# Patient Record
Sex: MALE | Race: WHITE | NOT HISPANIC OR LATINO | Employment: UNEMPLOYED | URBAN - METROPOLITAN AREA
[De-identification: names, ages, dates, MRNs, and addresses within clinical notes are randomized per-mention and may not be internally consistent; named-entity substitution may affect disease eponyms.]

---

## 2017-04-23 ENCOUNTER — HOSPITAL ENCOUNTER (EMERGENCY)
Facility: CLINIC | Age: 32
Discharge: HOME | End: 2017-04-23
Attending: PSYCHIATRY & NEUROLOGY | Admitting: PSYCHIATRY & NEUROLOGY
Payer: MEDICAID

## 2017-04-23 VITALS
TEMPERATURE: 98 F | OXYGEN SATURATION: 100 % | DIASTOLIC BLOOD PRESSURE: 79 MMHG | RESPIRATION RATE: 16 BRPM | HEART RATE: 124 BPM | BODY MASS INDEX: 25.68 KG/M2 | WEIGHT: 179 LBS | SYSTOLIC BLOOD PRESSURE: 129 MMHG

## 2017-04-23 DIAGNOSIS — F19.94 OTHER PSYCHOACTIVE SUBSTANCE-INDUCED MOOD DISORDER (H): ICD-10-CM

## 2017-04-23 DIAGNOSIS — F19.20 CHEMICAL DEPENDENCY (H): ICD-10-CM

## 2017-04-23 LAB
AMPHETAMINES UR QL SCN: ABNORMAL
BARBITURATES UR QL: ABNORMAL
BENZODIAZ UR QL: ABNORMAL
CANNABINOIDS UR QL SCN: ABNORMAL
COCAINE UR QL: ABNORMAL
ETHANOL UR QL SCN: ABNORMAL
OPIATES UR QL SCN: ABNORMAL

## 2017-04-23 PROCEDURE — 80307 DRUG TEST PRSMV CHEM ANLYZR: CPT | Performed by: FAMILY MEDICINE

## 2017-04-23 PROCEDURE — 25000132 ZZH RX MED GY IP 250 OP 250 PS 637: Performed by: PSYCHIATRY & NEUROLOGY

## 2017-04-23 PROCEDURE — 99285 EMERGENCY DEPT VISIT HI MDM: CPT | Mod: 25 | Performed by: PSYCHIATRY & NEUROLOGY

## 2017-04-23 PROCEDURE — 90791 PSYCH DIAGNOSTIC EVALUATION: CPT

## 2017-04-23 PROCEDURE — 99284 EMERGENCY DEPT VISIT MOD MDM: CPT | Mod: Z6 | Performed by: PSYCHIATRY & NEUROLOGY

## 2017-04-23 PROCEDURE — 80307 DRUG TEST PRSMV CHEM ANLYZR: CPT | Mod: 91 | Performed by: FAMILY MEDICINE

## 2017-04-23 PROCEDURE — 80320 DRUG SCREEN QUANTALCOHOLS: CPT | Performed by: FAMILY MEDICINE

## 2017-04-23 RX ORDER — GABAPENTIN 600 MG/1
600 TABLET ORAL 3 TIMES DAILY
Status: ON HOLD | COMMUNITY
End: 2017-05-12

## 2017-04-23 RX ADMIN — NICOTINE POLACRILEX 2 MG: 2 GUM, CHEWING ORAL at 16:12

## 2017-04-23 ASSESSMENT — ENCOUNTER SYMPTOMS
NERVOUS/ANXIOUS: 1
CONSTITUTIONAL NEGATIVE: 1
GASTROINTESTINAL NEGATIVE: 1
DECREASED CONCENTRATION: 1
RESPIRATORY NEGATIVE: 1
EYES NEGATIVE: 1
NEUROLOGICAL NEGATIVE: 1
HEMATOLOGIC/LYMPHATIC NEGATIVE: 1
ENDOCRINE NEGATIVE: 1
CARDIOVASCULAR NEGATIVE: 1
HALLUCINATIONS: 0
MUSCULOSKELETAL NEGATIVE: 1
HYPERACTIVE: 0

## 2017-04-23 NOTE — ED AVS SNAPSHOT
[facility], [place], Emergency Department    [address]    [facility] [address]    Phone:  [phone]    Fax:  [phone]                                       Morgan Bosch   MRN: [id number]    Department:  [facility], Emergency Department   Date of Visit:  4/23/2017           After Visit Summary Signature Page     I have received my discharge instructions, and my questions have been answered. I have discussed any challenges I see with this plan with the nurse or doctor.    ..........................................................................................................................................  Patient/Patient Representative Signature      ..........................................................................................................................................  Patient Representative Print Name and Relationship to Patient    ..................................................               ................................................  Date                                            Time    ..........................................................................................................................................  Reviewed by Signature/Title    ...................................................              ..............................................  Date                                                            Time

## 2017-04-23 NOTE — ED PROVIDER NOTES
History     Chief Complaint   Patient presents with     Suicidal     pt having anxiety, increased depression, and suicidal with plan     The history is provided by the patient and medical records.     Morgan Bosch is a 32 year old male who is here as he drove here seeking help. He has history of chemical dependency and MDD. He has been hospitalized previously. He was last here in November 2016 and was discharged to treatment at [facility], then was at [facility] for aftercare. Patient reports sobriety until this month. He felt his depression got worse. He relapsed on methamphetamine and alcohol, drinking twice weekly.he is living out of his truck and parents kicked him out for using. He was reporting feeling suicidal.    Patient was offered admission, or a crisis facility such as [facility]. He had time to process and is not interested in going back to treatment which is the likely recommendation. He felt that nothing will change. He declined admission into the hospital and a referral for Crisis stabilization.    PERSONAL MEDICAL HISTORY  Past Medical History:   Diagnosis Date     Anxiety      MDD (major depressive disorder)      Psychosis     substance induced     Substance abuse     methamphetamine     PAST SURGICAL HISTORY  Past Surgical History:   Procedure Laterality Date     NO HISTORY OF SURGERY       FAMILY HISTORY  Family History   Problem Relation Age of Onset     Anxiety Disorder Sister      Depression Father      Coronary Artery Disease Maternal Grandfather      DIABETES Paternal Grandfather      SOCIAL HISTORY  Social History   Substance Use Topics     Smoking status: Current Every Day Smoker     Packs/day: 0.50     Smokeless tobacco: Never Used     Alcohol use No     MEDICATIONS  No current facility-administered medications for this encounter.      Current Outpatient Prescriptions   Medication     gabapentin (NEURONTIN) 600 MG tablet     busPIRone (BUSPAR) 10 MG tablet     DULoxetine (CYMBALTA) 60 MG  capsule     multivitamin, therapeutic with minerals (THERA-VIT-M) TABS     Ibuprofen (ADVIL PO)     acetaminophen (TYLENOL) 325 MG tablet     hydrOXYzine (ATARAX) 50 MG tablet     prazosin (MINIPRESS) 1 MG capsule     nicotine polacrilex 4 MG lozenge     ALLERGIES  Allergies   Allergen Reactions     Adhesive Tape Itching     Blisters (band aids)     I have reviewed the Medications, Allergies, Past Medical and Surgical History, and Social History in the Epic system.    Review of Systems   Constitutional: Negative.    HENT: Negative.    Eyes: Negative.    Respiratory: Negative.    Cardiovascular: Negative.    Gastrointestinal: Negative.    Endocrine: Negative.    Genitourinary: Negative.    Musculoskeletal: Negative.    Skin: Negative.    Neurological: Negative.    Hematological: Negative.    Psychiatric/Behavioral: Positive for decreased concentration and suicidal ideas. Negative for hallucinations. The patient is nervous/anxious. The patient is not hyperactive.    All other systems reviewed and are negative.      Physical Exam   BP: 121/71  Pulse: 108  Temp: 98.6  F (37  C)  Resp: 16  Weight: 81.2 kg (179 lb)  SpO2: 97 %  Physical Exam   Constitutional: He appears well-developed.   HENT:   Head: Normocephalic.   Eyes: Pupils are equal, round, and reactive to light.   Neck: Normal range of motion.   Cardiovascular: Normal rate.    Pulmonary/Chest: Effort normal.   Abdominal: Soft.   Musculoskeletal: Normal range of motion.   Neurological: He is alert.   Skin: Skin is warm.   Psychiatric: His speech is normal. Judgment and thought content normal. His affect is blunt. He is withdrawn. He is not agitated, not aggressive, not hyperactive and not combative. Thought content is not paranoid and not delusional. Cognition and memory are normal. He exhibits a depressed mood. He expresses no homicidal and no suicidal ideation.   Nursing note and vitals reviewed.      ED Course     ED Course     Procedures      Labs Ordered and  "Resulted from Time of ED Arrival Up to the Time of Departure from the ED   DRUG ABUSE SCREEN 6 CHEM DEP URINE ([facility]) - Abnormal; Notable for the following:        Result Value    Amphetamine Qual Urine   (*)     Value: Positive   Cutoff for a positive amphetamine is greater than 500 ng/mL. This is an   unconfirmed screening result to be used for medical purposes only.      Benzodiazepine Qual Urine   (*)     Value: Positive   Cutoff for a positive benzodiazepine is greater than 200 ng/mL. This is an   unconfirmed screening result to be used for medical purposes only.      All other components within normal limits       Assessments & Plan (with Medical Decision Making)   Patient with active chemical dependency. He is declining an offer of admission or referral to crisis. He likely is starting to \"sober up,\" and is not ready to return to treatment. Patient is not holdable. He was discharged as he requested. He is encouraged to follow-up established care and services and to reach out to Crisis in the community.    I have reviewed the nursing notes.    I have reviewed the findings, diagnosis, plan and need for follow up with the patient.    Discharge Medication List as of 4/23/2017  6:13 PM          Final diagnoses:   Chemical dependency (H)   Other psychoactive substance-induced mood disorder (H)       4/23/2017   [facility], [place], EMERGENCY DEPARTMENT     Americo Bosch MD  04/23/17 2003    "

## 2017-04-23 NOTE — DISCHARGE INSTRUCTIONS
Methamphetamine is inducing a depressive state as you are coming down from it.  You need to stop using drugs in order to feel better  Follow-up with your established care and services for continued management  Consider connect to [facility] for further support in the community

## 2017-04-23 NOTE — ED AVS SNAPSHOT
[facility], Emergency Department    [address]    [place] [address]    Phone:  [phone]    Fax:  [phone]                                       Morgan Bosch   MRN: [id number]    Department:  [facility], Emergency Department   Date of Visit:  4/23/2017           Patient Information     Date Of Birth          1985        Your diagnoses for this visit were:     Chemical dependency (H)     Other psychoactive substance-induced mood disorder (H)        You were seen by Americo Bosch MD.      Follow-up Information     Follow up with No Ref-Primary, Physician.        Discharge Instructions       Methamphetamine is inducing a depressive state as you are coming down from it.  You need to stop using drugs in order to feel better  Follow-up with your established care and services for continued management  Consider connect to [facility] for further support in the community    24 Hour Appointment Hotline       To make an appointment at any [facility] clinic, call [phone] ([phone]). If you don't have a family doctor or clinic, we will help you find one. [facility] clinics are conveniently located to serve the needs of you and your family.             Review of your medicines      Our records show that you are taking the medicines listed below. If these are incorrect, please call your family doctor or clinic.        Dose / Directions Last dose taken    acetaminophen 325 MG tablet   Commonly known as:  TYLENOL   Dose:  650 mg        Take 2 tablets (650 mg) by mouth every 4 hours as needed for mild pain   Refills:  0        ADVIL PO   Dose:  400 mg        Take 400 mg by mouth every 8 hours as needed for moderate pain   Refills:  0        busPIRone 10 MG tablet   Commonly known as:  BUSPAR   Dose:  10 mg   Quantity:  90 tablet        Take 1 tablet (10 mg) by mouth 3 times daily   Refills:  1        DULoxetine 60 MG EC capsule   Commonly known as:  CYMBALTA   Dose:  60 mg   Quantity:  " 30 capsule        Take 1 capsule (60 mg) by mouth daily   Refills:  1        gabapentin 600 MG tablet   Commonly known as:  NEURONTIN   Dose:  600 mg        Take 600 mg by mouth 3 times daily   Refills:  0        hydrOXYzine 50 MG tablet   Commonly known as:  ATARAX   Dose:  50 mg   Quantity:  90 tablet        Take 1 tablet (50 mg) by mouth 3 times daily as needed for anxiety   Refills:  1        multivitamin, therapeutic with minerals Tabs tablet   Dose:  1 tablet   Quantity:  30 each        Take 1 tablet by mouth daily   Refills:  1        nicotine polacrilex 4 MG lozenge   Dose:  4-8 mg   Quantity:  135 tablet        Place 1-2 lozenges (4-8 mg) inside cheek every hour as needed for other (nicotine withdrawal symptoms)   Refills:  1        prazosin 1 MG capsule   Commonly known as:  MINIPRESS   Dose:  1 mg   Quantity:  30 capsule        Take 1 capsule (1 mg) by mouth At Bedtime   Refills:  1                Procedures and tests performed during your visit     Drug abuse screen 6 urine (chem dep)      Orders Needing Specimen Collection     None      Pending Results     No orders found from 4/21/2017 to 4/24/2017.            Pending Culture Results     No orders found from 4/21/2017 to 4/24/2017.            Thank you for choosing [facility]       Thank you for choosing [facility] for your care. Our goal is always to provide you with excellent care. Hearing back from our patients is one way we can continue to improve our services. Please take a few minutes to complete the written survey that you may receive in the mail after you visit with us. Thank you!        [employer] Information     [employer] lets you send messages to your doctor, view your test results, renew your prescriptions, schedule appointments and more. To sign up, go to [email or web address] . Click on \"Log in\" on the left side of the screen, which will take you to the Welcome page. Then click on \"Sign up Now\" on the right side of the page.     You will be " asked to enter the access code listed below, as well as some personal information. Please follow the directions to create your username and password.     Your access code is: [email or web address]  Expires: 2017  6:13 PM     Your access code will  in 90 days. If you need help or a new code, please call your [facility] clinic or [phone].        Care EveryWhere ID     This is your Care EveryWhere ID. This could be used by other organizations to access your [facility] medical records  [id number]        After Visit Summary       This is your record. Keep this with you and show to your community pharmacist(s) and doctor(s) at your next visit.

## 2017-04-25 ENCOUNTER — HOSPITAL ENCOUNTER (INPATIENT)
Facility: CLINIC | Age: 32
LOS: 20 days | Discharge: HOME | DRG: 885 | End: 2017-05-15
Attending: EMERGENCY MEDICINE | Admitting: PSYCHIATRY & NEUROLOGY
Payer: MEDICAID

## 2017-04-25 ENCOUNTER — TELEPHONE (OUTPATIENT)
Dept: BEHAVIORAL HEALTH | Facility: CLINIC | Age: 32
End: 2017-04-25

## 2017-04-25 DIAGNOSIS — F32.A DEPRESSION, UNSPECIFIED DEPRESSION TYPE: ICD-10-CM

## 2017-04-25 DIAGNOSIS — F33.1 MAJOR DEPRESSIVE DISORDER, RECURRENT EPISODE, MODERATE (H): ICD-10-CM

## 2017-04-25 DIAGNOSIS — F41.1 GAD (GENERALIZED ANXIETY DISORDER): Primary | ICD-10-CM

## 2017-04-25 DIAGNOSIS — F19.10 POLYSUBSTANCE ABUSE (H): ICD-10-CM

## 2017-04-25 DIAGNOSIS — G47.09 OTHER INSOMNIA: ICD-10-CM

## 2017-04-25 PROBLEM — R45.851 SUICIDAL IDEATION: Status: ACTIVE | Noted: 2017-04-25

## 2017-04-25 PROCEDURE — 90791 PSYCH DIAGNOSTIC EVALUATION: CPT

## 2017-04-25 PROCEDURE — 99285 EMERGENCY DEPT VISIT HI MDM: CPT | Mod: Z6 | Performed by: EMERGENCY MEDICINE

## 2017-04-25 PROCEDURE — 99285 EMERGENCY DEPT VISIT HI MDM: CPT | Mod: 25 | Performed by: EMERGENCY MEDICINE

## 2017-04-25 PROCEDURE — 25000132 ZZH RX MED GY IP 250 OP 250 PS 637: Performed by: PSYCHIATRY & NEUROLOGY

## 2017-04-25 PROCEDURE — 80307 DRUG TEST PRSMV CHEM ANLYZR: CPT | Performed by: EMERGENCY MEDICINE

## 2017-04-25 PROCEDURE — 80320 DRUG SCREEN QUANTALCOHOLS: CPT | Performed by: EMERGENCY MEDICINE

## 2017-04-25 PROCEDURE — 12400001 ZZH R&B MH UMMC

## 2017-04-25 RX ORDER — OLANZAPINE 5 MG/1
5 TABLET ORAL
Status: DISCONTINUED | OUTPATIENT
Start: 2017-04-25 | End: 2017-05-15 | Stop reason: HOSPADM

## 2017-04-25 RX ORDER — ALUMINA, MAGNESIA, AND SIMETHICONE 2400; 2400; 240 MG/30ML; MG/30ML; MG/30ML
30 SUSPENSION ORAL EVERY 4 HOURS PRN
Status: DISCONTINUED | OUTPATIENT
Start: 2017-04-25 | End: 2017-05-15 | Stop reason: HOSPADM

## 2017-04-25 RX ORDER — ACETAMINOPHEN 325 MG/1
650 TABLET ORAL EVERY 4 HOURS PRN
Status: DISCONTINUED | OUTPATIENT
Start: 2017-04-25 | End: 2017-05-15 | Stop reason: HOSPADM

## 2017-04-25 RX ORDER — HYDROXYZINE HYDROCHLORIDE 25 MG/1
25-50 TABLET, FILM COATED ORAL EVERY 4 HOURS PRN
Status: DISCONTINUED | OUTPATIENT
Start: 2017-04-25 | End: 2017-05-15 | Stop reason: HOSPADM

## 2017-04-25 RX ORDER — BISACODYL 10 MG
10 SUPPOSITORY, RECTAL RECTAL DAILY PRN
Status: DISCONTINUED | OUTPATIENT
Start: 2017-04-25 | End: 2017-05-15 | Stop reason: HOSPADM

## 2017-04-25 RX ORDER — MULTIPLE VITAMINS W/ MINERALS TAB 9MG-400MCG
1 TAB ORAL DAILY
Status: DISCONTINUED | OUTPATIENT
Start: 2017-04-25 | End: 2017-05-15 | Stop reason: HOSPADM

## 2017-04-25 RX ORDER — GABAPENTIN 600 MG/1
600 TABLET ORAL 3 TIMES DAILY
Status: DISCONTINUED | OUTPATIENT
Start: 2017-04-25 | End: 2017-05-15 | Stop reason: HOSPADM

## 2017-04-25 RX ORDER — PHENOBARBITAL 32.4 MG/1
32.4 TABLET ORAL 4 TIMES DAILY
Status: DISCONTINUED | OUTPATIENT
Start: 2017-04-25 | End: 2017-04-27

## 2017-04-25 RX ORDER — POLYETHYLENE GLYCOL 3350 17 G
2-4 POWDER IN PACKET (EA) ORAL
Status: DISCONTINUED | OUTPATIENT
Start: 2017-04-25 | End: 2017-05-15 | Stop reason: HOSPADM

## 2017-04-25 RX ORDER — BUSPIRONE HYDROCHLORIDE 10 MG/1
10 TABLET ORAL 3 TIMES DAILY
Status: DISCONTINUED | OUTPATIENT
Start: 2017-04-25 | End: 2017-05-02

## 2017-04-25 RX ORDER — TRAZODONE HYDROCHLORIDE 50 MG/1
50 TABLET, FILM COATED ORAL
Status: DISCONTINUED | OUTPATIENT
Start: 2017-04-25 | End: 2017-05-15 | Stop reason: HOSPADM

## 2017-04-25 RX ORDER — OLANZAPINE 10 MG/2ML
5 INJECTION, POWDER, FOR SOLUTION INTRAMUSCULAR
Status: DISCONTINUED | OUTPATIENT
Start: 2017-04-25 | End: 2017-05-15 | Stop reason: HOSPADM

## 2017-04-25 RX ADMIN — NICOTINE POLACRILEX 4 MG: 4 LOZENGE ORAL at 19:02

## 2017-04-25 RX ADMIN — ACETAMINOPHEN 650 MG: 325 TABLET, FILM COATED ORAL at 19:02

## 2017-04-25 RX ADMIN — NICOTINE POLACRILEX 4 MG: 4 LOZENGE ORAL at 17:58

## 2017-04-25 RX ADMIN — MULTIPLE VITAMINS W/ MINERALS TAB 1 TABLET: TAB at 19:02

## 2017-04-25 RX ADMIN — PHENOBARBITAL 32.4 MG: 32.4 TABLET ORAL at 19:02

## 2017-04-25 ASSESSMENT — ENCOUNTER SYMPTOMS
DIARRHEA: 0
ABDOMINAL PAIN: 0
SHORTNESS OF BREATH: 0
FEVER: 0
DYSPHORIC MOOD: 1

## 2017-04-25 NOTE — IP AVS SNAPSHOT
[address] [address]    Phone:  [phone]                                       After Visit Summary   4/25/2017    Morgan Bosch    MRN: [id number]           After Visit Summary Signature Page     I have received my discharge instructions, and my questions have been answered. I have discussed any challenges I see with this plan with the nurse or doctor.    ..........................................................................................................................................  Patient/Patient Representative Signature      ..........................................................................................................................................  Patient Representative Print Name and Relationship to Patient    ..................................................               ................................................  Date                                            Time    ..........................................................................................................................................  Reviewed by Signature/Title    ...................................................              ..............................................  Date                                                            Time

## 2017-04-25 NOTE — TELEPHONE ENCOUNTER
S: Rosa gave clinical saying pt attempted to carbon monoxide poison himself in his car today as a SA.  B: He was in er a few days and decided to leave er. He attempted suicide today saying everyone has lost nelly in him because he has relapsed on xanax, meth, etoh and cough syrup. He says he takes xanax occasionally. It is not rx'ed. He denies regularly using etoh. He says meth is his drug of choice but denies using it daily. He says he does not think he can be sober. He now wants cd tx. Utox pos for amphet's and benzps. No chronic med prob's.  A: coop, lethargic, vol, med cleared, SI  R: #20/[facility] accepted for brittanie james

## 2017-04-25 NOTE — IP AVS SNAPSHOT
MRN:[id number]                      After Visit Summary   4/25/2017    Morgan Bosch    MRN: [id number]           Thank you!     Thank you for choosing [facility] for your care. Our goal is always to provide you with excellent care.        Patient Information     Date Of Birth          1985        Designated Caregiver       Most Recent Value    Caregiver    Will someone help with your care after discharge? no      About your hospital stay     You were admitted on:  April 25, 2017 You last received care in the:  [facility]    You were discharged on:  May 15, 2017       Who to Call     For medical emergencies, please call 911.  For non-urgent questions about your medical care, please call your primary care provider or clinic, None          Attending Provider     Provider Specialty    Chanda Mcdowell MD Emergency Medicine    Konstantin Madrid MD Psychiatry       Primary Care Provider    Physician No Ref-Primary       No address on file        Further instructions from your care team       Behavioral Discharge Planning and Instructions    Summary:                                                  NEEDS PD    Main Diagnosis: Major Depressive Disorder, Alcohol use disorder    Major Treatments, Procedures and Findings: Psychological assessment, [facility] resources, medication management    Symptoms to Report: feeling more aggressive, increased confusion, losing more sleep, mood getting worse or thoughts of suicide    Lifestyle Adjustment: Please refrain from all mood altering substances including alcohol.    Psychiatry Follow-up:   Please contact [facility]  for a referral for medication management as you get closer to completing the program at [facility]..  [facility] has appointments available to them in [place].   Lacy Freeze: [phone]    Resources:   Crisis Intervention: 373.698.4796 or 623-097-3340 (TTY: 239.697.6248).  Call anytime for  "help.  National [employer] on Mental Illness (www.mn.mark.org): 333-572-6887 or 779-812-1363.  Suicide Awareness Voices of Education (SAVE) (www.save.org): 872-763-TEGS (1938)  National Suicide Prevention Line (www.mentalhealthmn.org): 909-033-YAMB (6905)    General Medication Instructions:   See your medication sheet(s) for instructions.   Take all medicines as directed.  Make no changes unless your doctor suggests them.   Go to all your doctor visits.  Be sure to have all your required lab tests. This way, your medicines can be refilled on time.  Do not use any drugs not prescribed by your doctor.  Avoid alcohol.      The treatment team has appreciated the opportunity to work with you.  We wish you the best in the future.  If you have any questions or concerns our unit number is [phone].    Pending Results     No orders found from 4/23/2017 to 4/26/2017.            Statement of Approval     Ordered          05/12/17 1708  I have reviewed and agree with all the recommendations and orders detailed in this document.  EFFECTIVE NOW     Approved and electronically signed by:  Konstantin Madrid MD             Admission Information     Date & Time Provider Department Dept. Phone    4/25/2017 Konstantin Madrid MD  [facility] [phone]      Your Vitals Were     Blood Pressure Pulse Temperature Respirations Height Weight    110/64 85 98  F (36.7  C) (Oral) 16 1.778 m (5' 10\") 87.5 kg (193 lb)    Pulse Oximetry BMI (Body Mass Index)                96% 27.69 kg/m2          [employer] Information     [employer] lets you send messages to your doctor, view your test results, renew your prescriptions, schedule appointments and more. To sign up, go to [email or web address] . Click on \"Log in\" on the left side of the screen, which will take you to the Welcome page. Then click on \"Sign up Now\" on the right side of the page.     You will be asked to enter the access code listed below, as well as some personal information. " Please follow the directions to create your username and password.     Your access code is: [email or web address]  Expires: 2017  6:13 PM     Your access code will  in 90 days. If you need help or a new code, please call your [facility] clinic or [phone].        Care EveryWhere ID     This is your Care EveryWhere ID. This could be used by other organizations to access your [facility] medical records  [id number]           Review of your medicines      START taking        Dose / Directions    FLUoxetine 20 MG tablet   Used for:  Major depressive disorder, recurrent episode, moderate (H), TIM (generalized anxiety disorder)        Dose:  40 mg   Take 2 tablets (40 mg) by mouth daily   Quantity:  60 tablet   Refills:  1         CONTINUE these medicines which may have CHANGED, or have new prescriptions. If we are uncertain of the size of tablets/capsules you have at home, strength may be listed as something that might have changed.        Dose / Directions    BusPIRone HCl 30 MG Tabs   This may have changed:    - medication strength  - how much to take  - when to take this   Used for:  TIM (generalized anxiety disorder)        Dose:  30 mg   Take 30 mg by mouth 2 times daily   Quantity:  60 tablet   Refills:  1       * hydrOXYzine 50 MG tablet   Commonly known as:  ATARAX   This may have changed:  Another medication with the same name was added. Make sure you understand how and when to take each.   Used for:  TIM (generalized anxiety disorder)        Dose:  50 mg   Take 1 tablet (50 mg) by mouth 3 times daily as needed for anxiety   Quantity:  90 tablet   Refills:  1       * hydrOXYzine 50 MG tablet   Commonly known as:  ATARAX   This may have changed:  You were already taking a medication with the same name, and this prescription was added. Make sure you understand how and when to take each.   Used for:  Other insomnia        Dose:   mg   Take 1-2 tablets ( mg) by mouth nightly as needed (sleep)    Quantity:  60 tablet   Refills:  1       * Notice:  This list has 2 medication(s) that are the same as other medications prescribed for you. Read the directions carefully, and ask your doctor or other care provider to review them with you.      CONTINUE these medicines which have NOT CHANGED        Dose / Directions    gabapentin 600 MG tablet   Commonly known as:  NEURONTIN   Used for:  TIM (generalized anxiety disorder)        Dose:  600 mg   Take 1 tablet (600 mg) by mouth 3 times daily   Quantity:  90 tablet   Refills:  1       multivitamin, therapeutic with minerals Tabs tablet   Used for:  Polysubstance abuse        Dose:  1 tablet   Take 1 tablet by mouth daily   Quantity:  30 each   Refills:  1       nicotine polacrilex 4 MG lozenge   Used for:  Polysubstance abuse        Dose:  4-8 mg   Place 1-2 lozenges (4-8 mg) inside cheek every hour as needed for other (nicotine withdrawal symptoms)   Quantity:  135 tablet   Refills:  1         STOP taking     acetaminophen 325 MG tablet   Commonly known as:  TYLENOL           ADVIL PO           DULoxetine 60 MG EC capsule   Commonly known as:  CYMBALTA                Where to get your medicines      These medications were sent to [facility] [place], [place] - [address]  [address] [address]     Phone:  [phone]     BusPIRone HCl 30 MG Tabs    FLUoxetine 20 MG tablet    gabapentin 600 MG tablet    hydrOXYzine 50 MG tablet    hydrOXYzine 50 MG tablet                Protect others around you: Learn how to safely use, store and throw away your medicines at www.disposemymeds.org.             Medication List: This is a list of all your medications and when to take them. Check marks below indicate your daily home schedule. Keep this list as a reference.      Medications           Morning Afternoon Evening Bedtime As Needed    BusPIRone HCl 30 MG Tabs   Take 30 mg by mouth 2 times daily   Last time this was given:  30 mg on  5/15/2017  6:09 AM                                FLUoxetine 20 MG tablet   Take 2 tablets (40 mg) by mouth daily   Last time this was given:  40 mg on 5/15/2017  6:09 AM                                gabapentin 600 MG tablet   Commonly known as:  NEURONTIN   Take 1 tablet (600 mg) by mouth 3 times daily   Last time this was given:  600 mg on 5/15/2017  6:09 AM                                * hydrOXYzine 50 MG tablet   Commonly known as:  ATARAX   Take 1 tablet (50 mg) by mouth 3 times daily as needed for anxiety   Last time this was given:  100 mg on 5/12/2017  8:20 PM                                * hydrOXYzine 50 MG tablet   Commonly known as:  ATARAX   Take 1-2 tablets ( mg) by mouth nightly as needed (sleep)   Last time this was given:  100 mg on 5/12/2017  8:20 PM                                multivitamin, therapeutic with minerals Tabs tablet   Take 1 tablet by mouth daily   Last time this was given:  1 tablet on 5/15/2017  6:10 AM                                nicotine polacrilex 4 MG lozenge   Place 1-2 lozenges (4-8 mg) inside cheek every hour as needed for other (nicotine withdrawal symptoms)   Last time this was given:  8 mg on 5/15/2017  6:10 AM                                * Notice:  This list has 2 medication(s) that are the same as other medications prescribed for you. Read the directions carefully, and ask your doctor or other care provider to review them with you.

## 2017-04-25 NOTE — PROGRESS NOTES
Patient admitted from emergency department. Patient is voluntary.  Frustrated and irritable with admission interview.  Stated he has been living in his van, and pulled on side of road and planned on carbon monoxide had hose to use but drove self to hospital.  Admits to using Meth and alcohol.  Friend had gave him one xanax.  Feeling hopeless and tired of dealing with his depression thinks he may have had 3 month of sobriety.  Questions cymbalta helping with his depression. Has not been attending AA or NA.  Has no support.

## 2017-04-25 NOTE — PHARMACY-ADMISSION MEDICATION HISTORY
Admission Medication History status for the 4/25/2017 admission is complete.  See EPIC admission navigator for Prior to Admission medications.    Medication history sources:  Patient, [facility] records (Care Everywhere)    Medication history source reliability: Good; patient knew his medications and doses well.    Medication adherence:  Moderate; patient reports he takes most of his medications regularly. He states he was on prazosin, but stopped a few months ago on his own. He also stopped taking his multivitamin and hydroxyzine PRN, but wants to restart both medications.    Changes made to PTA medication list (reason)  Added: None  Deleted:   - Prazosin; patient reports he hasn't taken for several months. He reports he stopped taking on his own.  Changed: None    Additional medication history information (including reliability of information, actions taken by pharmacist):   - [facility] records indicate the patient was started on quetiapine 100 mg po qHS for sleep in January 2017. The patient did not report as an active medication, so I did not add to his list. I was unable to follow-up with patient to confirm he is not taking.    Time spent in this activity: 10 minutes    Medication history completed by: Safia Ma, PharmD    Prior to Admission medications    Medication Sig Last Dose Taking? Auth Provider   gabapentin (NEURONTIN) 600 MG tablet Take 600 mg by mouth 3 times daily 4/24/2017 Yes Reported, Patient   busPIRone (BUSPAR) 10 MG tablet Take 1 tablet (10 mg) by mouth 3 times daily 4/24/2017 Yes Ni Harrison APRN CNP   hydrOXYzine (ATARAX) 50 MG tablet Take 1 tablet (50 mg) by mouth 3 times daily as needed for anxiety  Yes Ni Harrison APRN CNP   DULoxetine (CYMBALTA) 60 MG capsule Take 1 capsule (60 mg) by mouth daily 4/24/2017 Yes Ni Harrison APRN CNP   nicotine polacrilex 4 MG lozenge Place 1-2 lozenges (4-8 mg) inside cheek every hour as needed for other  (nicotine withdrawal symptoms) 4/25/2017 Yes Ni Harrison APRN CNP   Ibuprofen (ADVIL PO) Take 400 mg by mouth every 8 hours as needed for moderate pain   Yes Reported, Patient   acetaminophen (TYLENOL) 325 MG tablet Take 2 tablets (650 mg) by mouth every 4 hours as needed for mild pain  Yes Ni Harrison APRN CNP   multivitamin, therapeutic with minerals (THERA-VIT-M) TABS Take 1 tablet by mouth daily More than a month  Ni Harrison APRN CNP

## 2017-04-25 NOTE — PROGRESS NOTES
ADMISSION:[place], EBT( PT wants in the locker), card, Shoes, belt, bag park, hat, keys, phone, a lighter, all in locker #12  I am responsible for any personal items that are not sent to the safe or pharmacy. [facility] is not responsible for loss, theft or damage of any property in my possession.    Patient Signature _____________________ Date/Time _____________________    Staff Signature _______________________ Date/Time _____________________    2nd Staff person, if patient is unable/unwilling to sign  ___________________________________ Date/Time _____________________  DISCHARGE:  All personal items have been returned to me.    Patient Signature _____________________ Date/Time _____________________     Staff Signature _______________________ Date/Time _____________________

## 2017-04-25 NOTE — ED PROVIDER NOTES
History     Chief Complaint   Patient presents with     Suicidal     depression     HPI  Morgan Bosch is a 32 year old male with a history of polysubstance abuse and MDD who presents to the Emergency Department for evaluation of suicidal ideation. The patient reports feeling suicidal since being kicked out of his parents home in the last couple of weeks, when he relapsed on drugs. Notably, he was seen and evaluated here 2 days ago similarly for suicidal ideation and he was offered admission here or a crisis facility, such as [facility], but he declined at that time. The patient reports sobriety until this month, when he began having worsening depression, subsequently relapsing on methamphetamine and alcohol, as well as Xanax and cough syrup. He follows with [facility] and has had prior psychiatric admissions, and he has a history of inconsistency with his medications, gabapentin and Buspar. The patient has no physical complaints or other concerns at this time.    I have reviewed the Medications, Allergies, Past Medical and Surgical History, and Social History in the Epic system.    No current facility-administered medications for this encounter.      Current Outpatient Prescriptions   Medication     gabapentin (NEURONTIN) 600 MG tablet     busPIRone (BUSPAR) 10 MG tablet     DULoxetine (CYMBALTA) 60 MG capsule     nicotine polacrilex 4 MG lozenge     hydrOXYzine (ATARAX) 50 MG tablet     prazosin (MINIPRESS) 1 MG capsule     multivitamin, therapeutic with minerals (THERA-VIT-M) TABS     Ibuprofen (ADVIL PO)     acetaminophen (TYLENOL) 325 MG tablet     Past Medical History:   Diagnosis Date     Anxiety      MDD (major depressive disorder)      Psychosis     substance induced     Substance abuse     methamphetamine       Past Surgical History:   Procedure Laterality Date     NO HISTORY OF SURGERY         Family History   Problem Relation Age of Onset     Anxiety Disorder Sister      Depression Father       Coronary Artery Disease Maternal Grandfather      DIABETES Paternal Grandfather        Social History   Substance Use Topics     Smoking status: Current Every Day Smoker     Packs/day: 0.50     Smokeless tobacco: Never Used     Alcohol use 1.2 oz/week     2 Cans of beer per week        Allergies   Allergen Reactions     Adhesive Tape Itching     Blisters (band aids)       Review of Systems   Constitutional: Negative for fever.   Respiratory: Negative for shortness of breath.    Cardiovascular: Negative for chest pain.   Gastrointestinal: Negative for abdominal pain and diarrhea.   Psychiatric/Behavioral: Positive for dysphoric mood and suicidal ideas.   All other systems reviewed and are negative.      Physical Exam   BP: 105/72  Pulse: 103  Temp: 97.2  F (36.2  C)  Weight: 79.7 kg (175 lb 12.8 oz)  SpO2: 95 %  Physical Exam   Constitutional: He is oriented to person, place, and time. No distress.   HENT:   Mouth/Throat: No oropharyngeal exudate.   Superficial abrasion to the right side of face   Eyes: No scleral icterus.   Cardiovascular: Normal heart sounds and intact distal pulses.    Pulmonary/Chest: Breath sounds normal. No respiratory distress.   Abdominal: Soft. There is no tenderness.   Musculoskeletal: He exhibits no edema or tenderness.   Neurological: He is alert and oriented to person, place, and time. No cranial nerve deficit. He exhibits normal muscle tone.   Flat affect   Skin: Skin is warm. No rash noted. He is not diaphoretic.       ED Course     ED Course     Procedures             Critical Care time:  none               Labs Ordered and Resulted from Time of ED Arrival Up to the Time of Departure from the ED   DRUG ABUSE SCREEN 6 CHEM DEP URINE ([facility]) - Abnormal; Notable for the following:        Result Value    Amphetamine Qual Urine   (*)     Value: Positive   Cutoff for a positive amphetamine is greater than 500 ng/mL. This is an   unconfirmed screening result to be used for medical  purposes only.      Benzodiazepine Qual Urine   (*)     Value: Positive   Cutoff for a positive benzodiazepine is greater than 200 ng/mL. This is an   unconfirmed screening result to be used for medical purposes only.      All other components within normal limits            Assessments & Plan (with Medical Decision Making)   He does have a superficial abrasion to his face -- is unwilling to tell me exactly how or when it happened. He does seem neuro intact. He denies acute illness or complaints. He will be admitted voluntarily for inpt mental health stabilization and treatment.    I have reviewed the nursing notes.    I have reviewed the findings, diagnosis, plan and need for follow up with the patient.    New Prescriptions    No medications on file       Final diagnoses:   Depression, unspecified depression type   Polysubstance abuse     I, Tobias Obregon, am serving as a trained medical scribe to document services personally performed by Chanda Mcdowell MD, based on the provider's statements to me.      Chanda KERNS MD, was physically present and have reviewed and verified the accuracy of this note documented by Tobias Obregon.    4/25/2017   [facility], EMERGENCY DEPARTMENT     Chanda Mcdowell MD  04/25/17 8644

## 2017-04-26 LAB
ALBUMIN SERPL-MCNC: 3.4 G/DL (ref 3.4–5)
ALP SERPL-CCNC: 84 U/L (ref 40–150)
ALT SERPL W P-5'-P-CCNC: 22 U/L (ref 0–70)
ANION GAP SERPL CALCULATED.3IONS-SCNC: 5 MMOL/L (ref 3–14)
AST SERPL W P-5'-P-CCNC: 18 U/L (ref 0–45)
BASOPHILS # BLD AUTO: 0 10E9/L (ref 0–0.2)
BASOPHILS NFR BLD AUTO: 0.7 %
BILIRUB SERPL-MCNC: 1 MG/DL (ref 0.2–1.3)
BUN SERPL-MCNC: 10 MG/DL (ref 7–30)
CALCIUM SERPL-MCNC: 8.6 MG/DL (ref 8.5–10.1)
CHLORIDE SERPL-SCNC: 110 MMOL/L (ref 94–109)
CHOLEST SERPL-MCNC: 92 MG/DL
CO2 SERPL-SCNC: 29 MMOL/L (ref 20–32)
CREAT SERPL-MCNC: 0.88 MG/DL (ref 0.66–1.25)
DEPRECATED CALCIDIOL+CALCIFEROL SERPL-MC: 49 UG/L (ref 20–75)
DIFFERENTIAL METHOD BLD: ABNORMAL
EOSINOPHIL # BLD AUTO: 0.3 10E9/L (ref 0–0.7)
EOSINOPHIL NFR BLD AUTO: 5.6 %
ERYTHROCYTE [DISTWIDTH] IN BLOOD BY AUTOMATED COUNT: 12.8 % (ref 10–15)
GFR SERPL CREATININE-BSD FRML MDRD: ABNORMAL ML/MIN/1.7M2
GLUCOSE SERPL-MCNC: 98 MG/DL (ref 70–99)
HCT VFR BLD AUTO: 39 % (ref 40–53)
HCV AB SERPL QL IA: NORMAL
HDLC SERPL-MCNC: 57 MG/DL
HGB BLD-MCNC: 13.2 G/DL (ref 13.3–17.7)
HIV 1+2 AB+HIV1 P24 AG SERPL QL IA: NORMAL
IMM GRANULOCYTES # BLD: 0 10E9/L (ref 0–0.4)
IMM GRANULOCYTES NFR BLD: 0.3 %
LDLC SERPL CALC-MCNC: 14 MG/DL
LYMPHOCYTES # BLD AUTO: 1.6 10E9/L (ref 0.8–5.3)
LYMPHOCYTES NFR BLD AUTO: 26 %
MCH RBC QN AUTO: 31 PG (ref 26.5–33)
MCHC RBC AUTO-ENTMCNC: 33.8 G/DL (ref 31.5–36.5)
MCV RBC AUTO: 92 FL (ref 78–100)
MONOCYTES # BLD AUTO: 0.6 10E9/L (ref 0–1.3)
MONOCYTES NFR BLD AUTO: 10.4 %
NEUTROPHILS # BLD AUTO: 3.4 10E9/L (ref 1.6–8.3)
NEUTROPHILS NFR BLD AUTO: 57 %
NONHDLC SERPL-MCNC: 35 MG/DL
NRBC # BLD AUTO: 0 10*3/UL
NRBC BLD AUTO-RTO: 0 /100
PLATELET # BLD AUTO: 272 10E9/L (ref 150–450)
POTASSIUM SERPL-SCNC: 4.5 MMOL/L (ref 3.4–5.3)
PROT SERPL-MCNC: 6.5 G/DL (ref 6.8–8.8)
RBC # BLD AUTO: 4.26 10E12/L (ref 4.4–5.9)
SODIUM SERPL-SCNC: 144 MMOL/L (ref 133–144)
T4 FREE SERPL-MCNC: 1.21 NG/DL (ref 0.76–1.46)
TRIGL SERPL-MCNC: 105 MG/DL
TSH SERPL DL<=0.005 MIU/L-ACNC: 0.18 MU/L (ref 0.4–4)
WBC # BLD AUTO: 6 10E9/L (ref 4–11)

## 2017-04-26 PROCEDURE — 84443 ASSAY THYROID STIM HORMONE: CPT | Performed by: PSYCHIATRY & NEUROLOGY

## 2017-04-26 PROCEDURE — 25000132 ZZH RX MED GY IP 250 OP 250 PS 637: Performed by: PSYCHIATRY & NEUROLOGY

## 2017-04-26 PROCEDURE — 82306 VITAMIN D 25 HYDROXY: CPT | Performed by: PSYCHIATRY & NEUROLOGY

## 2017-04-26 PROCEDURE — 87389 HIV-1 AG W/HIV-1&-2 AB AG IA: CPT | Performed by: PSYCHIATRY & NEUROLOGY

## 2017-04-26 PROCEDURE — 80053 COMPREHEN METABOLIC PANEL: CPT | Performed by: PSYCHIATRY & NEUROLOGY

## 2017-04-26 PROCEDURE — 86803 HEPATITIS C AB TEST: CPT | Performed by: PSYCHIATRY & NEUROLOGY

## 2017-04-26 PROCEDURE — 85025 COMPLETE CBC W/AUTO DIFF WBC: CPT | Performed by: PSYCHIATRY & NEUROLOGY

## 2017-04-26 PROCEDURE — 84439 ASSAY OF FREE THYROXINE: CPT | Performed by: PSYCHIATRY & NEUROLOGY

## 2017-04-26 PROCEDURE — 80061 LIPID PANEL: CPT | Performed by: PSYCHIATRY & NEUROLOGY

## 2017-04-26 PROCEDURE — 12400001 ZZH R&B MH UMMC

## 2017-04-26 PROCEDURE — 36415 COLL VENOUS BLD VENIPUNCTURE: CPT | Performed by: PSYCHIATRY & NEUROLOGY

## 2017-04-26 PROCEDURE — 99223 1ST HOSP IP/OBS HIGH 75: CPT | Mod: AI | Performed by: PSYCHIATRY & NEUROLOGY

## 2017-04-26 RX ORDER — FLUOXETINE 10 MG/1
20 TABLET, FILM COATED ORAL DAILY
Status: DISCONTINUED | OUTPATIENT
Start: 2017-04-26 | End: 2017-05-02

## 2017-04-26 RX ADMIN — MULTIPLE VITAMINS W/ MINERALS TAB 1 TABLET: TAB at 08:50

## 2017-04-26 RX ADMIN — BUSPIRONE HYDROCHLORIDE 10 MG: 10 TABLET ORAL at 08:50

## 2017-04-26 RX ADMIN — PHENOBARBITAL 32.4 MG: 32.4 TABLET ORAL at 08:50

## 2017-04-26 RX ADMIN — NICOTINE POLACRILEX 8 MG: 4 LOZENGE ORAL at 13:34

## 2017-04-26 RX ADMIN — BUSPIRONE HYDROCHLORIDE 10 MG: 10 TABLET ORAL at 13:31

## 2017-04-26 RX ADMIN — NICOTINE POLACRILEX 8 MG: 4 LOZENGE ORAL at 19:58

## 2017-04-26 RX ADMIN — NICOTINE POLACRILEX 4 MG: 4 LOZENGE ORAL at 08:50

## 2017-04-26 RX ADMIN — PHENOBARBITAL 32.4 MG: 32.4 TABLET ORAL at 13:32

## 2017-04-26 RX ADMIN — NICOTINE POLACRILEX 4 MG: 4 LOZENGE ORAL at 17:01

## 2017-04-26 RX ADMIN — NICOTINE POLACRILEX 4 MG: 4 LOZENGE ORAL at 11:39

## 2017-04-26 RX ADMIN — GABAPENTIN 600 MG: 600 TABLET, FILM COATED ORAL at 14:07

## 2017-04-26 RX ADMIN — HYDROXYZINE HYDROCHLORIDE 50 MG: 25 TABLET ORAL at 13:31

## 2017-04-26 RX ADMIN — FLUOXETINE HYDROCHLORIDE 20 MG: 10 TABLET, COATED ORAL at 19:58

## 2017-04-26 RX ADMIN — PHENOBARBITAL 32.4 MG: 32.4 TABLET ORAL at 17:01

## 2017-04-26 RX ADMIN — GABAPENTIN 600 MG: 600 TABLET, FILM COATED ORAL at 08:50

## 2017-04-26 RX ADMIN — GABAPENTIN 600 MG: 600 TABLET, FILM COATED ORAL at 19:58

## 2017-04-26 RX ADMIN — PHENOBARBITAL 32.4 MG: 32.4 TABLET ORAL at 19:58

## 2017-04-26 RX ADMIN — BUSPIRONE HYDROCHLORIDE 10 MG: 10 TABLET ORAL at 19:58

## 2017-04-26 ASSESSMENT — ACTIVITIES OF DAILY LIVING (ADL)
LAUNDRY: WITH SUPERVISION
ORAL_HYGIENE: INDEPENDENT
HYGIENE/GROOMING: INDEPENDENT
DRESS: INDEPENDENT
ORAL_HYGIENE: INDEPENDENT
GROOMING: INDEPENDENT
DRESS: STREET CLOTHES

## 2017-04-26 NOTE — PLAN OF CARE
Problem: General Plan of Care (Inpatient Behavioral)  Goal: Team Discussion  Team Plan:   BEHAVIORAL TEAM DISCUSSION     Continued Stay Criteria/Rationale: suicide attempt by CO2 in the context of a relapse on meth and alcohol  Plan: CTC to explore options for support, manage medications  Participants: Dr. Madrid, RN Jessica Clemons, CTC Chanda Josephan     Medical/Physical: withdrawing from meth and alcohol     Illness Management Recovery model: Personal Plan of Care     Patient completed Personal Plan of Care, identifying reasons for hospitalization and goals for discharge. Form reviewed in team meeting and signed by patient, physician, writer and RN. Form given to [facility] to be scanned into [facility].

## 2017-04-26 NOTE — PROGRESS NOTES
"  Initial Psychosocial Assessment    I have reviewed the chart, met with the patient, and developed Care Plan. Information for assessment was obtained from: Pt, medical record                                                             Voluntary    Presenting Problem: Patient brought self to hospital after attempting suicide by carbon dioxide. He relapsed on Meth and alcohol. Pt reported to this writer that asphyxiation takes too long and he needs a quicker form of suicide like hanging. He does not want to live any longer. Patient stated, \"Im not going to make it.\"      History of Mental Health and Chemical Dependency:  History of several commitments, spent time at [facility] possibly 2010    Many prior CD treatments: [facility], [facility] x2, project [facility] x2, [facility]      Significant Life Events   (Illness, Abuse, Trauma, Death): denies    Family: born and raised in [place] with intact family. One sister in [place], never  and no children.    Living Situation: homeless, living in his [employer]      Educational Background: HS grad, attended the art [employer] and film school in [place].       Financial Status: unemployed, [place] and food stamps    Legal Issues:  Felony robbery conviction    Ethnic/Cultural Issues: none    Spiritual Orientation:  Yarsani     Service History: none    Social Functioning (organization, interests): used to enjoy photography, art and music    Current Treatment Providers are:    none    Social Service Assessment/Plan:     Provide safe environment  Manage medications  CTC to explore options for follow up  Offer therapeutic groups for coping skills                   "

## 2017-04-26 NOTE — PROGRESS NOTES
The pt was visible only briefly, took medications and went to sleep.  He was unavailable/unwilling to check-in this shift.     04/25/17 4435   Behavioral Health   Hallucinations denies / not responding to hallucinations   Thinking distractable   Orientation person: oriented;date: oriented;place: oriented;time: oriented   Memory baseline memory   Insight poor   Judgement impaired   Eye Contact at examiner   Affect blunted, flat   Mood depressed   Physical Appearance/Attire attire appropriate to age and situation   Hygiene neglected grooming - unclean body, hair, teeth   Suicidality other (see comments)  (unable to assess, pt slept all shift)   Self Injury other (see comment)  (unable to assess, pt slept all shift)   Activity withdrawn;isolative;other (see comment)  (sleeping)   Speech coherent   Medication Sensitivity no stated side effects;no observed side effects   Psychomotor / Gait balanced;steady   Substance Withdrawal Interventions   Social and Therapeutic Interventions (Substance Withdrawal) encourage participation in therapeutic groups and milieu activities;encourage socialization with peers   Safety   Suicidality status 15   Psycho Education   Type of Intervention 1:1 intervention   Response unavailable   Treatment Detail sleeping   Behavioral Health Interventions   Depression maintain safety precautions;monitor need to revise level of observation;maintain safe secure environment;encourage nutrition and hydration;encourage participation / independence with adls;provide emotional support;establish therapeutic relationship   Social and Therapeutic Interventions (Depression) encourage socialization with peers;encourage effective boundaries with peers;encourage participation in therapeutic groups and milieu activities

## 2017-04-26 NOTE — PROGRESS NOTES
Pt given Phenobarbital 32.4 mg at 1902. Next dose was scheduled for 2000, but was not given, due to timing with first dose. Next dose would be at 0100, but pt was asleep before writer could confirm if he was okay with being awakened to take this medication.    Pt requested medication for pain (rated at a 5 out of 10), and was given 650 mg of Tylenol at 1902.    Will continue to monitor for safety and comfort.

## 2017-04-27 PROCEDURE — 99233 SBSQ HOSP IP/OBS HIGH 50: CPT | Performed by: PSYCHIATRY & NEUROLOGY

## 2017-04-27 PROCEDURE — 25000132 ZZH RX MED GY IP 250 OP 250 PS 637: Performed by: PSYCHIATRY & NEUROLOGY

## 2017-04-27 PROCEDURE — 12400001 ZZH R&B MH UMMC

## 2017-04-27 RX ORDER — PHENOBARBITAL 32.4 MG/1
32.4 TABLET ORAL 3 TIMES DAILY
Status: DISCONTINUED | OUTPATIENT
Start: 2017-04-27 | End: 2017-04-28

## 2017-04-27 RX ADMIN — PHENOBARBITAL 32.4 MG: 32.4 TABLET ORAL at 20:38

## 2017-04-27 RX ADMIN — BUSPIRONE HYDROCHLORIDE 10 MG: 10 TABLET ORAL at 20:37

## 2017-04-27 RX ADMIN — BUSPIRONE HYDROCHLORIDE 10 MG: 10 TABLET ORAL at 08:51

## 2017-04-27 RX ADMIN — NICOTINE POLACRILEX 4 MG: 4 LOZENGE ORAL at 18:02

## 2017-04-27 RX ADMIN — GABAPENTIN 600 MG: 600 TABLET, FILM COATED ORAL at 20:37

## 2017-04-27 RX ADMIN — MULTIPLE VITAMINS W/ MINERALS TAB 1 TABLET: TAB at 08:51

## 2017-04-27 RX ADMIN — NICOTINE POLACRILEX 8 MG: 4 LOZENGE ORAL at 11:51

## 2017-04-27 RX ADMIN — NICOTINE POLACRILEX 8 MG: 4 LOZENGE ORAL at 15:53

## 2017-04-27 RX ADMIN — HYDROXYZINE HYDROCHLORIDE 50 MG: 25 TABLET ORAL at 14:14

## 2017-04-27 RX ADMIN — HYDROXYZINE HYDROCHLORIDE 50 MG: 25 TABLET ORAL at 20:37

## 2017-04-27 RX ADMIN — NICOTINE POLACRILEX 8 MG: 4 LOZENGE ORAL at 20:37

## 2017-04-27 RX ADMIN — GABAPENTIN 600 MG: 600 TABLET, FILM COATED ORAL at 08:51

## 2017-04-27 RX ADMIN — NICOTINE POLACRILEX 8 MG: 4 LOZENGE ORAL at 08:51

## 2017-04-27 RX ADMIN — GABAPENTIN 600 MG: 600 TABLET, FILM COATED ORAL at 14:15

## 2017-04-27 RX ADMIN — NICOTINE POLACRILEX 4 MG: 4 LOZENGE ORAL at 19:40

## 2017-04-27 RX ADMIN — BUSPIRONE HYDROCHLORIDE 10 MG: 10 TABLET ORAL at 14:14

## 2017-04-27 RX ADMIN — PHENOBARBITAL 32.4 MG: 32.4 TABLET ORAL at 15:54

## 2017-04-27 RX ADMIN — PHENOBARBITAL 32.4 MG: 32.4 TABLET ORAL at 08:51

## 2017-04-27 RX ADMIN — NICOTINE POLACRILEX 8 MG: 4 LOZENGE ORAL at 14:14

## 2017-04-27 RX ADMIN — PHENOBARBITAL 32.4 MG: 32.4 TABLET ORAL at 12:52

## 2017-04-27 RX ADMIN — FLUOXETINE HYDROCHLORIDE 20 MG: 10 TABLET, COATED ORAL at 08:51

## 2017-04-27 RX ADMIN — NICOTINE POLACRILEX 8 MG: 4 LOZENGE ORAL at 12:52

## 2017-04-27 ASSESSMENT — ACTIVITIES OF DAILY LIVING (ADL)
GROOMING: INDEPENDENT
DRESS: INDEPENDENT
ORAL_HYGIENE: INDEPENDENT
ORAL_HYGIENE: INDEPENDENT
LAUNDRY: UNABLE TO COMPLETE
GROOMING: INDEPENDENT
DRESS: STREET CLOTHES;INDEPENDENT

## 2017-04-27 NOTE — PROGRESS NOTES
"   04/26/17 2000   Behavioral Health   Hallucinations denies / not responding to hallucinations   Thinking confused;distractable;poor concentration   Orientation person: oriented;place: oriented   Memory baseline memory   Insight poor   Judgement impaired   Eye Contact at examiner   Affect blunted, flat;sad   Mood depressed;anxious;mood is calm   Physical Appearance/Attire disheveled   Hygiene neglected grooming - unclean body, hair, teeth   Suicidality chronic thoughts with no stated plan   Self Injury other (see comment)  (denies )   Activity isolative;withdrawn   Speech clear   Activities of Daily Living   Hygiene/Grooming independent   Oral Hygiene independent   Dress independent   Laundry with supervision   Room Organization independent     Patient reported SI thoughts including \" I think about all the ways I can end my life I fantasize about killing myself.\"  Patient denies SIB.  Patient reported feeling \" depressed(8), sad (3), agitated (4), irritated (3) and anxious (4).  Patient isolative, blunted, flat affect, ate meals, spend most of shift in bed and did not socialized with others.  Patient daily goal \" to feel better.\"  Patient goal after discharge \"not sure.\"  Patient is independent with ADL's.  Patient reported no nutritional concerns.  Patient reported no visitors.      "

## 2017-04-27 NOTE — H&P
Morgan Bosch was seen for 70 minutes on 04/26/2017, over 50 minutes of this time spent in counseling and coordinating care, clarifying diagnostic prognostic issues and presence of support in community.      CHIEF COMPLAINT AND REASON FOR ADMISSION:  Morgan Bosch is a 32-year-old  male with history of polysubstance abuse, major depression who presented to the Emergency Department for evaluation of suicidal ideation.  He said that he had been kicked out of his parent's home in the last couple of weeks, relapsed on drugs.  He reported that lately he has been using Xanax, cough syrup, methamphetamines and alcohol.      HISTORY OF PRESENT ILLNESS:  The patient is well known to this facility.  His last hospitalization was in 10/2016.  He was also admitted and treated in 09/2016, 08/2016, 02/2016, 03/2015, 10/2011, 03/2011, 04/2011, and he says he has multiple psychiatric hospitalizations as well.  The patient reports also that he has not been able to stay clean and sober, was kicked out of his mother's home in [place], [place], secondary to his drug relapse.  He said that he had attempted to carbon monoxide himself by placing a hose from the exhaust into his van;s window.  Then started having second thoughts and terminated the suicide attempt.  He reports poor sleep, low energy, feeling worthless, hopeless.  He admits that he has been using Xanax around 2 mg per day, snorting and using IV approximately $30 worth per episode of methamphetamines, using cough syrup but not every day and drinking alcohol daily, averaging a 6 pack per day.      PAST PSYCHIATRIC HISTORY:  The patient's most recent CD treatment efforts include [facility]  chemical dependency program.  See prior diagnoses such as schizoaffective disorder, major depressive disorder, TIM.  Has a history of prior MICD commitments in half-way in 06/2010 through [facility].  Typically does not follow through with services upon termination  of court involvement.  History of serious suicide attempt by acetaminophen overdose in 2010.  He states that he does not believe that his medications are working for him.  He has a history of poor compliance with medication.  He told me today that he feels depressed and highly anxious even when he is sober, however, his longest period of sobriety was only 6 months or so and in a closed environment.  In addition to the above-mentioned, the patient says that he was treated with multiple antidepressants, could recall being on Effexor, Prozac, Zoloft.  He is unsure which one was the most helpful; however, later told me that Prozac had been.  He also tried Lexapro, lithium, Seroquel, trazodone, Zyprexa.  Said that Vistaril was not helpful.  Reports a history of possible serotonin syndrome.  He estimates that he had been in 7-10 chemical dependency treatment programs.      PAST MEDICAL HISTORY:  No history of major medical concerns, surgeries, seizures, but there is a history of mild alcohol withdrawal.      ALLERGIES:  Allergic to adhesive tape.       HOME MEDICATIONS:  The patient admits he was not 100% compliant with:    1.  BuSpar 10 mg 3 times a day.   2.  Gabapentin 600 mg 3 times a day.   3.  Multivitamin 1 tablet daily.      PHYSICAL EXAMINATION:  Please refer to Dr. Chanda Mcdowell's note from 04/25/2017.  The patient had following lab work done at the Emergency Department:  Comprehensive metabolic battery showed elevated chloride of 110, total protein 6.5, otherwise was normal.  The patient had a low TSH 0.18, but free T4 was normal.  CBC with differential showed elevated white blood cells 13.2, hematocrit 39.0, MCV of 4.26.  Hepatitis C antibody and HIV antibody were nonreactive both.  Urine drug screen was positive for benzodiazepines and amphetamines.      VITAL SIGNS:  Temperature 98, respirations 16, heart rate 83, blood pressure 115/61.        FAMILY AND SOCIAL HISTORY:  The patient reports several family  members on both mother's and father's side have depression and anxiety. Uncle has Down's syndrome.  Parents have depression.  Two members of extended family have bipolar disorder.  He grew up in the [facility] and in [place].  Parents are  and had a normal childhood.  Has 1 sister, strained relationship with his family.  Completed about 1 year of college.  He has been homeless since being kicked out from his mother's place a couple of weeks ago.  Was convicted of robbery 8 years ago, spent some time in correction.  Denied current legal issues.      MENTAL STATUS EXAMINATION:  The patient is a  male who was interviewed while lying on the bed.  He appeared to be in significant distress and speech was slow and monotone.  He had difficulties keeping his eyes open.  Seemed to be pretty straightforward about his substance use, reported still having suicidal thoughts but said that he felt safe being in the hospital.  There was no loosening of associations.  He denied any psychotic symptoms or homicidal thoughts.  He was oriented x3.  Fund of knowledge appeared to be average with proper usage of vocabulary.  He had no peculiar use of language.  Fund of knowledge was appropriate.  Gait and station were not tested.        1.  Depression:  Major depressive disorder, severe, recurrent without psychotic features.   2.  Generalized anxiety disorder.   3.  Stimulant use disorder, severe.   4.  Polysubstance abuse.      RECOMMENDATION:  The patient was started overnight on phenobarbital to prevent withdrawal from using short-acting benzodiazepines (Xanax).  We will start him on Neurontin and buspirone, however, we will not restart on Cymbalta as he reported it was not helpful.  We will instead start him on Prozac as he reported he had the most significant improvement with it. I asked if he desired to go into chemical dependency treatment.  He said that he was unsure that he was on too many treatment programs, did not  see any benefit from that.  He, however, agreed with me to discuss this issue later on when he was more alert and had more chances to think about that.         IRMA DOHERTY MD             D: 2017 17:52   T: 2017 23:12   MT: EDWIN      Name:     NAVJOT HAINES   MRN:      [id number]        Account:      [id number]   :      1985           Admitted:     [id number]      Document: [id number]

## 2017-04-27 NOTE — PLAN OF CARE
"Problem: Depressive Symptoms  Goal: Depressive Symptoms  Signs and symptoms of listed problems will be absent or manageable.      Met with pt for 1:1.  Pt had refused his morning check-in with staff, but was receptive to check in on evening shift.  Pt presented as flat with a blunted affect.  Pt appears depressed.  Pt has poor eye contact and responded to many questions with \"I don't know\".  Pt appears guarded.  Pt has been isolative and withdrawn during his stay on the inpatient unit.  Pt has only left his room for meals and for medications.  Pt states he will attend group, but then does not follow through with that statement.  Pt states he has depression 8/10, but is unable to cite a specific stressor.  Pt rates anxiety 6/10, but is unable to cite a specific stressor.  Pt reports that he is sad, specifically because he is on the unit.  Pt reports that he feels overwhelmed on the unit.  Pt's goal is to be \"calm and relaxed\" on the unit.       Pt states he has chronic SI thoughts, with frequent urges, and thoughts of planning a suicide attempt.  Pt contracted for safety on the unit.  Pt has reported to other staff that he fantasizes about suicide frequently.  Pt reports SIB thoughts, but no urges or plans for SIB on the unit.  Pt denies all AH, VH, paranoia, and HI.     Pt reports no medical concerns. Pt reports he will shower, but it is unclear if he will follow through.  Pt reports that he has no nutrition concerns.  Pt is calm and pleasant when he does interact with staff and peers.  Will continue to monitor and assess.      "

## 2017-04-27 NOTE — PROGRESS NOTES
"[facility], [place]   Psychiatric Progress Note        Interim History:   The patient's care was discussed with the treatment team during the daily team meeting and/or staff's chart notes were reviewed.  Staff report patient spent a lot of time in his bed getting out only to eat and take meds. Refused vitals. He did get out of bed during visit with me, agreed to go to the patients' lounge. Maintained minimal eye contact. Reported still feeling severely depressed and constantly thinking about suicide: \"I want to kill myself. I don't have any will to keep living. I don't care to stop using because I don't care to live\". Said that he specifically thought about hanging himself and the whole process of doing it, confirmed that he had tried to poison himself with carbon monoxide prior to coming to the hospital. When asked if he was OK with staying at the hospital, he responded he was OK for one more day or two. Admitted that if he were to be discharged from the hospital now, he would have hanged himself. No more signs of withdrawal.         Medications:       FLUoxetine  20 mg Oral Daily     busPIRone  10 mg Oral TID     gabapentin  600 mg Oral TID     multivitamin, therapeutic with minerals  1 tablet Oral Daily     PHENobarbital  32.4 mg Oral 4x Daily          Allergies:     Allergies   Allergen Reactions     Adhesive Tape Itching     Blisters (band aids)          Labs:   No results found for this or any previous visit (from the past 24 hour(s)).       Psychiatric Examination:   BP (P) 107/60 (BP Location: Right arm)  Pulse (P) 76  Temp (P) 98  F (36.7  C) (Oral)  Resp 16  Ht 1.778 m (5' 10\")  Wt 78.9 kg (174 lb)  SpO2 96%  BMI 24.97 kg/m2  Weight is 174 lbs 0 oz  Body mass index is 24.97 kg/(m^2).    Appearance: awake, alert  Attitude:  guarded and somewhat cooperative  Eye Contact:  poor   Mood:  anxious and depressed  Affect:  constricted mobility  Speech:  increased speech latency " and decreased prosody  Psychomotor Behavior:  no evidence of tardive dyskinesia, dystonia, or tics and physical retardation  Throught Process:  goal oriented  Associations:  no loose associations  Thought Content:  active suicidal ideation present  Insight:  limited  Judgement:  limited  Oriented to:  time, person, and place  Attention Span and Concentration:  limited  Recent and Remote Memory:  limited         Precautions:     Behavioral Orders   Procedures     Code 1 - Restrict to Unit     Routine Programming     As clinically indicated     Status 15     Every 15 minutes.     Suicide precautions          DIagnoses:     1. Depression: Major depressive disorder, severe, recurrent without psychotic features.   2. Generalized anxiety disorder.   3. Stimulant use disorder, severe.   4. Polysubstance abuse.          Plan:     Was started on Prozac yesterday. Will start decreasing dose of Phenobarbital. No other med changes. He presents as serious danger to self, will, likely, soon demand to be discharged from the hospital, refuses CD treatment. Will file a petition for MICD commitment.

## 2017-04-28 PROCEDURE — 99207 ZZC CDG-MDM COMPONENT: MEETS MODERATE - UP CODED: CPT | Performed by: PSYCHIATRY & NEUROLOGY

## 2017-04-28 PROCEDURE — 12400001 ZZH R&B MH UMMC

## 2017-04-28 PROCEDURE — 25000132 ZZH RX MED GY IP 250 OP 250 PS 637: Performed by: PSYCHIATRY & NEUROLOGY

## 2017-04-28 PROCEDURE — 99233 SBSQ HOSP IP/OBS HIGH 50: CPT | Performed by: PSYCHIATRY & NEUROLOGY

## 2017-04-28 RX ORDER — PHENOBARBITAL 32.4 MG/1
32.4 TABLET ORAL 2 TIMES DAILY
Status: COMPLETED | OUTPATIENT
Start: 2017-04-28 | End: 2017-04-30

## 2017-04-28 RX ADMIN — NICOTINE POLACRILEX 4 MG: 4 LOZENGE ORAL at 12:26

## 2017-04-28 RX ADMIN — MULTIPLE VITAMINS W/ MINERALS TAB 1 TABLET: TAB at 08:48

## 2017-04-28 RX ADMIN — FLUOXETINE HYDROCHLORIDE 20 MG: 10 TABLET, COATED ORAL at 08:48

## 2017-04-28 RX ADMIN — NICOTINE POLACRILEX 4 MG: 4 LOZENGE ORAL at 21:09

## 2017-04-28 RX ADMIN — NICOTINE POLACRILEX 4 MG: 4 LOZENGE ORAL at 17:59

## 2017-04-28 RX ADMIN — HYDROXYZINE HYDROCHLORIDE 50 MG: 25 TABLET ORAL at 20:21

## 2017-04-28 RX ADMIN — NICOTINE POLACRILEX 2 MG: 4 LOZENGE ORAL at 20:21

## 2017-04-28 RX ADMIN — GABAPENTIN 600 MG: 600 TABLET, FILM COATED ORAL at 08:48

## 2017-04-28 RX ADMIN — NICOTINE POLACRILEX 4 MG: 4 LOZENGE ORAL at 16:39

## 2017-04-28 RX ADMIN — PHENOBARBITAL 32.4 MG: 32.4 TABLET ORAL at 21:09

## 2017-04-28 RX ADMIN — NICOTINE POLACRILEX 4 MG: 4 LOZENGE ORAL at 13:21

## 2017-04-28 RX ADMIN — NICOTINE POLACRILEX 4 MG: 4 LOZENGE ORAL at 07:23

## 2017-04-28 RX ADMIN — BUSPIRONE HYDROCHLORIDE 10 MG: 10 TABLET ORAL at 20:21

## 2017-04-28 RX ADMIN — BUSPIRONE HYDROCHLORIDE 10 MG: 10 TABLET ORAL at 08:48

## 2017-04-28 RX ADMIN — NICOTINE POLACRILEX 4 MG: 4 LOZENGE ORAL at 08:48

## 2017-04-28 RX ADMIN — GABAPENTIN 600 MG: 600 TABLET, FILM COATED ORAL at 20:21

## 2017-04-28 RX ADMIN — PHENOBARBITAL 32.4 MG: 32.4 TABLET ORAL at 08:48

## 2017-04-28 ASSESSMENT — ACTIVITIES OF DAILY LIVING (ADL)
LAUNDRY: UNABLE TO COMPLETE
HYGIENE/GROOMING: INDEPENDENT
ORAL_HYGIENE: INDEPENDENT
DRESS: STREET CLOTHES

## 2017-04-28 NOTE — PROGRESS NOTES
Pt. Was calm. PT. Did not attend groups. PT. Spent most of the time in his room. Pt. Seemed isolative and withdrawn. PT. Told staff that he feels depressed and has thoughts of suicide.

## 2017-04-28 NOTE — PROGRESS NOTES
"[facility], [place]   Psychiatric Progress Note        Interim History:   The patient's care was discussed with the treatment team during the daily team meeting and/or staff's chart notes were reviewed.  Staff report patient spent a lot of time in his bed getting out only to eat and take meds. Poor eye contact, when asked how he felt, said he was better physically, but still very depressed. Admitted to still having thoughts of hanging himself, when asked if he would stay at the hospital voluntarily, said, for 1-2 more days, not for longer. I informed him that we filed a petition for commitment and he was to be screened today. He showed very little emotions on his face, but said he understood.          Medications:       PHENobarbital  32.4 mg Oral TID     FLUoxetine  20 mg Oral Daily     busPIRone  10 mg Oral TID     gabapentin  600 mg Oral TID     multivitamin, therapeutic with minerals  1 tablet Oral Daily          Allergies:     Allergies   Allergen Reactions     Adhesive Tape Itching     Blisters (band aids)          Labs:   No results found for this or any previous visit (from the past 24 hour(s)).       Psychiatric Examination:   /60 (BP Location: Right arm)  Pulse 76  Temp 97.2  F (36.2  C) (Oral)  Resp 16  Ht 1.778 m (5' 10\")  Wt 78.9 kg (174 lb)  SpO2 96%  BMI 24.97 kg/m2  Weight is 174 lbs 0 oz  Body mass index is 24.97 kg/(m^2).    Appearance: awake, alert  Attitude:  guarded and minimally cooperative  Eye Contact:  poor   Mood:  anxious and depressed  Affect:  constricted mobility  Speech:  increased speech latency and decreased prosody  Psychomotor Behavior:  no evidence of tardive dyskinesia, dystonia, or tics and physical retardation  Throught Process:  goal oriented  Associations:  no loose associations  Thought Content:  active suicidal ideation present  Insight:  limited  Judgement:  limited  Oriented to:  time, person, and place  Attention Span and " Concentration:  limited  Recent and Remote Memory:  limited         Precautions:     Behavioral Orders   Procedures     Code 1 - Restrict to Unit     Routine Programming     As clinically indicated     Status 15     Every 15 minutes.     Suicide precautions          DIagnoses:     1. Depression: Major depressive disorder, severe, recurrent without psychotic features.   2. Generalized anxiety disorder.   3. Stimulant use disorder, severe.   4. Polysubstance abuse.          Plan:     Was started on Prozac 2 days ago. Will continue to decrease Phenobarbital dose. No other med changes. He presents as serious danger to self, will, likely, soon demand to be discharged from the hospital, refuses CD treatment. Will be screened today for MICD commitment. Presents a serious danger to self, if no screened and demands to leave, should be evaluated for need for 72 hour hold.

## 2017-04-28 NOTE — PLAN OF CARE
Problem: General Plan of Care (Inpatient Behavioral)  Goal: Team Discussion  Team Plan:   BEHAVIORAL TEAM DISCUSSION     Continued Stay Criteria/Rationale: suicidal/suicide attempt by carbon dioxide  Plan: petition for commitment with Meliton Chavez  Participants: Dr. 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 Form reviewed in team meeting and signed by patient, physician, writer and RN. Form given to [facility] to be scanned into Memoir.

## 2017-04-28 NOTE — PROGRESS NOTES
Pt discharged at 1345 to [facility] via cab with 30 day supply of meds. See D/C note and instructions.

## 2017-04-29 PROCEDURE — 25000132 ZZH RX MED GY IP 250 OP 250 PS 637: Performed by: PSYCHIATRY & NEUROLOGY

## 2017-04-29 PROCEDURE — 12400001 ZZH R&B MH UMMC

## 2017-04-29 RX ADMIN — BUSPIRONE HYDROCHLORIDE 10 MG: 10 TABLET ORAL at 08:53

## 2017-04-29 RX ADMIN — NICOTINE POLACRILEX 4 MG: 4 LOZENGE ORAL at 11:50

## 2017-04-29 RX ADMIN — BUSPIRONE HYDROCHLORIDE 10 MG: 10 TABLET ORAL at 15:52

## 2017-04-29 RX ADMIN — BUSPIRONE HYDROCHLORIDE 10 MG: 10 TABLET ORAL at 19:55

## 2017-04-29 RX ADMIN — NICOTINE POLACRILEX 4 MG: 4 LOZENGE ORAL at 19:55

## 2017-04-29 RX ADMIN — PHENOBARBITAL 32.4 MG: 32.4 TABLET ORAL at 08:52

## 2017-04-29 RX ADMIN — GABAPENTIN 600 MG: 600 TABLET, FILM COATED ORAL at 08:53

## 2017-04-29 RX ADMIN — HYDROXYZINE HYDROCHLORIDE 50 MG: 25 TABLET ORAL at 08:52

## 2017-04-29 RX ADMIN — NICOTINE POLACRILEX 8 MG: 4 LOZENGE ORAL at 07:23

## 2017-04-29 RX ADMIN — FLUOXETINE HYDROCHLORIDE 20 MG: 10 TABLET, COATED ORAL at 08:52

## 2017-04-29 RX ADMIN — PHENOBARBITAL 32.4 MG: 32.4 TABLET ORAL at 19:55

## 2017-04-29 RX ADMIN — GABAPENTIN 600 MG: 600 TABLET, FILM COATED ORAL at 19:55

## 2017-04-29 RX ADMIN — GABAPENTIN 600 MG: 600 TABLET, FILM COATED ORAL at 15:52

## 2017-04-29 RX ADMIN — MULTIPLE VITAMINS W/ MINERALS TAB 1 TABLET: TAB at 08:53

## 2017-04-29 RX ADMIN — HYDROXYZINE HYDROCHLORIDE 50 MG: 25 TABLET ORAL at 19:55

## 2017-04-29 RX ADMIN — NICOTINE POLACRILEX 4 MG: 4 LOZENGE ORAL at 08:52

## 2017-04-29 RX ADMIN — NICOTINE POLACRILEX 4 MG: 4 LOZENGE ORAL at 15:53

## 2017-04-29 RX ADMIN — NICOTINE POLACRILEX 4 MG: 4 LOZENGE ORAL at 18:44

## 2017-04-29 ASSESSMENT — ACTIVITIES OF DAILY LIVING (ADL)
LAUNDRY: WITH SUPERVISION
DRESS: STREET CLOTHES
ORAL_HYGIENE: INDEPENDENT
ORAL_HYGIENE: INDEPENDENT
HYGIENE/GROOMING: PROMPTS
GROOMING: INDEPENDENT
DRESS: STREET CLOTHES
HYGIENE/GROOMING: PROMPTS
DRESS: INDEPENDENT
ORAL_HYGIENE: INDEPENDENT

## 2017-04-29 NOTE — PROGRESS NOTES
"   04/29/17 0000 Behavioral Health   Hallucinations denies / not responding to hallucinations   Thinking distractable;poor concentration   Orientation person: oriented;place: oriented   Memory baseline memory   Insight poor   Judgement impaired   Eye Contact at examiner   Affect blunted, flat   Mood depressed;anxious;mood is calm   Physical Appearance/Attire disheveled   Hygiene neglected grooming - unclean body, hair, teeth   Suicidality chronic thoughts with no stated plan   Self Injury other (see comment)  (denies )   Activity isolative;withdrawn;other (see comment)  (did not attend group and did not socialized with others )   Speech clear   Activities of Daily Living   Hygiene/Grooming independent   Oral Hygiene independent   Dress independent   Laundry with supervision   Room Organization independent       Patient reported SI.  Patient denies SIB.  Patient reported feeling depressed (8), agitated (4), sad (2) and anxious (4) \" I don't want to talk about it.\"  Patient seems calm, spent most of the shift in his room, did not attend group and did not socialized with others.  Patient reported no nutritional concerns.  Patient is independent with ADL's.  Patient reported no visitors.      "

## 2017-04-29 NOTE — PROGRESS NOTES
Pt spent time in his room in his room, he left his room for meals and to complete his meals. Pt stated he has chronic thoughts of suicide but no plan, he also reported feeling depressed and anxious.

## 2017-04-30 PROCEDURE — 12400001 ZZH R&B MH UMMC

## 2017-04-30 PROCEDURE — 25000132 ZZH RX MED GY IP 250 OP 250 PS 637: Performed by: PSYCHIATRY & NEUROLOGY

## 2017-04-30 RX ADMIN — FLUOXETINE HYDROCHLORIDE 20 MG: 10 TABLET, COATED ORAL at 08:08

## 2017-04-30 RX ADMIN — MULTIPLE VITAMINS W/ MINERALS TAB 1 TABLET: TAB at 08:09

## 2017-04-30 RX ADMIN — HYDROXYZINE HYDROCHLORIDE 50 MG: 25 TABLET ORAL at 14:31

## 2017-04-30 RX ADMIN — BUSPIRONE HYDROCHLORIDE 10 MG: 10 TABLET ORAL at 14:30

## 2017-04-30 RX ADMIN — NICOTINE POLACRILEX 8 MG: 4 LOZENGE ORAL at 20:14

## 2017-04-30 RX ADMIN — NICOTINE POLACRILEX 4 MG: 4 LOZENGE ORAL at 21:25

## 2017-04-30 RX ADMIN — NICOTINE POLACRILEX 4 MG: 4 LOZENGE ORAL at 18:21

## 2017-04-30 RX ADMIN — NICOTINE POLACRILEX 8 MG: 4 LOZENGE ORAL at 09:59

## 2017-04-30 RX ADMIN — NICOTINE POLACRILEX 4 MG: 4 LOZENGE ORAL at 16:38

## 2017-04-30 RX ADMIN — GABAPENTIN 600 MG: 600 TABLET, FILM COATED ORAL at 08:08

## 2017-04-30 RX ADMIN — GABAPENTIN 600 MG: 600 TABLET, FILM COATED ORAL at 14:30

## 2017-04-30 RX ADMIN — NICOTINE POLACRILEX 8 MG: 4 LOZENGE ORAL at 14:32

## 2017-04-30 RX ADMIN — HYDROXYZINE HYDROCHLORIDE 50 MG: 25 TABLET ORAL at 20:14

## 2017-04-30 RX ADMIN — NICOTINE POLACRILEX 4 MG: 4 LOZENGE ORAL at 08:09

## 2017-04-30 RX ADMIN — PHENOBARBITAL 32.4 MG: 32.4 TABLET ORAL at 08:08

## 2017-04-30 RX ADMIN — NICOTINE POLACRILEX 4 MG: 4 LOZENGE ORAL at 11:54

## 2017-04-30 RX ADMIN — BUSPIRONE HYDROCHLORIDE 10 MG: 10 TABLET ORAL at 08:08

## 2017-04-30 RX ADMIN — BUSPIRONE HYDROCHLORIDE 10 MG: 10 TABLET ORAL at 20:14

## 2017-04-30 RX ADMIN — GABAPENTIN 600 MG: 600 TABLET, FILM COATED ORAL at 20:14

## 2017-04-30 ASSESSMENT — ACTIVITIES OF DAILY LIVING (ADL)
DRESS: STREET CLOTHES
ORAL_HYGIENE: INDEPENDENT
LAUNDRY: WITH SUPERVISION
ORAL_HYGIENE: INDEPENDENT
GROOMING: INDEPENDENT
LAUNDRY: WITH SUPERVISION
DRESS: STREET CLOTHES
HYGIENE/GROOMING: INDEPENDENT

## 2017-04-30 NOTE — PROGRESS NOTES
Pt attended groups this morning and he went back to his to sleep. He denies suicidal ideation and being depressed. He appears isolative, quiet and lonely. He come out for meals and making request.

## 2017-04-30 NOTE — PROGRESS NOTES
Patient reports feeling depressed with no significant change from previous days, denies current SI/SIB. Patient isolated to his room and napped almost all shift, only coming out to eat.     04/29/17 2100   Behavioral Health   Hallucinations denies / not responding to hallucinations   Thinking poor concentration   Orientation person: oriented;place: oriented;date: oriented;time: oriented   Memory baseline memory   Insight poor   Judgement impaired   Eye Contact at examiner   Affect blunted, flat   Mood depressed   Physical Appearance/Attire disheveled   Hygiene neglected grooming - unclean body, hair, teeth   Suicidality other (see comments)  (Denies)   Self Injury other (see comment)  (Denies)   Activity isolative;withdrawn   Speech clear;coherent   Medication Sensitivity no stated side effects;no observed side effects   Psychomotor / Gait slow   Activities of Daily Living   Hygiene/Grooming prompts   Oral Hygiene independent   Dress street clothes   Room Organization independent

## 2017-05-01 PROCEDURE — 99233 SBSQ HOSP IP/OBS HIGH 50: CPT | Performed by: PSYCHIATRY & NEUROLOGY

## 2017-05-01 PROCEDURE — 12400001 ZZH R&B MH UMMC

## 2017-05-01 PROCEDURE — 25000132 ZZH RX MED GY IP 250 OP 250 PS 637: Performed by: PSYCHIATRY & NEUROLOGY

## 2017-05-01 PROCEDURE — 99207 ZZC CDG-MDM COMPONENT: MEETS MODERATE - UP CODED: CPT | Performed by: PSYCHIATRY & NEUROLOGY

## 2017-05-01 RX ADMIN — MULTIPLE VITAMINS W/ MINERALS TAB 1 TABLET: TAB at 08:31

## 2017-05-01 RX ADMIN — NICOTINE POLACRILEX 4 MG: 4 LOZENGE ORAL at 13:49

## 2017-05-01 RX ADMIN — NICOTINE POLACRILEX 8 MG: 4 LOZENGE ORAL at 15:57

## 2017-05-01 RX ADMIN — NICOTINE POLACRILEX 8 MG: 4 LOZENGE ORAL at 17:22

## 2017-05-01 RX ADMIN — OLANZAPINE 5 MG: 5 TABLET, FILM COATED ORAL at 19:27

## 2017-05-01 RX ADMIN — BUSPIRONE HYDROCHLORIDE 10 MG: 10 TABLET ORAL at 08:31

## 2017-05-01 RX ADMIN — BUSPIRONE HYDROCHLORIDE 10 MG: 10 TABLET ORAL at 13:49

## 2017-05-01 RX ADMIN — BUSPIRONE HYDROCHLORIDE 10 MG: 10 TABLET ORAL at 19:27

## 2017-05-01 RX ADMIN — NICOTINE POLACRILEX 8 MG: 4 LOZENGE ORAL at 19:27

## 2017-05-01 RX ADMIN — NICOTINE POLACRILEX 4 MG: 4 LOZENGE ORAL at 11:15

## 2017-05-01 RX ADMIN — NICOTINE POLACRILEX 4 MG: 4 LOZENGE ORAL at 08:31

## 2017-05-01 RX ADMIN — GABAPENTIN 600 MG: 600 TABLET, FILM COATED ORAL at 19:27

## 2017-05-01 RX ADMIN — NICOTINE POLACRILEX 8 MG: 4 LOZENGE ORAL at 12:29

## 2017-05-01 RX ADMIN — GABAPENTIN 600 MG: 600 TABLET, FILM COATED ORAL at 08:31

## 2017-05-01 RX ADMIN — FLUOXETINE HYDROCHLORIDE 20 MG: 10 TABLET, COATED ORAL at 08:31

## 2017-05-01 RX ADMIN — GABAPENTIN 600 MG: 600 TABLET, FILM COATED ORAL at 13:50

## 2017-05-01 RX ADMIN — HYDROXYZINE HYDROCHLORIDE 50 MG: 25 TABLET ORAL at 13:49

## 2017-05-01 ASSESSMENT — ACTIVITIES OF DAILY LIVING (ADL)
DRESS: INDEPENDENT
ORAL_HYGIENE: INDEPENDENT
HYGIENE/GROOMING: INDEPENDENT
LAUNDRY: WITH SUPERVISION
HYGIENE/GROOMING: INDEPENDENT
DRESS: STREET CLOTHES
LAUNDRY: WITH SUPERVISION
ORAL_HYGIENE: INDEPENDENT

## 2017-05-01 NOTE — PROGRESS NOTES
Pt spent majority of his in bed and he stated that he is depressed. He stated that the medication is not working he he comes out for meals or making requests. He appears isolative and minimal interaction with peers.

## 2017-05-01 NOTE — PLAN OF CARE
"Problem: Depressive Symptoms  Goal: Depressive Symptoms  Signs and symptoms of listed problems will be absent or manageable.   Outcome: No Change  48 Hour Assessment    Pt declined vitals today    Pt was guarded during check-in, not making eye contact with writer. He stated that he was reluctant to answer questions because \"They want to commit me, so I'm hesitant to answer questions.\" Writer assured pt that he was not in legal trouble with the commitment process, and he stated that he knows this. However, when a representative from the [facility] came to the hospital to interview pt, he says that he declined to meet with them. In addition, writer reassured pt that anything he shares is written in an objective manner, and no opinions are given. It was also explained that staff needs to gather information in order to know that he is safe, and will keep himself safe while in the hospital and after discharge. He states that \"I'm safe.\" Pt did not confirm or deny SI or SIB. Writer encouraged pt to meet with  to gather more information regarding the commitment process.    Pt did begin to open up with how he is feeling and shared that his depression has been \"really bad\". \"I don't feel like I have a future. I don't feel like there is anything that will make it better.\"    Pt was med compliant, and visible in the milieu for meals, snacks and to get himself coffee     SI: did not elaborate and would not answer this question     SIB: did not elaborate and would not answer this question     Auditory/Visual Hallucinations: declined to answer this question    No aggressive behaviors displayed. Pt is withdrawn, not socializing with others, and not attending groups. Pt occasionally watches television, but for about 20 to 30 minutes, and returns to his room soon after.     Quality of sleep: has been in bed for the majority of the shift and does not state sleeping problems.     Pt denies pain    Will continue to monitor for " safety, and encourage pt to come out of his room.     Illness Management Recovery model:  Self-Reflection & Planning.     Encouraged patient to continue to consider triggers, wellness strategies, early warning signs, feedback from others, actions to take to prevent relapse, and coping strategies as part of a plan to remain well after leaving the hospital.

## 2017-05-02 PROCEDURE — 25000132 ZZH RX MED GY IP 250 OP 250 PS 637: Performed by: PSYCHIATRY & NEUROLOGY

## 2017-05-02 PROCEDURE — 12400001 ZZH R&B MH UMMC

## 2017-05-02 RX ORDER — FLUOXETINE 10 MG/1
40 TABLET, FILM COATED ORAL DAILY
Status: DISCONTINUED | OUTPATIENT
Start: 2017-05-02 | End: 2017-05-15 | Stop reason: HOSPADM

## 2017-05-02 RX ORDER — BUSPIRONE HYDROCHLORIDE 15 MG/1
15 TABLET ORAL 3 TIMES DAILY
Status: DISCONTINUED | OUTPATIENT
Start: 2017-05-02 | End: 2017-05-03

## 2017-05-02 RX ADMIN — GABAPENTIN 600 MG: 600 TABLET, FILM COATED ORAL at 08:30

## 2017-05-02 RX ADMIN — NICOTINE POLACRILEX 8 MG: 4 LOZENGE ORAL at 14:23

## 2017-05-02 RX ADMIN — NICOTINE POLACRILEX 4 MG: 4 LOZENGE ORAL at 16:15

## 2017-05-02 RX ADMIN — NICOTINE POLACRILEX 8 MG: 4 LOZENGE ORAL at 20:08

## 2017-05-02 RX ADMIN — GABAPENTIN 600 MG: 600 TABLET, FILM COATED ORAL at 20:08

## 2017-05-02 RX ADMIN — NICOTINE POLACRILEX 8 MG: 4 LOZENGE ORAL at 18:04

## 2017-05-02 RX ADMIN — FLUOXETINE HYDROCHLORIDE 40 MG: 10 TABLET, COATED ORAL at 08:30

## 2017-05-02 RX ADMIN — OLANZAPINE 5 MG: 5 TABLET, FILM COATED ORAL at 18:33

## 2017-05-02 RX ADMIN — GABAPENTIN 600 MG: 600 TABLET, FILM COATED ORAL at 14:23

## 2017-05-02 RX ADMIN — NICOTINE POLACRILEX 8 MG: 4 LOZENGE ORAL at 08:30

## 2017-05-02 RX ADMIN — NICOTINE POLACRILEX 8 MG: 4 LOZENGE ORAL at 11:09

## 2017-05-02 RX ADMIN — BUSPIRONE HYDROCHLORIDE 15 MG: 15 TABLET ORAL at 20:08

## 2017-05-02 RX ADMIN — HYDROXYZINE HYDROCHLORIDE 50 MG: 25 TABLET ORAL at 18:33

## 2017-05-02 RX ADMIN — MULTIPLE VITAMINS W/ MINERALS TAB 1 TABLET: TAB at 08:30

## 2017-05-02 RX ADMIN — BUSPIRONE HYDROCHLORIDE 15 MG: 15 TABLET ORAL at 14:24

## 2017-05-02 RX ADMIN — BUSPIRONE HYDROCHLORIDE 15 MG: 15 TABLET ORAL at 08:30

## 2017-05-02 ASSESSMENT — ACTIVITIES OF DAILY LIVING (ADL)
DRESS: STREET CLOTHES
HYGIENE/GROOMING: INDEPENDENT
ORAL_HYGIENE: INDEPENDENT
LAUNDRY: WITH SUPERVISION

## 2017-05-02 NOTE — PROGRESS NOTES
Writer spoke with Lacy from [facility]. Lacy stated they did support he petition and sent it to the 's office. She feels the court hold will come today.

## 2017-05-02 NOTE — PROGRESS NOTES
"Met briefly with patient for Rule 25 assessment.  Began [facility] application for eligibility and patient stated that he is not willing or able to complete a \"two hour\" assessment at this time because he says that it \"makes him too angry\".  Reports that \"if they want me to go to [facility] I'll go but I'm am not going to complete another assessment.\"  Patient left the office and ended the interview.      Pt. Reports he obtained MA through [facility].  This writer printed [facility] Application for Eligibility and placed this with the patient's Rule 25 documentation that he had begun working on on [facility]'s desk.  Perhaps the patient will be more open to completing the paperwork without an  present at this time.    "

## 2017-05-02 NOTE — PROGRESS NOTES
Juice refued to meet with the CD  for a rule 25.  He then turned in a 12 hour intent to leave form at 1225.  He actually wrote 1200 although the time was actually 1225 so the time was changed to reflect this.  Dr. Dominguez was notified.

## 2017-05-02 NOTE — PROGRESS NOTES
Patient reports continued depression without significant change from previous days but denies SI/SIB. Patient isolated to his room almost all shift, resting in bed.     05/01/17 2100   Behavioral Health   Hallucinations denies / not responding to hallucinations   Thinking distractable   Orientation person: oriented;place: oriented;date: oriented;time: oriented   Memory baseline memory   Insight insight appropriate to situation   Judgement impaired   Eye Contact at examiner   Affect blunted, flat   Mood depressed   Physical Appearance/Attire appears stated age;attire appropriate to age and situation   Hygiene well groomed   Suicidality other (see comments)  (Denies)   Self Injury other (see comment)  (Denies)   Activity isolative;withdrawn   Speech clear;coherent   Medication Sensitivity no stated side effects;no observed side effects   Psychomotor / Gait balanced;steady   Activities of Daily Living   Hygiene/Grooming independent   Oral Hygiene independent   Dress independent   Laundry with supervision   Room Organization independent

## 2017-05-02 NOTE — PROGRESS NOTES
"  /64  Pulse 85  Temp 97.6  F (36.4  C) (Oral)  Resp 16  Ht 1.778 m (5' 10\")  Wt 78.9 kg (174 lb)  SpO2 96%  BMI 24.97 kg/m2       Pt was visible in the milieu for the majority of the shift. He attended and participated in Community meeting, as well as watched movies with fellow patients. Pt requested to watch the news, as well. He was calm and pleasant with staff and others, but withdrawn.     Requested prn nicotine lozenges, as well as one dose of Zyprexa 5 mg. Was compliant with other scheduled medications.    Will continue to monitor for safety.  "

## 2017-05-02 NOTE — PROGRESS NOTES
"[facility], [place]   Psychiatric Progress Note        Interim History:   The patient's care was discussed with the treatment team during the daily team meeting and/or staff's chart notes were reviewed.  Staff report patient spent a lot of time in his bed getting out only to eat and take meds. Poor eye contact, when asked how he felt, said he still felt very depressed and didn't see any future for himself. Reported having Suicidal ideation, but would not give details. He refused to meet with county screener, refused vitals. Told me that he would consider leaving hospital in one day or two, then, admitted he had no place to go to.         Medications:       FLUoxetine  20 mg Oral Daily     busPIRone  10 mg Oral TID     gabapentin  600 mg Oral TID     multivitamin, therapeutic with minerals  1 tablet Oral Daily          Allergies:     Allergies   Allergen Reactions     Adhesive Tape Itching     Blisters (band aids)          Labs:   No results found for this or any previous visit (from the past 24 hour(s)).       Psychiatric Examination:   /64  Pulse 85  Temp 97.6  F (36.4  C) (Oral)  Resp 16  Ht 1.778 m (5' 10\")  Wt 78.9 kg (174 lb)  SpO2 96%  BMI 24.97 kg/m2  Weight is 174 lbs 0 oz  Body mass index is 24.97 kg/(m^2).    Appearance: awake, alert  Attitude:  guarded and minimally cooperative  Eye Contact:  poor   Mood:  anxious and depressed  Affect:  constricted mobility  Speech:  increased speech latency and decreased prosody  Psychomotor Behavior:  no evidence of tardive dyskinesia, dystonia, or tics and physical retardation  Throught Process:  goal oriented  Associations:  no loose associations  Thought Content:  active suicidal ideation present  Insight:  limited  Judgement:  limited  Oriented to:  time, person, and place  Attention Span and Concentration:  limited  Recent and Remote Memory:  limited         Precautions:     Behavioral Orders   Procedures     Code 1 - " Restrict to Unit     Routine Programming     As clinically indicated     Status 15     Every 15 minutes.     Suicide precautions          DIagnoses:     1. Depression: Major depressive disorder, severe, recurrent without psychotic features.   2. Generalized anxiety disorder.   3. Stimulant use disorder, severe.   4. Polysubstance abuse.          Plan:     Will increase Prozac. Will continue to decrease Phenobarbital dose. No other med changes. He presents as serious danger to self, will, likely, soon demand to be discharged from the hospital, refuses CD treatment. Was screened for MICD commitment. Presents a serious danger to self, if no screened and demands to leave, should be evaluated for need for 72 hour hold.

## 2017-05-02 NOTE — PROGRESS NOTES
The [facility] called and said Juice is on a court hold and they will fax the hold now.

## 2017-05-02 NOTE — PROGRESS NOTES
Pt comes out from room for meals but he has been sleeping in room. He denies vitals and not wanting to have one on one but he states everything is ok. He appears to be quiet and isolative to room. He does not socialize with peers.

## 2017-05-03 PROCEDURE — 99232 SBSQ HOSP IP/OBS MODERATE 35: CPT | Performed by: PSYCHIATRY & NEUROLOGY

## 2017-05-03 PROCEDURE — 12400001 ZZH R&B MH UMMC

## 2017-05-03 PROCEDURE — 25000132 ZZH RX MED GY IP 250 OP 250 PS 637: Performed by: PSYCHIATRY & NEUROLOGY

## 2017-05-03 RX ORDER — BUSPIRONE HYDROCHLORIDE 15 MG/1
30 TABLET ORAL 2 TIMES DAILY
Status: DISCONTINUED | OUTPATIENT
Start: 2017-05-03 | End: 2017-05-15 | Stop reason: HOSPADM

## 2017-05-03 RX ADMIN — HYDROXYZINE HYDROCHLORIDE 50 MG: 25 TABLET ORAL at 20:34

## 2017-05-03 RX ADMIN — GABAPENTIN 600 MG: 600 TABLET, FILM COATED ORAL at 08:46

## 2017-05-03 RX ADMIN — OLANZAPINE 5 MG: 5 TABLET, FILM COATED ORAL at 13:29

## 2017-05-03 RX ADMIN — HYDROXYZINE HYDROCHLORIDE 50 MG: 25 TABLET ORAL at 13:29

## 2017-05-03 RX ADMIN — BUSPIRONE HYDROCHLORIDE 30 MG: 15 TABLET ORAL at 20:34

## 2017-05-03 RX ADMIN — NICOTINE POLACRILEX 8 MG: 4 LOZENGE ORAL at 14:46

## 2017-05-03 RX ADMIN — NICOTINE POLACRILEX 8 MG: 4 LOZENGE ORAL at 13:29

## 2017-05-03 RX ADMIN — NICOTINE POLACRILEX 8 MG: 4 LOZENGE ORAL at 17:24

## 2017-05-03 RX ADMIN — NICOTINE POLACRILEX 8 MG: 4 LOZENGE ORAL at 10:38

## 2017-05-03 RX ADMIN — GABAPENTIN 600 MG: 600 TABLET, FILM COATED ORAL at 13:29

## 2017-05-03 RX ADMIN — NICOTINE POLACRILEX 8 MG: 4 LOZENGE ORAL at 18:35

## 2017-05-03 RX ADMIN — BUSPIRONE HYDROCHLORIDE 15 MG: 15 TABLET ORAL at 13:29

## 2017-05-03 RX ADMIN — FLUOXETINE HYDROCHLORIDE 40 MG: 10 TABLET, COATED ORAL at 08:46

## 2017-05-03 RX ADMIN — BUSPIRONE HYDROCHLORIDE 15 MG: 15 TABLET ORAL at 08:46

## 2017-05-03 RX ADMIN — NICOTINE POLACRILEX 8 MG: 4 LOZENGE ORAL at 11:59

## 2017-05-03 RX ADMIN — GABAPENTIN 600 MG: 600 TABLET, FILM COATED ORAL at 20:34

## 2017-05-03 RX ADMIN — NICOTINE POLACRILEX 8 MG: 4 LOZENGE ORAL at 20:35

## 2017-05-03 RX ADMIN — MULTIPLE VITAMINS W/ MINERALS TAB 1 TABLET: TAB at 08:47

## 2017-05-03 RX ADMIN — NICOTINE POLACRILEX 8 MG: 4 LOZENGE ORAL at 08:46

## 2017-05-03 ASSESSMENT — ACTIVITIES OF DAILY LIVING (ADL)
ORAL_HYGIENE: INDEPENDENT
DRESS: INDEPENDENT
LAUNDRY: WITH SUPERVISION
GROOMING: INDEPENDENT

## 2017-05-03 NOTE — PROGRESS NOTES
05/03/17 1318   Behavioral Health   Hallucinations denies / not responding to hallucinations   Thinking poor concentration   Orientation person: oriented;place: oriented;date: oriented;time: oriented   Memory baseline memory   Insight poor   Judgement impaired   Eye Contact at examiner   Affect blunted, flat   Mood hopeless   Physical Appearance/Attire attire appropriate to age and situation   Hygiene well groomed   Suicidality (none stated)   Self Injury (none stated)   Activity refusal   Speech clear   Medication Sensitivity no stated side effects   Psychomotor / Gait slow   Psycho Education   Type of Intervention 1:1 intervention   Response participates with cues/redirection   Hours 0.5   Activities of Daily Living   Hygiene/Grooming independent   Oral Hygiene independent   Dress independent   Laundry with supervision   Room Organization independent   pt isolative and withdrawn. Pt has limited hope for activities. Pt stays in room most of shift, out for short periods and mostly for meals.

## 2017-05-03 NOTE — PROGRESS NOTES
"[facility], [place]   Psychiatric Progress Note        Interim History:   The patient's care was discussed with the treatment team during the daily team meeting and/or staff's chart notes were reviewed.  Staff report patient spent a lot of time in his bed getting out only to eat and take meds. Poor eye contact, when asked how he felt, said he still felt very depressed and didn't see any future for himself. He admitted he was upset that [facility] supported our [facility] petition: \"I should have kept my mouth shut about Suicidal ideation\". He initially refused to meet with CD , then, agreed and completed CD assessment, but made it clear that it was important for him to be in compliance with treatment team recommendations. He denied med side effects.         Medications:       FLUoxetine  40 mg Oral Daily     busPIRone  15 mg Oral TID     gabapentin  600 mg Oral TID     multivitamin, therapeutic with minerals  1 tablet Oral Daily          Allergies:     Allergies   Allergen Reactions     Adhesive Tape Itching     Blisters (band aids)          Labs:   No results found for this or any previous visit (from the past 24 hour(s)).       Psychiatric Examination:   /64  Pulse 85  Temp 97.6  F (36.4  C) (Oral)  Resp 16  Ht 1.778 m (5' 10\")  Wt 78.9 kg (174 lb)  SpO2 96%  BMI 24.97 kg/m2  Weight is 174 lbs 0 oz  Body mass index is 24.97 kg/(m^2).    Appearance: awake, alert  Attitude:  guarded and minimally cooperative  Eye Contact:  poor   Mood:  anxious and depressed  Affect:  constricted mobility  Speech:  increased speech latency and decreased prosody  Psychomotor Behavior:  no evidence of tardive dyskinesia, dystonia, or tics and physical retardation  Throught Process:  goal oriented  Associations:  no loose associations  Thought Content:  active suicidal ideation present  Insight:  limited  Judgement:  limited  Oriented to:  time, person, and place  Attention Span and " Concentration:  limited  Recent and Remote Memory:  limited         Precautions:     Behavioral Orders   Procedures     Code 1 - Restrict to Unit     Routine Programming     As clinically indicated     Status 15     Every 15 minutes.     Suicide precautions          DIagnoses:     1. Depression: Major depressive disorder, severe, recurrent without psychotic features.   2. Generalized anxiety disorder.   3. Stimulant use disorder, severe.   4. Polysubstance abuse.          Plan:     Prozac and Buspar were increased 2 days ago. Will, per patient's request, change Buspar to bid dose. No other med changes.

## 2017-05-03 NOTE — PROGRESS NOTES
"Rule 25 Assessment  Background Information   1. Date of Assessment Request  2. Date of Assessment  5/3/2017 3. Date Service Authorized     4.   Carol Ann Salter MA, LPC, [facility] 5.  Phone Number   [facility] [phone] 6. Referent  N/A 7. Assessment Site  [facility] 20NB     8. Client Name   Morgan Bosch 9. Date of Birth  1985 Age  32 year old 10. Gender  male  11. PMI/ Insurance No.  Payor: MEDICAID [place] / Plan: LIMITED MEDICAID / Product Type: Medicaid /   [id number]   12. Client's Primary Language:  English 13. Do you require special accommodations, such as an  or assistance with written material? No   14. Current Address: [address] [place] [address] [place]  [facility] [address]   15. Client Phone Numbers: [phone] (home)      16. Tell me what has happened to bring you here today.    Per EPIC ER Note dated 4/26/17;  \"Morgan Bosch is a 32-year-old  male with history of polysubstance abuse, major depression who presented to the Emergency Department for evaluation of suicidal ideation. He said that he had been kicked out of his parent's home in the last couple of weeks, relapsed on drugs. He reported that lately he has been using Xanax, cough syrup, methamphetamines and alcohol.\"    17. Have you had other rule 25 assessments? Yes.   When  The patient's most recent CD treatment efforts include [facility] chemical dependency program. Has a history of prior MICD commitments in longterm in 06/2010 through [facility]. Typically does not follow through with services upon termination of court involvement.    DIMENSION I - Acute Intoxication /Withdrawal Potential   1. Chemical use most recent 12 months outside a facility and other significant use history (client self-report)              X = Primary Drug Used   Age of First Use Most Recent Pattern of Use and Duration   Need enough information to show pattern (both frequency and amounts) and to show tolerance for each chemical that has a diagnosis "   Date of last use and time, if needed   Withdrawal Potential? Requiring special care Method of use  (oral, smoked, snort, IV, etc)     Alcohol 20 2-5 beers , 1-2x week 18   no     oral     Marijuana/  Hashish N/A             Cocaine/Crack N/A           X Meth/  Amphetamines 25  .01-.05 grams, 1-2x/week 18  no snort     Heroin N/A             Other Opiates/  Synthetics N/A             Inhalants N/A             Benzodiazepines N/A             Hallucinogens N/A             Barbiturates/  Sedatives/  Hypnotics N/A             Over-the-Counter Drugs N/A             Other N/A             Nicotine 19 1/-1/2 pack/day (cigarettes) 18 no smoke     2. Do you use greater amounts of alcohol/other drugs to feel intoxicated or achieve the desired effect? No (patient report)  Or use the same amount and get less of an effect? no (DSM) Example: Increase in amounts and frequency of use.    3A. Have you ever been to detox? no    3B. When was the first time? NA    3C. How many times since then? NA    3D. Date of most recent detox: NA    4.  Withdrawal symptoms: Have you had any of the following withdrawal symptoms?  Past 12 months Recent (past 30 days)   Agitation  Fatigue / Extremely Tired  Sad / Depressed Feeling  Anxiety / Worried Agitation  Fatigue / Extremely Tired  Sad / Depressed Feeling  Anxiety / Worried     's Visual Observations and Symptoms: Alert and orientated x4 with no major withdrawal symptomology.     Based on the above information, is withdrawal likely to require attention as part of treatment participation?  No.    Dimension I Ratings   Acute intoxication/Withdrawal potential - The placing authority must use the criteria in Dimension I to determine a client s acute intoxication and withdrawal potential.    RISK DESCRIPTIONS - Severity ratin Client can tolerate and cope with withdrawal discomfort. The client displays no intoxication or withdrawal signs and symptoms.    REASONS SEVERITY WAS  ASSIGNED Patient displays no symptoms of withdrawal or intoxication symptomology at this time. Pt reports that his last use of alcohol was on 4/26/17 and his last use of meth was on 4/25/17. Pt was given a UA at time of ER admit and the UA was POS for  benzodiazepines and amphetamines.        DIMENSION II - Biomedical Complications and Conditions   1. Do you have any current health/medical conditions?(Include any infectious diseases, allergies, or chronic or acute pain, history of chronic conditions) Per Pt. Report, he does not have any medical conditions  Past Medical History:   Diagnosis Date     Anxiety      MDD (major depressive disorder)      Psychosis     substance induced     Substance abuse     methamphetamine       2. Do you have a health care provider? When was your most recent appointment? What concerns were identified?   Per pt report he does not have a primary care physician    3. If indicated by answers to items 1 or 2: How do you deal with these concerns? Per medical chart:  Pt. Has multiple mental health Dx and a history of polysubstance abuse, multiple past psychiatric hospitalizations and prior commitment for MI/CD.  Pt typically does not follow through with treatment recommendations.  Is that working for you?   If you are not receiving care for this problem, why not?        4A. List current medication(s) including over-the-counter or herbal supplements--including pain management:     Prior to Admission medications    Medication Sig Start Date End Date Taking? Authorizing Provider   gabapentin (NEURONTIN) 600 MG tablet Take 600 mg by mouth 3 times daily   Yes Reported, Patient   busPIRone (BUSPAR) 10 MG tablet Take 1 tablet (10 mg) by mouth 3 times daily 11/7/16  Yes Ni Harrison APRN CNP   DULoxetine (CYMBALTA) 60 MG capsule Take 1 capsule (60 mg) by mouth daily 11/7/16  Yes Ni Harrison APRN CNP   nicotine polacrilex 4 MG lozenge Place 1-2 lozenges (4-8 mg) inside cheek  every hour as needed for other (nicotine withdrawal symptoms) 11/7/16  Yes Ni Harrison APRN CNP   multivitamin, therapeutic with minerals (THERA-VIT-M) TABS Take 1 tablet by mouth daily 11/7/16  Yes Ni Harrison APRN CNP   hydrOXYzine (ATARAX) 50 MG tablet Take 1 tablet (50 mg) by mouth 3 times daily as needed for anxiety 11/7/16   Ni Harrison APRN CNP   Ibuprofen (ADVIL PO) Take 400 mg by mouth every 8 hours as needed for moderate pain     Reported, Patient   acetaminophen (TYLENOL) 325 MG tablet Take 2 tablets (650 mg) by mouth every 4 hours as needed for mild pain 3/23/15   Ni Harrison APRN CNP     Current Facility-Administered Medications   Medication     FLUoxetine (PROzac) tablet 40 mg     busPIRone (BUSPAR) tablet 15 mg     gabapentin (NEURONTIN) tablet 600 mg     multivitamin, therapeutic with minerals (THERA-VIT-M) tablet 1 tablet     nicotine polacrilex lozenge 4-8 mg     hydrOXYzine (ATARAX) tablet 25-50 mg     OLANZapine (zyPREXA) tablet 5 mg    Or     OLANZapine (zyPREXA) injection 5 mg     acetaminophen (TYLENOL) tablet 650 mg     alum & mag hydroxide-simethicone (MYLANTA ES/MAALOX  ES) suspension 30 mL     magnesium hydroxide (MILK OF MAGNESIA) suspension 30 mL     bisacodyl (DULCOLAX) Suppository 10 mg     traZODone (DESYREL) tablet 50 mg     nicotine polacrilex (COMMIT) lozenge 2-4 mg       4B. Do you follow current medical recommendations/take medications as prescribed? Yes    4C. When did you last take your medication? 5/3/2017    5. Has a health care provider/healer ever recommended that you reduce or quit alcohol/drug use? yes    6. Are you pregnant? No    7. Have you had any injuries, assaults/violence towards you, accidents, health related issues, overdose(s) or hospitalizations related to your use of alcohol or other drugs: No    8. Do you have any specific physical needs/accommodations? No    Dimension II Ratings   Biomedical Conditions and  "Complications - The placing authority must use the criteria in Dimension II to determine a client s biomedical conditions and complications.   RISK DESCRIPTIONS - Severity ratin Client displays full functioning with good ability to cope with physical discomfort.    REASONS SEVERITY WAS ASSIGNED Patient denies having any chronic biomedical conditions that would interfere with treatment or any recovery skills training/workshop. Pt reports taking the following medications at this time;    Current Facility-Administered Medications   Medication     FLUoxetine (PROzac) tablet 40 mg     busPIRone (BUSPAR) tablet 15 mg     gabapentin (NEURONTIN) tablet 600 mg     multivitamin, therapeutic with minerals (THERA-VIT-M) tablet 1 tablet     nicotine polacrilex lozenge 4-8 mg     hydrOXYzine (ATARAX) tablet 25-50 mg     OLANZapine (zyPREXA) tablet 5 mg    Or     OLANZapine (zyPREXA) injection 5 mg     acetaminophen (TYLENOL) tablet 650 mg     alum & mag hydroxide-simethicone (MYLANTA ES/MAALOX  ES) suspension 30 mL     magnesium hydroxide (MILK OF MAGNESIA) suspension 30 mL     bisacodyl (DULCOLAX) Suppository 10 mg     traZODone (DESYREL) tablet 50 mg     nicotine polacrilex (COMMIT) lozenge 2-4 mg     At the time of detox admission the patients BP was 115/61 and Pulse was 83 BPM. Pt denies having pain at this time.  Pt reports that he consumes nicotine daily (cigarette smoker) but isn't inclined to quit smoking at this time. Pt was provided with smoking cessation educational literature.        DIMENSION III - Emotional, Behavioral, Cognitive Conditions and Complications   1. (Optional) Tell me what it was like growing up in your family. (substance use, mental health, discipline, abuse, support)   Narrative provided by the patient:  \"Normal family - parents still , one sister. No abuse.  Family doesn't use, I didn't use drugs or alcohol until college  Raised by: His parents - still   Siblings: Patient reports " that he has 1 sister  Family CD History: Does not endorse CD history  Family MH History: Per chart: The patient reports several family members on both mother's and father's side have depression and anxiety. Uncle has Down's syndrome. Parents have depression. Two members of extended family have bipolar disorder.   Abuse: Pt denies a history of abuse while growing up.   Supported?: Pt reports that they felt supported while growing up.    2. When was the last time that you had significant problems...  A. with feeling very trapped, lonely, sad, blue, depressed or hopeless  about the future? Past Month    B. with sleep trouble, such as bad dreams, sleeping restlessly, or falling  asleep during the day? 2 - 12 months ago    C. with feeling very anxious, nervous, tense, scared, panicked, or like  something bad was going to happen? Past Month    D. with becoming very distressed and upset when something reminded  you of the past? Never    E. with thinking about ending your life or committing suicide? Past Month    3. When was the last time that you did the following things two or more times?  A. Lied or conned to get things you wanted or to avoid having to do  something? 1+ years ago    B. Had a hard time paying attention at school, work, or home? Never    C. Had a hard time listening to instructions at school, work, or home? 1+ years ago    D. Were a bully or threatened other people? Never    E. Started physical fights with other people? Past Month      Note: These questions are from the Global Appraisal of Individual Needs--Short Screener. Any item marked  past month  or  2 to 12 months ago  will be scored with a severity rating of at least 2.     Per medical Chart:  The pt had been living with his mother and was asked to leave.  Following this he relapsed on alcohol and stimulants.  He became depressed and hospitalized himself.      2A-2C: Pt reports and attributes these to his use of chemicals and possibly related to MH  concerns.   2E: Pt denies having any SIB's/SI's/SA's at this time and feels hopeful about the future.   3A-3C: Pt reports and attributes these to his use of chemicals and possibly related to MH concerns.     4A. If the person has answered item 2E with  in the past year  or  the past month , ask about frequency and history of suicide in the family or someone close and whether they were under the influence.   Patient does NOT endorse a hx of suicide in his family or of anyone close to him    Any history of suicide in your family? Or someone close to you? No    4B. If the person answered item 2E  in the past month  ask about intent, plan, means and access and any other follow-up information to determine imminent risk. Document any actions taken to intervene on any identified imminent risk.  N/A    5A. Have you ever been diagnosed with a mental health problem?  yes    5B. Are you receiving care for any mental health issues? Yes - currently hospitalized  If yes, what is the focus of that care or treatment?  Medication management and case management to connect patient with resources to help stabilize symptoms beyond crisis period  Are you satisfied with the service? yes  Most recent appointment?  current  How has it been helpful? Able to sleep    6. Have you been prescribed medications for emotional/psychological problems? Yes.    6B. Current mental health medications are listed for Dimension II, reference item II-5.     6C. Are you taking your medications as instructed?  yes.    7. Does your MH provider know about your use? Yes.    7B. What does he or she have to say about it?(DSM) Supportive of abstaining from substance use and recovery    8A. Have you ever been verbally, emotionally, physically or sexually abused?  No     Follow up questions to learn current risk, continuing emotional impact.  NA    8B. Have you received counseling for abuse?  No    9. Have you ever experienced or been part of a group that experienced  community violence, historical trauma, rape or assault? No    10A. : No    11. Do you have problems with any of the following things in your daily life?  No    Note: If the person has any of the above problems, follow up with items 12, 13, and 14. If none of the issues in item 11 are a problem for the person, skip to item 15.    I really don't cope.     12. Have you been diagnosed with traumatic brain injury or Alzheimer s?  no    13. If the answer to #12 is no, ask the following questions:    Have you ever hit your head or been hit on the head? no     Were you ever seen in the Emergency Room, hospital or by a doctor because of an injury to your head? no    Have you had any significant illness that affected your brain (brain tumor, meningitis, West Nile Virus, stroke or seizure, heart attack, near drowning or near suffocation)?  no    14. If the answer to #12 is yes, ask if any of the problems identified in #11 occurred since the head injury or loss of oxygen. NA    15A. Highest grade of school completed: Some college, but no degree    15B. Do you have a learning disability? No.    15C. Did you ever have tutoring in Math or English? No.    15D. Have you ever been diagnosed with Fetal Alcohol Effects or Fetal Alcohol Syndrome? no.    16. If yes to item 15 B, C, or D: How has this affected your use or been affected by your use? N/A    Dimension III Ratings   Emotional/Behavioral/Cognitive - The placing authority must use the criteria in Dimension III to determine a client s emotional, behavioral, and cognitive conditions and complications.   RISK DESCRIPTIONS - Severity ratin Client has difficulty with impulse control and lacks coping skills. Client has difficulty functioning in significant life areas. Client has moderate symptoms of emotional, behavioral, or cognitive problems. Client is able to participate in most treatment activities.    REASONS SEVERITY WAS ASSIGNED - The patient reports having mental  "health diagnosis of depression and anxiety and polysubstance abuse. Pt reports taking the following medications for MH; see Dimension II section 5. Pt reports that his childhood was normal and felt supported while growing up. Pt reports having one siblings. Pt denies a history of abuse. Pt lacks sober coping skills and impulse control. Pt lacks emotional and stress management skills. Pt denies SIB/SA/HI/HA at this time.        DIMENSION IV - Readiness for Change   1. You ve told me what brought you here today. (first section) What do you think the problem really is? \"That I am depressed and I recently relapsed.\"      2. Tell me how things are going.     Relationships: Mom kicked him out of his living situation  Legal: Does not endorse current legal  Financial: Not good  Emotional: Have been depressed  Education: N/A  Leisure: N/A  Employment: unemployed  Living Arrangements: Homeless      3. What activities have you engaged in when using alcohol/other drugs that could be hazardous to you or others (i.e. driving a car/motorcycle/boat, operating machinery, unsafe sex, sharing needles for drugs or tattoos, etc   Pt. Does not endorse engaging in hazardous activities while under the influence    4. How much time do you spend getting, using or getting over using alcohol or drugs? (DSM) 10-20 hours/week    5. Reasons for drinking/drug use (Use the space below to record answers. It may not be necessary to ask each item.)  Like the feeling Yes   Trying to forget problems N/A   To cope with stress N/A   To relieve physical pain N/A   To cope with anxiety Yes   To cope with depression Yes   To relax or unwind N/A   Makes it easier to talk with people Yes   Partner encourages use N/A   Most friends drink or use N/A   To cope with family problems N/A   Afraid of withdrawal symptoms/to feel better N/A   Other (specify)  N/A     A. What concerns other people about your alcohol or drug use/Has anyone told you that you use too much? " "What did they say? (DSM) My family and friends are very concerned. They want me to get help and quit using.  They are concerned about my health and mental health    B. What did you think about that/ do you think you have a problem with alcohol or drug use?   I agree and realize that I have a problem.     6. What changes are you willing to make? What substance are you willing to stop using? How are you going to do that? Have you tried that before? What interfered with your success with that goal?  Go to treatment and quit using.  Willing to stop using everything and engage in recovery activities.     7. What would be helpful to you in making this change? Support groups, treatment, and structure.    Dimension IV Ratings   Readiness for Change - The placing authority must use the criteria in Dimension IV to determine a client s readiness for change.   RISK DESCRIPTIONS - Severity rating: 3 Client displays inconsistent compliance, minimal awareness of either the client s addiction or mental disorder, and is minimally cooperative.    REASONS SEVERITY WAS ASSIGNED  Patient displays verbal compliance and motivation but lacks consistent behaviors and follow-through. Pt has continued to use despite significant impairment in multiple life domains, fractured relationships and mental health symptoms. Pt appears to be in the \"Preparation\" Stage within the Stages of Change Model as evidenced by pt. Admitting to the hospital which based on past experience is the beginning stage for pt to return to recovery and/or not using activities.\"        DIMENSION V - Relapse, Continued Use, and Continued Problem Potential   1. In what ways have you tried to control, cut-down or quit your use?Pt has multiple attempts at recovery and has attend CD treatment on multiple occassions in the past - reports he does not remember how many  If you have had periods of sobriety, how did you accomplish that? Treatment and support groups and hospitals "   hat was helpful? What happened to prevent you from continuing your sobriety? (DSM)   I have tried cutting down and controlling but lead to me relapsing.  My longest peroid of sobriety has been - Pt reports that he does not know    2. Have you experienced cravings? If yes, ask follow up questions to determine if the person recognizes triggers and if the person has had any success in dealing with them.  Yes, stress and seeing certain people, places or things all can cause me cravings to want to use.    3. Have you been treated for alcohol/other drug abuse/dependence? Yes.    3B. Number of times(lifetime) (over what period) - patient has attended CD tx on multiple past occassions - reports he does not remember how many.  At least 6..  Most recent was November 2016 at [facility]  3C. Number of times completed treatment (lifetime) - Patient has a hx of completing treatment but not following through with recommendations after.    3D. During the past three years have you participated in outpatient and/or residential?  Yes.    3E. When and where? [facility].     3F. What was helpful? yes    4. Support group participation: Have you/do you attend support group meetings to reduce/stop your alcohol/drug use? How recently? Yes - when I was at treatment  What was your experience? fine  Are you willing to restart? yes    5. What would assist you in staying sober/straight?     Structure, sober support, and treatment    Dimension V Ratings   Relapse/Continued Use/Continued problem potential - The placing authority must use the criteria in Dimension V to determine a client s relapse, continued use, and continued problem potential.   RISK DESCRIPTIONS - Severity rating: 3 Some awareness of the negative impact of mental health problems or substance abuse. Some coping skills to arrest mental health or addiction illnesses, or prevent relapse, inconsistently applied    REASONS SEVERITY WAS ASSIGNED Patient reports  having been involved in numerous past inpatient and outpatient treatments (completed most), 0 past detox admissions, and minimal past 12-Step support group participation. Pt reports having minimal some sober time but reports he is not sure how much and has tried to quit using and drinking on multiple occassions in the past but relapsed.  Pt lacks impulse control, sober coping skills and long-term sober maintenance skills. Pt is at a high risk for relapse/continued use.       DIMENSION VI - Recovery Environment   1. Are you employed/attending school? Tell me about that.     I am currently unemployed and I am not attending school.     2A. Describe a typical day; evening for you. Work, school, social, leisure, volunteer, spiritual practices. Include time spent obtaining, using, recovering from drugs or alcohol. (DSM) I haven't been doing much of anything as of late and spend most of the day using or with things related to my use. I don't do much of anything except for using and I lack daily structure.      2B. How often do you spend more time than you planned using or use more than you planned? (DSM) A lot of the time when I am actively using.     3. How important is using to your social connections? Do many of your family or friends use? Not my family.  i don't have a lot of friends    4A. Are you currently in a significant relationship? No    4C. Sexual Orientation: Heterosexual    5A. Who do you live with?  NA    5B. Tell me about their alcohol/drug use and mental health issues. NA    5C. Are you concerned for your safety there? Yes - homeless    5D. Are you concerned about the safety of anyone else who lives with you? no.    6A. Do you have children who live with you? No    6B. Do you have children who do not live with you? No    7A. Who supports you in making changes in your alcohol or drug use? What are they willing to do to support you? Who is upset or angry about you making changes in your alcohol or drug use?  "How big a problem is this for you?      My family is supportive. I am sure some would help if I needed something and if they knew I was working hard on my sobriety.     7B. This table is provided to record information about the person s relationships and available support It is not necessary to ask each item; only to get a comprehensive picture of their support system.  How often can you count on the following people when you need someone?   Partner / Spouse N/A   Parent(s)/Aunt(s)/Uncle(s)/Grandparents Always supportive   Sibling(s)/Cousin(s) Always supportive   Child(waldemar) N/A   Other relative(s) N/A   Friend(s)/neighbor(s) N/A   Child(waldemar) s father(s)/mother(s) N/A   Support group member(s) Always supportive   Community of nelly members N/A   /counselor/therapist/healer N/A   Other (specify) N/A     8A. What is your current living situation? I am currently homeless.    8B. What is your long term plan for where you will be living? I dont have one    8C. Tell me about your living environment/neighborhood? Homeless, non-supportive environment    9. Criminal justice history: Gather current/recent history and any significant history related to substance use--Arrests?  Yes - one  Convictions? Yes - \"simple robbery\"  Circumstances? Served time in prison  Still on probation or parole? No    10. What obstacles exist to participating in treatment? (Time off work, childcare, funding, transportation, pending prison time, living situation) None    Dimension VI Ratings   Recovery environment - The placing authority must use the criteria in Dimension VI to determine a client s recovery environment.   RISK DESCRIPTIONS - Severity ratin Client has (A) Chronically antagonistic significant other, living environment, family, peer group or long-term criminal justice involvement that is harmful to recovery or treatment progress, or (B) Client has an actively antagonistic significant other, family, work, or living " "environment with immediate threat to the client s safety and well-being.    REASONS SEVERITY WAS ASSIGNED - (What support does the person have for making changes? What structure/stability does the person have in his or her daily life that will increase the likelihood that changes can be sustained? What problems exist in the person s environment that will jeopardize getting/staying clean and sober?)     Patient reports that his current living situation is unsupportive towards his recovery. Pt reports that he is currently homeless. Pt reports that he lacks a daily structure and meaningful activities. Pt reports that he is currently unemployed and is not attending school at this time. Pt reports fracturing relationships with family and friends due to his use. Pt lacks a sober support network. Pt reports that he has some past legal involvement for \"simple robbery\" and isn't on probation for it.        Client Choice/Exceptions   Would you like services specific to language, age, gender, culture, Temple preference, race, ethnicity, sexual orientation or disability?  Somewhere in [place] or [place]    What particular treatment choices and options would you like to have?MI/CD    Do you have a preference for a particular treatment program? [facility] (per patient request)    Criteria for Diagnosis     Criteria for Diagnosis  DSM-5 Criteria for Substance Use Disorder  Instructions: Determine whether the client currently meets the criteria for Substance Use Disorder using the diagnostic criteria in the DSM-V pp.481-589. Current means during the most recent 12 months outside a facility that controls access to substances    Category of Substance Severity (ICD-10 Code / DSM 5 Code)     Alcohol Use Disorder Moderate  (F10.20) (303.90)   Cannabis Use Disorder NA   Hallucinogen Use Disorder NA   Inhalant Use Disorder NA   Opioid Use Disorder NA   Sedative, Hypnotic, or Anxiolytic Use Disorder NA   Stimulant Related Disorder " Severe   (F15.20) (304.40) Amphetamine type substance   Tobacco Use Disorder Moderate   (F17.200) (305.1)   Other (or unknown) Substance Use Disorder NA     Collateral Contact Summary   Number of contacts made: 2    Contact with referring person:  N/A.    If court related records were reviewed, summarize here: N/A    Information from collateral contacts was significantly different from information from the client and lead to different risk ratings. Summarize here: It is this writer's opinion that the patient did not give a thorough and accurate clinical picture of his substance use concerns and history in his report, however significant documentation exists in the [facility] medical record for this pt. That indicates a severe pattern of polysubstance use concerns, past attempts at treatment and recovery and multiple mental health/psychiatric hospitalizations, which indicate a severe pattern of use/abuse    Rule 25 Assessment Summary and Plan   's Recommendation    1)  Complete a residential based or similar treatment program similar to [facility]    2)  Abstain from all mood-altering chemicals unless prescribed by a licensed provider.   3)  Attend weekly peer recovery support group meetings (ie. 12 step,SMART Recovery, health Realization, Wellbriety, Rational Recovery, etc.)     4)  Actively work with a male recovery mentor, sponsor or obtain a  through [place] [facility] ([phone]).   5)  Follow all the recommendations of your treatment/medical providers.  6)  Remain law abiding.      Collateral Contacts     Name    Konstantin Madrid MD Relationship    Attending Physician Phone Number    [phone] Releases           Patient's H&P:  Morgan Bosch was seen for 70 minutes on 04/26/2017, over 50 minutes of this time spent in counseling and coordinating care, clarifying diagnostic prognostic issues and presence of support in community.       CHIEF COMPLAINT AND REASON FOR ADMISSION: Morgan  Hiro is a 32-year-old  male with history of polysubstance abuse, major depression who presented to the Emergency Department for evaluation of suicidal ideation. He said that he had been kicked out of his parent's home in the last couple of weeks, relapsed on drugs. He reported that lately he has been using Xanax, cough syrup, methamphetamines and alcohol.       HISTORY OF PRESENT ILLNESS: The patient is well known to this facility. His last hospitalization was in 10/2016. He was also admitted and treated in 09/2016, 08/2016, 02/2016, 03/2015, 10/2011, 03/2011, 04/2011, and he says he has multiple psychiatric hospitalizations as well. The patient reports also that he has not been able to stay clean and sober, was kicked out of his mother's home in [place], [place], secondary to his drug relapse. He said that he had attempted to carbon monoxide himself by placing a hose from the exhaust into his van;s window. Then started having second thoughts and terminated the suicide attempt. He reports poor sleep, low energy, feeling worthless, hopeless. He admits that he has been using Xanax around 2 mg per day, snorting and using IV approximately $30 worth per episode of methamphetamines, using cough syrup but not every day and drinking alcohol daily, averaging a 6 pack per day.       PAST PSYCHIATRIC HISTORY: The patient's most recent CD treatment efforts include [facility] chemical dependency program. See prior diagnoses such as schizoaffective disorder, major depressive disorder, TIM. Has a history of prior MICD commitments in [facility] in 06/2010 through [facility]. Typically does not follow through with services upon termination of court involvement. History of serious suicide attempt by acetaminophen overdose in 2010. He states that he does not believe that his medications are working for him. He has a history of poor compliance with medication. He told me today that he feels depressed and highly  anxious even when he is sober, however, his longest period of sobriety was only 6 months or so and in a closed environment. In addition to the above-mentioned, the patient says that he was treated with multiple antidepressants, could recall being on Effexor, Prozac, Zoloft. He is unsure which one was the most helpful; however, later told me that Prozac had been. He also tried Lexapro, lithium, Seroquel, trazodone, Zyprexa. Said that Vistaril was not helpful. Reports a history of possible serotonin syndrome. He estimates that he had been in 7-10 chemical dependency treatment programs.       PAST MEDICAL HISTORY: No history of major medical concerns, surgeries, seizures, but there is a history of mild alcohol withdrawal.       ALLERGIES: Allergic to adhesive tape.       HOME MEDICATIONS: The patient admits he was not 100% compliant with:   1. BuSpar 10 mg 3 times a day.   2. Gabapentin 600 mg 3 times a day.   3. Multivitamin 1 tablet daily.       PHYSICAL EXAMINATION: Please refer to Dr. Chanda Mcdowell's note from 04/25/2017. The patient had following lab work done at the Emergency Department: Comprehensive metabolic battery showed elevated chloride of 110, total protein 6.5, otherwise was normal. The patient had a low TSH 0.18, but free T4 was normal. CBC with differential showed elevated white blood cells 13.2, hematocrit 39.0, MCV of 4.26. Hepatitis C antibody and HIV antibody were nonreactive both. Urine drug screen was positive for benzodiazepines and amphetamines.       VITAL SIGNS: Temperature 98, respirations 16, heart rate 83, blood pressure 115/61.       FAMILY AND SOCIAL HISTORY: The patient reports several family members on both mother's and father's side have depression and anxiety. Uncle has Down's syndrome. Parents have depression. Two members of extended family have bipolar disorder. He grew up in the [facility] and in [place]. Parents are  and had a normal childhood. Has 1 sister,  strained relationship with his family. Completed about 1 year of college. He has been homeless since being kicked out from his mother's place a couple of weeks ago. Was convicted of robbery 8 years ago, spent some time in longterm. Denied current legal issues.       MENTAL STATUS EXAMINATION: The patient is a  male who was interviewed while lying on the bed. He appeared to be in significant distress and speech was slow and monotone. He had difficulties keeping his eyes open. Seemed to be pretty straightforward about his substance use, reported still having suicidal thoughts but said that he felt safe being in the hospital. There was no loosening of associations. He denied any psychotic symptoms or homicidal thoughts. He was oriented x3. Fund of knowledge appeared to be average with proper usage of vocabulary. He had no peculiar use of language. Fund of knowledge was appropriate. Gait and station were not tested.       1. Depression: Major depressive disorder, severe, recurrent without psychotic features.   2. Generalized anxiety disorder.   3. Stimulant use disorder, severe.   4. Polysubstance abuse.       RECOMMENDATION: The patient was started overnight on phenobarbital to prevent withdrawal from using short-acting benzodiazepines (Xanax). We will start him on Neurontin and buspirone, however, we will not restart on Cymbalta as he reported it was not helpful. We will instead start him on Prozac as he reported he had the most significant improvement with it. I asked if he desired to go into chemical dependency treatment. He said that he was unsure that he was on too many treatment programs, did not see any benefit from that. He, however, agreed with me to discuss this issue later on when he was more alert and had more chances to think about that.           IRMA DOHERTY MD               D: 04/26/2017 17:52 T: 04/26/2017 23:12 MT: LQ       Name: NAVJOT HAINES   MRN: [id number] Account: [id number]    : 1985 Admitted: [id number]       Document: [id number]              Last signed by: Konstantin Madrid MD at 2017  1:17 PM   Revision History          Collateral Contacts     Name    [facility]'s EMR   Relationship     Phone Number     Releases           Information Provided:  This writer reviewed this patients Electronic Medical Record and the information reviewed supports the information the patient reported during his CD evaluation.    llateral Contacts      A problematic pattern of alcohol/drug use leading to clinically significant impairment or distress, as manifested by at least two of the following, occurring within a 12-month period:    Alcohol/drug is often taken in larger amounts or over a longer period than was intended.  There is a persistent desire or unsuccessful efforts to cut down or control alcohol/drug use  A great deal of time is spent in activities necessary to obtain alcohol, use alcohol, or recover from its effects.  Recurrent alcohol/drug use resulting in a failure to fulfill major role obligations at work, school or home.  Continued alcohol use despite having persistent or recurrent social or interpersonal problems caused or exacerbated by the effects of alcohol/drug.  Important social, occupational, or recreational activities are given up or reduced because of alcohol/drug use.  Alcohol/drug use is continued despite knowledge of having a persistent or recurrent physical or psychological problem that is likely to have been caused or exacerbated by alcohol.    Specifier  Severe: Presence of 6 or more symptoms

## 2017-05-03 NOTE — PROGRESS NOTES
Completed patient's Rule 25 assessment after patient completed all documentation independently.  Patient assessed at Residential level and documentation was given to [facility] to send to [facility].  CPA form was filled out and needs a signature

## 2017-05-04 PROCEDURE — 25000132 ZZH RX MED GY IP 250 OP 250 PS 637: Performed by: PSYCHIATRY & NEUROLOGY

## 2017-05-04 PROCEDURE — 12400001 ZZH R&B MH UMMC

## 2017-05-04 RX ADMIN — NICOTINE POLACRILEX 8 MG: 4 LOZENGE ORAL at 16:47

## 2017-05-04 RX ADMIN — NICOTINE POLACRILEX 8 MG: 4 LOZENGE ORAL at 15:45

## 2017-05-04 RX ADMIN — NICOTINE POLACRILEX 8 MG: 4 LOZENGE ORAL at 19:05

## 2017-05-04 RX ADMIN — GABAPENTIN 600 MG: 600 TABLET, FILM COATED ORAL at 19:56

## 2017-05-04 RX ADMIN — MULTIPLE VITAMINS W/ MINERALS TAB 1 TABLET: TAB at 06:48

## 2017-05-04 RX ADMIN — NICOTINE POLACRILEX 8 MG: 4 LOZENGE ORAL at 17:44

## 2017-05-04 RX ADMIN — BUSPIRONE HYDROCHLORIDE 30 MG: 15 TABLET ORAL at 19:55

## 2017-05-04 RX ADMIN — GABAPENTIN 600 MG: 600 TABLET, FILM COATED ORAL at 14:26

## 2017-05-04 RX ADMIN — OLANZAPINE 5 MG: 5 TABLET, FILM COATED ORAL at 19:55

## 2017-05-04 RX ADMIN — NICOTINE POLACRILEX 8 MG: 4 LOZENGE ORAL at 14:27

## 2017-05-04 RX ADMIN — HYDROXYZINE HYDROCHLORIDE 50 MG: 25 TABLET ORAL at 19:56

## 2017-05-04 RX ADMIN — NICOTINE POLACRILEX 8 MG: 4 LOZENGE ORAL at 06:50

## 2017-05-04 RX ADMIN — FLUOXETINE HYDROCHLORIDE 40 MG: 10 TABLET, COATED ORAL at 06:47

## 2017-05-04 RX ADMIN — NICOTINE POLACRILEX 8 MG: 4 LOZENGE ORAL at 12:58

## 2017-05-04 RX ADMIN — NICOTINE POLACRILEX 8 MG: 4 LOZENGE ORAL at 19:56

## 2017-05-04 RX ADMIN — GABAPENTIN 600 MG: 600 TABLET, FILM COATED ORAL at 06:48

## 2017-05-04 RX ADMIN — BUSPIRONE HYDROCHLORIDE 30 MG: 15 TABLET ORAL at 06:47

## 2017-05-04 ASSESSMENT — ACTIVITIES OF DAILY LIVING (ADL)
ORAL_HYGIENE: INDEPENDENT
DRESS: STREET CLOTHES
LAUNDRY: WITH SUPERVISION
ORAL_HYGIENE: INDEPENDENT
DRESS: INDEPENDENT
GROOMING: INDEPENDENT
LAUNDRY: WITH SUPERVISION
GROOMING: INDEPENDENT

## 2017-05-04 NOTE — PROGRESS NOTES
"  Pt went to court today and the results are that he is under commitment now. Pt appears blunted, and reports feeling \"flat.\"       05/04/17 1336   Behavioral Health   Hallucinations denies / not responding to hallucinations   Thinking poor concentration   Orientation person: oriented;place: oriented;date: oriented;time: oriented   Memory baseline memory   Insight poor   Judgement impaired   Eye Contact at examiner   Affect blunted, flat   Mood depressed   Physical Appearance/Attire neat   Hygiene well groomed   Suicidality other (see comments)  (denies, alluded to possible passive SI)   Self Injury other (see comment)  (denies)   Activity other (see comment)  (Court, on commitment now, eating, partially in milieu)   Speech clear;coherent   Medication Sensitivity no stated side effects;no observed side effects   Psychomotor / Gait balanced;steady   Activities of Daily Living   Hygiene/Grooming independent   Oral Hygiene independent   Dress independent   Laundry with supervision   Room Organization independent     "

## 2017-05-04 NOTE — PROGRESS NOTES
"[facility], [place]   Psychiatric Progress Note        Interim History:   The patient's care was discussed with the treatment team during the daily team meeting and/or staff's chart notes were reviewed.  Patient was not seen today as he was attending his final commitment hearing. He denied med side effects. Per staff report:  Pt went to court today and the results are that he is under commitment now. Pt appears blunted, and reports feeling \"flat.\"         Medications:       busPIRone  30 mg Oral BID     FLUoxetine  40 mg Oral Daily     gabapentin  600 mg Oral TID     multivitamin, therapeutic with minerals  1 tablet Oral Daily          Allergies:     Allergies   Allergen Reactions     Adhesive Tape Itching     Blisters (band aids)          Labs:   No results found for this or any previous visit (from the past 24 hour(s)).       Psychiatric Examination:   /64  Pulse 85  Temp 98.8  F (37.1  C)  Resp 16  Ht 1.778 m (5' 10\")  Wt 78.9 kg (174 lb)  SpO2 96%  BMI 24.97 kg/m2  Weight is 174 lbs 0 oz  Body mass index is 24.97 kg/(m^2).    Appearance: awake, alert  Attitude:  guarded and minimally cooperative  Eye Contact:  poor   Mood:  anxious and depressed  Affect:  constricted mobility  Speech:  increased speech latency and decreased prosody  Psychomotor Behavior:  no evidence of tardive dyskinesia, dystonia, or tics and physical retardation  Throught Process:  goal oriented  Associations:  no loose associations  Thought Content:  active suicidal ideation present  Insight:  limited  Judgement:  limited  Oriented to:  time, person, and place  Attention Span and Concentration:  limited  Recent and Remote Memory:  limited         Precautions:     Behavioral Orders   Procedures     Code 1 - Restrict to Unit     Routine Programming     As clinically indicated     Status 15     Every 15 minutes.     Suicide precautions          DIagnoses:     1. Depression: Major depressive disorder, " severe, recurrent without psychotic features.   2. Generalized anxiety disorder.   3. Stimulant use disorder, severe.   4. Polysubstance abuse.          Plan:     Prozac and Buspar were increased 3 days ago. No medication changes today. Patient is now fully MICD committed. Will work on getting him into residential MICD program.

## 2017-05-05 PROCEDURE — 99232 SBSQ HOSP IP/OBS MODERATE 35: CPT | Performed by: PSYCHIATRY & NEUROLOGY

## 2017-05-05 PROCEDURE — 25000132 ZZH RX MED GY IP 250 OP 250 PS 637: Performed by: PSYCHIATRY & NEUROLOGY

## 2017-05-05 PROCEDURE — 12400001 ZZH R&B MH UMMC

## 2017-05-05 RX ADMIN — HYDROXYZINE HYDROCHLORIDE 50 MG: 25 TABLET ORAL at 20:31

## 2017-05-05 RX ADMIN — NICOTINE POLACRILEX 8 MG: 4 LOZENGE ORAL at 11:39

## 2017-05-05 RX ADMIN — BUSPIRONE HYDROCHLORIDE 30 MG: 15 TABLET ORAL at 08:51

## 2017-05-05 RX ADMIN — NICOTINE POLACRILEX 8 MG: 4 LOZENGE ORAL at 08:51

## 2017-05-05 RX ADMIN — NICOTINE POLACRILEX 8 MG: 4 LOZENGE ORAL at 12:34

## 2017-05-05 RX ADMIN — GABAPENTIN 600 MG: 600 TABLET, FILM COATED ORAL at 20:31

## 2017-05-05 RX ADMIN — GABAPENTIN 600 MG: 600 TABLET, FILM COATED ORAL at 15:38

## 2017-05-05 RX ADMIN — NICOTINE POLACRILEX 8 MG: 4 LOZENGE ORAL at 15:38

## 2017-05-05 RX ADMIN — NICOTINE POLACRILEX 8 MG: 4 LOZENGE ORAL at 19:23

## 2017-05-05 RX ADMIN — NICOTINE POLACRILEX 8 MG: 4 LOZENGE ORAL at 18:22

## 2017-05-05 RX ADMIN — GABAPENTIN 600 MG: 600 TABLET, FILM COATED ORAL at 08:51

## 2017-05-05 RX ADMIN — NICOTINE POLACRILEX 8 MG: 4 LOZENGE ORAL at 16:58

## 2017-05-05 RX ADMIN — FLUOXETINE HYDROCHLORIDE 40 MG: 10 TABLET, COATED ORAL at 08:51

## 2017-05-05 RX ADMIN — MULTIPLE VITAMINS W/ MINERALS TAB 1 TABLET: TAB at 08:51

## 2017-05-05 RX ADMIN — NICOTINE POLACRILEX 4 MG: 4 LOZENGE ORAL at 10:10

## 2017-05-05 RX ADMIN — NICOTINE POLACRILEX 8 MG: 4 LOZENGE ORAL at 20:31

## 2017-05-05 RX ADMIN — BUSPIRONE HYDROCHLORIDE 30 MG: 15 TABLET ORAL at 20:31

## 2017-05-05 ASSESSMENT — ACTIVITIES OF DAILY LIVING (ADL)
DRESS: STREET CLOTHES
DRESS: STREET CLOTHES;INDEPENDENT
HYGIENE/GROOMING: INDEPENDENT
HYGIENE/GROOMING: INDEPENDENT
ORAL_HYGIENE: INDEPENDENT
ORAL_HYGIENE: INDEPENDENT

## 2017-05-05 NOTE — PLAN OF CARE
"Problem: Depressive Symptoms  Goal: Depressive Symptoms  Signs and symptoms of listed problems will be absent or manageable.   Outcome: Improving  48 Hour Assessment    Pt declined 1600 and 2000 vitals.    Pt appeared to be less isolative and withdrawn today and was visible in the milieu for the entirety of the shift. He was watching movies, socializing with fellow patients, as well as attending and participating in Community group. He displayed a full range of affect, smiling and laughing with staff and patients. He states that he feels better after his court visit today, and is hoping his aftercare placement is arranged soon.     He was pleasant and calm with writer, other staff and patients, and states that his \"Depression is better, and my anxiety is a little high today.\" Pt requested and received prn Zyprexa 5 mg  and Vistaril 50 mg. Pt was med compliant with other prescribed medications.        SI: Pt denies     SIB: Pt denies     Auditory/Visual Hallucinations: Pt states that he has never had auditory or visual hallucinations.     Quality of sleep: States that he is usually able to fall asleep with no issue.    Will continue to monitor for safety          "

## 2017-05-05 NOTE — PLAN OF CARE
Problem: General Plan of Care (Inpatient Behavioral)  Goal: Team Discussion  Team Plan:   BEHAVIORAL TEAM DISCUSSION     Continued Stay Criteria/Rationale: pt is committed and will transfer to MICD treatment  Plan: CTC to coordinate transfer to MICD facility, manage meds, offer groups for coping skills  Participants: Dr. Madrid, [facility] ALBINO Peña     Progress: moderate

## 2017-05-05 NOTE — PROGRESS NOTES
"[facility], [place]   Psychiatric Progress Note        Interim History:   The patient's care was discussed with the treatment team during the daily team meeting and/or staff's chart notes were reviewed.  He was seen today sitting quietly at the patients' lobby and coloring. Reported he had been feeling less depressed. When I asked him specifics, said that depression was less severe and denied presence of Suicidal ideation. He also said he was more hopeful about his future, accepted he would have to go to MICD program. Subjectively, appeared to be slightly more animated.         Medications:       busPIRone  30 mg Oral BID     FLUoxetine  40 mg Oral Daily     gabapentin  600 mg Oral TID     multivitamin, therapeutic with minerals  1 tablet Oral Daily          Allergies:     Allergies   Allergen Reactions     Adhesive Tape Itching     Blisters (band aids)          Labs:   No results found for this or any previous visit (from the past 24 hour(s)).       Psychiatric Examination:   /64  Pulse 85  Temp 98.8  F (37.1  C)  Resp 16  Ht 1.778 m (5' 10\")  Wt 78.9 kg (174 lb)  SpO2 96%  BMI 24.97 kg/m2  Weight is 174 lbs 0 oz  Body mass index is 24.97 kg/(m^2).    Appearance: awake, alert  Attitude:  More cooperative  Eye Contact: better  Mood: less anxious and depressed  Affect:  constricted mobility, more animated than before  Speech:  increased speech latency and decreased prosody  Psychomotor Behavior:  no evidence of tardive dyskinesia, dystonia, or tics and physical retardation  Throught Process:  goal oriented  Associations:  no loose associations  Thought Content:  Denies Suicidal ideation   Insight:  limited, but improving  Judgement:  limited, but improving  Oriented to:  time, person, and place  Attention Span and Concentration:  limited  Recent and Remote Memory:  limited         Precautions:     Behavioral Orders   Procedures     Code 1 - Restrict to Unit     Routine " Programming     As clinically indicated     Status 15     Every 15 minutes.     Suicide precautions          DIagnoses:     1. Depression: Major depressive disorder, severe, recurrent without psychotic features.   2. Generalized anxiety disorder.   3. Stimulant use disorder, severe.   4. Polysubstance abuse.          Plan:     Shows improvement. He is fully committed. Had CD assessment. Will, likely, go to [facility] treatment.

## 2017-05-06 PROCEDURE — 25000132 ZZH RX MED GY IP 250 OP 250 PS 637: Performed by: PSYCHIATRY & NEUROLOGY

## 2017-05-06 PROCEDURE — 12400001 ZZH R&B MH UMMC

## 2017-05-06 RX ADMIN — NICOTINE POLACRILEX 8 MG: 4 LOZENGE ORAL at 14:03

## 2017-05-06 RX ADMIN — NICOTINE POLACRILEX 8 MG: 4 LOZENGE ORAL at 18:23

## 2017-05-06 RX ADMIN — NICOTINE POLACRILEX 8 MG: 4 LOZENGE ORAL at 19:29

## 2017-05-06 RX ADMIN — HYDROXYZINE HYDROCHLORIDE 50 MG: 25 TABLET ORAL at 20:31

## 2017-05-06 RX ADMIN — FLUOXETINE HYDROCHLORIDE 40 MG: 10 TABLET, COATED ORAL at 08:43

## 2017-05-06 RX ADMIN — NICOTINE POLACRILEX 8 MG: 4 LOZENGE ORAL at 12:28

## 2017-05-06 RX ADMIN — NICOTINE POLACRILEX 8 MG: 4 LOZENGE ORAL at 09:55

## 2017-05-06 RX ADMIN — BUSPIRONE HYDROCHLORIDE 30 MG: 15 TABLET ORAL at 20:31

## 2017-05-06 RX ADMIN — BUSPIRONE HYDROCHLORIDE 30 MG: 15 TABLET ORAL at 08:43

## 2017-05-06 RX ADMIN — NICOTINE POLACRILEX 8 MG: 4 LOZENGE ORAL at 15:57

## 2017-05-06 RX ADMIN — NICOTINE POLACRILEX 8 MG: 4 LOZENGE ORAL at 20:31

## 2017-05-06 RX ADMIN — GABAPENTIN 600 MG: 600 TABLET, FILM COATED ORAL at 08:43

## 2017-05-06 RX ADMIN — GABAPENTIN 600 MG: 600 TABLET, FILM COATED ORAL at 20:31

## 2017-05-06 RX ADMIN — MULTIPLE VITAMINS W/ MINERALS TAB 1 TABLET: TAB at 08:43

## 2017-05-06 RX ADMIN — NICOTINE POLACRILEX 8 MG: 4 LOZENGE ORAL at 17:13

## 2017-05-06 RX ADMIN — GABAPENTIN 600 MG: 600 TABLET, FILM COATED ORAL at 14:03

## 2017-05-06 RX ADMIN — NICOTINE POLACRILEX 8 MG: 4 LOZENGE ORAL at 11:22

## 2017-05-06 RX ADMIN — NICOTINE POLACRILEX 8 MG: 4 LOZENGE ORAL at 08:43

## 2017-05-06 ASSESSMENT — ACTIVITIES OF DAILY LIVING (ADL)
DRESS: INDEPENDENT
LAUNDRY: UNABLE TO COMPLETE
HYGIENE/GROOMING: INDEPENDENT
DRESS: SCRUBS (BEHAVIORAL HEALTH)
GROOMING: INDEPENDENT
ORAL_HYGIENE: INDEPENDENT
ORAL_HYGIENE: INDEPENDENT

## 2017-05-06 NOTE — PLAN OF CARE
"Problem: Depressive Symptoms  Goal: Depressive Symptoms  Signs and symptoms of listed problems will be absent or manageable.   Met with pt for 1:1.  Pt presents as calm and has good eye contact.  Pt reports he feels \"pretty good\".   Pt reports he has depression 2/10 and anxiety 3/10.  Pt states this is an improvement from his admission.  He states the improvements stems from the stability on the unit and restarting medications.  Pt denies SI, SIB, AH, VH, paranoia, and HI.  Pt was visible in the milieu (watching movies); however, he had minimal socializing with peers.  Pt is calm and pleasant with staff.  Pt had questions about commitment that he planned to follow up with his physician.       Pt states that he naps during the day because he is \"bored\".  Pt reported that he took a shower today and has enough to eat.  Pt has been routinely refusing daily vitals, but is in NAD.  Pt had no further questions or concerns for staff.  Will continue to monitor and assess.      "

## 2017-05-06 NOTE — PROGRESS NOTES
"Pt staying out in milieu for first half of shift. Refusing vitals because \"there's no point in getting it.\" Some socializing with another male peer but otherwise mostly to self. Didn't check-in with writer. Flat and blunted affect but calm and pleasant otherwise.        05/05/17 2140   Behavioral Health   Hallucinations denies / not responding to hallucinations   Thinking distractable   Memory baseline memory   Insight poor   Judgement impaired   Eye Contact at examiner   Affect blunted, flat   Mood mood is calm   Physical Appearance/Attire appears stated age;attire appropriate to age and situation;neat   Hygiene other (see comment)  (adequate)   Suicidality other (see comments)  (none stated/observed)   Self Injury other (see comment)  (none stated/observed)   Activity isolative   Speech clear;coherent   Medication Sensitivity no observed side effects;no stated side effects   Psychomotor / Gait balanced;steady   Activities of Daily Living   Hygiene/Grooming independent   Oral Hygiene independent   Dress street clothes;independent   Room Organization independent     "

## 2017-05-06 NOTE — PROGRESS NOTES
05/06/17 1323   Behavioral Health   Hallucinations denies / not responding to hallucinations   Thinking distractable   Orientation person: oriented;place: oriented;date: oriented;time: oriented   Memory baseline memory   Insight poor   Judgement impaired   Eye Contact at examiner   Affect blunted, flat   Mood mood is calm   Physical Appearance/Attire appears stated age   Hygiene well groomed   Suicidality other (see comments)  (denied )   Self Injury other (see comment)  (denied)   Activity isolative   Speech clear;coherent   Medication Sensitivity no stated side effects   Psychomotor / Gait balanced   Psycho Education   Type of Intervention 1:1 intervention   Response participates, initiates socially appropriate   Hours 0.5   Treatment Detail (ways to cope )   Activities of Daily Living   Hygiene/Grooming independent   Oral Hygiene independent   Dress independent   Room Organization independent   Groups   Details (isolative )   Behavioral Health Interventions   Depression maintain safety precautions;encourage participation / independence with adls;provide emotional support;establish therapeutic relationship;assist with developing and utilizing healthy coping strategies;build upon strengths   Social and Therapeutic Interventions (Depression) encourage socialization with peers;encourage participation in therapeutic groups and milieu activities     Pt was isolative the majority of this shift, came out for meals, refused BP, attended community meeting only but refused to participate. Pt appeared blunted/flat, in and out of room and observed pacing the whelan at times. Pt had a visit with mom and stated visit went well.

## 2017-05-07 PROCEDURE — 25000132 ZZH RX MED GY IP 250 OP 250 PS 637: Performed by: PSYCHIATRY & NEUROLOGY

## 2017-05-07 PROCEDURE — 12400001 ZZH R&B MH UMMC

## 2017-05-07 RX ADMIN — NICOTINE POLACRILEX 8 MG: 4 LOZENGE ORAL at 07:38

## 2017-05-07 RX ADMIN — NICOTINE POLACRILEX 8 MG: 4 LOZENGE ORAL at 15:11

## 2017-05-07 RX ADMIN — NICOTINE POLACRILEX 8 MG: 4 LOZENGE ORAL at 10:10

## 2017-05-07 RX ADMIN — NICOTINE POLACRILEX 8 MG: 4 LOZENGE ORAL at 20:18

## 2017-05-07 RX ADMIN — NICOTINE POLACRILEX 8 MG: 4 LOZENGE ORAL at 13:56

## 2017-05-07 RX ADMIN — GABAPENTIN 600 MG: 600 TABLET, FILM COATED ORAL at 13:55

## 2017-05-07 RX ADMIN — GABAPENTIN 600 MG: 600 TABLET, FILM COATED ORAL at 20:18

## 2017-05-07 RX ADMIN — NICOTINE POLACRILEX 8 MG: 4 LOZENGE ORAL at 08:47

## 2017-05-07 RX ADMIN — HYDROXYZINE HYDROCHLORIDE 50 MG: 25 TABLET ORAL at 20:18

## 2017-05-07 RX ADMIN — BUSPIRONE HYDROCHLORIDE 30 MG: 15 TABLET ORAL at 20:18

## 2017-05-07 RX ADMIN — MULTIPLE VITAMINS W/ MINERALS TAB 1 TABLET: TAB at 07:38

## 2017-05-07 RX ADMIN — BUSPIRONE HYDROCHLORIDE 30 MG: 15 TABLET ORAL at 07:38

## 2017-05-07 RX ADMIN — OLANZAPINE 5 MG: 5 TABLET, FILM COATED ORAL at 15:16

## 2017-05-07 RX ADMIN — NICOTINE POLACRILEX 4 MG: 4 LOZENGE ORAL at 12:29

## 2017-05-07 RX ADMIN — GABAPENTIN 600 MG: 600 TABLET, FILM COATED ORAL at 07:38

## 2017-05-07 RX ADMIN — NICOTINE POLACRILEX 8 MG: 4 LOZENGE ORAL at 16:42

## 2017-05-07 RX ADMIN — FLUOXETINE HYDROCHLORIDE 40 MG: 10 TABLET, COATED ORAL at 07:38

## 2017-05-07 ASSESSMENT — ACTIVITIES OF DAILY LIVING (ADL)
HYGIENE/GROOMING: INDEPENDENT
ORAL_HYGIENE: INDEPENDENT
DRESS: STREET CLOTHES
LAUNDRY: WITH SUPERVISION
DRESS: STREET CLOTHES
LAUNDRY: WITH SUPERVISION
ORAL_HYGIENE: INDEPENDENT
HYGIENE/GROOMING: INDEPENDENT

## 2017-05-07 NOTE — PROGRESS NOTES
Pt spent time in the [place] watching movies and he was asking about his coomitment what happen if he relapses. He denies suicidal ideation and he wants to get to treatment.

## 2017-05-08 PROCEDURE — 12400001 ZZH R&B MH UMMC

## 2017-05-08 PROCEDURE — 99232 SBSQ HOSP IP/OBS MODERATE 35: CPT | Performed by: PSYCHIATRY & NEUROLOGY

## 2017-05-08 PROCEDURE — 25000132 ZZH RX MED GY IP 250 OP 250 PS 637: Performed by: PSYCHIATRY & NEUROLOGY

## 2017-05-08 RX ADMIN — NICOTINE POLACRILEX 8 MG: 4 LOZENGE ORAL at 13:23

## 2017-05-08 RX ADMIN — OLANZAPINE 5 MG: 5 TABLET, FILM COATED ORAL at 16:27

## 2017-05-08 RX ADMIN — BUSPIRONE HYDROCHLORIDE 30 MG: 15 TABLET ORAL at 20:01

## 2017-05-08 RX ADMIN — NICOTINE POLACRILEX 8 MG: 4 LOZENGE ORAL at 15:00

## 2017-05-08 RX ADMIN — NICOTINE POLACRILEX 4 MG: 4 LOZENGE ORAL at 17:47

## 2017-05-08 RX ADMIN — GABAPENTIN 600 MG: 600 TABLET, FILM COATED ORAL at 13:23

## 2017-05-08 RX ADMIN — NICOTINE POLACRILEX 4 MG: 4 LOZENGE ORAL at 10:01

## 2017-05-08 RX ADMIN — NICOTINE POLACRILEX 8 MG: 4 LOZENGE ORAL at 08:21

## 2017-05-08 RX ADMIN — GABAPENTIN 600 MG: 600 TABLET, FILM COATED ORAL at 08:21

## 2017-05-08 RX ADMIN — MULTIPLE VITAMINS W/ MINERALS TAB 1 TABLET: TAB at 08:21

## 2017-05-08 RX ADMIN — NICOTINE POLACRILEX 8 MG: 4 LOZENGE ORAL at 11:28

## 2017-05-08 RX ADMIN — GABAPENTIN 600 MG: 600 TABLET, FILM COATED ORAL at 20:02

## 2017-05-08 RX ADMIN — BUSPIRONE HYDROCHLORIDE 30 MG: 15 TABLET ORAL at 08:21

## 2017-05-08 RX ADMIN — FLUOXETINE HYDROCHLORIDE 40 MG: 10 TABLET, COATED ORAL at 08:21

## 2017-05-08 RX ADMIN — NICOTINE POLACRILEX 8 MG: 4 LOZENGE ORAL at 16:27

## 2017-05-08 ASSESSMENT — ACTIVITIES OF DAILY LIVING (ADL)
GROOMING: INDEPENDENT
ORAL_HYGIENE: INDEPENDENT
LAUNDRY: WITH SUPERVISION
HYGIENE/GROOMING: INDEPENDENT
DRESS: INDEPENDENT
ORAL_HYGIENE: INDEPENDENT
LAUNDRY: WITH SUPERVISION
DRESS: INDEPENDENT

## 2017-05-08 NOTE — PROGRESS NOTES
05/08/17 1424   Behavioral Health   Hallucinations denies / not responding to hallucinations   Thinking intact   Orientation person: oriented;place: oriented;date: oriented;time: oriented   Memory baseline memory   Insight insight appropriate to situation   Judgement impaired   Eye Contact at examiner   Affect blunted, flat   Mood mood is calm;irritable   Physical Appearance/Attire appears stated age   Hygiene well groomed   Suicidality other (see comments)  (denied)   Self Injury other (see comment)  (denied)   Activity isolative;withdrawn   Speech clear;coherent   Medication Sensitivity no stated side effects   Psychomotor / Gait balanced   Psycho Education   Type of Intervention 1:1 intervention   Response participates, initiates socially appropriate   Hours 0.5   Treatment Detail (check-in )   Activities of Daily Living   Hygiene/Grooming independent   Oral Hygiene independent   Dress independent   Laundry with supervision   Room Organization independent   Groups   Details (isolative )   Behavioral Health Interventions   Depression maintain safety precautions;encourage participation / independence with adls;provide emotional support;establish therapeutic relationship;assist with developing and utilizing healthy coping strategies;build upon strengths   Social and Therapeutic Interventions (Depression) encourage socialization with peers;encourage participation in therapeutic groups and milieu activities     Pt was withdrawn and isolative. Pt spent the majority of the shift napping in his room. When visible, pt was barely social. Refused vitals and groups. Pt appeared irritated upon approached. Blunted/flat affect. Denied all psychotic symptoms. Kept on asking when he's going to get out of here.       .

## 2017-05-08 NOTE — PROGRESS NOTES
Isolative and withdrawn from the milieu tonight. Spent most the evening napping or reading in his room. Has denied vitals for the entire evening. Is irritable when asked to get a blood pressure. His insight is appropriate to certain situation and events but his judgment is impaired when it comes to actively participating in social activities and groups.

## 2017-05-08 NOTE — PROGRESS NOTES
"[facility], [place]   Psychiatric Progress Note        Interim History:   The patient's care was discussed with the treatment team during the daily team meeting and/or staff's chart notes were reviewed.  He was seen today sitting quietly at the patients' lobby. He reported he had been doing better, moodwise, but quickly got irritated when I asked why he had been refusing vitals. He eventually, agreed to allow vitals. Otherwise, he is cooperative with care and his meds.          Medications:       busPIRone  30 mg Oral BID     FLUoxetine  40 mg Oral Daily     gabapentin  600 mg Oral TID     multivitamin, therapeutic with minerals  1 tablet Oral Daily          Allergies:     Allergies   Allergen Reactions     Adhesive Tape Itching     Blisters (band aids)          Labs:   No results found for this or any previous visit (from the past 24 hour(s)).       Psychiatric Examination:   /64  Pulse 85  Temp 98.8  F (37.1  C)  Resp 16  Ht 1.778 m (5' 10\")  Wt 78.9 kg (174 lb)  SpO2 96%  BMI 24.97 kg/m2  Weight is 174 lbs 0 oz  Body mass index is 24.97 kg/(m^2).    Appearance: awake, alert  Attitude:  partially cooperative  Eye Contact: better  Mood: less anxious and depressed  Affect:  constricted mobility, more animated than before  Speech:  increased speech latency and decreased prosody  Psychomotor Behavior:  no evidence of tardive dyskinesia, dystonia, or tics and physical retardation  Throught Process:  goal oriented  Associations:  no loose associations  Thought Content:  Denies Suicidal ideation   Insight:  limited, but improving  Judgement:  limited, but improving  Oriented to:  time, person, and place  Attention Span and Concentration:  limited  Recent and Remote Memory:  limited         Precautions:     Behavioral Orders   Procedures     Code 1 - Restrict to Unit     Routine Programming     As clinically indicated     Status 15     Every 15 minutes.     Suicide precautions "          DIagnoses:     1. Depression: Major depressive disorder, severe, recurrent without psychotic features.   2. Generalized anxiety disorder.   3. Stimulant use disorder, severe.   4. Polysubstance abuse.          Plan:     Shows improvement. He is fully committed. Had CD assessment. Will, likely, go to [facility] treatment.

## 2017-05-09 PROCEDURE — 12400001 ZZH R&B MH UMMC

## 2017-05-09 PROCEDURE — 25000132 ZZH RX MED GY IP 250 OP 250 PS 637: Performed by: PSYCHIATRY & NEUROLOGY

## 2017-05-09 RX ADMIN — NICOTINE POLACRILEX 4 MG: 4 LOZENGE ORAL at 12:29

## 2017-05-09 RX ADMIN — NICOTINE POLACRILEX 8 MG: 4 LOZENGE ORAL at 19:27

## 2017-05-09 RX ADMIN — NICOTINE POLACRILEX 8 MG: 4 LOZENGE ORAL at 15:29

## 2017-05-09 RX ADMIN — BUSPIRONE HYDROCHLORIDE 30 MG: 15 TABLET ORAL at 08:36

## 2017-05-09 RX ADMIN — ACETAMINOPHEN 650 MG: 325 TABLET, FILM COATED ORAL at 18:22

## 2017-05-09 RX ADMIN — GABAPENTIN 600 MG: 600 TABLET, FILM COATED ORAL at 19:27

## 2017-05-09 RX ADMIN — GABAPENTIN 600 MG: 600 TABLET, FILM COATED ORAL at 13:40

## 2017-05-09 RX ADMIN — FLUOXETINE HYDROCHLORIDE 40 MG: 10 TABLET, COATED ORAL at 08:37

## 2017-05-09 RX ADMIN — NICOTINE POLACRILEX 4 MG: 4 LOZENGE ORAL at 08:40

## 2017-05-09 RX ADMIN — NICOTINE POLACRILEX 4 MG: 4 LOZENGE ORAL at 13:41

## 2017-05-09 RX ADMIN — HYDROXYZINE HYDROCHLORIDE 50 MG: 25 TABLET ORAL at 19:27

## 2017-05-09 RX ADMIN — BUSPIRONE HYDROCHLORIDE 30 MG: 15 TABLET ORAL at 19:27

## 2017-05-09 RX ADMIN — GABAPENTIN 600 MG: 600 TABLET, FILM COATED ORAL at 08:37

## 2017-05-09 RX ADMIN — NICOTINE POLACRILEX 8 MG: 4 LOZENGE ORAL at 17:35

## 2017-05-09 RX ADMIN — NICOTINE POLACRILEX 8 MG: 4 LOZENGE ORAL at 18:23

## 2017-05-09 RX ADMIN — MULTIPLE VITAMINS W/ MINERALS TAB 1 TABLET: TAB at 08:37

## 2017-05-09 RX ADMIN — NICOTINE POLACRILEX 8 MG: 4 LOZENGE ORAL at 16:43

## 2017-05-09 ASSESSMENT — ACTIVITIES OF DAILY LIVING (ADL)
HYGIENE/GROOMING: INDEPENDENT
ORAL_HYGIENE: INDEPENDENT
DRESS: INDEPENDENT
ORAL_HYGIENE: INDEPENDENT
DRESS: INDEPENDENT
LAUNDRY: WITH SUPERVISION
GROOMING: INDEPENDENT

## 2017-05-09 NOTE — PROGRESS NOTES
05/09/17 1219   Behavioral Health   Hallucinations denies / not responding to hallucinations   Thinking intact   Orientation person: oriented;place: oriented;date: oriented;time: oriented   Memory baseline memory   Insight poor   Judgement impaired   Eye Contact at examiner   Affect blunted, flat   Mood mood is calm   Physical Appearance/Attire attire appropriate to age and situation   Hygiene well groomed   Suicidality (denies)   Self Injury (denies)   Activity withdrawn   Speech clear;coherent   Medication Sensitivity no stated side effects   Psychomotor / Gait balanced   Psycho Education   Type of Intervention 1:1 intervention   Response participates, initiates socially appropriate   Hours 0.5   Activities of Daily Living   Hygiene/Grooming independent   Oral Hygiene independent   Dress independent   Laundry with supervision   Room Organization independent   pt more social and visible in milieu. Pt patiently waiting for a bed at a treatment facility. Pt calm and pleasant. Pt attending some groups.

## 2017-05-09 NOTE — PLAN OF CARE
Problem: Depressive Symptoms  Goal: Depressive Symptoms  Signs and symptoms of listed problems will be absent or manageable.   Outcome: No Change  Morgan is fully alert and oriented. He was visible in milieu watching TV with peers. No agitating or irritable behaviors this evening. He remains medication compliant. He denies any discomfort. He refused to talk about his depression this evening. He define his moon as better. No SI/SIB behaviors

## 2017-05-10 PROCEDURE — 99232 SBSQ HOSP IP/OBS MODERATE 35: CPT | Performed by: PSYCHIATRY & NEUROLOGY

## 2017-05-10 PROCEDURE — 90853 GROUP PSYCHOTHERAPY: CPT

## 2017-05-10 PROCEDURE — 25000132 ZZH RX MED GY IP 250 OP 250 PS 637: Performed by: PSYCHIATRY & NEUROLOGY

## 2017-05-10 PROCEDURE — 12400001 ZZH R&B MH UMMC

## 2017-05-10 RX ADMIN — NICOTINE POLACRILEX 8 MG: 4 LOZENGE ORAL at 08:56

## 2017-05-10 RX ADMIN — GABAPENTIN 600 MG: 600 TABLET, FILM COATED ORAL at 08:57

## 2017-05-10 RX ADMIN — HYDROXYZINE HYDROCHLORIDE 50 MG: 25 TABLET ORAL at 13:26

## 2017-05-10 RX ADMIN — NICOTINE POLACRILEX 8 MG: 4 LOZENGE ORAL at 19:15

## 2017-05-10 RX ADMIN — NICOTINE POLACRILEX 8 MG: 4 LOZENGE ORAL at 07:45

## 2017-05-10 RX ADMIN — FLUOXETINE HYDROCHLORIDE 40 MG: 10 TABLET, COATED ORAL at 08:56

## 2017-05-10 RX ADMIN — GABAPENTIN 600 MG: 600 TABLET, FILM COATED ORAL at 13:26

## 2017-05-10 RX ADMIN — BUSPIRONE HYDROCHLORIDE 30 MG: 15 TABLET ORAL at 08:56

## 2017-05-10 RX ADMIN — OLANZAPINE 5 MG: 5 TABLET, FILM COATED ORAL at 18:19

## 2017-05-10 RX ADMIN — NICOTINE POLACRILEX 8 MG: 4 LOZENGE ORAL at 16:29

## 2017-05-10 RX ADMIN — NICOTINE POLACRILEX 4 MG: 4 LOZENGE ORAL at 17:57

## 2017-05-10 RX ADMIN — NICOTINE POLACRILEX 8 MG: 4 LOZENGE ORAL at 10:11

## 2017-05-10 RX ADMIN — HYDROXYZINE HYDROCHLORIDE 50 MG: 25 TABLET ORAL at 18:19

## 2017-05-10 RX ADMIN — MULTIPLE VITAMINS W/ MINERALS TAB 1 TABLET: TAB at 08:56

## 2017-05-10 RX ADMIN — NICOTINE POLACRILEX 8 MG: 4 LOZENGE ORAL at 14:55

## 2017-05-10 RX ADMIN — NICOTINE POLACRILEX 8 MG: 4 LOZENGE ORAL at 12:23

## 2017-05-10 RX ADMIN — NICOTINE POLACRILEX 8 MG: 4 LOZENGE ORAL at 13:26

## 2017-05-10 ASSESSMENT — ACTIVITIES OF DAILY LIVING (ADL)
LAUNDRY: WITH SUPERVISION
ORAL_HYGIENE: INDEPENDENT
LAUNDRY: UNABLE TO COMPLETE
DRESS: STREET CLOTHES
HYGIENE/GROOMING: INDEPENDENT
DRESS: STREET CLOTHES
GROOMING: INDEPENDENT
ORAL_HYGIENE: INDEPENDENT

## 2017-05-10 NOTE — PROGRESS NOTES
Initially seen by OT on this date. More observation needed to complete initial evaluation at this time.  Partcipiated in group focused on relaxation and self expression.  Worked quietly, com[pleted task and asked to leave.    Plan: Encourage ongoing attendance as able.  Patient will be given a self assessment/goal setting form.  OT staff will review this with patient, explain the value of inclusion in treatment plan and offer options to meet identified goals.

## 2017-05-10 NOTE — PROGRESS NOTES
Pt attended groups this shift and he states that he doing well and waiting for placement. He was in the lounge watching movies with other peers.

## 2017-05-10 NOTE — PROGRESS NOTES
"[facility], [place]   Psychiatric Progress Note        Interim History:   The patient's care was discussed with the treatment team during the daily team meeting and/or staff's chart notes were reviewed.  He was seen today sitting quietly at the patients' lobby. He reported feeling better. Denied having Suicidal ideation for the last 3-4 days. Admitted he felt bored here, was looking forward to being discharged to [facility] program [facility]. He has been allowing checking vitals.          Medications:       busPIRone  30 mg Oral BID     FLUoxetine  40 mg Oral Daily     gabapentin  600 mg Oral TID     multivitamin, therapeutic with minerals  1 tablet Oral Daily          Allergies:     Allergies   Allergen Reactions     Adhesive Tape Itching     Blisters (band aids)          Labs:   No results found for this or any previous visit (from the past 24 hour(s)).       Psychiatric Examination:   /64  Pulse 85  Temp 97.5  F (36.4  C) (Oral)  Resp 16  Ht 1.778 m (5' 10\")  Wt 88 kg (194 lb)  SpO2 96%  BMI 27.84 kg/m2  Weight is 194 lbs 0 oz  Body mass index is 27.84 kg/(m^2).    Appearance: awake, alert  Attitude: more cooperative  Eye Contact: better  Mood: less anxious and depressed  Affect:  constricted mobility, more animated than before  Speech:  increased speech latency and decreased prosody  Psychomotor Behavior:  no evidence of tardive dyskinesia, dystonia, or tics and physical retardation  Throught Process:  goal oriented  Associations:  no loose associations  Thought Content:  Denies Suicidal ideation   Insight:  limited, but improving  Judgement:  limited, but improving  Oriented to:  time, person, and place  Attention Span and Concentration:  limited  Recent and Remote Memory:  limited         Precautions:     Behavioral Orders   Procedures     Code 1 - Restrict to Unit     Routine Programming     As clinically indicated     Status 15     Every 15 minutes.     Suicide precautions      "     DIagnoses:     1. Depression: Major depressive disorder, severe, recurrent without psychotic features.   2. Generalized anxiety disorder.   3. Stimulant use disorder, severe.   4. Polysubstance abuse.          Plan:     Shows improvement. He is fully committed. Had CD assessment. Will, likely, go to [facility] treatment. Per [facility], he was also referred to [facility] and [facility] programs.

## 2017-05-10 NOTE — PROGRESS NOTES
Was social out in the milieu tonight. Said during group that he was having a really good day. No anxiety or depression noted.

## 2017-05-11 PROCEDURE — 99232 SBSQ HOSP IP/OBS MODERATE 35: CPT | Performed by: PSYCHIATRY & NEUROLOGY

## 2017-05-11 PROCEDURE — 12400001 ZZH R&B MH UMMC

## 2017-05-11 PROCEDURE — 25000132 ZZH RX MED GY IP 250 OP 250 PS 637: Performed by: PSYCHIATRY & NEUROLOGY

## 2017-05-11 RX ADMIN — BUSPIRONE HYDROCHLORIDE 30 MG: 15 TABLET ORAL at 08:04

## 2017-05-11 RX ADMIN — NICOTINE POLACRILEX 8 MG: 4 LOZENGE ORAL at 18:17

## 2017-05-11 RX ADMIN — NICOTINE POLACRILEX 8 MG: 4 LOZENGE ORAL at 15:59

## 2017-05-11 RX ADMIN — MULTIPLE VITAMINS W/ MINERALS TAB 1 TABLET: TAB at 08:05

## 2017-05-11 RX ADMIN — BUSPIRONE HYDROCHLORIDE 30 MG: 15 TABLET ORAL at 20:24

## 2017-05-11 RX ADMIN — NICOTINE POLACRILEX 8 MG: 4 LOZENGE ORAL at 09:26

## 2017-05-11 RX ADMIN — NICOTINE POLACRILEX 8 MG: 4 LOZENGE ORAL at 12:14

## 2017-05-11 RX ADMIN — FLUOXETINE HYDROCHLORIDE 40 MG: 10 TABLET, COATED ORAL at 08:04

## 2017-05-11 RX ADMIN — NICOTINE POLACRILEX 8 MG: 4 LOZENGE ORAL at 10:51

## 2017-05-11 RX ADMIN — NICOTINE POLACRILEX 4 MG: 4 LOZENGE ORAL at 08:05

## 2017-05-11 RX ADMIN — NICOTINE POLACRILEX 8 MG: 4 LOZENGE ORAL at 19:16

## 2017-05-11 RX ADMIN — NICOTINE POLACRILEX 8 MG: 4 LOZENGE ORAL at 14:16

## 2017-05-11 RX ADMIN — NICOTINE POLACRILEX 8 MG: 4 LOZENGE ORAL at 17:02

## 2017-05-11 RX ADMIN — HYDROXYZINE HYDROCHLORIDE 50 MG: 25 TABLET ORAL at 20:24

## 2017-05-11 RX ADMIN — GABAPENTIN 600 MG: 600 TABLET, FILM COATED ORAL at 08:04

## 2017-05-11 RX ADMIN — NICOTINE POLACRILEX 8 MG: 4 LOZENGE ORAL at 20:24

## 2017-05-11 RX ADMIN — GABAPENTIN 600 MG: 600 TABLET, FILM COATED ORAL at 20:24

## 2017-05-11 RX ADMIN — GABAPENTIN 600 MG: 600 TABLET, FILM COATED ORAL at 14:17

## 2017-05-11 ASSESSMENT — ACTIVITIES OF DAILY LIVING (ADL)
DRESS: STREET CLOTHES
HYGIENE/GROOMING: INDEPENDENT
ORAL_HYGIENE: INDEPENDENT
ORAL_HYGIENE: INDEPENDENT
DRESS: STREET CLOTHES
GROOMING: INDEPENDENT

## 2017-05-11 NOTE — PROGRESS NOTES
05/11/17 1423   Activities of Daily Living   Hygiene/Grooming independent   Oral Hygiene independent   Dress street clothes   Room Organization independent   Pt has been visible his affect flat, he has made several jokes, and played guitar with staff. He went to all groups, and stated that he is ready to leave, and is doing ok.

## 2017-05-11 NOTE — PROGRESS NOTES
"[facility], [place]   Psychiatric Progress Note        Interim History:   The patient's care was discussed with the treatment team during the daily team meeting and/or staff's chart notes were reviewed.  He was seen today sitting quietly at the patients' lobby and coloring. Reported that his mood was slightly better, denied presence of Suicidal ideation. Said he was looking forward to getting out of this hospital, sounded less enthusiastic while was talking about going to CD program. Socialized with some patients on the floor.          Medications:       busPIRone  30 mg Oral BID     FLUoxetine  40 mg Oral Daily     gabapentin  600 mg Oral TID     multivitamin, therapeutic with minerals  1 tablet Oral Daily          Allergies:     Allergies   Allergen Reactions     Adhesive Tape Itching     Blisters (band aids)          Labs:   No results found for this or any previous visit (from the past 24 hour(s)).       Psychiatric Examination:   /64  Pulse 85  Temp 98  F (36.7  C) (Oral)  Resp 16  Ht 1.778 m (5' 10\")  Wt 88 kg (194 lb)  SpO2 96%  BMI 27.84 kg/m2  Weight is 194 lbs 0 oz  Body mass index is 27.84 kg/(m^2).    Appearance: awake, alert  Attitude: more cooperative  Eye Contact: better  Mood: less anxious and depressed  Affect:  constricted mobility, more animated than before  Speech:  increased speech latency and decreased prosody  Psychomotor Behavior:  no evidence of tardive dyskinesia, dystonia, or tics and physical retardation  Throught Process:  goal oriented  Associations:  no loose associations  Thought Content:  Denies Suicidal ideation   Insight:  limited, but improving  Judgement:  limited, but improving  Oriented to:  time, person, and place  Attention Span and Concentration: better  Recent and Remote Memory: good         Precautions:     Behavioral Orders   Procedures     Code 1 - Restrict to Unit     Routine Programming     As clinically indicated     Status 15 "     Every 15 minutes.     Suicide precautions          DIagnoses:     1. Depression: Major depressive disorder, severe, recurrent without psychotic features.   2. Generalized anxiety disorder.   3. Stimulant use disorder, severe.   4. Polysubstance abuse.          Plan:     Shows improvement. He is fully committed. Had CD assessment. Per [facility], he can go to CD treatment on May 16.

## 2017-05-12 PROCEDURE — 25000132 ZZH RX MED GY IP 250 OP 250 PS 637: Performed by: PSYCHIATRY & NEUROLOGY

## 2017-05-12 PROCEDURE — 99207 ZZC CDG-MDM COMPONENT: MEETS LOW - DOWN CODED: CPT | Performed by: PSYCHIATRY & NEUROLOGY

## 2017-05-12 PROCEDURE — 99232 SBSQ HOSP IP/OBS MODERATE 35: CPT | Performed by: PSYCHIATRY & NEUROLOGY

## 2017-05-12 PROCEDURE — 12400001 ZZH R&B MH UMMC

## 2017-05-12 RX ORDER — HYDROXYZINE HYDROCHLORIDE 50 MG/1
50 TABLET, FILM COATED ORAL 3 TIMES DAILY PRN
Qty: 90 TABLET | Refills: 1 | Status: ON HOLD | OUTPATIENT
Start: 2017-05-12 | End: 2017-05-30

## 2017-05-12 RX ORDER — BUSPIRONE HYDROCHLORIDE 30 MG/1
30 TABLET ORAL 2 TIMES DAILY
Qty: 60 TABLET | Refills: 1 | Status: ON HOLD | OUTPATIENT
Start: 2017-05-12 | End: 2017-05-30

## 2017-05-12 RX ORDER — FLUOXETINE 20 MG/1
40 TABLET, FILM COATED ORAL DAILY
Qty: 60 TABLET | Refills: 1 | Status: ON HOLD | OUTPATIENT
Start: 2017-05-12 | End: 2017-05-30

## 2017-05-12 RX ORDER — GABAPENTIN 600 MG/1
600 TABLET ORAL 3 TIMES DAILY
Qty: 90 TABLET | Refills: 1 | Status: ON HOLD | OUTPATIENT
Start: 2017-05-12 | End: 2017-05-30

## 2017-05-12 RX ORDER — HYDROXYZINE HYDROCHLORIDE 50 MG/1
50-100 TABLET, FILM COATED ORAL
Qty: 60 TABLET | Refills: 1 | Status: ON HOLD | OUTPATIENT
Start: 2017-05-12 | End: 2017-05-30

## 2017-05-12 RX ORDER — HYDROXYZINE HYDROCHLORIDE 50 MG/1
50-100 TABLET, FILM COATED ORAL
Status: DISCONTINUED | OUTPATIENT
Start: 2017-05-12 | End: 2017-05-15 | Stop reason: HOSPADM

## 2017-05-12 RX ADMIN — BUSPIRONE HYDROCHLORIDE 30 MG: 15 TABLET ORAL at 20:03

## 2017-05-12 RX ADMIN — NICOTINE POLACRILEX 8 MG: 4 LOZENGE ORAL at 20:04

## 2017-05-12 RX ADMIN — GABAPENTIN 600 MG: 600 TABLET, FILM COATED ORAL at 20:03

## 2017-05-12 RX ADMIN — GABAPENTIN 600 MG: 600 TABLET, FILM COATED ORAL at 14:09

## 2017-05-12 RX ADMIN — FLUOXETINE HYDROCHLORIDE 40 MG: 10 TABLET, COATED ORAL at 08:14

## 2017-05-12 RX ADMIN — HYDROXYZINE HYDROCHLORIDE 100 MG: 50 TABLET, FILM COATED ORAL at 20:20

## 2017-05-12 RX ADMIN — NICOTINE POLACRILEX 8 MG: 4 LOZENGE ORAL at 18:36

## 2017-05-12 RX ADMIN — NICOTINE POLACRILEX 8 MG: 4 LOZENGE ORAL at 12:40

## 2017-05-12 RX ADMIN — NICOTINE POLACRILEX 8 MG: 4 LOZENGE ORAL at 06:51

## 2017-05-12 RX ADMIN — NICOTINE POLACRILEX 8 MG: 4 LOZENGE ORAL at 17:34

## 2017-05-12 RX ADMIN — NICOTINE POLACRILEX 8 MG: 4 LOZENGE ORAL at 14:09

## 2017-05-12 RX ADMIN — MULTIPLE VITAMINS W/ MINERALS TAB 1 TABLET: TAB at 08:14

## 2017-05-12 RX ADMIN — BUSPIRONE HYDROCHLORIDE 30 MG: 15 TABLET ORAL at 08:14

## 2017-05-12 RX ADMIN — NICOTINE POLACRILEX 8 MG: 4 LOZENGE ORAL at 08:13

## 2017-05-12 RX ADMIN — NICOTINE POLACRILEX 8 MG: 4 LOZENGE ORAL at 16:05

## 2017-05-12 RX ADMIN — GABAPENTIN 600 MG: 600 TABLET, FILM COATED ORAL at 08:14

## 2017-05-12 RX ADMIN — NICOTINE POLACRILEX 8 MG: 4 LOZENGE ORAL at 10:02

## 2017-05-12 ASSESSMENT — ACTIVITIES OF DAILY LIVING (ADL)
GROOMING: INDEPENDENT
LAUNDRY: WITH SUPERVISION
ORAL_HYGIENE: INDEPENDENT
ORAL_HYGIENE: INDEPENDENT
LAUNDRY: WITH SUPERVISION
DRESS: INDEPENDENT
GROOMING: INDEPENDENT
DRESS: STREET CLOTHES

## 2017-05-12 NOTE — PROGRESS NOTES
"The pt seemed bored, and reported \"being done with being here.\"  He is waiting on a treatment placement, and has endorsed many times his lack of belief that he will stay sober.     05/11/17 1444   Behavioral Health   Hallucinations denies / not responding to hallucinations   Thinking poor concentration   Orientation person: oriented;place: oriented;date: oriented;time: oriented   Memory baseline memory   Insight admits / accepts   Judgement intact   Eye Contact at examiner   Affect full range affect   Mood mood is calm   Physical Appearance/Attire neat   Hygiene well groomed   Suicidality other (see comments)  (pt denies)   Self Injury other (see comment)  (pt denies)   Activity other (see comment)  (visible, attending groups, watching tv)   Speech clear   Medication Sensitivity no stated side effects;no observed side effects   Psychomotor / Gait balanced   Substance Withdrawal Interventions   Social and Therapeutic Interventions (Substance Withdrawal) encourage socialization with peers;encourage effective boundaries with peers;encourage participation in therapeutic groups and milieu activities   Safety   Suicidality status 15   Psycho Education   Type of Intervention 1:1 intervention   Response participates, initiates socially appropriate   Hours 0.5   Treatment Detail check-in   Group Therapy Session   Group Attendance attended group session   Time Session Began 1700   Time Session Ended 1720   Total Time (minutes) 20   Group Type community   Activities of Daily Living   Hygiene/Grooming independent   Oral Hygiene independent   Dress street clothes   Room Organization independent   Activity   Activity Level of Assistance independent   Behavioral Health Interventions   Depression maintain safety precautions;monitor need to revise level of observation;maintain safe secure environment;encourage participation / independence with adls;establish therapeutic relationship;provide emotional support   Social and Therapeutic " Interventions (Depression) encourage socialization with peers;encourage effective boundaries with peers;encourage participation in therapeutic groups and milieu activities

## 2017-05-12 NOTE — PROGRESS NOTES
"   05/12/17 1417   Behavioral Health   Hallucinations denies / not responding to hallucinations   Thinking poor concentration   Orientation person: oriented;place: oriented;date: oriented;time: oriented   Memory baseline memory   Insight admits / accepts   Judgement intact   Eye Contact at examiner   Affect full range affect   Mood anxious   Physical Appearance/Attire neat   Hygiene well groomed   Suicidality (denies)   Self Injury (denies)   Activity (visible in milieu)   Speech clear;coherent   Medication Sensitivity no stated side effects   Psychomotor / Gait balanced   Psycho Education   Type of Intervention 1:1 intervention   Response participates, initiates socially appropriate   Hours 0.5   Activities of Daily Living   Hygiene/Grooming independent   Oral Hygiene independent   Dress independent   Laundry with supervision   Room Organization independent   pt visible in milieu most of shift, social and calm with peers. Pt resting at other times ( seems out of boredem). Pt got frustrated after calling [facility] to move up his discharge and found that he could get in Monday morning. Pt thought his team, \"werent doing their jobs\". Pt denies SI.    "

## 2017-05-12 NOTE — DISCHARGE INSTRUCTIONS
Behavioral Discharge Planning and Instructions    Summary:                                                  NEEDS PD    Main Diagnosis: Major Depressive Disorder, Alcohol use disorder    Major Treatments, Procedures and Findings: Psychological assessment, [facility] resources, medication management    Symptoms to Report: feeling more aggressive, increased confusion, losing more sleep, mood getting worse or thoughts of suicide    Lifestyle Adjustment: Please refrain from all mood altering substances including alcohol.    Psychiatry Follow-up:   Please contact [facility]  for a referral for medication management as you get closer to completing the program at [facility]..  [facility] has appointments available to them in [place].   Lacy Freeze: [phone]    Resources:   Crisis Intervention: 760.599.3700 or 886-682-1866 (TTY: 326.315.1404).  Call anytime for help.  National [place] on Mental Illness (www.mn.mark.org): 915.855.8772 or 640-030-3352.  Suicide Awareness Voices of Education (SAVE) (www.save.org): 459-316-XPWG (4185)  National Suicide Prevention Line (www.mentalhealthmn.org): 980-004-PRQI (9803)    General Medication Instructions:   See your medication sheet(s) for instructions.   Take all medicines as directed.  Make no changes unless your doctor suggests them.   Go to all your doctor visits.  Be sure to have all your required lab tests. This way, your medicines can be refilled on time.  Do not use any drugs not prescribed by your doctor.  Avoid alcohol.      The treatment team has appreciated the opportunity to work with you.  We wish you the best in the future.  If you have any questions or concerns our unit number is [phone].

## 2017-05-12 NOTE — PROGRESS NOTES
"[facility], [place]   Psychiatric Progress Note        Interim History:   The patient's care was discussed with the treatment team during the daily team meeting and/or staff's chart notes were reviewed.  He was seen today sitting quietly at the patients' lobby and coloring. Reported that his mood was slightly better, denied presence of Suicidal ideation. He, in fact looked irritated, sad and had minimal eye contact. Said he was looking forward to getting out of this hospital, sounded less enthusiastic while was talking about going to CD program, complained of feeling bored, having insomnia. I reviewed with him past hypnotic trials: he agreed to try Vistaril  mg hs prn for insomnia. Socialized with some patients on the floor.   P.S.: after meeting with me patient called to [facility] and was told that they could take him not on coming Tuesday as they had agreed earlier, but on Monday. He became agitated and blamed this treatment team for not making [facility] staff to admit him sooner.          Medications:       busPIRone  30 mg Oral BID     FLUoxetine  40 mg Oral Daily     gabapentin  600 mg Oral TID     multivitamin, therapeutic with minerals  1 tablet Oral Daily          Allergies:     Allergies   Allergen Reactions     Adhesive Tape Itching     Blisters (band aids)          Labs:   No results found for this or any previous visit (from the past 24 hour(s)).       Psychiatric Examination:   /64  Pulse 85  Temp 97.8  F (36.6  C) (Oral)  Resp 17  Ht 1.778 m (5' 10\")  Wt 88 kg (194 lb)  SpO2 96%  BMI 27.84 kg/m2  Weight is 194 lbs 0 oz  Body mass index is 27.84 kg/(m^2).    Appearance: awake, alert  Attitude: irritable, less cooperative  Eye Contact: limited  Mood: less anxious and depressed?  Affect:  constricted mobility,   Speech:  increased speech latency and decreased prosody  Psychomotor Behavior:  no evidence of tardive dyskinesia, dystonia, or tics and " physical retardation  Throught Process:  goal oriented  Associations:  no loose associations  Thought Content:  Denies Suicidal ideation   Insight:  limited, but improving  Judgement:  limited, but improving  Oriented to:  time, person, and place  Attention Span and Concentration: better  Recent and Remote Memory: good         Precautions:     Behavioral Orders   Procedures     Code 1 - Restrict to Unit     Routine Programming     As clinically indicated     Status 15     Every 15 minutes.     Suicide precautions          DIagnoses:     1. Depression: Major depressive disorder, severe, recurrent without psychotic features.   2. Generalized anxiety disorder.   3. Stimulant use disorder, severe.   4. Polysubstance abuse.          Plan:     Reports feeling less depressed and ready to go to CD program. He is fully committed. Had CD assessment. Per [facility], he can go to [facility] CD treatment on May 15. I completed his discharge orders 5/12/2017, he agreed to try Vistaril  mg hs prn for sleep.

## 2017-05-12 NOTE — PROGRESS NOTES
Patient became very angry with writer after he called [facility] to move up his admission. He thought writer should have called today to check on a availability. Writer stated that yesterday was told that they could admit patient on Tuesday and was never told to call again. In addition, writer faxed pt's provisional discharge to the  yesterday for her signature as patient was due to leave on Tuesday. PD was not signed and faxed back as of this writing. Patient will need a thirty day supply of meds which need to be ordered. Patient was very demanding and angry.

## 2017-05-13 PROCEDURE — 90853 GROUP PSYCHOTHERAPY: CPT

## 2017-05-13 PROCEDURE — 25000132 ZZH RX MED GY IP 250 OP 250 PS 637: Performed by: PSYCHIATRY & NEUROLOGY

## 2017-05-13 PROCEDURE — 12400001 ZZH R&B MH UMMC

## 2017-05-13 RX ADMIN — BUSPIRONE HYDROCHLORIDE 30 MG: 15 TABLET ORAL at 08:21

## 2017-05-13 RX ADMIN — NICOTINE POLACRILEX 8 MG: 4 LOZENGE ORAL at 06:57

## 2017-05-13 RX ADMIN — FLUOXETINE HYDROCHLORIDE 40 MG: 10 TABLET, COATED ORAL at 08:46

## 2017-05-13 RX ADMIN — NICOTINE POLACRILEX 8 MG: 4 LOZENGE ORAL at 13:28

## 2017-05-13 RX ADMIN — GABAPENTIN 600 MG: 600 TABLET, FILM COATED ORAL at 08:22

## 2017-05-13 RX ADMIN — NICOTINE POLACRILEX 8 MG: 4 LOZENGE ORAL at 12:00

## 2017-05-13 RX ADMIN — GABAPENTIN 600 MG: 600 TABLET, FILM COATED ORAL at 19:45

## 2017-05-13 RX ADMIN — NICOTINE POLACRILEX 8 MG: 4 LOZENGE ORAL at 19:45

## 2017-05-13 RX ADMIN — NICOTINE POLACRILEX 8 MG: 4 LOZENGE ORAL at 18:25

## 2017-05-13 RX ADMIN — NICOTINE POLACRILEX 4 MG: 4 LOZENGE ORAL at 08:22

## 2017-05-13 RX ADMIN — NICOTINE POLACRILEX 8 MG: 4 LOZENGE ORAL at 17:12

## 2017-05-13 RX ADMIN — BUSPIRONE HYDROCHLORIDE 30 MG: 15 TABLET ORAL at 19:45

## 2017-05-13 RX ADMIN — NICOTINE POLACRILEX 8 MG: 4 LOZENGE ORAL at 09:37

## 2017-05-13 RX ADMIN — MULTIPLE VITAMINS W/ MINERALS TAB 1 TABLET: TAB at 08:22

## 2017-05-13 RX ADMIN — NICOTINE POLACRILEX 8 MG: 4 LOZENGE ORAL at 15:58

## 2017-05-13 ASSESSMENT — ACTIVITIES OF DAILY LIVING (ADL)
ORAL_HYGIENE: INDEPENDENT
DRESS: STREET CLOTHES
DRESS: STREET CLOTHES
HYGIENE/GROOMING: INDEPENDENT
HYGIENE/GROOMING: INDEPENDENT
ORAL_HYGIENE: INDEPENDENT
LAUNDRY: WITH SUPERVISION

## 2017-05-13 NOTE — PROGRESS NOTES
"/64  Pulse 85  Temp 97.8  F (36.6  C) (Oral)  Resp 17  Ht 1.778 m (5' 10\")  Wt 88 kg (194 lb)  SpO2 96%  BMI 27.84 kg/m2       Pt was visible in the milieu for the majority of the evening shift. He attended and participated in Community meeting, and socialized with fellow patients. He states that he \"Feels good, but am sort of upset that I wasn't able to be discharged today.\" However, he says he feels ok about leaving on Monday. Pt stated that he is concerned that his medications won't be ready for him when he is discharged. Will follow up with this.    Pt does make passing comments, such as \"I wish I was debilitated.\" However, he displays a full range of affect, if not somewhat blunted at times. He is pleasant with staff and other patients, and does not display signs of aggression.    No medication side effects displayed or observed. He took 100 mg of prn Zyprexa, as well as his other scheduled medications. He states that his sleep has been \"Good.\"    He denies SI, SIB, auditory and visual hallucinations.    Will continue to assess and monitor for safety.  "

## 2017-05-13 NOTE — PROGRESS NOTES
05/13/17 1452   Activities of Daily Living   Hygiene/Grooming independent   Oral Hygiene independent   Dress street clothes   Room Organization independent   pt has been visible all shift, his affect is bright, he played guitar, cribbage, and Jenga with peers. He stated that he is doing fine, and is ready to leave.

## 2017-05-14 VITALS
DIASTOLIC BLOOD PRESSURE: 64 MMHG | WEIGHT: 193 LBS | OXYGEN SATURATION: 96 % | TEMPERATURE: 98 F | BODY MASS INDEX: 27.63 KG/M2 | HEART RATE: 85 BPM | RESPIRATION RATE: 16 BRPM | SYSTOLIC BLOOD PRESSURE: 110 MMHG | HEIGHT: 70 IN

## 2017-05-14 PROCEDURE — 12400001 ZZH R&B MH UMMC

## 2017-05-14 PROCEDURE — 25000132 ZZH RX MED GY IP 250 OP 250 PS 637: Performed by: PSYCHIATRY & NEUROLOGY

## 2017-05-14 RX ADMIN — NICOTINE POLACRILEX 8 MG: 4 LOZENGE ORAL at 21:56

## 2017-05-14 RX ADMIN — GABAPENTIN 600 MG: 600 TABLET, FILM COATED ORAL at 20:33

## 2017-05-14 RX ADMIN — NICOTINE POLACRILEX 8 MG: 4 LOZENGE ORAL at 14:12

## 2017-05-14 RX ADMIN — NICOTINE POLACRILEX 8 MG: 4 LOZENGE ORAL at 11:21

## 2017-05-14 RX ADMIN — NICOTINE POLACRILEX 8 MG: 4 LOZENGE ORAL at 16:04

## 2017-05-14 RX ADMIN — BUSPIRONE HYDROCHLORIDE 30 MG: 15 TABLET ORAL at 08:24

## 2017-05-14 RX ADMIN — ACETAMINOPHEN 650 MG: 325 TABLET, FILM COATED ORAL at 23:00

## 2017-05-14 RX ADMIN — MULTIPLE VITAMINS W/ MINERALS TAB 1 TABLET: TAB at 08:24

## 2017-05-14 RX ADMIN — GABAPENTIN 600 MG: 600 TABLET, FILM COATED ORAL at 08:24

## 2017-05-14 RX ADMIN — BUSPIRONE HYDROCHLORIDE 30 MG: 15 TABLET ORAL at 20:33

## 2017-05-14 RX ADMIN — NICOTINE POLACRILEX 8 MG: 4 LOZENGE ORAL at 17:45

## 2017-05-14 RX ADMIN — GABAPENTIN 600 MG: 600 TABLET, FILM COATED ORAL at 14:12

## 2017-05-14 RX ADMIN — FLUOXETINE HYDROCHLORIDE 40 MG: 10 TABLET, COATED ORAL at 08:24

## 2017-05-14 RX ADMIN — NICOTINE POLACRILEX 8 MG: 4 LOZENGE ORAL at 19:04

## 2017-05-14 RX ADMIN — NICOTINE POLACRILEX 8 MG: 4 LOZENGE ORAL at 20:33

## 2017-05-14 RX ADMIN — NICOTINE POLACRILEX 8 MG: 4 LOZENGE ORAL at 09:35

## 2017-05-14 RX ADMIN — NICOTINE POLACRILEX 8 MG: 4 LOZENGE ORAL at 08:24

## 2017-05-14 RX ADMIN — NICOTINE POLACRILEX 8 MG: 4 LOZENGE ORAL at 07:04

## 2017-05-14 ASSESSMENT — ACTIVITIES OF DAILY LIVING (ADL)
LAUNDRY: WITH SUPERVISION
DRESS: INDEPENDENT
ORAL_HYGIENE: INDEPENDENT
GROOMING: INDEPENDENT

## 2017-05-14 NOTE — PROGRESS NOTES
Patient reports good mood, denies SI/SIB or psychotic symptoms, states he is ready to leave and just waiting for his bed to open in treatment. Patient was visible and social with full range affect.     05/13/17 2000   Behavioral Health   Hallucinations denies / not responding to hallucinations   Thinking intact   Orientation person: oriented;place: oriented;date: oriented;time: oriented   Memory baseline memory   Insight poor   Judgement impaired   Eye Contact at examiner   Affect full range affect   Mood mood is calm   Physical Appearance/Attire appears stated age   Hygiene neglected grooming - unclean body, hair, teeth   Suicidality other (see comments)  (Denies)   Self Injury other (see comment)  (Denies)   Activity other (see comment)  (Visible and social.)   Speech clear;coherent   Medication Sensitivity no stated side effects;no observed side effects   Psychomotor / Gait balanced;steady   Activities of Daily Living   Hygiene/Grooming independent   Oral Hygiene independent   Dress street clothes   Laundry with supervision   Room Organization independent

## 2017-05-14 NOTE — PLAN OF CARE
"Problem: Depressive Symptoms  Goal: Depressive Symptoms  Signs and symptoms of listed problems will be absent or manageable.   Outcome: Adequate for Discharge Date Met:  05/14/17  PT in milieu most of shift, had visitor and reports he is looking forward to discharge tomorrow, denies feeling suicidal. Pt requested copy of his PD.states \"I never got one\" pt did have a signed copy in his chart was given copy.      "

## 2017-05-15 ENCOUNTER — HOSPITAL ENCOUNTER (INPATIENT)
Facility: CLINIC | Age: 32
LOS: 17 days | Discharge: OTHER INSTITUTION | DRG: 885 | End: 2017-06-02
Attending: EMERGENCY MEDICINE | Admitting: PSYCHIATRY & NEUROLOGY
Payer: MEDICAID

## 2017-05-15 DIAGNOSIS — F19.20 CHEMICAL DEPENDENCY (H): ICD-10-CM

## 2017-05-15 LAB — ALCOHOL BREATH TEST: 0.05 (ref 0–0.01)

## 2017-05-15 PROCEDURE — 25000132 ZZH RX MED GY IP 250 OP 250 PS 637: Performed by: PSYCHIATRY & NEUROLOGY

## 2017-05-15 PROCEDURE — 82075 ASSAY OF BREATH ETHANOL: CPT

## 2017-05-15 PROCEDURE — 99285 EMERGENCY DEPT VISIT HI MDM: CPT | Mod: 25

## 2017-05-15 PROCEDURE — 99283 EMERGENCY DEPT VISIT LOW MDM: CPT | Mod: Z6 | Performed by: EMERGENCY MEDICINE

## 2017-05-15 RX ADMIN — FLUOXETINE HYDROCHLORIDE 40 MG: 10 TABLET, COATED ORAL at 06:09

## 2017-05-15 RX ADMIN — NICOTINE POLACRILEX 8 MG: 4 LOZENGE ORAL at 06:10

## 2017-05-15 RX ADMIN — GABAPENTIN 600 MG: 600 TABLET, FILM COATED ORAL at 06:09

## 2017-05-15 RX ADMIN — BUSPIRONE HYDROCHLORIDE 30 MG: 15 TABLET ORAL at 06:09

## 2017-05-15 RX ADMIN — MULTIPLE VITAMINS W/ MINERALS TAB 1 TABLET: TAB at 06:10

## 2017-05-15 ASSESSMENT — ENCOUNTER SYMPTOMS
HALLUCINATIONS: 0
FEVER: 0
ABDOMINAL PAIN: 0
SHORTNESS OF BREATH: 0
SORE THROAT: 0

## 2017-05-15 NOTE — IP AVS SNAPSHOT
[address]    [address] [facility]    [facility] [address]    Phone:  [phone]                                       After Visit Summary   5/15/2017    Morgan Bosch    MRN: [id number]           After Visit Summary Signature Page     I have received my discharge instructions, and my questions have been answered. I have discussed any challenges I see with this plan with the nurse or doctor.    ..........................................................................................................................................  Patient/Patient Representative Signature      ..........................................................................................................................................  Patient Representative Print Name and Relationship to Patient    ..................................................               ................................................  Date                                            Time    ..........................................................................................................................................  Reviewed by Signature/Title    ...................................................              ..............................................  Date                                                            Time

## 2017-05-15 NOTE — PROGRESS NOTES
Juice denies any SI/SIB and states feeling stable and ready for discharge this morning.  Will be taking taxi to [facility] for treatment which Juice is looking forward to.  AM meds given.  All discharge meds and belongings from home given to Juice upon discharge.  All d/c recommendations reviewed with patient who states understanding and has no questions or concerns at this time.

## 2017-05-15 NOTE — IP AVS SNAPSHOT
MRN:[id number]                      After Visit Summary   5/15/2017    Morgan Bosch    MRN: [id number]           Thank you!     Thank you for choosing [facility] for your care. Our goal is always to provide you with excellent care.        Patient Information     Date Of Birth          1985        About your hospital stay     You were admitted on:  May 16, 2017 You last received care in the:  [facility] 32NR    You were discharged on:  June 2, 2017       Who to Call     For medical emergencies, please call 911.  For non-urgent questions about your medical care, please call your primary care provider or clinic, None          Attending Provider     Provider Specialty    Maame Madden MD Emergency Medicine    Sam, Ish Stock MD Psychiatry    [facility], Quoc King MD Psychiatry       Primary Care Provider    Physician No Ref-Primary      Further instructions from your care team       Behavioral Discharge Planning and Instructions    Summary: You were admitted to station 32 the day after you were discharged from station 20. You were discharged from station 20 on a provisional discharge with a plan to go to [facility]. You did not go to [facility] [address] but chose to use drugs and wander around. You then presented to the ED. Your provisional discharge has been revoked and you are being sent to [facility].     [facility]. The commitment expires on 11/5/2017.       Main Diagnosis:   1. Major depressive disorder, moderate, recurrent.   2. Generalized anxiety disorder.   3. Stimulant use disorder, severe.  4. Polysubstance abuse.      Major Treatments, Procedures and Findings: You met with Marta Harrison NP for psychiatric assessment and medication management. Direct care was provided by unit nurses and staff. You met with case management. You had opportunities to participate in therapeutic  groups on the unit.     Symptoms to Report: feeling more aggressive, increased confusion, losing more sleep, mood getting worse, thoughts of suicide or use of drugs or alcohol    Lifestyle Adjustment: Take medications only as prescribed. Do not use alcohol or illicit drugs. Attend all scheduled appointments with your outpatient providers. Call at least 24 hours in advance if you need to reschedule an appointment to ensure continued access to your outpatient providers. Contact the appropriate professional or hotline listed below as needed for support if your symptoms continue, or call 9-1-1 in an emergency.       Psychiatry Follow-up:   You are being discharged to [facility]    Resources:   Crisis Intervention: 290.201.6227 or 603-381-8129 (TTY: 370.949.9941).  Call anytime for help.  National [place] on Mental Illness (www.mn.mark.org): 709.740.7617 or 037-708-6601.  Alcoholics Anonymous (www.alcoholics-anonymous.org): Check your phone book for your local chapter.  Suicide Awareness Voices of Education (SAVE) (www.save.org): 477-592-IQXA (8683)  National Suicide Prevention Line (www.mentalhealthmn.org): 432-429-QSHP (9160)  Mental Health Consumer/Survivor Network of [place] (www.mhcsn.net): 268.618.6796 or 769-162-2687  Mental Health Association of [place] (www.mentalhealth.org): 796.595.6262 or 100-610-4202    General Medication Instructions:   See your medication sheet(s) for instructions.   Take all medicines as directed.  Make no changes unless your doctor suggests them.   Go to all your doctor visits.  Be sure to have all your required lab tests. This way, your medicines can be refilled on time.  Do not use any drugs not prescribed by your doctor.  Avoid alcohol.    The treatment team has appreciated the opportunity to work with you.  We wish you the best in the future.  If you have any questions or concerns our unit number is [phone].         Pending Results     No orders found from 5/13/2017 to 5/16/2017.     "        Statement of Approval     Ordered          17 0516  I have reviewed and agree with all the recommendations and orders detailed in this document.  EFFECTIVE NOW     Approved and electronically signed by:  Quoc Malloy MD             Admission Information     Date & Time Provider Department Dept. Phone    5/15/2017 Quoc Malloy MD  32NR [phone]      Your Vitals Were     Blood Pressure Pulse Temperature Respirations Height Weight    104/70 73 97.2  F (36.2  C) (Oral) 16 1.778 m (5' 10\") 86.5 kg (190 lb 11.2 oz)    Pulse Oximetry BMI (Body Mass Index)                93% 27.36 kg/m2          MyChart Information     [employer] lets you send messages to your doctor, view your test results, renew your prescriptions, schedule appointments and more. To sign up, go to [email or web address] . Click on \"Log in\" on the left side of the screen, which will take you to the Welcome page. Then click on \"Sign up Now\" on the right side of the page.     You will be asked to enter the access code listed below, as well as some personal information. Please follow the directions to create your username and password.     Your access code is: [email or web address]  Expires: 2017  6:13 PM     Your access code will  in 90 days. If you need help or a new code, please call your [facility] clinic or [phone].        Care EveryWhere ID     This is your Care EveryWhere ID. This could be used by other organizations to access your [facility] medical records  [id number]           Review of your medicines      START taking        Dose / Directions    benzocaine 20 % Liqd liquid   Commonly known as:  ANBESOL        Dose:  1 spray   Take 1 mL by mouth every 3 hours as needed for moderate pain   Quantity:  30 mL   Refills:  1       FLUoxetine 20 MG capsule   Commonly known as:  PROzac   Replaces:  FLUoxetine 20 MG tablet        Dose:  60 mg   Take 3 capsules (60 mg) by mouth daily   Quantity:  90 capsule   Refills:  " 1       OLANZapine 10 MG tablet   Commonly known as:  zyPREXA        Dose:  10 mg   Take 1 tablet (10 mg) by mouth daily as needed for agitation or severe anxiety   Quantity:  10 tablet   Refills:  1         CONTINUE these medicines which may have CHANGED, or have new prescriptions. If we are uncertain of the size of tablets/capsules you have at home, strength may be listed as something that might have changed.        Dose / Directions    hydrOXYzine 50 MG tablet   Commonly known as:  ATARAX   This may have changed:    - how much to take  - Another medication with the same name was removed. Continue taking this medication, and follow the directions you see here.        Dose:  100 mg   Take 2 tablets (100 mg) by mouth 3 times daily as needed for anxiety   Quantity:  180 tablet   Refills:  1       nicotine polacrilex 4 MG lozenge   This may have changed:  how much to take        Dose:  8 mg   Place 2 lozenges (8 mg) inside cheek every hour as needed for other (nicotine withdrawal symptoms)   Quantity:  168 tablet   Refills:  1         CONTINUE these medicines which have NOT CHANGED        Dose / Directions    BusPIRone HCl 30 MG Tabs        Dose:  30 mg   Take 30 mg by mouth 2 times daily   Quantity:  60 tablet   Refills:  1       gabapentin 600 MG tablet   Commonly known as:  NEURONTIN        Dose:  600 mg   Take 1 tablet (600 mg) by mouth 3 times daily   Quantity:  90 tablet   Refills:  1       multivitamin, therapeutic with minerals Tabs tablet        Dose:  1 tablet   Take 1 tablet by mouth daily   Quantity:  30 each   Refills:  1         STOP taking     FLUoxetine 20 MG tablet   Replaced by:  FLUoxetine 20 MG capsule                Where to get your medicines      These medications were sent to [facility] [place], [place] - [address]  [address] [address]     Phone:  [phone]     hydrOXYzine 50 MG tablet    nicotine polacrilex 4 MG lozenge         These  medications were sent to [id number] - [place] - [address] [place] [address]     Phone:  [phone]     benzocaine 20 % Liqd liquid    BusPIRone HCl 30 MG Tabs    FLUoxetine 20 MG capsule    gabapentin 600 MG tablet    multivitamin, therapeutic with minerals Tabs tablet    OLANZapine 10 MG tablet                Protect others around you: Learn how to safely use, store and throw away your medicines at www.disposemymeds.org.             Medication List: This is a list of all your medications and when to take them. Check marks below indicate your daily home schedule. Keep this list as a reference.      Medications           Morning Afternoon Evening Bedtime As Needed    benzocaine 20 % Liqd liquid   Commonly known as:  ANBESOL   Take 1 mL by mouth every 3 hours as needed for moderate pain                                   BusPIRone HCl 30 MG Tabs   Take 30 mg by mouth 2 times daily   Last time this was given:  30 mg on 6/2/2017  8:00 AM                                      FLUoxetine 20 MG capsule   Commonly known as:  PROzac   Take 3 capsules (60 mg) by mouth daily   Last time this was given:  60 mg on 6/2/2017  8:00 AM                                   gabapentin 600 MG tablet   Commonly known as:  NEURONTIN   Take 1 tablet (600 mg) by mouth 3 times daily   Last time this was given:  600 mg on 6/2/2017  8:00 AM                                         hydrOXYzine 50 MG tablet   Commonly known as:  ATARAX   Take 2 tablets (100 mg) by mouth 3 times daily as needed for anxiety   Last time this was given:  50 mg on 6/1/2017  8:15 PM                                   multivitamin, therapeutic with minerals Tabs tablet   Take 1 tablet by mouth daily   Last time this was given:  1 tablet on 6/2/2017  8:00 AM                                   nicotine polacrilex 4 MG lozenge   Place 2 lozenges (8 mg) inside cheek every hour as needed for other (nicotine withdrawal symptoms)   Last  time this was given:  8 mg on 6/2/2017  7:49 AM                                   OLANZapine 10 MG tablet   Commonly known as:  zyPREXA   Take 1 tablet (10 mg) by mouth daily as needed for agitation or severe anxiety

## 2017-05-15 NOTE — PROGRESS NOTES
"/64  Pulse 85  Temp 98  F (36.7  C) (Oral)  Resp 16  Ht 1.778 m (5' 10\")  Wt 87.5 kg (193 lb)  SpO2 96%  BMI 27.69 kg/m2      Pt was visible in the milieu for the majority of the shift, socializing with others, watching television and playing the guitar. He states that his depression and anxiety is \"Better\" and that he is looking forward to discharge. He denies SI/SIB and auditory/visual hallucinations.    Writer asked pt if he has support, aside from his treatment center, and he states \"I have my mom, and I saw her today.\" Encouraged pt to use all of the resources available to him, and reach out when he needs help.    Pt expressed concern regarding his ride for discharge tomorrow, and writer reassured him that his ride would be arranged for him. Discharge for pt is at 0745 5/15/2017. Explained the process of discharging from the hospital, and pt understands information.    Pt attended and participated on Community group. Provided pt with supplies to shower if he would like to do so before his discharge.     No aggressive behaviors present, and pt displayed a full range of affect. No additional concerns at this time.    Will continue to assess and monitor for safety.      "

## 2017-05-16 PROBLEM — F19.20 CHEMICAL DEPENDENCY (H): Status: ACTIVE | Noted: 2017-05-16

## 2017-05-16 LAB
AMPHETAMINES UR QL SCN: ABNORMAL
BARBITURATES UR QL: ABNORMAL
BENZODIAZ UR QL: ABNORMAL
CANNABINOIDS UR QL SCN: ABNORMAL
COCAINE UR QL: ABNORMAL
ETHANOL UR QL SCN: ABNORMAL
OPIATES UR QL SCN: ABNORMAL
PCP UR QL SCN: ABNORMAL

## 2017-05-16 PROCEDURE — 80307 DRUG TEST PRSMV CHEM ANLYZR: CPT | Performed by: NURSE PRACTITIONER

## 2017-05-16 PROCEDURE — 80307 DRUG TEST PRSMV CHEM ANLYZR: CPT | Performed by: EMERGENCY MEDICINE

## 2017-05-16 PROCEDURE — 25000132 ZZH RX MED GY IP 250 OP 250 PS 637: Performed by: EMERGENCY MEDICINE

## 2017-05-16 PROCEDURE — 90853 GROUP PSYCHOTHERAPY: CPT

## 2017-05-16 PROCEDURE — 25000132 ZZH RX MED GY IP 250 OP 250 PS 637: Performed by: NURSE PRACTITIONER

## 2017-05-16 PROCEDURE — 12400001 ZZH R&B MH UMMC

## 2017-05-16 PROCEDURE — 80320 DRUG SCREEN QUANTALCOHOLS: CPT | Performed by: NURSE PRACTITIONER

## 2017-05-16 PROCEDURE — 99223 1ST HOSP IP/OBS HIGH 75: CPT | Mod: AI | Performed by: NURSE PRACTITIONER

## 2017-05-16 RX ORDER — POLYETHYLENE GLYCOL 3350 17 G
4-8 POWDER IN PACKET (EA) ORAL
Status: DISCONTINUED | OUTPATIENT
Start: 2017-05-16 | End: 2017-05-24

## 2017-05-16 RX ORDER — BUSPIRONE HYDROCHLORIDE 15 MG/1
30 TABLET ORAL 2 TIMES DAILY
Status: DISCONTINUED | OUTPATIENT
Start: 2017-05-16 | End: 2017-06-02 | Stop reason: HOSPADM

## 2017-05-16 RX ORDER — HYDROXYZINE HYDROCHLORIDE 50 MG/1
50-100 TABLET, FILM COATED ORAL
Status: DISCONTINUED | OUTPATIENT
Start: 2017-05-16 | End: 2017-05-23

## 2017-05-16 RX ORDER — GABAPENTIN 600 MG/1
600 TABLET ORAL 3 TIMES DAILY
Status: DISCONTINUED | OUTPATIENT
Start: 2017-05-16 | End: 2017-06-02 | Stop reason: HOSPADM

## 2017-05-16 RX ORDER — FLUOXETINE 10 MG/1
60 TABLET, FILM COATED ORAL DAILY
Status: DISCONTINUED | OUTPATIENT
Start: 2017-05-16 | End: 2017-05-18 | Stop reason: CLARIF

## 2017-05-16 RX ORDER — ACETAMINOPHEN 325 MG/1
650 TABLET ORAL EVERY 4 HOURS PRN
Status: DISCONTINUED | OUTPATIENT
Start: 2017-05-16 | End: 2017-06-02 | Stop reason: HOSPADM

## 2017-05-16 RX ORDER — IBUPROFEN 600 MG/1
600 TABLET, FILM COATED ORAL EVERY 6 HOURS PRN
Status: DISCONTINUED | OUTPATIENT
Start: 2017-05-16 | End: 2017-06-02 | Stop reason: HOSPADM

## 2017-05-16 RX ORDER — BUPROPION HYDROCHLORIDE 150 MG/1
150 TABLET ORAL DAILY
Status: DISCONTINUED | OUTPATIENT
Start: 2017-05-16 | End: 2017-05-22

## 2017-05-16 RX ORDER — MULTIPLE VITAMINS W/ MINERALS TAB 9MG-400MCG
1 TAB ORAL DAILY
Status: DISCONTINUED | OUTPATIENT
Start: 2017-05-16 | End: 2017-06-02 | Stop reason: HOSPADM

## 2017-05-16 RX ORDER — HYDROXYZINE HYDROCHLORIDE 25 MG/1
25-50 TABLET, FILM COATED ORAL EVERY 4 HOURS PRN
Status: DISCONTINUED | OUTPATIENT
Start: 2017-05-16 | End: 2017-05-16

## 2017-05-16 RX ORDER — HYDROXYZINE HYDROCHLORIDE 50 MG/1
50 TABLET, FILM COATED ORAL 3 TIMES DAILY PRN
Status: DISCONTINUED | OUTPATIENT
Start: 2017-05-16 | End: 2017-05-23

## 2017-05-16 RX ORDER — FLUOXETINE 10 MG/1
40 TABLET, FILM COATED ORAL DAILY
Status: DISCONTINUED | OUTPATIENT
Start: 2017-05-16 | End: 2017-05-16

## 2017-05-16 RX ADMIN — BUPROPION HYDROCHLORIDE 150 MG: 150 TABLET, FILM COATED, EXTENDED RELEASE ORAL at 10:45

## 2017-05-16 RX ADMIN — HYDROXYZINE HYDROCHLORIDE 100 MG: 50 TABLET, FILM COATED ORAL at 19:29

## 2017-05-16 RX ADMIN — NICOTINE POLACRILEX 8 MG: 4 LOZENGE ORAL at 14:04

## 2017-05-16 RX ADMIN — NICOTINE POLACRILEX 8 MG: 4 LOZENGE ORAL at 08:11

## 2017-05-16 RX ADMIN — GABAPENTIN 600 MG: 600 TABLET, FILM COATED ORAL at 14:04

## 2017-05-16 RX ADMIN — BUSPIRONE HYDROCHLORIDE 30 MG: 15 TABLET ORAL at 08:11

## 2017-05-16 RX ADMIN — NICOTINE POLACRILEX 8 MG: 4 LOZENGE ORAL at 10:45

## 2017-05-16 RX ADMIN — GABAPENTIN 600 MG: 600 TABLET, FILM COATED ORAL at 19:29

## 2017-05-16 RX ADMIN — BUSPIRONE HYDROCHLORIDE 30 MG: 15 TABLET ORAL at 19:29

## 2017-05-16 RX ADMIN — GABAPENTIN 600 MG: 600 TABLET, FILM COATED ORAL at 08:11

## 2017-05-16 RX ADMIN — NICOTINE POLACRILEX 8 MG: 4 LOZENGE ORAL at 17:47

## 2017-05-16 RX ADMIN — NICOTINE POLACRILEX 8 MG: 4 LOZENGE ORAL at 19:28

## 2017-05-16 RX ADMIN — NICOTINE POLACRILEX 8 MG: 4 LOZENGE ORAL at 20:23

## 2017-05-16 RX ADMIN — NICOTINE POLACRILEX 8 MG: 4 LOZENGE ORAL at 16:34

## 2017-05-16 RX ADMIN — NICOTINE POLACRILEX 8 MG: 4 LOZENGE ORAL at 15:17

## 2017-05-16 RX ADMIN — MULTIPLE VITAMINS W/ MINERALS TAB 1 TABLET: TAB at 08:11

## 2017-05-16 RX ADMIN — NICOTINE POLACRILEX 8 MG: 4 LOZENGE ORAL at 12:34

## 2017-05-16 RX ADMIN — NICOTINE POLACRILEX 8 MG: 4 LOZENGE ORAL at 06:14

## 2017-05-16 RX ADMIN — FLUOXETINE HYDROCHLORIDE 60 MG: 10 TABLET, COATED ORAL at 08:11

## 2017-05-16 ASSESSMENT — ACTIVITIES OF DAILY LIVING (ADL)
GROOMING: HANDWASHING;SHOWER;INDEPENDENT
TRANSFERRING: 0-->INDEPENDENT
EATING: 0-->INDEPENDENT
COMMUNICATION: 0-->UNDERSTANDS/COMMUNICATES WITHOUT DIFFICULTY
BATHING: 0-->INDEPENDENT
GROOMING: HANDWASHING;INDEPENDENT
SWALLOWING: 0-->SWALLOWS FOODS/LIQUIDS WITHOUT DIFFICULTY
AMBULATION: 0-->INDEPENDENT
DRESS: STREET CLOTHES;SCRUBS (BEHAVIORAL HEALTH);INDEPENDENT
ORAL_HYGIENE: INDEPENDENT
CHANGE_IN_FUNCTIONAL_STATUS_SINCE_ONSET_OF_CURRENT_ILLNESS/INJURY: NO
DRESS: 0-->INDEPENDENT
TOILETING: 0-->INDEPENDENT
DRESS: STREET CLOTHES;INDEPENDENT
ORAL_HYGIENE: INDEPENDENT

## 2017-05-16 NOTE — PROGRESS NOTES
"Patient was admitted to unit from  ED.  Patient is committed to this hospital.  Patient reports that he discharged from unit 20 yesterday (5/15/17) in the morning and took a cab to [facility] where he was intended to go for CD treatment.  Patient stated that he decided not to go to treatment and instead \"started to use\".  Per patient, he drank a bottle of DXM, snorted meth, smoked marijuana and cigarette butts as well as drinking a beer.  His utox is pending at this time.  Blood alcohol level is 0.053.  Patient reports that he isn't much interested in going through treatment but acknowledges that his options are limited as he is under commitment.  He states that the treatment he goes to should be locked.  Denies suicidality, SIB, hallucinations and HI. Pupils are dilated.    Patient is calm and cooperative.  Need to collect a urine sample for Utox.    "

## 2017-05-16 NOTE — PLAN OF CARE
Problem: General Plan of Care (Inpatient Behavioral)  Goal: Team Discussion  Team Plan:   BEHAVIORAL TEAM DISCUSSION     Continued Stay Criteria/Rationale: new patient  Plan: The patient will continue to meet w/ the treatment team for assessment and treatment planning, CTC will continue to work w/ for discharge planning.     Participants: DION Mancini MS,CORKY Sandhu, RN     Summary/Recommendation: Pt is on MI/CD commitment, eloped while on the way to treatment  Medical/Physical: see chart  Progress: new patient     Pt did not fill out the personal plan of care

## 2017-05-16 NOTE — PROGRESS NOTES
"  INITIAL PSYCHOSOCIAL ASSESSMENT AND NOTE  I have reviewed the chart met with the patient, and developed Care Plan.  Information for assessment was obtained from: Pt and chart    PRESENTING PROBLEM: Pt was admitted to station  on a voluntary basis. Pt is on a MI/CD commitment. He had been discharged from this facility on 5/14 to [facility] for chemical dependency treatment but eloped rather than entering treatment. Pt then used multiple substances and presented in the ED the following day on 5/15.   Per admit note by Marta Harrison NP, \"Mr. Bosch has previous diagnoses of major depressive disorder, generalized anxiety disorder, stimulant use disorder and polysubstance abuse. He was most recently hospitalized on station [address] [place] [address] from 04/25/17 through 05/15/2017. He had completed treatment at [facility] a few months prior and was residing at his mother's home. She kicked him out due to his drug relapse. He attempted to commit suicide by carbon monoxide poisoning. He placed a hose from the exhaust into his van's window. He changed his mind and aborted the suicide attempt. Prior to his hospitalization on station [address] [place] [address], he had been using Xanax 2 mg per day. He had also been snorting and using IV methamphetamine. He had been consuming a 6 pack of alcohol daily and had been sporadically abusing dextromethorphan. During his hospitalization, his mood stabilized. He was committed MICD. He was transported to [facility] via taxi following his provisional discharge. He states that he had been thinking about eloping prior to this and once the taxi arrived at [facility] he eloped without entering the facility. He used dextromethorphan, methamphetamine, alcohol and marijuana. He said that he had a \"peaceful good day.\" He was singing in the streets. He estimates that he walked 12 miles. He then walked to the Emergency Department because \"I'm on commitment and I didn't know what else to do.\" The patient reports that he " "feels hopeless about his ability to maintain sobriety. He does not want to participate in chemical dependency treatment and states that if he is forced to do so through commitment he will not go to groups and he will spend his time writing.\"   The following areas have been assessed:  History of Mental Health and Chemical Dependency: Pt has past diagnoses of major depressive disorder, generalized anxiety disorder, stimulant use disorder and polysubstance abuse. Pt has had multiple past inpatient  admits (12+) including 4 in 2016; this is his second in 2017. Pt is currently on a MI/CD commitment; he has hx of 2 past MI/CD commitments in 2010. Hx of suicide attempt by overdosing on Tylenol many years ago. Had an aborted suicide attempt in 2017 using carbon monoxide. Hx of SIB years ago by putting out cigarettes on his skin.   Methamphetamine is pt's drug of choice; began using at age 23 or 24. Hx of IV use. Cocaine started age 23. Hx regular pot use. Hx of alcohol abuse, trying heroin and opiates and abusing benzodiazepines. Also often abuses dextromethorphan--states is self medicating his social anxiety with this. Longest sober is 6 months.  Pt has been to 10+ past CD treatments including his last treatment at [facility] in 4/2016--did complete that program. Has also been to [facility], [facility] and [facility]  Living Situation: pt is homeless; family won't let him live with them due to drug use  Significant Life Events (Illness, Abuse, Trauma, Death): denies any hx of abuse  Family Description (Constellation, Family Psychiatric History): Never , no children. Grew up in [place] with both parents and a sister. Sister lives in [place]; parents live in [place], [place]. Family relationship is strained.   uncle, sister, aunts and grandparents have depression. Two members of his extended family have bipolar disorder. Multiple people on his mother's side of the family have chemical dependency issues. "     Financial Status: No income. Has limited [place], [place] and food stamps  Occupational History: Last employed 2015 working as a  for a [employer] .   Educational Background: graduated high school, some college--about 1 year     Service History: none  Legal Issues: No current legal other than the commitment. Pt was in halfway 8-9 years ago for felony robbery  Ethnic/Cultural Issues: none  Spiritual Orientation: none  Social Functioning (organizations, interests): Has social anxiety, feels isolated. Hx of engaging in photography, art and music    Current Treatment providers:    through [facility] is Lacy Langley, [phone]  Fax: [phone]    No other providers at this time    Social Service Assessment/Plan:   Pt is a voluntary admit though is on a MI/CD commitment. Pt is clear that he is not interested in chemical dependency treatment and states that he will not participate if he is forced.  CTC contacted pt's CM, Lacy Langley. Discussed options including [facility] facility, [facility] or [facility] in [place]. She will talk to supervisor.     Discharge Plan:     Summary: You were admitted to station 32 the day after you were discharged from station 20. You were discharged from station 20 on a provisional discharge with a plan to go to [address]. You did not go to [address] but chose to use drugs and wander around. You then presented to the ED. Your provisional discharge has been revoked and you are being sent to [facility].     You were dually committed to the [facility] and the [facility] on 5/5/2017. The commitment expires on 11/5/2017.

## 2017-05-16 NOTE — H&P
"DATE OF ADMISSION:  05/15/2017      IDENTIFYING INFORMATION:  Mr. Morgan Bosch is a 32-year-old male admitted to the [facility], [place], station [address] [place].  He was admitted as a voluntary patient under ongoing MICD commitment through the Emergency Department on 05/15/2017 following a relapse on dextromethorphan, methamphetamine, alcohol and marijuana.  He had been discharged from station [address] [place] earlier in the day and took a cab to [facility], but then eloped rather than entering treatment.     CHIEF COMPLAINT:  \"A foreshadow of a thought in my mind and then when the moment got there it was too intense.\"      HISTORY OF PRESENT ILLNESS:  Mr. Bosch provides information for this assessment.  He is a reliable historian.  Intake data, records from the ER and records from previous hospitalizations were reviewed.      Mr. Bosch has previous diagnoses of major depressive disorder, generalized anxiety disorder, stimulant use disorder and polysubstance abuse.  He was most recently hospitalized on station [address] [place] from 04/25/17 through 05/15/2017.  He had completed treatment at [facility] a few months prior and was residing at his mother's home.  She kicked him out due to his drug relapse.  He attempted to commit suicide by carbon monoxide poisoning.  He placed a hose from the exhaust into his van's window.  He changed his mind and aborted the suicide attempt.  Prior to his hospitalization on station [address] [place], he had been using Xanax 2 mg per day.  He had also been snorting and using IV methamphetamine.  He had been consuming a 6 pack of alcohol daily and had been sporadically abusing dextromethorphan.  During his hospitalization, his mood stabilized.  He was committed MICD.   He was transported to [facility] via taxi following his provisional discharge.  He states that he had been thinking about eloping prior to this and once the taxi arrived at [facility] he eloped without entering the " "facility.  He used dextromethorphan, methamphetamine, alcohol and marijuana.  He said that he had a \"peaceful good day.\"  He was singing in the streets.  He estimates that he walked 12 miles.  He then walked to the Emergency Department because \"I'm on commitment and I didn't know what else to do.\"  The patient reports that he feels hopeless about his ability to maintain sobriety.  He does not want to participate in chemical dependency treatment and states that if he is forced to do so through commitment he will not go to groups and he will spend his time writing.      He reports that his mood is mildly depressed.  He denies suicidal thoughts.  Sleep and appetite have been normal.  Energy has been fair.  He has some social anxiety.  He denies feeling irritable.  He denies symptoms consistent with psychosis or marilyn.  He denies homicidal ideation.      PAST PSYCHIATRIC HISTORY:  He has had multiple psychiatric hospitalizations including 4 in 2016.  This is his second hospitalization in 2017.  He has a history of 2 MICD commitments around 2010.  He has a history of suicide attempt by overdosing on 500 tablets of Tylenol \"a long time ago.\"  He also aborted a suicide attempt by carbon monoxide poisoning in 04/2017.  He has a history of self-injury by putting out cigarettes on his skin \"a long time ago.\"  He denies any history of aggressive behavior toward others.  His  through [facility] is Lacy Langley.  He does not have a prescriber or a therapist.  Past medications include Cymbalta, Effexor, Lexapro, lithium, Seroquel, trazodone and Zyprexa.  He reports a history of possible serotonin syndrome.      SUBSTANCE USE HISTORY:  Methamphetamine is his drug of choice.  He began using methamphetamine when he was 23 or 24 years old.  He has used IV in the past.  He began using cocaine at the age of 23.  He has a history of using marijuana regularly.  In more recent months he was using it sporadically.  He has a " "history of alcohol abuse many years ago.  In the past, he has tried heroin and opiate pills.  He has also abused benzodiazepines.  He says that he often abuses dextromethorphan, which helps alleviate social anxiety.  He estimates that he has been to over 10 chemical dependency treatments, most recently at [facility].  His longest period of sobriety was 6 months.  He smokes 1/2-1 pack of cigarettes per day.      PAST MEDICAL HISTORY:  No history of major medical concerns, surgeries, seizures or head injuries.      REVIEW OF SYSTEMS:  A 10-point review of systems is completed and negative with the exception of HPI.      PHYSICAL EXAMINATION:  Please refer to the exam completed by Dr. Madden in the Emergency Department on 05/15/2017.      ALLERGIES:  Adhesive tape.      PRIOR TO ADMISSION MEDICATIONS:   1.  BuSpar 30 mg p.o. b.i.d.   2.  Prozac 40 mg p.o. q. day.   3.  Neurontin 600 mg p.o. t.i.d.   4.  Vistaril 50 mg p.o. t.i.d. p.r.n. anxiety.   5.  Vistaril  mg p.o. each day at bedtime p.r.n. insomnia.   6.  Multivitamin 1 tab p.o. q. day.   7.  Nicotine lozenges 4-8 mg p.o. q. hour p.r.n.      LABORATORY DATA:  Urine toxicology is pending collection.  Breathalyzer was 0.053.      VITAL SIGNS:  Temperature 98.1, pulse 105, respirations 18, blood pressure 112/68.      FAMILY HISTORY:  He reports that several members on both his mother's side and his father's side have depression and anxiety.  He stated his uncle, sister, aunts and grandparents have depression.  Two members of his extended family have bipolar disorder.  Multiple people on his mother's side of the family have chemical dependency issues.      SOCIAL HISTORY:  He grew up in the [facility] and in [place].  His parents are .  He describes his childhood as \"normal.\"  He has 1 sister.  He has a strained relationship with his family.  He is single.  He does not have children.  He completed about 1 year of college.  He was last " "employed in 09/2015 working as a  for a donut .  He is currently homeless.  His family will not allow him to reside with them.  He was convicted of robbery 9 years ago and spent some time in senior living.  No current legal issues.      MENTAL STATUS EXAMINATION:  He was awake, alert with fair grooming and hygiene.  Attitude was cooperative.  Eye contact was fair.  Mood was mildly depressed and anxious but overall \"fairly stable.\"  Affect was blunted.  Speech was clear and coherent.  There is no evidence of tardive dyskinesia, dystonia or tics.  Thought process was linear and goal oriented.  He had no loosening of associations.  He denied suicidal and homicidal ideation.  There is no evidence of psychosis.  Insight was fair, judgment was fair.  He was oriented to person, place, time, date and reason for hospitalization.  Attention span and concentration were intact.  Recent and remote memory were intact.  He had no peculiar use of language.  Fund of knowledge was appropriate.  Muscle strength and tone were normal.  Gait and station were normal.      DIAGNOSES:   1.  Major depressive disorder, moderate, recurrent.   2.  Generalized anxiety disorder.   3.  Stimulant use disorder, severe.  4.  Polysubstance abuse.      RECOMMENDATIONS:  Mr. Bosch will continue as a voluntary patient on station [address] [place] [address].  We will contact his outpatient  to discuss revocation of his provisional discharge and possible dispositions following his release from the hospital.  We discussed options for medication management.  He would like to increase Prozac from 40 mg to 60 mg.  All other medications will be continued as they were prescribed prior to admission.  I provided him with information regarding the risks and benefits of this treatment plan including medications, and he provided consent.         BHARAT CALI NP             D: 05/16/2017 07:51   T: 05/16/2017 08:18   MT: LILLY      Name:     YANCY, " NAVJOT   MRN:      [id number]        Account:      [id number]   :      1985           Admitted:     [id number]      Document: [id number]

## 2017-05-16 NOTE — ED PROVIDER NOTES
"  History     Chief Complaint   Patient presents with     Addiction Problem     Pt was discharged this am from station 20-was given a cab ride to his treatment center but instead went/drank, did multiple drugs.     KENZIE  Morgan Bosch is a 32 year old male with a history of depression, substance induced psychosis, and chronic recalcitrant polysubstance abuse who presents to the Emergency Department for substance abuse. The patient was recently hospitalized for 3 weeks and was discharged today around 8:00 AM (~15.5 hours ago) due to suicidal ideation and substance abuse. He is on committment and was supposed to go to [facility] for CD but did not. Transportation was arranged for him to go to [facility] for CD treatment and once he got out of the car, he \"bolted\" and instead, he decided to go and use multiple different substances. He reports drinking a bottle of DXM, snorted 20 dollars of meth, smoked marijuana and drank 1 beer. This was nearly 16 hours ago. He denies suicidal ideation, hallucinations, cough, cold symptoms, nausea, vomiting or any other concerns or complaints at this time. He was placed on commitment during his most recent hospitalization and \"I'm turning myself in\" because he realizes that he needs help.    Past Medical History:   Diagnosis Date     Anxiety      MDD (major depressive disorder)      Psychosis     substance induced     Substance abuse     methamphetamine       Past Surgical History:   Procedure Laterality Date     NO HISTORY OF SURGERY         Family History   Problem Relation Age of Onset     Anxiety Disorder Sister      Depression Father      Coronary Artery Disease Maternal Grandfather      DIABETES Paternal Grandfather        Social History   Substance Use Topics     Smoking status: Current Every Day Smoker     Packs/day: 0.50     Smokeless tobacco: Never Used     Alcohol use 1.2 oz/week     2 Cans of beer per week       Current Facility-Administered Medications   Medication     " hydrOXYzine (ATARAX) tablet 25-50 mg     busPIRone (BUSPAR) tablet 30 mg     FLUoxetine (PROzac) tablet 40 mg     gabapentin (NEURONTIN) tablet 600 mg     multivitamin, therapeutic with minerals (THERA-VIT-M) tablet 1 tablet     nicotine polacrilex lozenge 4-8 mg        Allergies   Allergen Reactions     Adhesive Tape Itching     Blisters (band aids)     I have reviewed the Medications, Allergies, Past Medical and Surgical History, and Social History in the Epic system.    Review of Systems   Constitutional: Negative for fever.   HENT: Negative for congestion and sore throat.    Respiratory: Negative for shortness of breath.    Cardiovascular: Negative for chest pain.   Gastrointestinal: Negative for abdominal pain.   Psychiatric/Behavioral: Negative for hallucinations and suicidal ideas.        Positive for polysubstance abuse.     All other systems reviewed and are negative.      Physical Exam   BP: 136/84  Pulse: 125  Temp: 100.1  F (37.8  C)  Resp: 18  SpO2: 95 %  Physical Exam   Constitutional: No distress.   Alert, cooperative.    HENT:   Head: Atraumatic.   Mouth/Throat: Oropharynx is clear and moist. No oropharyngeal exudate.   Eyes: Pupils are equal, round, and reactive to light. No scleral icterus.   Pupils approximately 5 mm and reactive B   Cardiovascular: Normal rate, regular rhythm, normal heart sounds and intact distal pulses.    No murmur heard.  Pulmonary/Chest: Effort normal and breath sounds normal. No respiratory distress. He has no wheezes. He has no rales.   Abdominal: Soft. Bowel sounds are normal. He exhibits no distension. There is no tenderness. There is no rebound.   Musculoskeletal: He exhibits no edema or tenderness.   Skin: Skin is warm. No rash noted. He is not diaphoretic.   Psychiatric:   Alert, cooperative. Slightly disheveled. Normal speech. No SI, does not appear to be attending to internal stimuli.    Nursing note and vitals reviewed.      ED Course     11:27 PM  The patient was  seen and examined by Dr. Madden in [facility].     ED Course     Procedures             Critical Care time:  none               Results for orders placed or performed during the hospital encounter of 05/15/17 (from the past 24 hour(s))   Alcohol breath test POCT   Result Value Ref Range    Alcohol Breath Test 0.053 (A) 0.00 - 0.01              Assessments & Plan (with Medical Decision Making)   This is a 32-year-old male who was just discharged from station 20 earlier this morning and is committed to the [facility] due to MICD purposes who comes back after using.  Evidently the patient was discharged early this morning, plan was to send him to [facility] for chemical dependency treatment.  Transportation brought him to [facility], but he never actually made it in the door and instead  ran away  and used dextromethorphan, marijuana, alcohol, and meth.  He came back here himself today because he knows he needs help.  He denies any suicidal ideation and is alert and cooperative.    I do see the document dated May 11th under the notes tab in Epic, scanned in as  findings of fact  which is the commitment form to [facility].  As patient has failed the plan to discharge him directly to treatment, he will need to come back into the hospital and further plans made, possibly send him to a locked treatment facility, this will be up to the admitting team.  Patient will be admitted at this point.    I have reviewed the nursing notes.  I have reviewed the findings, diagnosis, plan and need for follow up with the patient.  Current Discharge Medication List          Final diagnoses:   Chemical dependency (H)     Kaia KERNS, am serving as a trained medical scribe to document services personally performed by Maame Madden MD, based on the provider's statements to me.      Maame KERNS MD, was physically present and have reviewed and verified the accuracy of this note documented by Kaia Rapp.     5/15/2017    [facility], [place], EMERGENCY DEPARTMENT     Maame Madden MD  05/16/17 0124

## 2017-05-16 NOTE — DISCHARGE SUMMARY
Psychiatric Discharge Summary    Morgan Bosch MRN# [id number]   Age: 32 year old YOB: 1985     Date of Admission:  4/25/2017  Date of Discharge:  5/15/2017  7:25 AM  Admitting Physician:  Konstantin Madrid MD  Discharge Physician:  Konstantin Madrid MD (Contact: Pager: [phone])         Event Leading to Hospitalization:   Morgan Bosch is a 32-year-old  male with history of polysubstance abuse, major depression who presented to the Emergency Department for evaluation of suicidal ideation. He said that he had been kicked out of his parent's home in the last couple of weeks, relapsed on drugs. He reported that lately he has been using Xanax, cough syrup, methamphetamines and alcohol.       HISTORY OF PRESENT ILLNESS: The patient is well known to this facility. His last hospitalization was in 10/2016. He was also admitted and treated in 09/2016, 08/2016, 02/2016, 03/2015, 10/2011, 03/2011, 04/2011, and he says he has multiple psychiatric hospitalizations as well. The patient reports also that he has not been able to stay clean and sober, was kicked out of his mother's home in [place], [place], secondary to his drug relapse. He said that he had attempted to carbon monoxide himself by placing a hose from the exhaust into his van;s window. Then started having second thoughts and terminated the suicide attempt. He reports poor sleep, low energy, feeling worthless, hopeless. He admits that he has been using Xanax around 2 mg per day, snorting and using IV approximately $30 worth per episode of methamphetamines, using cough syrup but not every day and drinking alcohol daily, averaging a 6 pack per day.       PAST PSYCHIATRIC HISTORY: The patient's most recent CD treatment efforts include [facility] chemical dependency program. See prior diagnoses such as schizoaffective disorder, major depressive disorder, TIM. Has a history of prior MICD commitments in long-term in 06/2010  through [facility]. Typically does not follow through with services upon termination of court involvement. History of serious suicide attempt by acetaminophen overdose in 2010. He states that he does not believe that his medications are working for him. He has a history of poor compliance with medication. He told me today that he feels depressed and highly anxious even when he is sober, however, his longest period of sobriety was only 6 months or so and in a closed environment. In addition to the above-mentioned, the patient says that he was treated with multiple antidepressants, could recall being on Effexor, Prozac, Zoloft. He is unsure which one was the most helpful; however, later told me that Prozac had been. He also tried Lexapro, lithium, Seroquel, trazodone, Zyprexa. Said that Vistaril was not helpful. Reports a history of possible serotonin syndrome. He estimates that he had been in 7-10 chemical dependency treatment programs.       See Admission note by Konstantin Madrid MD for additional details.          DIagnoses:     1. Depression: Major depressive disorder, severe, recurrent without psychotic features.   2. Generalized anxiety disorder.   3. Stimulant use disorder, severe.   4. Polysubstance abuse.          Labs:     Results for NAVJOT HAINES (MRN [id number]) as of 5/16/2017 12:49   Ref. Range 4/23/2017 15:34 4/25/2017 10:45 4/26/2017 11:38   Sodium Latest Ref Range: 133 - 144 mmol/L   144   Potassium Latest Ref Range: 3.4 - 5.3 mmol/L   4.5   Chloride Latest Ref Range: 94 - 109 mmol/L   110 (H)   Carbon Dioxide Latest Ref Range: 20 - 32 mmol/L   29   Urea Nitrogen Latest Ref Range: 7 - 30 mg/dL   10   Creatinine Latest Ref Range: 0.66 - 1.25 mg/dL   0.88   GFR Estimate Latest Ref Range: >60 mL/min/1.7m2   >90...   GFR Estimate If Black Latest Ref Range: >60 mL/min/1.7m2   >90...   Calcium Latest Ref Range: 8.5 - 10.1 mg/dL   8.6   Anion Gap Latest Ref Range: 3 - 14 mmol/L   5   Albumin  Latest Ref Range: 3.4 - 5.0 g/dL   3.4   Protein Total Latest Ref Range: 6.8 - 8.8 g/dL   6.5 (L)   Bilirubin Total Latest Ref Range: 0.2 - 1.3 mg/dL   1.0   Alkaline Phosphatase Latest Ref Range: 40 - 150 U/L   84   ALT Latest Ref Range: 0 - 70 U/L   22   AST Latest Ref Range: 0 - 45 U/L   18   Cholesterol Latest Ref Range: <200 mg/dL   92   HDL Cholesterol Latest Ref Range: >39 mg/dL   57   LDL Cholesterol Calculated Latest Ref Range: <100 mg/dL   14   Non HDL Cholesterol Latest Ref Range: <130 mg/dL   35   T4 Free Latest Ref Range: 0.76 - 1.46 ng/dL   1.21   Triglycerides Latest Ref Range: <150 mg/dL   105   TSH Latest Ref Range: 0.40 - 4.00 mU/L   0.18 (L)   Vitamin D Deficiency screening Latest Ref Range: 20 - 75 ug/L   49   Glucose Latest Ref Range: 70 - 99 mg/dL   98   WBC Latest Ref Range: 4.0 - 11.0 10e9/L   6.0   Hemoglobin Latest Ref Range: 13.3 - 17.7 g/dL   13.2 (L)   Hematocrit Latest Ref Range: 40.0 - 53.0 %   39.0 (L)   Platelet Count Latest Ref Range: 150 - 450 10e9/L   272   RBC Count Latest Ref Range: 4.4 - 5.9 10e12/L   4.26 (L)   MCV Latest Ref Range: 78 - 100 fl   92   MCH Latest Ref Range: 26.5 - 33.0 pg   31.0   MCHC Latest Ref Range: 31.5 - 36.5 g/dL   33.8   RDW Latest Ref Range: 10.0 - 15.0 %   12.8   Diff Method Unknown   Automated Method   % Neutrophils Latest Units: %   57.0   % Lymphocytes Latest Units: %   26.0   % Monocytes Latest Units: %   10.4   % Eosinophils Latest Units: %   5.6   % Basophils Latest Units: %   0.7   % Immature Granulocytes Latest Units: %   0.3   Nucleated RBCs Latest Ref Range: 0 /100   0   Absolute Neutrophil Latest Ref Range: 1.6 - 8.3 10e9/L   3.4   Absolute Lymphocytes Latest Ref Range: 0.8 - 5.3 10e9/L   1.6   Absolute Monocytes Latest Ref Range: 0.0 - 1.3 10e9/L   0.6   Absolute Eosinophils Latest Ref Range: 0.0 - 0.7 10e9/L   0.3   Absolute Basophils Latest Ref Range: 0.0 - 0.2 10e9/L   0.0   Abs Immature Granulocytes Latest Ref Range: 0 - 0.4 10e9/L    0.0   Absolute Nucleated RBC Unknown   0.0   Hepatitis C Antibody Latest Ref Range: NR    Nonreactive...   HIV Antigen Antibody Combo Latest Ref Range: NR    Nonreactive...   Amphetamine Qual Urine Latest Ref Range: NEG  Positive... (A) Positive... (A)    Cocaine Qual Urine Latest Ref Range: NEG  Negative... Negative...    Opiates Qualitative Urine Latest Ref Range: NEG  Negative... Negative...    Cannabinoids Qual Urine Latest Ref Range: NEG  Negative... Negative...    Barbiturates Qual Urine Latest Ref Range: NEG  Negative... Negative...    Benzodiazepine Qual Urine Latest Ref Range: NEG  Positive... (A) Positive... (A)    Ethanol Qual Urine Latest Ref Range: NEG  Negative... Negative...             Consults:   No consultations were requested during this admission         Hospital Course:   Morgan Bosch was admitted to Station 20 with attending Konstantin Madrid MD. The patient was placed under status 15 (15 minute checks) to ensure patient safety. The patient presented as severely depressed, openly voicing Suicidal ideation and plans how to kill himself. He repeatedly stated that he wanted to kill himself and would not go to CD treatment because he had no future and saw no point in trying to get better. Because of severity of depression, Suicidal ideation, refusal to go to CD treatment this treatment filed for MICD commitment. Petition was supported by the Pending sale to [facility] and patient was fully MICD committed. Patient's Cymbalta was stopped, he was treated with Prozac, dose was increased to the max recommended. He used Vistaril for anxiety and, occasionally, Zyprexa for anxiety. He reported some improvement in his depressive symptoms and, by the end of hospitalization, denied presence of Suicidal ideation, stated he was ready to go to CD program. He, however, also looked irritable and craving to use. The patient stated at this facility for few more days after he was completed. He didn't go to groups, spent time  outside of his room coloring. He grew more and more irritable, demanding from myself and CTC to get him out of unit ASAP with no concern/interest about going to an appropriate MICD program approved by his [facility]. He eventually, was approved for [facility] CD program and was discharged there. As of the moment of writing this note I became aware that on his way to [facility] CD program patient escaped, relapsed on cough syrup and other substances and was readmitted to this facility to station 32.     Morgan Bosch did not participate in groups and was visible in the milieu.     The patient's symptoms of depression have somewhat improved.     Morgan Bosch was released to CD program. At the time of discharge Morgan Bosch was determined to not be a danger to himself or others.          Discharge Medications:     Discharge Medication List as of 5/15/2017  7:20 AM      START taking these medications    Details   FLUoxetine 20 MG tablet Take 2 tablets (40 mg) by mouth daily, Disp-60 tablet, R-1, E-Prescribe         CONTINUE these medications which have CHANGED    Details   busPIRone 30 MG TABS Take 30 mg by mouth 2 times daily, Disp-60 tablet, R-1, E-Prescribe      !! hydrOXYzine (ATARAX) 50 MG tablet Take 1 tablet (50 mg) by mouth 3 times daily as needed for anxiety, Disp-90 tablet, R-1, E-Prescribe      !! hydrOXYzine (ATARAX) 50 MG tablet Take 1-2 tablets ( mg) by mouth nightly as needed (sleep), Disp-60 tablet, R-1, E-Prescribe      gabapentin (NEURONTIN) 600 MG tablet Take 1 tablet (600 mg) by mouth 3 times daily, Disp-90 tablet, R-1, E-Prescribe       !! - Potential duplicate medications found. Please discuss with provider.      CONTINUE these medications which have NOT CHANGED    Details   nicotine polacrilex 4 MG lozenge Place 1-2 lozenges (4-8 mg) inside cheek every hour as needed for other (nicotine withdrawal symptoms), Disp-135 tablet, R-1, E-Prescribe      multivitamin, therapeutic with  minerals (THERA-VIT-M) TABS Take 1 tablet by mouth daily, Disp-30 each, R-1, E-Prescribe         STOP taking these medications       DULoxetine (CYMBALTA) 60 MG capsule Comments:   Reason for Stopping:         Ibuprofen (ADVIL PO) Comments:   Reason for Stopping:         acetaminophen (TYLENOL) 325 MG tablet Comments:   Reason for Stopping:                    Psychiatric Examination:   Appearance:  awake, alert and dressed in hospital scrubs  Attitude:  somewhat cooperative  Eye Contact:  fair  Mood:  anxious and sad   Affect:  constricted mobility  Speech:  clear, coherent  Psychomotor Behavior:  no evidence of tardive dyskinesia, dystonia, or tics and intact station, gait and muscle tone  Thought Process:  linear and goal oriented  Associations:  no loose associations  Thought Content:  no evidence of suicidal ideation or homicidal ideation and no evidence of psychotic thought  Insight:  partial  Judgment:  fair  Oriented to:  time, person, and place  Attention Span and Concentration:  fair  Recent and Remote Memory:  fair  Language: Able to name objects, Able to repeat phrases and Able to read and write  Fund of Knowledge: appropriate  Muscle Strength and Tone: normal  Gait and Station: Normal         Discharge Plan:   psychiatry Follow-up: discharged to [facility] program.   Please contact [facility]  for a referral for medication management as you get closer to completing the program at [facility]  [facility] has appointments available to them in [place].   Lacy Freeze: [phone]      Attestation:  The patient has been seen and evaluated by me,  Konstantin Madrid MD on 5/15/2017 between 9:00 and 9:40.

## 2017-05-16 NOTE — PROGRESS NOTES
05/16/17 0258   Patient Belongings   Did you bring any home meds/supplements to the hospital?  Yes   Disposition of meds  Sent to security/pharmacy per site process   Patient Belongings cell phone/electronics;clothing;shoes;wallet;other (see comments)   Disposition of Belongings Safe, unit locker #10, patient   Belongings Search Yes   Clothing Search Yes   Second Staff Shawn   General Info Comment [facility], [facility] SAFE:  (1) bottle each of Gabapentin 300 mg caps, Hydroxyzine HCL 50 mg tabs, Buspirone HCL 30 mg tabs, and Fluoxetine HCL 20 mg tabs (security envelope #[id number]).    UNIT LOCKER #10:  (1) backpack, (1) pair shoes with laces, (1) cap, (1) belt, (1) cell phone, (1) cell phone charging cord, (1) lighter, (1) wallet, (1) pair shorts with drawstrings, (1) spring jacket,  and (1) small plastic bag of toiletries.    GIVEN TO PATIENT:  (5) books, and appropriate clothing items.      Forrest Ferreiar   05/16/2017      ADMISSION:  I am responsible for any personal items that are not sent to the safe or pharmacy. [facility] is not responsible for loss, theft or damage of any property in my possession.    Patient Signature _____________________ Date/Time _____________________    Staff Signature _______________________ Date/Time _____________________    2nd Staff person, if patient is unable/unwilling to sign  ___________________________________ Date/Time _____________________    DISCHARGE:  My personal items have been returned to me.   Patient Signature _____________________ Date/Time _____________________

## 2017-05-17 PROCEDURE — 90853 GROUP PSYCHOTHERAPY: CPT

## 2017-05-17 PROCEDURE — 25000132 ZZH RX MED GY IP 250 OP 250 PS 637: Performed by: NURSE PRACTITIONER

## 2017-05-17 PROCEDURE — 99232 SBSQ HOSP IP/OBS MODERATE 35: CPT | Performed by: NURSE PRACTITIONER

## 2017-05-17 PROCEDURE — 12400001 ZZH R&B MH UMMC

## 2017-05-17 PROCEDURE — 25000132 ZZH RX MED GY IP 250 OP 250 PS 637: Performed by: EMERGENCY MEDICINE

## 2017-05-17 PROCEDURE — 99207 ZZC CDG-MDM COMPONENT: MEETS MODERATE - UP CODED: CPT | Performed by: NURSE PRACTITIONER

## 2017-05-17 RX ADMIN — NICOTINE POLACRILEX 8 MG: 4 LOZENGE ORAL at 15:55

## 2017-05-17 RX ADMIN — NICOTINE POLACRILEX 8 MG: 4 LOZENGE ORAL at 14:29

## 2017-05-17 RX ADMIN — NICOTINE POLACRILEX 8 MG: 4 LOZENGE ORAL at 19:51

## 2017-05-17 RX ADMIN — FLUOXETINE HYDROCHLORIDE 60 MG: 10 TABLET, COATED ORAL at 08:18

## 2017-05-17 RX ADMIN — GABAPENTIN 600 MG: 600 TABLET, FILM COATED ORAL at 14:29

## 2017-05-17 RX ADMIN — NICOTINE POLACRILEX 8 MG: 4 LOZENGE ORAL at 21:06

## 2017-05-17 RX ADMIN — NICOTINE POLACRILEX 8 MG: 4 LOZENGE ORAL at 17:17

## 2017-05-17 RX ADMIN — NICOTINE POLACRILEX 8 MG: 4 LOZENGE ORAL at 11:08

## 2017-05-17 RX ADMIN — NICOTINE POLACRILEX 8 MG: 4 LOZENGE ORAL at 06:58

## 2017-05-17 RX ADMIN — NICOTINE POLACRILEX 8 MG: 4 LOZENGE ORAL at 12:30

## 2017-05-17 RX ADMIN — BUSPIRONE HYDROCHLORIDE 30 MG: 15 TABLET ORAL at 08:19

## 2017-05-17 RX ADMIN — GABAPENTIN 600 MG: 600 TABLET, FILM COATED ORAL at 08:19

## 2017-05-17 RX ADMIN — HYDROXYZINE HYDROCHLORIDE 100 MG: 50 TABLET, FILM COATED ORAL at 21:06

## 2017-05-17 RX ADMIN — NICOTINE POLACRILEX 8 MG: 4 LOZENGE ORAL at 18:48

## 2017-05-17 RX ADMIN — MULTIPLE VITAMINS W/ MINERALS TAB 1 TABLET: TAB at 08:17

## 2017-05-17 RX ADMIN — NICOTINE POLACRILEX 8 MG: 4 LOZENGE ORAL at 08:17

## 2017-05-17 RX ADMIN — BUPROPION HYDROCHLORIDE 150 MG: 150 TABLET, FILM COATED, EXTENDED RELEASE ORAL at 08:19

## 2017-05-17 RX ADMIN — GABAPENTIN 600 MG: 600 TABLET, FILM COATED ORAL at 21:06

## 2017-05-17 RX ADMIN — NICOTINE POLACRILEX 8 MG: 4 LOZENGE ORAL at 09:58

## 2017-05-17 RX ADMIN — BUSPIRONE HYDROCHLORIDE 30 MG: 15 TABLET ORAL at 21:06

## 2017-05-17 ASSESSMENT — ACTIVITIES OF DAILY LIVING (ADL)
LAUNDRY: WITH SUPERVISION
ORAL_HYGIENE: INDEPENDENT
DRESS: STREET CLOTHES
ORAL_HYGIENE: INDEPENDENT
GROOMING: INDEPENDENT
GROOMING: INDEPENDENT

## 2017-05-17 NOTE — PROGRESS NOTES
MAHESH from Lacy Langley, pt's CM at [facility], [phone]. Has consulted with her supervisor, pt's mom and the chemical health worker who knows pt well. They all agree that pt should go to a CARE program. They will be revoking pt's PD.     [facility] advised pt about this. Pt upset, mildly argumentative, demanded that CTC talk to the doctor who has a different idea for him. [facility] explained that this is a legal matter due to the commitment and [facility] will make the final determination. [facility] gave him the number for his .

## 2017-05-17 NOTE — PROGRESS NOTES
[facility]   Psychiatric Progress Note      Impression:     Mr. Morgan Bosch is a 32-year-old male admitted to the [facility], [place], station [address]. He was admitted as a voluntary patient under ongoing MICD commitment through the Emergency Department on 05/15/2017 following a relapse on dextromethorphan, methamphetamine, alcohol and marijuana.  He had been discharged from station [address] [place] earlier in the day and took a cab to [facility], but then eloped rather than entering treatment.  Buspar, Neurontin and PRN Vistaril were continued.  Prozac was increased.  Wellbutrin XL was added.  Mood is mildly depressed and minimally anxious.         Diagnoses:     1. Major depressive disorder, moderate, recurrent.   2. Generalized anxiety disorder.   3. Stimulant use disorder, severe.  4. Polysubstance abuse.          Plan:     Medications:  Continue Buspar, Prozac, Neurontin and Wellbutrin XL.  Continue PRN Vistaril.    He is under commitment MICD.  Awaiting call back from his outpatient CM to discuss disposition and whether she intends to revoke his PD.  He does not have any outpatient providers.      Attestation:  Patient has been seen and evaluated by me,  VERONIKA Hugo CNP  The patient was counseled on  nature of illness and treatment plan/options  Care was coordinated with  tx team  Total amount of time: 20 minutes  Coordination of care/counseling: 15 minutes          Interim History:     The patient's care was discussed with the treatment team and chart notes were reviewed.  Pt was documented as sleeping 7 hours during the overnight.  He took PRN Vistaril x 1.  He attended 1 group yesterday.  The treatment team has not yet been able to reach his outpatient CM to discuss whether she will file an intent to revoke his PD.  Pt says that he doesn't want to go to CD treatment, but that he does want to be sober.  States when he's doing  "well he goes to  3 or 4 times per week and has a sponsor.  States he did well at [facility] in the past and would be interested in returning there.  Pt is currently homeless and family will not allow him to reside with them.  Pt reports his anxiety is minimal.  Mood is mildly depressed, mostly related to circumstances.  He denies SI.  Tolerating medications well.           Medications:     Current Facility-Administered Medications   Medication     busPIRone (BUSPAR) tablet 30 mg     gabapentin (NEURONTIN) tablet 600 mg     multivitamin, therapeutic with minerals (THERA-VIT-M) tablet 1 tablet     nicotine polacrilex lozenge 4-8 mg     FLUoxetine (PROzac) tablet 60 mg     hydrOXYzine (ATARAX) tablet 50 mg     hydrOXYzine (ATARAX) tablet  mg     ibuprofen (ADVIL/MOTRIN) tablet 600 mg     acetaminophen (TYLENOL) tablet 650 mg     buPROPion (WELLBUTRIN XL) 24 hr tablet 150 mg             Allergies:     Allergies   Allergen Reactions     Adhesive Tape Itching     Blisters (band aids)            Psychiatric Examination:   /80  Pulse 98  Temp 98.4  F (36.9  C) (Oral)  Resp 16  Ht 1.778 m (5' 10\")  Wt 86.8 kg (191 lb 4.8 oz)  SpO2 95%  BMI 27.45 kg/m2  Weight is 191 lbs 4.8 oz  Body mass index is 27.45 kg/(m^2).    Appearance:  awake, alert, adequate grooming/hygiene  Attitude:  cooperative  Eye Contact:  fair  Mood:  \"all right\" mildly depressed, minimally anxious  Affect:  appropriate and in normal range  Speech:  clear, coherent  Psychomotor Behavior:  no evidence of tardive dyskinesia, dystonia, or tics  Thought Process:  linear and goal oriented  Associations:  no loose associations  Thought Content:  no evidence of suicidal ideation or homicidal ideation and no evidence of psychotic thought  Insight:  fair  Judgment:  fair  Oriented to: date, time, person, and place  Attention Span and Concentration:  intact  Recent and Remote Memory:  intact  Language: Able to name objects, Able to repeat " phrases and Able to read and write  Fund of Knowledge: appropriate  Muscle Strength and Tone: normal  Gait and Station: Normal         Labs:      Ref. Range 5/15/2017 21:59 5/16/2017 08:50   Alcohol Breath Test Latest Ref Range: 0.00 - 0.01  0.053 (A)    Amphetamine Qual Urine Latest Ref Range: NEG   Positive... (A)   Cocaine Qual Urine Latest Ref Range: NEG   Negative...   Opiates Qualitative Urine Latest Ref Range: NEG   Negative...   Cannabinoids Qual Urine Latest Ref Range: NEG   Positive... (A)   Barbiturates Qual Urine Latest Ref Range: NEG   Positive... (A)   Pcp Qual Urine Latest Ref Range: NEG   Negative...   Benzodiazepine Qual Urine Latest Ref Range: NEG   Negative...   Ethanol Qual Urine Latest Ref Range: NEG   Negative...

## 2017-05-18 PROCEDURE — 90853 GROUP PSYCHOTHERAPY: CPT

## 2017-05-18 PROCEDURE — 25000132 ZZH RX MED GY IP 250 OP 250 PS 637: Performed by: NURSE PRACTITIONER

## 2017-05-18 PROCEDURE — 99232 SBSQ HOSP IP/OBS MODERATE 35: CPT | Performed by: NURSE PRACTITIONER

## 2017-05-18 PROCEDURE — 97150 GROUP THERAPEUTIC PROCEDURES: CPT | Mod: GO

## 2017-05-18 PROCEDURE — 99207 ZZC CDG-MDM COMPONENT: MEETS MODERATE - UP CODED: CPT | Performed by: NURSE PRACTITIONER

## 2017-05-18 PROCEDURE — 25000132 ZZH RX MED GY IP 250 OP 250 PS 637: Performed by: EMERGENCY MEDICINE

## 2017-05-18 PROCEDURE — 25000132 ZZH RX MED GY IP 250 OP 250 PS 637: Performed by: PSYCHIATRY & NEUROLOGY

## 2017-05-18 PROCEDURE — 12400001 ZZH R&B MH UMMC

## 2017-05-18 RX ADMIN — NICOTINE POLACRILEX 8 MG: 4 LOZENGE ORAL at 17:38

## 2017-05-18 RX ADMIN — GABAPENTIN 600 MG: 600 TABLET, FILM COATED ORAL at 08:34

## 2017-05-18 RX ADMIN — GABAPENTIN 600 MG: 600 TABLET, FILM COATED ORAL at 20:24

## 2017-05-18 RX ADMIN — BUSPIRONE HYDROCHLORIDE 30 MG: 15 TABLET ORAL at 20:25

## 2017-05-18 RX ADMIN — NICOTINE POLACRILEX 8 MG: 4 LOZENGE ORAL at 20:25

## 2017-05-18 RX ADMIN — NICOTINE POLACRILEX 8 MG: 4 LOZENGE ORAL at 12:15

## 2017-05-18 RX ADMIN — FLUOXETINE 60 MG: 20 CAPSULE ORAL at 09:34

## 2017-05-18 RX ADMIN — BUPROPION HYDROCHLORIDE 150 MG: 150 TABLET, FILM COATED, EXTENDED RELEASE ORAL at 08:34

## 2017-05-18 RX ADMIN — NICOTINE POLACRILEX 8 MG: 4 LOZENGE ORAL at 09:34

## 2017-05-18 RX ADMIN — NICOTINE POLACRILEX 8 MG: 4 LOZENGE ORAL at 10:22

## 2017-05-18 RX ADMIN — NICOTINE POLACRILEX 8 MG: 4 LOZENGE ORAL at 19:02

## 2017-05-18 RX ADMIN — NICOTINE POLACRILEX 8 MG: 4 LOZENGE ORAL at 06:14

## 2017-05-18 RX ADMIN — NICOTINE POLACRILEX 8 MG: 4 LOZENGE ORAL at 14:31

## 2017-05-18 RX ADMIN — GABAPENTIN 600 MG: 600 TABLET, FILM COATED ORAL at 14:31

## 2017-05-18 RX ADMIN — BUSPIRONE HYDROCHLORIDE 30 MG: 15 TABLET ORAL at 08:34

## 2017-05-18 RX ADMIN — MULTIPLE VITAMINS W/ MINERALS TAB 1 TABLET: TAB at 08:34

## 2017-05-18 RX ADMIN — NICOTINE POLACRILEX 8 MG: 4 LOZENGE ORAL at 16:05

## 2017-05-18 RX ADMIN — HYDROXYZINE HYDROCHLORIDE 100 MG: 50 TABLET, FILM COATED ORAL at 20:24

## 2017-05-18 RX ADMIN — NICOTINE POLACRILEX 8 MG: 4 LOZENGE ORAL at 07:57

## 2017-05-18 RX ADMIN — IBUPROFEN 600 MG: 600 TABLET ORAL at 20:24

## 2017-05-18 ASSESSMENT — ACTIVITIES OF DAILY LIVING (ADL)
GROOMING: INDEPENDENT
ORAL_HYGIENE: INDEPENDENT
DRESS: SCRUBS (BEHAVIORAL HEALTH)
DRESS: STREET CLOTHES;INDEPENDENT
GROOMING: HANDWASHING;SHOWER;INDEPENDENT
ORAL_HYGIENE: INDEPENDENT
LAUNDRY: WITH SUPERVISION

## 2017-05-18 NOTE — PLAN OF CARE
Problem: Depressive Symptoms  Intervention: Social and Therapeutic Interv (Depressive Symptoms)     Pt attended the morning OT clinic group.  Generally kept to self, though friendly and occasionally joined in conversation while working on task.  Pt chose to draw, and continued to work on his drawing after group as he found it relaxing.  Didn't bring up any specific stressors or concerns.

## 2017-05-18 NOTE — PROGRESS NOTES
[facility]   Psychiatric Progress Note      Impression:     Mr. Morgan Bosch is a 32-year-old male admitted to the [facility], [place], station [address]. He was admitted as a voluntary patient under ongoing MICD commitment through the Emergency Department on 05/15/2017 following a relapse on dextromethorphan, methamphetamine, alcohol and marijuana.  He had been discharged from station [address] [place] earlier in the day and took a cab to [facility], but then eloped rather than entering treatment.  Buspar, Neurontin and PRN Vistaril were continued.  Prozac was increased.  Wellbutrin XL was added.  Mood is mildly depressed and minimally anxious.         Diagnoses:     1. Major depressive disorder, moderate, recurrent.   2. Generalized anxiety disorder.   3. Stimulant use disorder, severe.  4. Polysubstance abuse.          Plan:     Medications:  Continue Buspar, Prozac, Neurontin and Wellbutrin XL.  Continue PRN Vistaril.    He is under commitment MICD.  His PD is in the process of being revoked.  Plan to transfer to a CARE facility.    Attestation:  Patient has been seen and evaluated by me,  VERONIKA Hugo CNP  The patient was counseled on  nature of illness and treatment plan/options  Care was coordinated with  tx team  Total amount of time: 20 minutes  Coordination of care/counseling: 15 minutes          Interim History:     The patient's care was discussed with the treatment team and chart notes were reviewed.  Pt was documented as sleeping 6.5 hours during the overnight.  He took PRN Vistaril last night and finds it helpful.  He has been attending few groups.  He has been spending time reading and talking to other patients.  Pt reports feeling disappointed that he will be going to a CARE facility and is trying to avoid thinking about it.  He feels hopeless regarding his ability to maintain sobriety and break the negative patterns he has  "previously established.  Rates anxiety 3/10 and depression 4/10.  Denies SI.  Tolerating medications well.           Medications:     Current Facility-Administered Medications   Medication     busPIRone (BUSPAR) tablet 30 mg     gabapentin (NEURONTIN) tablet 600 mg     multivitamin, therapeutic with minerals (THERA-VIT-M) tablet 1 tablet     nicotine polacrilex lozenge 4-8 mg     FLUoxetine (PROzac) tablet 60 mg     hydrOXYzine (ATARAX) tablet 50 mg     hydrOXYzine (ATARAX) tablet  mg     ibuprofen (ADVIL/MOTRIN) tablet 600 mg     acetaminophen (TYLENOL) tablet 650 mg     buPROPion (WELLBUTRIN XL) 24 hr tablet 150 mg             Allergies:     Allergies   Allergen Reactions     Adhesive Tape Itching     Blisters (band aids)            Psychiatric Examination:   /83  Pulse 80  Temp 98.7  F (37.1  C) (Oral)  Resp 16  Ht 1.778 m (5' 10\")  Wt 86.1 kg (189 lb 14.4 oz)  SpO2 93%  BMI 27.25 kg/m2  Weight is 189 lbs 14.4 oz  Body mass index is 27.25 kg/(m^2).    Appearance:  awake, alert, adequate grooming/hygiene  Attitude:  cooperative  Eye Contact:  fair  Mood:   Mildly depressed & anxious  Affect:  appropriate and in normal range  Speech:  clear, coherent  Psychomotor Behavior:  no evidence of tardive dyskinesia, dystonia, or tics  Thought Process:  linear and goal oriented  Associations:  no loose associations  Thought Content:  no evidence of suicidal ideation or homicidal ideation and no evidence of psychotic thought  Insight:  fair  Judgment:  fair  Oriented to: date, time, person, and place  Attention Span and Concentration:  intact  Recent and Remote Memory:  intact  Language: Able to name objects, Able to repeat phrases and Able to read and write  Fund of Knowledge: appropriate  Muscle Strength and Tone: normal  Gait and Station: Normal         Labs:      Ref. Range 5/15/2017 21:59 5/16/2017 08:50   Alcohol Breath Test Latest Ref Range: 0.00 - 0.01  0.053 (A)    Amphetamine Qual Urine Latest " Ref Range: NEG   Positive... (A)   Cocaine Qual Urine Latest Ref Range: NEG   Negative...   Opiates Qualitative Urine Latest Ref Range: NEG   Negative...   Cannabinoids Qual Urine Latest Ref Range: NEG   Positive... (A)   Barbiturates Qual Urine Latest Ref Range: NEG   Positive... (A)   Pcp Qual Urine Latest Ref Range: NEG   Negative...   Benzodiazepine Qual Urine Latest Ref Range: NEG   Negative...   Ethanol Qual Urine Latest Ref Range: NEG   Negative...

## 2017-05-18 NOTE — PROGRESS NOTES
VM from at [facility], [phone]. Needs to get the H&P, face sheet, labs, progress notes, MAR  [phone].      [facility] faxed above information from date of admit to 5/18/2017.  Called [facility] and advised information is sent.     [facility] called [facility] and spoke with Lizabeth. Pt is on the wait list for [facility]. Pt is near the top of the list but only the intent to revoke has been received and he can't be admitted until the actual revocation order has been received and the date for any appeal has passed (5/25).     [facility] advised pt of this. Pt requested the contact information for his ; [facility] provided this.

## 2017-05-18 NOTE — PROGRESS NOTES
Pt awake entire shift.  Pt attended groups.  Pt out in milieu, social with others.  Pt is irritable and easily frustrated.  Pt blames others for current situation.  Pt has no desire to change behaviors.  Pt requires observation for instigating others to behave inappropriately.       05/18/17 1433   Behavioral Health   Hallucinations denies / not responding to hallucinations   Thinking intact   Orientation person: oriented;place: oriented;date: oriented;time: oriented   Memory baseline memory   Insight poor   Judgement impaired   Eye Contact at examiner   Affect blunted, flat   Mood irritable   Physical Appearance/Attire attire appropriate to age and situation   Hygiene well groomed   Suicidality other (see comments)  (denies)   Activity other (see comment)  (out in milieu)   Speech clear;coherent   Psychomotor / Gait balanced;steady   Sleep/Rest/Relaxation   Day/Evening Time Hours up all shift   Coping/Psychosocial   Verbalized Emotional State frustration;powerlessness   Plan Of Care Reviewed With patient   Psycho Education   Type of Intervention 1:1 intervention   Response participates with encouragement   Activities of Daily Living   Hygiene/Grooming handwashing;shower;independent   Oral Hygiene independent   Dress street clothes;independent   Activity   Activity Level of Assistance independent   Behavioral Health Interventions   Depression provide emotional support;assist with developing and utilizing healthy coping strategies;assess patient response to medication   Social and Therapeutic Interventions (Depression) encourage effective boundaries with peers;encourage participation in therapeutic groups and milieu activities

## 2017-05-18 NOTE — PROGRESS NOTES
Juice  participated in a reflection group this a.m. on gifts he has received and given throughout his life. Completed a journaling exercise and shared his reflections with the group. Comments were concise, but relevant and thoughtful.

## 2017-05-19 PROCEDURE — 25000132 ZZH RX MED GY IP 250 OP 250 PS 637: Performed by: PSYCHIATRY & NEUROLOGY

## 2017-05-19 PROCEDURE — 25000132 ZZH RX MED GY IP 250 OP 250 PS 637: Performed by: NURSE PRACTITIONER

## 2017-05-19 PROCEDURE — 97150 GROUP THERAPEUTIC PROCEDURES: CPT | Mod: GO

## 2017-05-19 PROCEDURE — 25000132 ZZH RX MED GY IP 250 OP 250 PS 637: Performed by: EMERGENCY MEDICINE

## 2017-05-19 PROCEDURE — 12400001 ZZH R&B MH UMMC

## 2017-05-19 PROCEDURE — H2032 ACTIVITY THERAPY, PER 15 MIN: HCPCS

## 2017-05-19 RX ORDER — OLANZAPINE 5 MG/1
5-10 TABLET, ORALLY DISINTEGRATING ORAL EVERY 6 HOURS PRN
Status: DISCONTINUED | OUTPATIENT
Start: 2017-05-19 | End: 2017-05-22

## 2017-05-19 RX ORDER — OLANZAPINE 10 MG/2ML
5-10 INJECTION, POWDER, FOR SOLUTION INTRAMUSCULAR DAILY PRN
Status: DISCONTINUED | OUTPATIENT
Start: 2017-05-19 | End: 2017-05-22

## 2017-05-19 RX ADMIN — HYDROXYZINE HYDROCHLORIDE 100 MG: 50 TABLET, FILM COATED ORAL at 20:28

## 2017-05-19 RX ADMIN — HYDROXYZINE HYDROCHLORIDE 50 MG: 50 TABLET, FILM COATED ORAL at 20:44

## 2017-05-19 RX ADMIN — NICOTINE POLACRILEX 4 MG: 4 LOZENGE ORAL at 18:21

## 2017-05-19 RX ADMIN — GABAPENTIN 600 MG: 600 TABLET, FILM COATED ORAL at 08:38

## 2017-05-19 RX ADMIN — GABAPENTIN 600 MG: 600 TABLET, FILM COATED ORAL at 19:30

## 2017-05-19 RX ADMIN — BUSPIRONE HYDROCHLORIDE 30 MG: 15 TABLET ORAL at 19:30

## 2017-05-19 RX ADMIN — IBUPROFEN 600 MG: 600 TABLET ORAL at 11:07

## 2017-05-19 RX ADMIN — FLUOXETINE 60 MG: 20 CAPSULE ORAL at 08:38

## 2017-05-19 RX ADMIN — NICOTINE POLACRILEX 8 MG: 4 LOZENGE ORAL at 13:44

## 2017-05-19 RX ADMIN — NICOTINE POLACRILEX 4 MG: 4 LOZENGE ORAL at 19:30

## 2017-05-19 RX ADMIN — MULTIPLE VITAMINS W/ MINERALS TAB 1 TABLET: TAB at 08:38

## 2017-05-19 RX ADMIN — BUSPIRONE HYDROCHLORIDE 30 MG: 15 TABLET ORAL at 08:38

## 2017-05-19 RX ADMIN — NICOTINE POLACRILEX 8 MG: 4 LOZENGE ORAL at 09:35

## 2017-05-19 RX ADMIN — BUPROPION HYDROCHLORIDE 150 MG: 150 TABLET, FILM COATED, EXTENDED RELEASE ORAL at 08:38

## 2017-05-19 RX ADMIN — NICOTINE POLACRILEX 6 MG: 4 LOZENGE ORAL at 17:01

## 2017-05-19 RX ADMIN — NICOTINE POLACRILEX 8 MG: 4 LOZENGE ORAL at 08:39

## 2017-05-19 RX ADMIN — NICOTINE POLACRILEX 8 MG: 4 LOZENGE ORAL at 07:54

## 2017-05-19 RX ADMIN — NICOTINE POLACRILEX 8 MG: 4 LOZENGE ORAL at 12:57

## 2017-05-19 RX ADMIN — GABAPENTIN 600 MG: 600 TABLET, FILM COATED ORAL at 13:44

## 2017-05-19 RX ADMIN — NICOTINE POLACRILEX 8 MG: 4 LOZENGE ORAL at 11:07

## 2017-05-19 ASSESSMENT — ACTIVITIES OF DAILY LIVING (ADL)
ORAL_HYGIENE: INDEPENDENT
GROOMING: INDEPENDENT
LAUNDRY: WITH SUPERVISION
DRESS: SCRUBS (BEHAVIORAL HEALTH);INDEPENDENT
GROOMING: HANDWASHING;SHOWER;INDEPENDENT
ORAL_HYGIENE: INDEPENDENT
DRESS: STREET CLOTHES;INDEPENDENT
LAUNDRY: WITH SUPERVISION

## 2017-05-19 NOTE — PROGRESS NOTES
[facility]   Psychiatric Progress Note      Impression:     Mr. Morgan Bosch is a 32-year-old male admitted to the [facility], [place], station [address]. He was admitted as a voluntary patient under ongoing MICD commitment through the Emergency Department on 05/15/2017 following a relapse on dextromethorphan, methamphetamine, alcohol and marijuana.  He had been discharged from station [address] [place] earlier in the day and took a cab to [facility], but then eloped rather than entering treatment.  PD is in the process of being revoked.  Buspar, Neurontin and PRN Vistaril were continued.  Prozac was increased.  Wellbutrin XL was added.  Mood is mildly depressed and minimally anxious.         Diagnoses:     1. Major depressive disorder, moderate, recurrent.   2. Generalized anxiety disorder.   3. Stimulant use disorder, severe.  4. Polysubstance abuse.   5. Tooth pain (right upper central incisor, right lower molar).           Plan:     Medications:  Continue Buspar, Prozac, Neurontin and Wellbutrin XL.  Continue PRN Vistaril.  Add Orajel for tooth pain.    He is under commitment MICD.  His PD is in the process of being revoked.  Plan to transfer to [facility].      Attestation:  Patient has been seen and evaluated by me,  VERONIKA Hugo CNP  The patient was counseled on  nature of illness and treatment plan/options  Care was coordinated with  tx team  Total amount of time: 27 minutes  Coordination of care/counselin minutes          Interim History:     The patient's care was discussed with the treatment team and chart notes were reviewed.  Pt was documented as sleeping 7.5 hours during the overnight.  He has been using PRN Vistaril at bedtime.  He has been attending some groups.  Pt states he does not plan to appeal revocation of PD or commitment because he is homeless, but he is unhappy with the plan to transfer to [facility].   "Mood is mildly depressed and anxious.  He denies SI.  He is tolerating medications well.  Complains or right upper central incisor and right lower molar pain.  Orajel was ordered.           Medications:     Current Facility-Administered Medications   Medication     benzocaine (ANBESOL) 20 % liquid     FLUoxetine (PROzac) capsule 60 mg     busPIRone (BUSPAR) tablet 30 mg     gabapentin (NEURONTIN) tablet 600 mg     multivitamin, therapeutic with minerals (THERA-VIT-M) tablet 1 tablet     nicotine polacrilex lozenge 4-8 mg     hydrOXYzine (ATARAX) tablet 50 mg     hydrOXYzine (ATARAX) tablet  mg     ibuprofen (ADVIL/MOTRIN) tablet 600 mg     acetaminophen (TYLENOL) tablet 650 mg     buPROPion (WELLBUTRIN XL) 24 hr tablet 150 mg             Allergies:     Allergies   Allergen Reactions     Adhesive Tape Itching     Blisters (band aids)            Psychiatric Examination:   /83  Pulse 80  Temp 98.1  F (36.7  C) (Oral)  Resp 16  Ht 1.778 m (5' 10\")  Wt 86.1 kg (189 lb 14.4 oz)  SpO2 93%  BMI 27.25 kg/m2  Weight is 189 lbs 14.4 oz  Body mass index is 27.25 kg/(m^2).    Appearance:  awake, alert, adequate grooming/hygiene  Attitude:  cooperative  Eye Contact:  fair  Mood:   Mildly depressed & anxious  Affect:  appropriate and in normal range  Speech:  clear, coherent  Psychomotor Behavior:  no evidence of tardive dyskinesia, dystonia, or tics  Thought Process:  linear and goal oriented  Associations:  no loose associations  Thought Content:  no evidence of suicidal ideation or homicidal ideation and no evidence of psychotic thought  Insight:  fair  Judgment:  fair  Oriented to: date, time, person, and place  Attention Span and Concentration:  intact  Recent and Remote Memory:  intact  Language: Able to name objects, Able to repeat phrases and Able to read and write  Fund of Knowledge: appropriate  Muscle Strength and Tone: normal  Gait and Station: Normal         Labs:      Ref. Range 5/15/2017 21:59 " 5/16/2017 08:50   Alcohol Breath Test Latest Ref Range: 0.00 - 0.01  0.053 (A)    Amphetamine Qual Urine Latest Ref Range: NEG   Positive... (A)   Cocaine Qual Urine Latest Ref Range: NEG   Negative...   Opiates Qualitative Urine Latest Ref Range: NEG   Negative...   Cannabinoids Qual Urine Latest Ref Range: NEG   Positive... (A)   Barbiturates Qual Urine Latest Ref Range: NEG   Positive... (A)   Pcp Qual Urine Latest Ref Range: NEG   Negative...   Benzodiazepine Qual Urine Latest Ref Range: NEG   Negative...   Ethanol Qual Urine Latest Ref Range: NEG   Negative...

## 2017-05-19 NOTE — PROGRESS NOTES
Patient informed Lizz ROSS RN he would like to see a dentist.  Endorses 2 cavities on upper teeth.  Information relayed to this writer.  Will pass on information in report.

## 2017-05-19 NOTE — DISCHARGE INSTRUCTIONS
Behavioral Discharge Planning and Instructions    Summary: You were admitted to station 32 the day after you were discharged from station 20. You were discharged from station 20 on a provisional discharge with a plan to go to [address]. You did not go to [address] but chose to use drugs and wander around. You then presented to the ED. Your provisional discharge has been revoked and you are being sent to [facility].     You were dually committed to the [facility] and the [facility] on 5/5/2017. The commitment expires on 11/5/2017.       Main Diagnosis:   1. Major depressive disorder, moderate, recurrent.   2. Generalized anxiety disorder.   3. Stimulant use disorder, severe.  4. Polysubstance abuse.      Major Treatments, Procedures and Findings: You met with Marta Harrison NP for psychiatric assessment and medication management. Direct care was provided by unit nurses and staff. You met with case management. You had opportunities to participate in therapeutic groups on the unit.     Symptoms to Report: feeling more aggressive, increased confusion, losing more sleep, mood getting worse, thoughts of suicide or use of drugs or alcohol    Lifestyle Adjustment: Take medications only as prescribed. Do not use alcohol or illicit drugs. Attend all scheduled appointments with your outpatient providers. Call at least 24 hours in advance if you need to reschedule an appointment to ensure continued access to your outpatient providers. Contact the appropriate professional or hotline listed below as needed for support if your symptoms continue, or call 9-1-1 in an emergency.       Psychiatry Follow-up:   You are being discharged to [facility]    Resources:   Crisis Intervention: [phone] or [phone] (TTY: [phone]).  Call anytime for help.  National [place] on [place] Illness (www.mn.mark.org): 852.474.6252 or 938-473-7856.  Alcoholics Anonymous  (www.alcoholics-anonymous.org): Check your phone book for your local chapter.  Suicide Awareness Voices of Education (SAVE) (www.save.org): 547-836-KRKF (1225)  National Suicide Prevention Line (www.mentalhealthmn.org): 372-718-DODH (2783)  Mental Health Consumer/Survivor Network of [place] (www.mhcsn.net): 915.420.5174 or 474-137-8896  Mental Health Association of [place] (www.mentalhealth.org): 575.386.7361 or 517-439-4823    General Medication Instructions:   See your medication sheet(s) for instructions.   Take all medicines as directed.  Make no changes unless your doctor suggests them.   Go to all your doctor visits.  Be sure to have all your required lab tests. This way, your medicines can be refilled on time.  Do not use any drugs not prescribed by your doctor.  Avoid alcohol.    The treatment team has appreciated the opportunity to work with you.  We wish you the best in the future.  If you have any questions or concerns our unit number is [phone].

## 2017-05-19 NOTE — PLAN OF CARE
Problem: Depressive Symptoms  Goal: Depressive Symptoms  Signs and symptoms of listed problems will be absent or manageable.   Outcome: No Change  Patient at the time of this note has had a good evening.  Patient resent in milieu but informed the nurse on 1:1 communication that he was depressed and frustrated about where he may end up when discharged.  Said he is frustrated that it may be [place].  RN encouraged patient to focus on his creativity.  Patient played guitar music during shift in room along with completing color project.  Patient verbalized understanding.

## 2017-05-19 NOTE — PROGRESS NOTES
05/19/17 1330   Behavioral Health   Hallucinations denies / not responding to hallucinations   Thinking intact   Orientation person: oriented;place: oriented;date: oriented;time: oriented   Memory baseline memory   Insight insight appropriate to situation   Judgement impaired   Eye Contact into space;at examiner   Affect blunted, flat;full range affect   Mood depressed   Physical Appearance/Attire attire appropriate to age and situation   Hygiene well groomed   Suicidality other (see comments)  (denies)   Self Injury other (see comment)  (denies)   Activity other (see comment)  (visible in milieu)   Speech clear;coherent   Medication Sensitivity no stated side effects;no observed side effects   Psychomotor / Gait balanced;steady   Psycho Education   Type of Intervention 1:1 intervention   Response participates, initiates socially appropriate   Hours 0.5   Treatment Detail (1:1 check in)   Activities of Daily Living   Hygiene/Grooming independent   Oral Hygiene independent   Dress scrubs (behavioral health);independent   Laundry with supervision   Room Organization independent     Patient had a relatively unremarkable day. He did, however, get into an argument with another patient. All concerned parties have been attended to and the milieu is calm and without issue concerning these patients.

## 2017-05-20 PROCEDURE — 90853 GROUP PSYCHOTHERAPY: CPT

## 2017-05-20 PROCEDURE — 25000132 ZZH RX MED GY IP 250 OP 250 PS 637: Performed by: PSYCHIATRY & NEUROLOGY

## 2017-05-20 PROCEDURE — 25000132 ZZH RX MED GY IP 250 OP 250 PS 637: Performed by: NURSE PRACTITIONER

## 2017-05-20 PROCEDURE — 25000132 ZZH RX MED GY IP 250 OP 250 PS 637: Performed by: EMERGENCY MEDICINE

## 2017-05-20 PROCEDURE — 12400001 ZZH R&B MH UMMC

## 2017-05-20 RX ADMIN — BUSPIRONE HYDROCHLORIDE 30 MG: 15 TABLET ORAL at 20:16

## 2017-05-20 RX ADMIN — NICOTINE POLACRILEX 4 MG: 4 LOZENGE ORAL at 11:53

## 2017-05-20 RX ADMIN — NICOTINE POLACRILEX 4 MG: 4 LOZENGE ORAL at 16:05

## 2017-05-20 RX ADMIN — FLUOXETINE 60 MG: 20 CAPSULE ORAL at 08:39

## 2017-05-20 RX ADMIN — NICOTINE POLACRILEX 4 MG: 4 LOZENGE ORAL at 08:39

## 2017-05-20 RX ADMIN — GABAPENTIN 600 MG: 600 TABLET, FILM COATED ORAL at 20:17

## 2017-05-20 RX ADMIN — GABAPENTIN 600 MG: 600 TABLET, FILM COATED ORAL at 08:39

## 2017-05-20 RX ADMIN — HYDROXYZINE HYDROCHLORIDE 100 MG: 50 TABLET, FILM COATED ORAL at 20:17

## 2017-05-20 RX ADMIN — BUSPIRONE HYDROCHLORIDE 30 MG: 15 TABLET ORAL at 08:39

## 2017-05-20 RX ADMIN — NICOTINE POLACRILEX 8 MG: 4 LOZENGE ORAL at 14:17

## 2017-05-20 RX ADMIN — MULTIPLE VITAMINS W/ MINERALS TAB 1 TABLET: TAB at 08:39

## 2017-05-20 RX ADMIN — NICOTINE POLACRILEX 8 MG: 4 LOZENGE ORAL at 17:21

## 2017-05-20 RX ADMIN — GABAPENTIN 600 MG: 600 TABLET, FILM COATED ORAL at 14:17

## 2017-05-20 RX ADMIN — BUPROPION HYDROCHLORIDE 150 MG: 150 TABLET, FILM COATED, EXTENDED RELEASE ORAL at 08:39

## 2017-05-20 RX ADMIN — NICOTINE POLACRILEX 8 MG: 4 LOZENGE ORAL at 20:17

## 2017-05-20 RX ADMIN — NICOTINE POLACRILEX 8 MG: 4 LOZENGE ORAL at 10:42

## 2017-05-20 ASSESSMENT — ACTIVITIES OF DAILY LIVING (ADL)
GROOMING: INDEPENDENT
LAUNDRY: WITH SUPERVISION
LAUNDRY: WITH SUPERVISION
GROOMING: HANDWASHING;SHOWER;INDEPENDENT
DRESS: STREET CLOTHES
ORAL_HYGIENE: INDEPENDENT
ORAL_HYGIENE: INDEPENDENT
DRESS: STREET CLOTHES;INDEPENDENT

## 2017-05-20 NOTE — PLAN OF CARE
Problem: Depressive Symptoms  Goal: Depressive Symptoms  Signs and symptoms of listed problems will be absent or manageable.   Outcome: Improving  Patient pleasant and cooperative, attended group meetings.  Flat affect, denies SI/SIB this shift.  Socialized and watched T.V with peers.  No aggressive behavior noted so far, will continue to monitor closely.

## 2017-05-20 NOTE — PROGRESS NOTES
Attended OT group focused on the topic of the process of recovery. He initiated ideas and elaborated on his answers, He stated the appreciation of the support he gains from his roommate here and feels this to be helpful. He talked about the plan of continuing support after d/c with him. He supported peer's comments, appeared interested and involved. Will encourage attendance and OT set goal focus.

## 2017-05-20 NOTE — PROGRESS NOTES
"Pt was visible out in milieu watching movie. Later this evening pt was upset and was yelling at his RN for medication \"You're fucking lying, I need my med's now\". Pt was asked to calm down and encouraged to speak with his doctor. Pt started getting more agitated, paces hallway swearing \"stupid bitch, she don't know shit and tries to tell me about my med's\". This writer approach pt and encouraged him to calm down and attempted to redirect him and deescalate the behavior.  Pt walked away from this writer and walked toward his RN while she was helping another pt and started insulting her again which exasperated patients stress ( pt whom the RN was helping). Pt was asked to go to his room and calm down, he refused to do so stating \"I am not going to my room, because I am not a child, what are you going to do about it, and I can swear all I want\". Code green was called and pt agreed to remain in his room. Few minutes later came out, insulted this writer and went back to room \"fuck you guys, grow up\".   "

## 2017-05-21 PROCEDURE — 25000132 ZZH RX MED GY IP 250 OP 250 PS 637: Performed by: PSYCHIATRY & NEUROLOGY

## 2017-05-21 PROCEDURE — 12400001 ZZH R&B MH UMMC

## 2017-05-21 PROCEDURE — 25000132 ZZH RX MED GY IP 250 OP 250 PS 637: Performed by: EMERGENCY MEDICINE

## 2017-05-21 PROCEDURE — 25000132 ZZH RX MED GY IP 250 OP 250 PS 637: Performed by: NURSE PRACTITIONER

## 2017-05-21 RX ADMIN — BUPROPION HYDROCHLORIDE 150 MG: 150 TABLET, FILM COATED, EXTENDED RELEASE ORAL at 08:12

## 2017-05-21 RX ADMIN — NICOTINE POLACRILEX 8 MG: 4 LOZENGE ORAL at 11:40

## 2017-05-21 RX ADMIN — NICOTINE POLACRILEX 8 MG: 4 LOZENGE ORAL at 13:04

## 2017-05-21 RX ADMIN — NICOTINE POLACRILEX 8 MG: 4 LOZENGE ORAL at 10:10

## 2017-05-21 RX ADMIN — NICOTINE POLACRILEX 4 MG: 4 LOZENGE ORAL at 15:21

## 2017-05-21 RX ADMIN — NICOTINE POLACRILEX 8 MG: 4 LOZENGE ORAL at 16:53

## 2017-05-21 RX ADMIN — NICOTINE POLACRILEX 8 MG: 4 LOZENGE ORAL at 07:37

## 2017-05-21 RX ADMIN — MULTIPLE VITAMINS W/ MINERALS TAB 1 TABLET: TAB at 08:09

## 2017-05-21 RX ADMIN — FLUOXETINE 60 MG: 20 CAPSULE ORAL at 08:09

## 2017-05-21 RX ADMIN — GABAPENTIN 600 MG: 600 TABLET, FILM COATED ORAL at 20:10

## 2017-05-21 RX ADMIN — NICOTINE POLACRILEX 8 MG: 4 LOZENGE ORAL at 08:49

## 2017-05-21 RX ADMIN — NICOTINE POLACRILEX 8 MG: 4 LOZENGE ORAL at 21:13

## 2017-05-21 RX ADMIN — HYDROXYZINE HYDROCHLORIDE 100 MG: 50 TABLET, FILM COATED ORAL at 20:10

## 2017-05-21 RX ADMIN — NICOTINE POLACRILEX 8 MG: 4 LOZENGE ORAL at 18:07

## 2017-05-21 RX ADMIN — HYDROXYZINE HYDROCHLORIDE 50 MG: 50 TABLET, FILM COATED ORAL at 13:04

## 2017-05-21 RX ADMIN — GABAPENTIN 600 MG: 600 TABLET, FILM COATED ORAL at 13:04

## 2017-05-21 RX ADMIN — BUSPIRONE HYDROCHLORIDE 30 MG: 15 TABLET ORAL at 20:10

## 2017-05-21 RX ADMIN — GABAPENTIN 600 MG: 600 TABLET, FILM COATED ORAL at 08:09

## 2017-05-21 RX ADMIN — NICOTINE POLACRILEX 8 MG: 4 LOZENGE ORAL at 20:10

## 2017-05-21 RX ADMIN — BUSPIRONE HYDROCHLORIDE 30 MG: 15 TABLET ORAL at 08:09

## 2017-05-21 RX ADMIN — NICOTINE POLACRILEX 8 MG: 4 LOZENGE ORAL at 19:01

## 2017-05-21 ASSESSMENT — ACTIVITIES OF DAILY LIVING (ADL)
LAUNDRY: WITH SUPERVISION
DRESS: STREET CLOTHES;INDEPENDENT
GROOMING: HANDWASHING;SHOWER;INDEPENDENT
DRESS: SCRUBS (BEHAVIORAL HEALTH);INDEPENDENT
ORAL_HYGIENE: INDEPENDENT
GROOMING: HANDWASHING;SHOWER;INDEPENDENT
ORAL_HYGIENE: INDEPENDENT

## 2017-05-22 PROCEDURE — 99232 SBSQ HOSP IP/OBS MODERATE 35: CPT | Performed by: NURSE PRACTITIONER

## 2017-05-22 PROCEDURE — 25000132 ZZH RX MED GY IP 250 OP 250 PS 637: Performed by: NURSE PRACTITIONER

## 2017-05-22 PROCEDURE — 97150 GROUP THERAPEUTIC PROCEDURES: CPT | Mod: GO

## 2017-05-22 PROCEDURE — 25000132 ZZH RX MED GY IP 250 OP 250 PS 637: Performed by: PSYCHIATRY & NEUROLOGY

## 2017-05-22 PROCEDURE — 25000132 ZZH RX MED GY IP 250 OP 250 PS 637: Performed by: EMERGENCY MEDICINE

## 2017-05-22 PROCEDURE — 12400001 ZZH R&B MH UMMC

## 2017-05-22 RX ORDER — OLANZAPINE 10 MG/2ML
5-10 INJECTION, POWDER, FOR SOLUTION INTRAMUSCULAR DAILY PRN
Status: DISCONTINUED | OUTPATIENT
Start: 2017-05-22 | End: 2017-06-02 | Stop reason: HOSPADM

## 2017-05-22 RX ORDER — OLANZAPINE 5 MG/1
5-10 TABLET, ORALLY DISINTEGRATING ORAL EVERY 6 HOURS PRN
Status: DISCONTINUED | OUTPATIENT
Start: 2017-05-22 | End: 2017-06-02 | Stop reason: HOSPADM

## 2017-05-22 RX ADMIN — MULTIPLE VITAMINS W/ MINERALS TAB 1 TABLET: TAB at 08:48

## 2017-05-22 RX ADMIN — HYDROXYZINE HYDROCHLORIDE 50 MG: 50 TABLET, FILM COATED ORAL at 13:42

## 2017-05-22 RX ADMIN — NICOTINE POLACRILEX 8 MG: 4 LOZENGE ORAL at 12:46

## 2017-05-22 RX ADMIN — GABAPENTIN 600 MG: 600 TABLET, FILM COATED ORAL at 13:41

## 2017-05-22 RX ADMIN — NICOTINE POLACRILEX 8 MG: 4 LOZENGE ORAL at 17:50

## 2017-05-22 RX ADMIN — GABAPENTIN 600 MG: 600 TABLET, FILM COATED ORAL at 20:24

## 2017-05-22 RX ADMIN — BUSPIRONE HYDROCHLORIDE 30 MG: 15 TABLET ORAL at 08:48

## 2017-05-22 RX ADMIN — NICOTINE POLACRILEX 8 MG: 4 LOZENGE ORAL at 07:04

## 2017-05-22 RX ADMIN — NICOTINE POLACRILEX 8 MG: 4 LOZENGE ORAL at 08:48

## 2017-05-22 RX ADMIN — NICOTINE POLACRILEX 8 MG: 4 LOZENGE ORAL at 13:43

## 2017-05-22 RX ADMIN — NICOTINE POLACRILEX 6 MG: 4 LOZENGE ORAL at 16:25

## 2017-05-22 RX ADMIN — FLUOXETINE 60 MG: 20 CAPSULE ORAL at 08:48

## 2017-05-22 RX ADMIN — HYDROXYZINE HYDROCHLORIDE 100 MG: 50 TABLET, FILM COATED ORAL at 20:24

## 2017-05-22 RX ADMIN — NICOTINE POLACRILEX 8 MG: 4 LOZENGE ORAL at 11:13

## 2017-05-22 RX ADMIN — NICOTINE POLACRILEX 8 MG: 4 LOZENGE ORAL at 20:24

## 2017-05-22 RX ADMIN — NICOTINE POLACRILEX 8 MG: 4 LOZENGE ORAL at 18:50

## 2017-05-22 RX ADMIN — BUSPIRONE HYDROCHLORIDE 30 MG: 15 TABLET ORAL at 20:24

## 2017-05-22 RX ADMIN — GABAPENTIN 600 MG: 600 TABLET, FILM COATED ORAL at 08:48

## 2017-05-22 RX ADMIN — NICOTINE POLACRILEX 8 MG: 4 LOZENGE ORAL at 10:05

## 2017-05-22 ASSESSMENT — ACTIVITIES OF DAILY LIVING (ADL)
GROOMING: INDEPENDENT
ORAL_HYGIENE: INDEPENDENT
LAUNDRY: WITH SUPERVISION
DRESS: INDEPENDENT
GROOMING: INDEPENDENT
ORAL_HYGIENE: INDEPENDENT

## 2017-05-22 NOTE — PROGRESS NOTES
[facility]   Psychiatric Progress Note      Impression:     Mr. Morgan Bosch is a 32-year-old male admitted to the [facility], [place], station [address]. He was admitted as a voluntary patient under ongoing MICD commitment through the Emergency Department on 05/15/2017 following a relapse on dextromethorphan, methamphetamine, alcohol and marijuana.  He had been discharged from [address] earlier in the day and took a cab to [facility], but then eloped rather than entering treatment.  PD is in the process of being revoked.  Buspar, Neurontin and PRN Vistaril were continued.  Prozac was increased.  Wellbutrin XL was added but discontinued due to agitation.  Mood is mildly depressed and minimally anxious.         Diagnoses:     1. Major depressive disorder, moderate, recurrent.   2. Generalized anxiety disorder.   3. Stimulant use disorder, severe.  4. Polysubstance abuse.   5. Antisocial personality traits versus disorder.  6. Tooth pain (right upper central incisor, right lower molar).           Plan:     Medications:  Continue Buspar, Prozac and Neurontin.  Discontinue Wellbutrin XL.  Continue PRN Vistaril.      He is under commitment MICD.  His PD is in the process of being revoked.  Plan to transfer to [facility] when a bed is available and after the appropriate amount of time is allowed to appeal the court's decision (ends ).     Attestation:  Patient has been seen and evaluated by me,  VERONIKA Hugo CNP  The patient was counseled on  nature of illness and treatment plan/options  Care was coordinated with  tx team  Total amount of time: 26 minutes  Coordination of care/counselin minutes          Interim History:     The patient's care was discussed with the treatment team and chart notes were reviewed.  Pt has been attending some groups.  Staff report he has been irritable at times.  Pt said he has been more  "agitated the last few days and attributes this to Wellbutrin.  Wellbutrin was discontinued.  Pt asked for PRN Zyprexa over the weekend and was upset that it was not administered.  Discussed that Zyprexa is meant to be used only for extreme agitation, and that Vistaril should be used preferentially for anxiety or irritability.  Pt denies SI.  States that his teeth continue to hurt, but he hasn't used Orajel.           Medications:     Current Facility-Administered Medications   Medication     OLANZapine zydis (zyPREXA) ODT tab 5-10 mg    Or     OLANZapine (zyPREXA) injection 5-10 mg     benzocaine (ANBESOL) 20 % liquid     FLUoxetine (PROzac) capsule 60 mg     busPIRone (BUSPAR) tablet 30 mg     gabapentin (NEURONTIN) tablet 600 mg     multivitamin, therapeutic with minerals (THERA-VIT-M) tablet 1 tablet     nicotine polacrilex lozenge 4-8 mg     hydrOXYzine (ATARAX) tablet 50 mg     hydrOXYzine (ATARAX) tablet  mg     ibuprofen (ADVIL/MOTRIN) tablet 600 mg     acetaminophen (TYLENOL) tablet 650 mg             Allergies:     Allergies   Allergen Reactions     Adhesive Tape Itching     Blisters (band aids)            Psychiatric Examination:   /80  Pulse 82  Temp 98  F (36.7  C)  Resp 16  Ht 1.778 m (5' 10\")  Wt 86.2 kg (190 lb)  SpO2 93%  BMI 27.26 kg/m2  Weight is 190 lbs 0 oz  Body mass index is 27.26 kg/(m^2).    Appearance:  awake, alert, adequate grooming/hygiene  Attitude:  cooperative  Eye Contact:  fair  Mood:   Mildly depressed & anxious, more irritable  Affect:  appropriate and in normal range  Speech:  clear, coherent  Psychomotor Behavior:  no evidence of tardive dyskinesia, dystonia, or tics  Thought Process:  linear and goal oriented  Associations:  no loose associations  Thought Content:  no evidence of suicidal ideation or homicidal ideation and no evidence of psychotic thought  Insight:  fair  Judgment:  fair  Oriented to: date, time, person, and place  Attention Span and " Concentration:  intact  Recent and Remote Memory:  intact  Language: Able to name objects, Able to repeat phrases and Able to read and write  Fund of Knowledge: appropriate  Muscle Strength and Tone: normal  Gait and Station: Normal         Labs:      Ref. Range 5/15/2017 21:59 5/16/2017 08:50   Alcohol Breath Test Latest Ref Range: 0.00 - 0.01  0.053 (A)    Amphetamine Qual Urine Latest Ref Range: NEG   Positive... (A)   Cocaine Qual Urine Latest Ref Range: NEG   Negative...   Opiates Qualitative Urine Latest Ref Range: NEG   Negative...   Cannabinoids Qual Urine Latest Ref Range: NEG   Positive... (A)   Barbiturates Qual Urine Latest Ref Range: NEG   Positive... (A)   Pcp Qual Urine Latest Ref Range: NEG   Negative...   Benzodiazepine Qual Urine Latest Ref Range: NEG   Negative...   Ethanol Qual Urine Latest Ref Range: NEG   Negative...

## 2017-05-22 NOTE — PLAN OF CARE
Problem: Depressive Symptoms  Goal: Social and Therapeutic (Depression)  Signs and symptoms of listed problems will be absent or manageable.      Attended 1.0  of 2.0 possible O.T. groups. Quiet, minimal interaction. Focused intently on detailed artwork.

## 2017-05-22 NOTE — PLAN OF CARE
"Problem: Depressive Symptoms  Goal: Depressive Symptoms  Signs and symptoms of listed problems will be absent or manageable.   Outcome: No Change  Patient is alert and oriented to person, place, time, situation. Patient is withdrawn with flat affect but did attend OT group and community meeting. Patient refused Wellbutrin this morning, complained that it has made him more agitated, medication was discontinued. Patient is now committed. Patient states anxiety and depression are both \"high\" but denies suicidal ideation, self injurious thoughts at this time. No behavioral outbursts this shift.      "

## 2017-05-23 PROCEDURE — 99232 SBSQ HOSP IP/OBS MODERATE 35: CPT | Performed by: NURSE PRACTITIONER

## 2017-05-23 PROCEDURE — 25000132 ZZH RX MED GY IP 250 OP 250 PS 637: Performed by: NURSE PRACTITIONER

## 2017-05-23 PROCEDURE — 25000132 ZZH RX MED GY IP 250 OP 250 PS 637: Performed by: PSYCHIATRY & NEUROLOGY

## 2017-05-23 PROCEDURE — 25000132 ZZH RX MED GY IP 250 OP 250 PS 637: Performed by: EMERGENCY MEDICINE

## 2017-05-23 PROCEDURE — 12400001 ZZH R&B MH UMMC

## 2017-05-23 RX ORDER — HYDROXYZINE HYDROCHLORIDE 50 MG/1
50-100 TABLET, FILM COATED ORAL 3 TIMES DAILY PRN
Status: DISCONTINUED | OUTPATIENT
Start: 2017-05-23 | End: 2017-06-02 | Stop reason: HOSPADM

## 2017-05-23 RX ADMIN — GABAPENTIN 600 MG: 600 TABLET, FILM COATED ORAL at 19:13

## 2017-05-23 RX ADMIN — HYDROXYZINE HYDROCHLORIDE 100 MG: 50 TABLET, FILM COATED ORAL at 14:05

## 2017-05-23 RX ADMIN — NICOTINE POLACRILEX 8 MG: 4 LOZENGE ORAL at 19:14

## 2017-05-23 RX ADMIN — NICOTINE POLACRILEX 8 MG: 4 LOZENGE ORAL at 16:05

## 2017-05-23 RX ADMIN — HYDROXYZINE HYDROCHLORIDE 100 MG: 50 TABLET, FILM COATED ORAL at 19:13

## 2017-05-23 RX ADMIN — BUSPIRONE HYDROCHLORIDE 30 MG: 15 TABLET ORAL at 19:12

## 2017-05-23 RX ADMIN — MULTIPLE VITAMINS W/ MINERALS TAB 1 TABLET: TAB at 07:41

## 2017-05-23 RX ADMIN — FLUOXETINE 60 MG: 20 CAPSULE ORAL at 07:41

## 2017-05-23 RX ADMIN — NICOTINE POLACRILEX 8 MG: 4 LOZENGE ORAL at 17:10

## 2017-05-23 RX ADMIN — NICOTINE POLACRILEX 4 MG: 4 LOZENGE ORAL at 10:00

## 2017-05-23 RX ADMIN — NICOTINE POLACRILEX 8 MG: 4 LOZENGE ORAL at 18:18

## 2017-05-23 RX ADMIN — NICOTINE POLACRILEX 4 MG: 4 LOZENGE ORAL at 07:41

## 2017-05-23 RX ADMIN — NICOTINE POLACRILEX 4 MG: 4 LOZENGE ORAL at 08:46

## 2017-05-23 RX ADMIN — GABAPENTIN 600 MG: 600 TABLET, FILM COATED ORAL at 07:41

## 2017-05-23 RX ADMIN — NICOTINE POLACRILEX 4 MG: 4 LOZENGE ORAL at 14:05

## 2017-05-23 RX ADMIN — NICOTINE POLACRILEX 4 MG: 4 LOZENGE ORAL at 12:10

## 2017-05-23 RX ADMIN — BUSPIRONE HYDROCHLORIDE 30 MG: 15 TABLET ORAL at 07:41

## 2017-05-23 RX ADMIN — GABAPENTIN 600 MG: 600 TABLET, FILM COATED ORAL at 14:05

## 2017-05-23 ASSESSMENT — ACTIVITIES OF DAILY LIVING (ADL)
ORAL_HYGIENE: INDEPENDENT
GROOMING: HANDWASHING;INDEPENDENT
DRESS: STREET CLOTHES;INDEPENDENT
DRESS: STREET CLOTHES;INDEPENDENT
GROOMING: HANDWASHING;INDEPENDENT
ORAL_HYGIENE: INDEPENDENT

## 2017-05-23 NOTE — PROGRESS NOTES
05/16/17 1500   General Information   Date Initially Attended OT 05/16/17   Clinical Impression   Affect Flat   Orientation Oriented to person, place and time   Appearance and ADLs Neatly groomed   Attention to Internal Stimuli No observed signs   Interaction Skills Withdrawn;Interacts with prompts, minimal response   Ability to Communicate Needs Does so with prompts   Verbal Content Clear;Appropriate to topic   Ability to Maintain Boundaries Maintains appropriate physical boundaries;Maintains appropriate verbal boundaries   Participation Independently participates   Concentration Concentrates 50 minutes   Ability to Concentrate Without difficulty   Follows and Comprehends Directions Independently follows 2 step verbal directions   Memory Delayed and immediate recall intact   Organization Other (see comments)  (independent on familiar task)   Decision Making Needs further assessment   Planning and Problem Solving Independently plans ahead   Ability to Apply and Learn Concepts Needs further assessment   Frustrations / Stress Tolerance Needs further assessment   Level of Insight Needs further assessment   Self Esteem Needs further assessment   Social Supports Needs further assessment   General Observation/Plan   General Observations/Plan See Comments     INITIAL O.T. ASSESSMENT Details:  Pt had been seen several times last week by various OT's however no Assessment was done at that time. Writer has only seen pt in 1 group. Pt stated he had done a Self Assessment. Was structured on very detailed drawing task, excellent work skills and concentration. Minimal interaction with prompts.Plan: .OTR to review Self Assessment in his paper chart and discuss with pt. Pt will explore and practice coping skills to reduce and manage stressors in daily life.

## 2017-05-23 NOTE — PLAN OF CARE
Problem: Depressive Symptoms  Goal: Social and Therapeutic (Depression)  Signs and symptoms of listed problems will be absent or manageable.      Pt did not attend any OT groups today.

## 2017-05-23 NOTE — PROGRESS NOTES
Call from Geovanna at [facility]. Need current MAR and progress notes from the 18th to current. Fax: [phone]    [facility] faxed the information requested.    [facility] called Lacy Langley pt's CM at [facility], [phone].  Left message requesting the revocation order. Call back from Lacy. She will fax the revocation.     Call back from Geovanna at [facility]. Pt to be re-reviewed because of his volatility; notes indicate that pt was yelling and agitated on the unit. Will need to demonstrate being more stable. Will want updated records on Friday which she will send for review. He is the first on the list; there is a possible discharge next week.     [facility] advised pt of above.

## 2017-05-23 NOTE — PROGRESS NOTES
[facility]   Psychiatric Progress Note      Impression:     Mr. Morgan Bosch is a 32-year-old male admitted to the [facility], [place], station [address]. He was admitted as a voluntary patient under ongoing MICD commitment through the Emergency Department on 05/15/2017 following a relapse on dextromethorphan, methamphetamine, alcohol and marijuana.  He had been discharged from [address] earlier in the day and took a cab to [facility], but then eloped rather than entering treatment.  PD is in the process of being revoked.  Buspar, Neurontin and PRN Vistaril were continued.  Prozac was increased.  Wellbutrin XL was added but discontinued due to agitation.  Mood is mildly depressed and minimally anxious.         Diagnoses:     1. Major depressive disorder, moderate, recurrent.   2. Generalized anxiety disorder.   3. Stimulant use disorder, severe.  4. Polysubstance abuse.   5. Antisocial personality traits versus disorder.  6. Tooth pain (right upper central incisor, right lower molar).           Plan:     Medications:  Continue Buspar, Prozac and Neurontin.  Change Vistaril to 50-100mg TID PRN.    He is under commitment MICD.  His PD is in the process of being revoked.  Plan to transfer to [facility] when a bed is available and after the appropriate amount of time is allowed to appeal the court's decision (ends ).     Attestation:  Patient has been seen and evaluated by me,  VERONIKA Huog CNP  The patient was counseled on  nature of illness and treatment plan/options  Care was coordinated with  tx team  Total amount of time: 26 minutes  Coordination of care/counselin minutes          Interim History:     The patient's care was discussed with the treatment team and chart notes were reviewed.  Pt was documented as sleeping 7 hours during the overnight.  He has been attending some groups.  He took PRN Vistaril x 2  "yesterday.  He asked for an increase in his Vistaril dose.  Reports increasing depressive symptoms and anxiety, related to being hospitalized.  Feels less agitated/irritable today.  He denies SI.  Appetite is normal.  Sleep is adequate.  Reports some cravings to use.  Continues to complain of tooth pain but has not used Orajel since it was prescribed and did not take any oral analgesics yesterday.           Medications:     Current Facility-Administered Medications   Medication     hydrOXYzine (ATARAX) tablet  mg     OLANZapine zydis (zyPREXA) ODT tab 5-10 mg    Or     OLANZapine (zyPREXA) injection 5-10 mg     benzocaine (ANBESOL) 20 % liquid     FLUoxetine (PROzac) capsule 60 mg     busPIRone (BUSPAR) tablet 30 mg     gabapentin (NEURONTIN) tablet 600 mg     multivitamin, therapeutic with minerals (THERA-VIT-M) tablet 1 tablet     nicotine polacrilex (COMMIT) lozenge 4-8 mg     ibuprofen (ADVIL/MOTRIN) tablet 600 mg     acetaminophen (TYLENOL) tablet 650 mg             Allergies:     Allergies   Allergen Reactions     Adhesive Tape Itching     Blisters (band aids)            Psychiatric Examination:   /80  Pulse 82  Temp 98.1  F (36.7  C)  Resp 16  Ht 1.778 m (5' 10\")  Wt 86.5 kg (190 lb 11.2 oz)  SpO2 93%  BMI 27.36 kg/m2  Weight is 190 lbs 11.2 oz  Body mass index is 27.36 kg/(m^2).    Appearance:  awake, alert, adequate grooming/hygiene  Attitude:  cooperative   Eye Contact:  fair  Mood:   more depressed & anxious which he related to being hospitalized, less irritable  Affect:  appropriate and in normal range  Speech:  clear, coherent  Psychomotor Behavior:  no evidence of tardive dyskinesia, dystonia, or tics  Thought Process:  linear and goal oriented  Associations:  no loose associations  Thought Content:  no evidence of suicidal ideation or homicidal ideation and no evidence of psychotic thought  Insight:  fair  Judgment:  fair  Oriented to: date, time, person, and place  Attention Span " and Concentration:  intact  Recent and Remote Memory:  intact  Language: Able to name objects, Able to repeat phrases and Able to read and write  Fund of Knowledge: appropriate  Muscle Strength and Tone: normal  Gait and Station: Normal         Labs:      Ref. Range 5/15/2017 21:59 5/16/2017 08:50   Alcohol Breath Test Latest Ref Range: 0.00 - 0.01  0.053 (A)    Amphetamine Qual Urine Latest Ref Range: NEG   Positive... (A)   Cocaine Qual Urine Latest Ref Range: NEG   Negative...   Opiates Qualitative Urine Latest Ref Range: NEG   Negative...   Cannabinoids Qual Urine Latest Ref Range: NEG   Positive... (A)   Barbiturates Qual Urine Latest Ref Range: NEG   Positive... (A)   Pcp Qual Urine Latest Ref Range: NEG   Negative...   Benzodiazepine Qual Urine Latest Ref Range: NEG   Negative...   Ethanol Qual Urine Latest Ref Range: NEG   Negative...

## 2017-05-23 NOTE — PROGRESS NOTES
Pt observed staying in room most of the shift.  Pt affect flat, minimal group attendance.  No behavioral problems.       05/23/17 1415   Behavioral Health   Hallucinations denies / not responding to hallucinations   Thinking poor concentration   Orientation person: oriented;place: oriented;date: oriented;time: oriented   Memory baseline memory   Insight poor   Judgement impaired   Eye Contact at examiner   Affect blunted, flat   Mood depressed;anxious   Physical Appearance/Attire attire appropriate to age and situation   Hygiene well groomed   Suicidality other (see comments)  (denies)   Activity isolative;withdrawn   Speech clear;coherent   Psychomotor / Gait balanced;steady   Sleep/Rest/Relaxation   Day/Evening Time Hours resting in bed   Coping/Psychosocial   Verbalized Emotional State anxiety;depression   Plan Of Care Reviewed With patient   Psycho Education   Type of Intervention 1:1 intervention   Response participates with encouragement   Activities of Daily Living   Hygiene/Grooming handwashing;independent   Oral Hygiene independent   Dress street clothes;independent   Activity   Activity Level of Assistance independent   Behavioral Health Interventions   Depression provide emotional support;assist with developing and utilizing healthy coping strategies;assess patient response to medication   Social and Therapeutic Interventions (Depression) encourage socialization with peers;encourage participation in therapeutic groups and milieu activities

## 2017-05-24 PROCEDURE — 12400001 ZZH R&B MH UMMC

## 2017-05-24 PROCEDURE — 99231 SBSQ HOSP IP/OBS SF/LOW 25: CPT | Performed by: NURSE PRACTITIONER

## 2017-05-24 PROCEDURE — 25000132 ZZH RX MED GY IP 250 OP 250 PS 637: Performed by: NURSE PRACTITIONER

## 2017-05-24 PROCEDURE — 25000132 ZZH RX MED GY IP 250 OP 250 PS 637: Performed by: EMERGENCY MEDICINE

## 2017-05-24 PROCEDURE — 25000132 ZZH RX MED GY IP 250 OP 250 PS 637: Performed by: PSYCHIATRY & NEUROLOGY

## 2017-05-24 RX ADMIN — NICOTINE POLACRILEX 4 MG: 4 LOZENGE ORAL at 20:30

## 2017-05-24 RX ADMIN — GABAPENTIN 600 MG: 600 TABLET, FILM COATED ORAL at 20:29

## 2017-05-24 RX ADMIN — HYDROXYZINE HYDROCHLORIDE 100 MG: 50 TABLET, FILM COATED ORAL at 14:58

## 2017-05-24 RX ADMIN — NICOTINE POLACRILEX 8 MG: 4 LOZENGE ORAL at 08:04

## 2017-05-24 RX ADMIN — MULTIPLE VITAMINS W/ MINERALS TAB 1 TABLET: TAB at 08:03

## 2017-05-24 RX ADMIN — HYDROXYZINE HYDROCHLORIDE 100 MG: 50 TABLET, FILM COATED ORAL at 20:30

## 2017-05-24 RX ADMIN — NICOTINE POLACRILEX 8 MG: 4 LOZENGE ORAL at 16:14

## 2017-05-24 RX ADMIN — NICOTINE POLACRILEX 8 MG: 4 LOZENGE ORAL at 19:07

## 2017-05-24 RX ADMIN — NICOTINE POLACRILEX 8 MG: 4 LOZENGE ORAL at 17:05

## 2017-05-24 RX ADMIN — BUSPIRONE HYDROCHLORIDE 30 MG: 15 TABLET ORAL at 08:03

## 2017-05-24 RX ADMIN — GABAPENTIN 600 MG: 600 TABLET, FILM COATED ORAL at 14:57

## 2017-05-24 RX ADMIN — BUSPIRONE HYDROCHLORIDE 30 MG: 15 TABLET ORAL at 20:28

## 2017-05-24 RX ADMIN — NICOTINE POLACRILEX 8 MG: 4 LOZENGE ORAL at 12:54

## 2017-05-24 RX ADMIN — NICOTINE POLACRILEX 8 MG: 4 LOZENGE ORAL at 18:10

## 2017-05-24 RX ADMIN — NICOTINE POLACRILEX 8 MG: 4 LOZENGE ORAL at 11:50

## 2017-05-24 RX ADMIN — GABAPENTIN 600 MG: 600 TABLET, FILM COATED ORAL at 08:03

## 2017-05-24 RX ADMIN — NICOTINE POLACRILEX 8 MG: 4 LOZENGE ORAL at 14:59

## 2017-05-24 RX ADMIN — FLUOXETINE 60 MG: 20 CAPSULE ORAL at 08:03

## 2017-05-24 ASSESSMENT — ACTIVITIES OF DAILY LIVING (ADL)
LAUNDRY: WITH SUPERVISION
HYGIENE/GROOMING: INDEPENDENT
LAUNDRY: WITH SUPERVISION
GROOMING: INDEPENDENT
DRESS: STREET CLOTHES;INDEPENDENT
ORAL_HYGIENE: INDEPENDENT
DRESS: INDEPENDENT
ORAL_HYGIENE: INDEPENDENT

## 2017-05-24 NOTE — PROGRESS NOTES
Pt is calm and appropriate. He spent most of shift in his room, but was out occasionally and social with peers. He reports depression and anxiety. Pt did not attend groups.       05/24/17 1438   Behavioral Health   Hallucinations denies / not responding to hallucinations   Thinking intact   Orientation time: oriented;date: oriented;place: oriented;person: oriented   Memory baseline memory   Insight poor   Judgement impaired   Eye Contact at examiner   Affect blunted, flat;full range affect   Mood depressed;anxious;mood is calm   Physical Appearance/Attire attire appropriate to age and situation   Hygiene well groomed   Suicidality other (see comments)  (none)   Self Injury other (see comment)  (none)   Activity isolative;withdrawn;other (see comment)  (visible and social at times, not attending groups)   Speech clear;coherent   Medication Sensitivity no observed side effects;no stated side effects   Psychomotor / Gait balanced;steady   Sleep/Rest/Relaxation   Day/Evening Time Hours napping;resting in bed   Number of hours napping 3 hours   Number of hours resting in bed 2 hours   Safety   Suicidality status 15   Psycho Education   Type of Intervention 1:1 intervention   Response participates, initiates socially appropriate   Hours 0.5   Treatment Detail check in   Activities of Daily Living   Hygiene/Grooming independent   Oral Hygiene independent   Dress street clothes;independent   Laundry with supervision   Room Organization independent   Activity   Activity Level of Assistance independent   Behavioral Health Interventions   Depression maintain safety precautions;maintain safe secure environment;encourage nutrition and hydration;encourage participation / independence with adls;provide emotional support;establish therapeutic relationship;assist with developing and utilizing healthy coping strategies;build upon strengths   Social and Therapeutic Interventions (Depression) encourage socialization with  peers;encourage effective boundaries with peers;encourage participation in therapeutic groups and milieu activities

## 2017-05-24 NOTE — PROGRESS NOTES
[facility]   Psychiatric Progress Note      Impression:     Mr. Morgan Bosch is a 32-year-old male admitted to the [facility], [place], [address]. He was admitted as a voluntary patient under ongoing MICD commitment through the Emergency Department on 05/15/2017 following a relapse on dextromethorphan, methamphetamine, alcohol and marijuana.  He had been discharged from [address] [place] earlier in the day and took a cab to [facility], but then eloped rather than entering treatment.  PD has been revoked.  Buspar, Neurontin and PRN Vistaril were continued.  Prozac was increased.  Wellbutrin XL was added but discontinued due to agitation.  Mood is somewhat depressed and anxious.  He denies SI.         Diagnoses:     1. Major depressive disorder, moderate, recurrent.   2. Generalized anxiety disorder.   3. Stimulant use disorder, severe.  4. Polysubstance abuse.   5. Antisocial personality traits versus disorder.  6. Tooth pain (right upper central incisor, right lower molar).           Plan:     Medications:  Continue Buspar, Prozac and Neurontin.  Continue Vistaril 50-100mg TID PRN.    He is under commitment MICD.  His PD was revoked.  Plan to transfer to [facility] when a bed is available and after the appropriate amount of time is allowed to appeal the court's decision (ends ).     Attestation:  Patient has been seen and evaluated by me,  VERONIKA Hugo CNP  The patient was counseled on  nature of illness and treatment plan/options  Care was coordinated with  tx team  Total amount of time: 21 minutes  Coordination of care/counselin minutes          Interim History:     The patient's care was discussed with the treatment team and chart notes were reviewed.  Pt was documented as sleeping 7.5 hours during the overnight.  He has been spending some time in the milieu but has not been attending groups.  He has also been  "spending time napping.  He took PRN Vistaril x 2 yesterday.  Pt's eye contact was poor and he was minimally engaged in our conversation today.  His mood is \"alright\" and he rates depression 6/10 and anxiety 4/10.  Discussed that pt must exhibit appropriate behaviors (i.e. not yelling or swearing) for several days before [facility] will accept him.  Tolerating meds well.           Medications:     Current Facility-Administered Medications   Medication     nicotine polacrilex lozenge 4-8 mg     hydrOXYzine (ATARAX) tablet  mg     OLANZapine zydis (zyPREXA) ODT tab 5-10 mg    Or     OLANZapine (zyPREXA) injection 5-10 mg     benzocaine (ANBESOL) 20 % liquid     FLUoxetine (PROzac) capsule 60 mg     busPIRone (BUSPAR) tablet 30 mg     gabapentin (NEURONTIN) tablet 600 mg     multivitamin, therapeutic with minerals (THERA-VIT-M) tablet 1 tablet     ibuprofen (ADVIL/MOTRIN) tablet 600 mg     acetaminophen (TYLENOL) tablet 650 mg             Allergies:     Allergies   Allergen Reactions     Adhesive Tape Itching     Blisters (band aids)            Psychiatric Examination:   /80  Pulse 82  Temp 98.1  F (36.7  C)  Resp 16  Ht 1.778 m (5' 10\")  Wt 86.5 kg (190 lb 11.2 oz)  SpO2 93%  BMI 27.36 kg/m2  Weight is 190 lbs 11.2 oz  Body mass index is 27.36 kg/(m^2).    Appearance:  awake, alert, adequate grooming/hygiene  Attitude:  cooperative   Eye Contact:  minimal  Mood:   more depressed & anxious which he relates to being hospitalized  Affect:  appropriate and in normal range  Speech:  clear, coherent  Psychomotor Behavior:  no evidence of tardive dyskinesia, dystonia, or tics  Thought Process:  linear and goal oriented  Associations:  no loose associations  Thought Content:  no evidence of suicidal ideation or homicidal ideation and no evidence of psychotic thought  Insight:  fair  Judgment:  fair  Oriented to: date, time, person, and place  Attention Span and Concentration:  intact  Recent and Remote " Memory:  intact  Language: Able to name objects, Able to repeat phrases and Able to read and write  Fund of Knowledge: appropriate  Muscle Strength and Tone: normal  Gait and Station: Normal         Labs:      Ref. Range 5/15/2017 21:59 5/16/2017 08:50   Alcohol Breath Test Latest Ref Range: 0.00 - 0.01  0.053 (A)    Amphetamine Qual Urine Latest Ref Range: NEG   Positive... (A)   Cocaine Qual Urine Latest Ref Range: NEG   Negative...   Opiates Qualitative Urine Latest Ref Range: NEG   Negative...   Cannabinoids Qual Urine Latest Ref Range: NEG   Positive... (A)   Barbiturates Qual Urine Latest Ref Range: NEG   Positive... (A)   Pcp Qual Urine Latest Ref Range: NEG   Negative...   Benzodiazepine Qual Urine Latest Ref Range: NEG   Negative...   Ethanol Qual Urine Latest Ref Range: NEG   Negative...

## 2017-05-25 PROCEDURE — 97150 GROUP THERAPEUTIC PROCEDURES: CPT | Mod: GO

## 2017-05-25 PROCEDURE — 12400001 ZZH R&B MH UMMC

## 2017-05-25 PROCEDURE — 25000132 ZZH RX MED GY IP 250 OP 250 PS 637: Performed by: EMERGENCY MEDICINE

## 2017-05-25 PROCEDURE — 99231 SBSQ HOSP IP/OBS SF/LOW 25: CPT | Performed by: NURSE PRACTITIONER

## 2017-05-25 PROCEDURE — 25000132 ZZH RX MED GY IP 250 OP 250 PS 637: Performed by: NURSE PRACTITIONER

## 2017-05-25 PROCEDURE — 25000132 ZZH RX MED GY IP 250 OP 250 PS 637: Performed by: PSYCHIATRY & NEUROLOGY

## 2017-05-25 PROCEDURE — 90853 GROUP PSYCHOTHERAPY: CPT

## 2017-05-25 RX ADMIN — BUSPIRONE HYDROCHLORIDE 30 MG: 15 TABLET ORAL at 20:19

## 2017-05-25 RX ADMIN — HYDROXYZINE HYDROCHLORIDE 100 MG: 50 TABLET, FILM COATED ORAL at 20:20

## 2017-05-25 RX ADMIN — GABAPENTIN 600 MG: 600 TABLET, FILM COATED ORAL at 13:16

## 2017-05-25 RX ADMIN — GABAPENTIN 600 MG: 600 TABLET, FILM COATED ORAL at 08:11

## 2017-05-25 RX ADMIN — NICOTINE POLACRILEX 8 MG: 4 LOZENGE ORAL at 12:34

## 2017-05-25 RX ADMIN — NICOTINE POLACRILEX 8 MG: 4 LOZENGE ORAL at 08:10

## 2017-05-25 RX ADMIN — BUSPIRONE HYDROCHLORIDE 30 MG: 15 TABLET ORAL at 08:11

## 2017-05-25 RX ADMIN — NICOTINE POLACRILEX 8 MG: 4 LOZENGE ORAL at 16:58

## 2017-05-25 RX ADMIN — NICOTINE POLACRILEX 8 MG: 4 LOZENGE ORAL at 14:29

## 2017-05-25 RX ADMIN — FLUOXETINE 60 MG: 20 CAPSULE ORAL at 08:11

## 2017-05-25 RX ADMIN — NICOTINE POLACRILEX 8 MG: 4 LOZENGE ORAL at 19:23

## 2017-05-25 RX ADMIN — HYDROXYZINE HYDROCHLORIDE 100 MG: 50 TABLET, FILM COATED ORAL at 13:16

## 2017-05-25 RX ADMIN — NICOTINE POLACRILEX 8 MG: 4 LOZENGE ORAL at 11:23

## 2017-05-25 RX ADMIN — MULTIPLE VITAMINS W/ MINERALS TAB 1 TABLET: TAB at 08:11

## 2017-05-25 RX ADMIN — GABAPENTIN 600 MG: 600 TABLET, FILM COATED ORAL at 20:19

## 2017-05-25 RX ADMIN — NICOTINE POLACRILEX 8 MG: 4 LOZENGE ORAL at 20:22

## 2017-05-25 ASSESSMENT — ACTIVITIES OF DAILY LIVING (ADL)
GROOMING: INDEPENDENT
LAUNDRY: WITH SUPERVISION
LAUNDRY: WITH SUPERVISION
ORAL_HYGIENE: INDEPENDENT
DRESS: INDEPENDENT
DRESS: INDEPENDENT
ORAL_HYGIENE: INDEPENDENT
GROOMING: INDEPENDENT

## 2017-05-25 NOTE — PROGRESS NOTES
"Patient provided PRN (EMAR) (atarax at 1315) for anxiety and reported \"better\". Nursing will continue to monitor.    "

## 2017-05-25 NOTE — PROGRESS NOTES
"[facility]   Psychiatric Progress Note      Impression:     Mr. Morgan Bosch is a 32-year-old male admitted to the [facility], [address]. He was admitted as a voluntary patient under ongoing MICD commitment through the Emergency Department on 05/15/2017 following a relapse on dextromethorphan, methamphetamine, alcohol and marijuana.  He had been discharged from station [address] [place] earlier in the day and took a cab to [facility], but then eloped rather than entering treatment.  PD has been revoked.  Buspar, Neurontin and PRN Vistaril were continued.  Prozac was increased.  Wellbutrin XL was added but discontinued due to agitation.  Mood is somewhat depressed and anxious.  He denies SI.         Diagnoses:     1. Major depressive disorder, moderate, recurrent.   2. Generalized anxiety disorder.   3. Stimulant use disorder, severe.  4. Polysubstance abuse.   5. Antisocial personality traits versus disorder.  6. Tooth pain (right upper central incisor, right lower molar).           Plan:     Medications:  Continue Buspar, Prozac and Neurontin.  Continue Vistaril 50-100mg TID PRN.    He is under commitment MICD.  His PD was revoked.  Plan to transfer to [facility] when a bed is available.  Attestation:  Patient has been seen and evaluated by me,  VERONIKA Hugo CNP  The patient was counseled on  nature of illness and treatment plan/options  Care was coordinated with  tx team  Total amount of time: 20 minutes  Coordination of care/counseling: 15 minutes          Interim History:     The patient's care was discussed with the treatment team and chart notes were reviewed.  Pt was documented as sleeping 7.5 hours during the overnight.  He spent much of the day in bed yesterday and has not been attending groups.  Pt states that his motivation is low, but that he does plan to attend some groups today.  Mood is \"okay\" and " "moderately depressed related to circumstances.  Pt says he has \"a normal amount\" of anxiety for him.  He feels somewhat irritable and says that PRN Vistaril helps.  When asked whether he wants to maintain sobriety, he replied, \"I guess.\"  He said that he is open to participating in CD treatment.  \"It depends on what it's like.\"           Medications:     Current Facility-Administered Medications   Medication     nicotine polacrilex lozenge 4-8 mg     hydrOXYzine (ATARAX) tablet  mg     OLANZapine zydis (zyPREXA) ODT tab 5-10 mg    Or     OLANZapine (zyPREXA) injection 5-10 mg     benzocaine (ANBESOL) 20 % liquid     FLUoxetine (PROzac) capsule 60 mg     busPIRone (BUSPAR) tablet 30 mg     gabapentin (NEURONTIN) tablet 600 mg     multivitamin, therapeutic with minerals (THERA-VIT-M) tablet 1 tablet     ibuprofen (ADVIL/MOTRIN) tablet 600 mg     acetaminophen (TYLENOL) tablet 650 mg             Allergies:     Allergies   Allergen Reactions     Adhesive Tape Itching     Blisters (band aids)            Psychiatric Examination:   /80  Pulse 82  Temp 98.1  F (36.7  C)  Resp 16  Ht 1.778 m (5' 10\")  Wt 86.5 kg (190 lb 11.2 oz)  SpO2 93%  BMI 27.36 kg/m2  Weight is 190 lbs 11.2 oz  Body mass index is 27.36 kg/(m^2).    Appearance:  awake, alert, adequate grooming/hygiene  Attitude:  cooperative   Eye Contact:  fair  Mood:   more depressed & somewhat anxious which he relates to being hospitalized  Affect:  appropriate and in normal range  Speech:  clear, coherent  Psychomotor Behavior:  no evidence of tardive dyskinesia, dystonia, or tics  Thought Process:  linear and goal oriented  Associations:  no loose associations  Thought Content:  no evidence of suicidal ideation or homicidal ideation and no evidence of psychotic thought  Insight:  fair  Judgment:  fair  Oriented to: date, time, person, and place  Attention Span and Concentration:  intact  Recent and Remote Memory:  intact  Language: Able to name " objects, Able to repeat phrases and Able to read and write  Fund of Knowledge: appropriate  Muscle Strength and Tone: normal  Gait and Station: Normal         Labs:      Ref. Range 5/15/2017 21:59 5/16/2017 08:50   Alcohol Breath Test Latest Ref Range: 0.00 - 0.01  0.053 (A)    Amphetamine Qual Urine Latest Ref Range: NEG   Positive... (A)   Cocaine Qual Urine Latest Ref Range: NEG   Negative...   Opiates Qualitative Urine Latest Ref Range: NEG   Negative...   Cannabinoids Qual Urine Latest Ref Range: NEG   Positive... (A)   Barbiturates Qual Urine Latest Ref Range: NEG   Positive... (A)   Pcp Qual Urine Latest Ref Range: NEG   Negative...   Benzodiazepine Qual Urine Latest Ref Range: NEG   Negative...   Ethanol Qual Urine Latest Ref Range: NEG   Negative...

## 2017-05-25 NOTE — PLAN OF CARE
"Problem: Depressive Symptoms  Goal: Depressive Symptoms  Signs and symptoms of listed problems will be absent or manageable.     Patient, prior to discharge, will:  -verbalize an understanding of medication regimen 5/25/17 Completed  -verbalize absence of SI/SIB 5/25/17 Completed    -verbalize decrease in depressive signs/symptoms  -verbalize a decrease in anxiety   -develop a safety plan  -identify a support system   -will participate in coordination of discharge planning    To promote safety/ mental health  Outcome: Improving  RN48/      Patient rates depression 3/10* c/o Fatigue or loss of energy  Patient rates anxiety at 3/10* c/o Trouble relaxing  Patient describes mood as \"depressed and anxious.\"     Patient is cooperative appearing Blunted/Flat and anxious . Patient is med-compliant, is eating 100% and reports \"sleeps through the night\". Patient is attending groups (attended OT this shift). Patient is hopeful stating \"I should be able to discharge soon\" and stated GOAL is \"[place].\"     Patient denies current or recent suicidal ideation    SIB denies    HI: denies current or recent homicidal ideation or behaviors.     VS reviewed: /80  Pulse 82  Temp 98.1  F (36.7  C)  Resp 16  Ht 1.778 m (5' 10\")  Wt 86.5 kg (190 lb 11.2 oz)  SpO2 93%  BMI 27.36 kg/m2 . Patient denies  pain.     Patient evaluation continues. Assessed mood,anxiety,thoughts and behavior. Patient is progressing towards goals. Patient is encouraged to participate in groups and assisted to develop healthy coping skills.      Length of stay: 9     Refer to daily team meeting notes for individualized plan of care. Nursing will continue to assess.     * Scale is offered as scale of 1 to 10 with 10 being the worst.                                                                                                                    "

## 2017-05-25 NOTE — PLAN OF CARE
Problem: Depressive Symptoms  Goal: Social and Therapeutic (Depression)  Signs and symptoms of listed problems will be absent or manageable.      .Attended 1.50 of 2.0 possible O.T. groups. Flat but responsive. Worked intermittently on structured tasks.

## 2017-05-26 PROCEDURE — 12400001 ZZH R&B MH UMMC

## 2017-05-26 PROCEDURE — 99231 SBSQ HOSP IP/OBS SF/LOW 25: CPT | Performed by: NURSE PRACTITIONER

## 2017-05-26 PROCEDURE — 90853 GROUP PSYCHOTHERAPY: CPT

## 2017-05-26 PROCEDURE — 25000132 ZZH RX MED GY IP 250 OP 250 PS 637: Performed by: NURSE PRACTITIONER

## 2017-05-26 PROCEDURE — 25000132 ZZH RX MED GY IP 250 OP 250 PS 637: Performed by: PSYCHIATRY & NEUROLOGY

## 2017-05-26 PROCEDURE — 25000132 ZZH RX MED GY IP 250 OP 250 PS 637: Performed by: EMERGENCY MEDICINE

## 2017-05-26 RX ADMIN — GABAPENTIN 600 MG: 600 TABLET, FILM COATED ORAL at 21:10

## 2017-05-26 RX ADMIN — BUSPIRONE HYDROCHLORIDE 30 MG: 15 TABLET ORAL at 21:10

## 2017-05-26 RX ADMIN — NICOTINE POLACRILEX 8 MG: 4 LOZENGE ORAL at 21:10

## 2017-05-26 RX ADMIN — NICOTINE POLACRILEX 8 MG: 4 LOZENGE ORAL at 08:52

## 2017-05-26 RX ADMIN — NICOTINE POLACRILEX 8 MG: 4 LOZENGE ORAL at 10:13

## 2017-05-26 RX ADMIN — HYDROXYZINE HYDROCHLORIDE 100 MG: 50 TABLET, FILM COATED ORAL at 14:04

## 2017-05-26 RX ADMIN — NICOTINE POLACRILEX 8 MG: 4 LOZENGE ORAL at 16:09

## 2017-05-26 RX ADMIN — HYDROXYZINE HYDROCHLORIDE 100 MG: 50 TABLET, FILM COATED ORAL at 21:10

## 2017-05-26 RX ADMIN — NICOTINE POLACRILEX 8 MG: 4 LOZENGE ORAL at 14:05

## 2017-05-26 RX ADMIN — NICOTINE POLACRILEX 8 MG: 4 LOZENGE ORAL at 07:47

## 2017-05-26 RX ADMIN — NICOTINE POLACRILEX 8 MG: 4 LOZENGE ORAL at 19:24

## 2017-05-26 RX ADMIN — GABAPENTIN 600 MG: 600 TABLET, FILM COATED ORAL at 14:05

## 2017-05-26 RX ADMIN — MULTIPLE VITAMINS W/ MINERALS TAB 1 TABLET: TAB at 07:48

## 2017-05-26 RX ADMIN — GABAPENTIN 600 MG: 600 TABLET, FILM COATED ORAL at 07:48

## 2017-05-26 RX ADMIN — NICOTINE POLACRILEX 8 MG: 4 LOZENGE ORAL at 12:05

## 2017-05-26 RX ADMIN — BUSPIRONE HYDROCHLORIDE 30 MG: 15 TABLET ORAL at 07:48

## 2017-05-26 RX ADMIN — FLUOXETINE 60 MG: 20 CAPSULE ORAL at 07:48

## 2017-05-26 RX ADMIN — NICOTINE POLACRILEX 8 MG: 4 LOZENGE ORAL at 18:09

## 2017-05-26 ASSESSMENT — ACTIVITIES OF DAILY LIVING (ADL)
ORAL_HYGIENE: INDEPENDENT
GROOMING: INDEPENDENT
LAUNDRY: WITH SUPERVISION
DRESS: STREET CLOTHES;INDEPENDENT
GROOMING: HANDWASHING;SHOWER;INDEPENDENT
ORAL_HYGIENE: INDEPENDENT
DRESS: INDEPENDENT

## 2017-05-26 NOTE — PLAN OF CARE
Problem: Depressive Symptoms  Goal: Depressive Symptoms  Signs and symptoms of listed problems will be absent or manageable.     Patient, prior to discharge, will:  -verbalize an understanding of medication regimen 5/25/17 Completed  -verbalize absence of SI/SIB 5/25/17 Completed    -verbalize decrease in depressive signs/symptoms  -verbalize a decrease in anxiety   -develop a safety plan  -identify a support system   -will participate in coordination of discharge planning    To promote safety/ mental health   Outcome: No Change  Illness Management Recovery model:  Feedback.     Patient identified the following people they would like to receive feedback from if/when they see early warning signs:     Friend(s) has a few friends  Family(s):mother supportive     Partner/Souse:  Pt not      Support Group Member(s):  Haven't used     Co-Worker(s):  Doesn't work    Pt states  That  He feels like giving up becaause he's feeling unmotivated and not sure he wants to change. Pt doesn't feel he can change and feels he's at a standstill   Pt denies suicidal ideation and sib.  Pt has been pleasant and cooperative with staff. Pt states he still feels depressed and restless. Pt out in the lounge most of the P.M.

## 2017-05-26 NOTE — PROGRESS NOTES
Attended .5 of the OT afternoon group. Affect was flat. He offered no answers. Question preoccupied. Left early to talk with his RN about meds, returned for just a few minutes and left group.

## 2017-05-26 NOTE — PROGRESS NOTES
"[facility]   Psychiatric Progress Note      Impression:     Mr. Morgan Bosch is a 32-year-old male admitted to the [facility], [address]. He was admitted as a voluntary patient under ongoing MICD commitment through the Emergency Department on 05/15/2017 following a relapse on dextromethorphan, methamphetamine, alcohol and marijuana.  He had been discharged from station [address] earlier in the day and took a cab to [facility], but then eloped rather than entering treatment.  PD has been revoked.  Buspar, Neurontin and PRN Vistaril were continued.  Prozac was increased.  Wellbutrin XL was added but discontinued due to agitation.  Mood is somewhat depressed and anxious.  He denies SI.         Diagnoses:     1. Major depressive disorder, moderate, recurrent.   2. Generalized anxiety disorder.   3. Stimulant use disorder, severe.  4. Polysubstance abuse.   5. Antisocial personality traits versus disorder.  6. Tooth pain (right upper central incisor, right lower molar).           Plan:     Medications:  Continue Buspar, Prozac and Neurontin.  Continue Vistaril 50-100mg TID PRN.    He is under commitment MICD.  His PD was revoked.  Plan to transfer to [facility] when a bed is available.    Attestation:  Patient has been seen and evaluated by me,  VERONIKA Hugo CNP  The patient was counseled on  nature of illness and treatment plan/options  Care was coordinated with  tx team  Total amount of time: 20 minutes  Coordination of care/counseling: 15 minutes          Interim History:     The patient's care was discussed with the treatment team and chart notes were reviewed.  Pt was documented as sleeping 7.5 hours during the overnight.  He has been attending some groups.  He has been using PRN Vistaril twice per day and finds it somewhat beneficial for anxiety and insomnia.  Mood is \"alright.\"  He denies SI.  Denies side " "effects from medications.  States he has had no cravings to use in the past day.            Medications:     Current Facility-Administered Medications   Medication     nicotine polacrilex lozenge 4-8 mg     hydrOXYzine (ATARAX) tablet  mg     OLANZapine zydis (zyPREXA) ODT tab 5-10 mg    Or     OLANZapine (zyPREXA) injection 5-10 mg     benzocaine (ANBESOL) 20 % liquid     FLUoxetine (PROzac) capsule 60 mg     busPIRone (BUSPAR) tablet 30 mg     gabapentin (NEURONTIN) tablet 600 mg     multivitamin, therapeutic with minerals (THERA-VIT-M) tablet 1 tablet     ibuprofen (ADVIL/MOTRIN) tablet 600 mg     acetaminophen (TYLENOL) tablet 650 mg             Allergies:     Allergies   Allergen Reactions     Adhesive Tape Itching     Blisters (band aids)            Psychiatric Examination:   /79  Pulse 99  Temp 98.5  F (36.9  C) (Oral)  Resp 16  Ht 1.778 m (5' 10\")  Wt 86.5 kg (190 lb 11.2 oz)  SpO2 93%  BMI 27.36 kg/m2  Weight is 190 lbs 11.2 oz  Body mass index is 27.36 kg/(m^2).    Appearance:  awake, alert, adequate grooming/hygiene  Attitude:  cooperative   Eye Contact:  fair  Mood:   \"alright\"  Affect:  appropriate and in normal range  Speech:  clear, coherent  Psychomotor Behavior:  no evidence of tardive dyskinesia, dystonia, or tics  Thought Process:  linear and goal oriented  Associations:  no loose associations  Thought Content:  no evidence of suicidal ideation or homicidal ideation and no evidence of psychotic thought  Insight:  fair  Judgment:  fair  Oriented to: date, time, person, and place  Attention Span and Concentration:  intact  Recent and Remote Memory:  intact  Language: Able to name objects, Able to repeat phrases and Able to read and write  Fund of Knowledge: appropriate  Muscle Strength and Tone: normal  Gait and Station: Normal         Labs:      Ref. Range 5/15/2017 21:59 5/16/2017 08:50   Alcohol Breath Test Latest Ref Range: 0.00 - 0.01  0.053 (A)    Amphetamine Qual Urine " Latest Ref Range: NEG   Positive... (A)   Cocaine Qual Urine Latest Ref Range: NEG   Negative...   Opiates Qualitative Urine Latest Ref Range: NEG   Negative...   Cannabinoids Qual Urine Latest Ref Range: NEG   Positive... (A)   Barbiturates Qual Urine Latest Ref Range: NEG   Positive... (A)   Pcp Qual Urine Latest Ref Range: NEG   Negative...   Benzodiazepine Qual Urine Latest Ref Range: NEG   Negative...   Ethanol Qual Urine Latest Ref Range: NEG   Negative...

## 2017-05-27 PROCEDURE — 12400001 ZZH R&B MH UMMC

## 2017-05-27 PROCEDURE — 25000132 ZZH RX MED GY IP 250 OP 250 PS 637: Performed by: EMERGENCY MEDICINE

## 2017-05-27 PROCEDURE — 25000132 ZZH RX MED GY IP 250 OP 250 PS 637: Performed by: NURSE PRACTITIONER

## 2017-05-27 PROCEDURE — 25000132 ZZH RX MED GY IP 250 OP 250 PS 637: Performed by: PSYCHIATRY & NEUROLOGY

## 2017-05-27 RX ADMIN — BUSPIRONE HYDROCHLORIDE 30 MG: 15 TABLET ORAL at 20:32

## 2017-05-27 RX ADMIN — MULTIPLE VITAMINS W/ MINERALS TAB 1 TABLET: TAB at 08:05

## 2017-05-27 RX ADMIN — GABAPENTIN 600 MG: 600 TABLET, FILM COATED ORAL at 13:23

## 2017-05-27 RX ADMIN — NICOTINE POLACRILEX 8 MG: 4 LOZENGE ORAL at 08:02

## 2017-05-27 RX ADMIN — NICOTINE POLACRILEX 8 MG: 4 LOZENGE ORAL at 15:56

## 2017-05-27 RX ADMIN — HYDROXYZINE HYDROCHLORIDE 100 MG: 50 TABLET, FILM COATED ORAL at 20:32

## 2017-05-27 RX ADMIN — NICOTINE POLACRILEX 8 MG: 4 LOZENGE ORAL at 20:32

## 2017-05-27 RX ADMIN — OLANZAPINE 10 MG: 5 TABLET, ORALLY DISINTEGRATING ORAL at 20:32

## 2017-05-27 RX ADMIN — NICOTINE POLACRILEX 8 MG: 4 LOZENGE ORAL at 09:51

## 2017-05-27 RX ADMIN — GABAPENTIN 600 MG: 600 TABLET, FILM COATED ORAL at 20:32

## 2017-05-27 RX ADMIN — BUSPIRONE HYDROCHLORIDE 30 MG: 15 TABLET ORAL at 08:05

## 2017-05-27 RX ADMIN — FLUOXETINE 60 MG: 20 CAPSULE ORAL at 08:05

## 2017-05-27 RX ADMIN — NICOTINE POLACRILEX 8 MG: 4 LOZENGE ORAL at 13:23

## 2017-05-27 RX ADMIN — NICOTINE POLACRILEX 8 MG: 4 LOZENGE ORAL at 19:28

## 2017-05-27 RX ADMIN — HYDROXYZINE HYDROCHLORIDE 100 MG: 50 TABLET, FILM COATED ORAL at 13:23

## 2017-05-27 RX ADMIN — NICOTINE POLACRILEX 8 MG: 4 LOZENGE ORAL at 18:18

## 2017-05-27 RX ADMIN — GABAPENTIN 600 MG: 600 TABLET, FILM COATED ORAL at 08:05

## 2017-05-27 RX ADMIN — NICOTINE POLACRILEX 8 MG: 4 LOZENGE ORAL at 10:55

## 2017-05-27 ASSESSMENT — ACTIVITIES OF DAILY LIVING (ADL)
DRESS: INDEPENDENT
ORAL_HYGIENE: INDEPENDENT
GROOMING: INDEPENDENT
LAUNDRY: WITH SUPERVISION

## 2017-05-27 NOTE — PROGRESS NOTES
"   05/27/17 1300   Behavioral Health   Hallucinations denies / not responding to hallucinations   Thinking poor concentration   Orientation person: oriented;place: oriented   Memory baseline memory   Insight poor   Judgement impaired   Eye Contact at examiner   Affect blunted, flat;irritable   Mood mood is calm;irritable   Physical Appearance/Attire attire appropriate to age and situation   Hygiene well groomed   Suicidality other (see comments)  (denies)   Self Injury other (see comment)  (denies)   Activity other (see comment)  (Watched movies in the milieu, colored with peers)   Speech clear;coherent   Medication Sensitivity no stated side effects   Psychomotor / Gait balanced;steady   Safety   Suicidality status 15   Psycho Education   Type of Intervention 1:1 intervention   Response participates, initiates socially appropriate   Hours 0.5   Treatment Detail check-in   Activities of Daily Living   Hygiene/Grooming independent   Oral Hygiene independent   Dress independent   Laundry with supervision   Room Organization independent   Behavioral Health Interventions   Depression maintain safety precautions;monitor need to revise level of observation;maintain safe secure environment;assist patient in developing safety plan;assist patient in following safety plan;provide emotional support;establish therapeutic relationship;assist with developing and utilizing healthy coping strategies;build upon strengths   Social and Therapeutic Interventions (Depression) encourage socialization with peers;encourage participation in therapeutic groups and milieu activities   Pt was irritable today, refused vitals. Pt was out in the milieu watching movies most of the day. Pt rated his depression and anxiety a \"5\" on a scale of 1 to 10. Pt attended CM for a little while. Pt denies SI & SIB.   "

## 2017-05-27 NOTE — PLAN OF CARE
Problem: Depressive Symptoms  Goal: Depressive Symptoms  Signs and symptoms of listed problems will be absent or manageable.     Patient, prior to discharge, will:  -verbalize an understanding of medication regimen 5/25/17 Completed  -verbalize absence of SI/SIB 5/25/17 Completed    -verbalize decrease in depressive signs/symptoms  -verbalize a decrease in anxiety   -develop a safety plan  -identify a support system   -will participate in coordination of discharge planning    To promote safety/ mental health   Outcome: No Change   48 hour: Pt has presented as pleasant and appropriate thru out the evening. Pt states he is ready to DC and is just waiting for placement. Pt denies SI at this time.

## 2017-05-28 PROCEDURE — 12400001 ZZH R&B MH UMMC

## 2017-05-28 PROCEDURE — 25000132 ZZH RX MED GY IP 250 OP 250 PS 637: Performed by: NURSE PRACTITIONER

## 2017-05-28 PROCEDURE — 25000132 ZZH RX MED GY IP 250 OP 250 PS 637: Performed by: EMERGENCY MEDICINE

## 2017-05-28 PROCEDURE — 25000132 ZZH RX MED GY IP 250 OP 250 PS 637: Performed by: PSYCHIATRY & NEUROLOGY

## 2017-05-28 RX ADMIN — NICOTINE POLACRILEX 8 MG: 4 LOZENGE ORAL at 14:46

## 2017-05-28 RX ADMIN — NICOTINE POLACRILEX 8 MG: 4 LOZENGE ORAL at 20:22

## 2017-05-28 RX ADMIN — GABAPENTIN 600 MG: 600 TABLET, FILM COATED ORAL at 20:22

## 2017-05-28 RX ADMIN — NICOTINE POLACRILEX 8 MG: 4 LOZENGE ORAL at 18:26

## 2017-05-28 RX ADMIN — NICOTINE POLACRILEX 8 MG: 4 LOZENGE ORAL at 10:04

## 2017-05-28 RX ADMIN — FLUOXETINE 60 MG: 20 CAPSULE ORAL at 08:32

## 2017-05-28 RX ADMIN — NICOTINE POLACRILEX 8 MG: 4 LOZENGE ORAL at 11:58

## 2017-05-28 RX ADMIN — NICOTINE POLACRILEX 8 MG: 4 LOZENGE ORAL at 08:32

## 2017-05-28 RX ADMIN — NICOTINE POLACRILEX 8 MG: 4 LOZENGE ORAL at 17:11

## 2017-05-28 RX ADMIN — BUSPIRONE HYDROCHLORIDE 30 MG: 15 TABLET ORAL at 08:32

## 2017-05-28 RX ADMIN — HYDROXYZINE HYDROCHLORIDE 100 MG: 50 TABLET, FILM COATED ORAL at 20:22

## 2017-05-28 RX ADMIN — NICOTINE POLACRILEX 4 MG: 4 LOZENGE ORAL at 13:07

## 2017-05-28 RX ADMIN — GABAPENTIN 600 MG: 600 TABLET, FILM COATED ORAL at 08:32

## 2017-05-28 RX ADMIN — MULTIPLE VITAMINS W/ MINERALS TAB 1 TABLET: TAB at 08:32

## 2017-05-28 RX ADMIN — GABAPENTIN 600 MG: 600 TABLET, FILM COATED ORAL at 13:07

## 2017-05-28 RX ADMIN — BUSPIRONE HYDROCHLORIDE 30 MG: 15 TABLET ORAL at 20:22

## 2017-05-28 ASSESSMENT — ACTIVITIES OF DAILY LIVING (ADL)
DRESS: INDEPENDENT
ORAL_HYGIENE: INDEPENDENT
ORAL_HYGIENE: INDEPENDENT
DRESS: INDEPENDENT
GROOMING: INDEPENDENT
GROOMING: INDEPENDENT

## 2017-05-28 NOTE — PROGRESS NOTES
"   05/28/17 1300   Behavioral Health   Hallucinations denies / not responding to hallucinations   Thinking intact   Orientation person: oriented;place: oriented   Memory baseline memory   Insight poor   Judgement impaired   Eye Contact at examiner   Affect full range affect   Mood mood is calm   Physical Appearance/Attire attire appropriate to age and situation   Hygiene well groomed   Suicidality other (see comments)  (denies)   Self Injury other (see comment)  (denies)   Activity other (see comment)  (social in the milieu)   Speech clear;coherent   Medication Sensitivity no observed side effects   Psychomotor / Gait balanced;steady   Safety   Suicidality status 15   Psycho Education   Type of Intervention 1:1 intervention   Response participates, initiates socially appropriate   Hours 0.5   Treatment Detail (check-in)   Activities of Daily Living   Hygiene/Grooming independent   Oral Hygiene independent   Dress independent   Room Organization independent   Behavioral Health Interventions   Depression maintain safety precautions;monitor need to revise level of observation;maintain safe secure environment;assist patient in developing safety plan;assist patient in following safety plan;provide emotional support   Social and Therapeutic Interventions (Depression) encourage socialization with peers;encourage participation in therapeutic groups and milieu activities   Pt was social in the milieu, mood was calm. Pt did not state a daily goal. Pt rated his depression a \"4\" and anxiety a \"5\" on a scale of 1 to 10. Pt denies hallucinations, SI & SIB.   "

## 2017-05-29 PROCEDURE — 25000132 ZZH RX MED GY IP 250 OP 250 PS 637: Performed by: EMERGENCY MEDICINE

## 2017-05-29 PROCEDURE — 12400001 ZZH R&B MH UMMC

## 2017-05-29 PROCEDURE — 90853 GROUP PSYCHOTHERAPY: CPT

## 2017-05-29 PROCEDURE — 25000132 ZZH RX MED GY IP 250 OP 250 PS 637: Performed by: NURSE PRACTITIONER

## 2017-05-29 PROCEDURE — 25000132 ZZH RX MED GY IP 250 OP 250 PS 637: Performed by: PSYCHIATRY & NEUROLOGY

## 2017-05-29 PROCEDURE — 97150 GROUP THERAPEUTIC PROCEDURES: CPT | Mod: GO

## 2017-05-29 RX ADMIN — NICOTINE POLACRILEX 8 MG: 4 LOZENGE ORAL at 11:09

## 2017-05-29 RX ADMIN — NICOTINE POLACRILEX 8 MG: 4 LOZENGE ORAL at 17:30

## 2017-05-29 RX ADMIN — NICOTINE POLACRILEX 8 MG: 4 LOZENGE ORAL at 08:28

## 2017-05-29 RX ADMIN — GABAPENTIN 600 MG: 600 TABLET, FILM COATED ORAL at 13:57

## 2017-05-29 RX ADMIN — HYDROXYZINE HYDROCHLORIDE 100 MG: 50 TABLET, FILM COATED ORAL at 13:56

## 2017-05-29 RX ADMIN — NICOTINE POLACRILEX 8 MG: 4 LOZENGE ORAL at 16:24

## 2017-05-29 RX ADMIN — BUSPIRONE HYDROCHLORIDE 30 MG: 15 TABLET ORAL at 20:24

## 2017-05-29 RX ADMIN — NICOTINE POLACRILEX 8 MG: 4 LOZENGE ORAL at 07:16

## 2017-05-29 RX ADMIN — NICOTINE POLACRILEX 8 MG: 4 LOZENGE ORAL at 13:57

## 2017-05-29 RX ADMIN — NICOTINE POLACRILEX 8 MG: 4 LOZENGE ORAL at 19:45

## 2017-05-29 RX ADMIN — NICOTINE POLACRILEX 8 MG: 4 LOZENGE ORAL at 12:20

## 2017-05-29 RX ADMIN — GABAPENTIN 600 MG: 600 TABLET, FILM COATED ORAL at 20:24

## 2017-05-29 RX ADMIN — GABAPENTIN 600 MG: 600 TABLET, FILM COATED ORAL at 08:28

## 2017-05-29 RX ADMIN — HYDROXYZINE HYDROCHLORIDE 100 MG: 50 TABLET, FILM COATED ORAL at 20:24

## 2017-05-29 RX ADMIN — MULTIPLE VITAMINS W/ MINERALS TAB 1 TABLET: TAB at 08:28

## 2017-05-29 RX ADMIN — BUSPIRONE HYDROCHLORIDE 30 MG: 15 TABLET ORAL at 08:28

## 2017-05-29 RX ADMIN — FLUOXETINE 60 MG: 20 CAPSULE ORAL at 08:28

## 2017-05-29 ASSESSMENT — ACTIVITIES OF DAILY LIVING (ADL)
ORAL_HYGIENE: INDEPENDENT
LAUNDRY: WITH SUPERVISION
LAUNDRY: WITH SUPERVISION
GROOMING: INDEPENDENT
DRESS: STREET CLOTHES
DRESS: STREET CLOTHES
GROOMING: INDEPENDENT
ORAL_HYGIENE: INDEPENDENT

## 2017-05-29 NOTE — PLAN OF CARE
Problem: Depressive Symptoms  Goal: Depressive Symptoms  Signs and symptoms of listed problems will be absent or manageable.     Patient, prior to discharge, will:  -verbalize an understanding of medication regimen 5/25/17 Completed  -verbalize absence of SI/SIB 5/25/17 Completed    -verbalize decrease in depressive signs/symptoms  -verbalize a decrease in anxiety   -develop a safety plan  -identify a support system   -will participate in coordination of discharge planning    To promote safety/ mental health      RN 48 hour assessment:     Patient attended group this morning. He endorsed anxiety today, and received Hydroxyzine 100mg prn x 2 this shift. Patient denied having any suicidal thoughts. He appears blunted. Patient continues to wait for CD treatment placement.

## 2017-05-29 NOTE — PROGRESS NOTES
05/28/17 2247   Significant Event   Significant Event Other (see comments)  (shift summary)     Pt was present in the milieu, watched TV in the milieu and interacted with peers.  Denies SI, SIB and hallucinations.

## 2017-05-29 NOTE — PROGRESS NOTES
[facility] from Lacy Langley pt's [facility] . Treatment plan was given to pt.

## 2017-05-29 NOTE — PLAN OF CARE
Problem: Depressive Symptoms  Goal: Social and Therapeutic (Depression)  Signs and symptoms of listed problems will be absent or manageable.      .Attended 2.0   of 3.0 possible O.T. groups. Pt will explore and practice coping skills to reduce and manage stressors in daily life. Added details to artwork with very nice results. Socialized with peers/OTR appropriately. Attended Life Skills Activity Group designed to facilitate interaction with others,spontaneity, enjoyment and maximize cognitive skills of developing strategy, problem solving, enhancing memory as well as provide resources for ongoing use. Appeared to be motivated to trivia type activity, good general knowledge and recall of facts. Some spontaneity.

## 2017-05-30 PROCEDURE — 25000132 ZZH RX MED GY IP 250 OP 250 PS 637: Performed by: EMERGENCY MEDICINE

## 2017-05-30 PROCEDURE — 25000132 ZZH RX MED GY IP 250 OP 250 PS 637: Performed by: NURSE PRACTITIONER

## 2017-05-30 PROCEDURE — 25000132 ZZH RX MED GY IP 250 OP 250 PS 637: Performed by: PSYCHIATRY & NEUROLOGY

## 2017-05-30 PROCEDURE — 99232 SBSQ HOSP IP/OBS MODERATE 35: CPT | Performed by: NURSE PRACTITIONER

## 2017-05-30 PROCEDURE — 12400001 ZZH R&B MH UMMC

## 2017-05-30 PROCEDURE — 97150 GROUP THERAPEUTIC PROCEDURES: CPT | Mod: GO

## 2017-05-30 RX ORDER — MULTIPLE VITAMINS W/ MINERALS TAB 9MG-400MCG
1 TAB ORAL DAILY
Qty: 30 EACH | Refills: 1 | Status: SHIPPED | OUTPATIENT
Start: 2017-05-30 | End: 2017-05-30

## 2017-05-30 RX ORDER — GABAPENTIN 600 MG/1
600 TABLET ORAL 3 TIMES DAILY
Qty: 90 TABLET | Refills: 1 | Status: SHIPPED | OUTPATIENT
Start: 2017-05-30 | End: 2019-02-11

## 2017-05-30 RX ORDER — BUSPIRONE HYDROCHLORIDE 30 MG/1
30 TABLET ORAL 2 TIMES DAILY
Qty: 60 TABLET | Refills: 1 | Status: SHIPPED | OUTPATIENT
Start: 2017-05-30 | End: 2017-05-30

## 2017-05-30 RX ORDER — MULTIPLE VITAMINS W/ MINERALS TAB 9MG-400MCG
1 TAB ORAL DAILY
Qty: 30 EACH | Refills: 1 | Status: SHIPPED | OUTPATIENT
Start: 2017-05-30 | End: 2019-02-11

## 2017-05-30 RX ORDER — HYDROXYZINE HYDROCHLORIDE 50 MG/1
100 TABLET, FILM COATED ORAL 3 TIMES DAILY PRN
Qty: 180 TABLET | Refills: 1 | Status: SHIPPED | OUTPATIENT
Start: 2017-05-30 | End: 2017-05-30

## 2017-05-30 RX ORDER — GABAPENTIN 600 MG/1
600 TABLET ORAL 3 TIMES DAILY
Qty: 90 TABLET | Refills: 1 | Status: SHIPPED | OUTPATIENT
Start: 2017-05-30 | End: 2017-05-30

## 2017-05-30 RX ORDER — HYDROXYZINE HYDROCHLORIDE 50 MG/1
50-100 TABLET, FILM COATED ORAL 3 TIMES DAILY PRN
Qty: 120 TABLET | Refills: 1 | Status: SHIPPED | OUTPATIENT
Start: 2017-05-30 | End: 2017-05-30

## 2017-05-30 RX ORDER — OLANZAPINE 10 MG/1
10 TABLET ORAL DAILY PRN
Qty: 10 TABLET | Refills: 1 | Status: SHIPPED | OUTPATIENT
Start: 2017-05-30 | End: 2017-05-30

## 2017-05-30 RX ORDER — OLANZAPINE 10 MG/1
10 TABLET ORAL DAILY PRN
Qty: 10 TABLET | Refills: 1 | Status: SHIPPED | OUTPATIENT
Start: 2017-05-30 | End: 2019-02-11

## 2017-05-30 RX ORDER — BUSPIRONE HYDROCHLORIDE 30 MG/1
30 TABLET ORAL 2 TIMES DAILY
Qty: 60 TABLET | Refills: 1 | Status: SHIPPED | OUTPATIENT
Start: 2017-05-30 | End: 2019-02-11

## 2017-05-30 RX ORDER — HYDROXYZINE HYDROCHLORIDE 50 MG/1
100 TABLET, FILM COATED ORAL 3 TIMES DAILY PRN
Qty: 180 TABLET | Refills: 1 | Status: SHIPPED | OUTPATIENT
Start: 2017-05-30 | End: 2019-02-11

## 2017-05-30 RX ADMIN — HYDROXYZINE HYDROCHLORIDE 100 MG: 50 TABLET, FILM COATED ORAL at 19:49

## 2017-05-30 RX ADMIN — GABAPENTIN 600 MG: 600 TABLET, FILM COATED ORAL at 19:49

## 2017-05-30 RX ADMIN — GABAPENTIN 600 MG: 600 TABLET, FILM COATED ORAL at 13:51

## 2017-05-30 RX ADMIN — NICOTINE POLACRILEX 8 MG: 4 LOZENGE ORAL at 10:32

## 2017-05-30 RX ADMIN — MULTIPLE VITAMINS W/ MINERALS TAB 1 TABLET: TAB at 08:02

## 2017-05-30 RX ADMIN — BUSPIRONE HYDROCHLORIDE 30 MG: 15 TABLET ORAL at 19:49

## 2017-05-30 RX ADMIN — NICOTINE POLACRILEX 8 MG: 4 LOZENGE ORAL at 16:34

## 2017-05-30 RX ADMIN — NICOTINE POLACRILEX 8 MG: 4 LOZENGE ORAL at 08:51

## 2017-05-30 RX ADMIN — NICOTINE POLACRILEX 8 MG: 4 LOZENGE ORAL at 18:22

## 2017-05-30 RX ADMIN — NICOTINE POLACRILEX 8 MG: 4 LOZENGE ORAL at 13:51

## 2017-05-30 RX ADMIN — BUSPIRONE HYDROCHLORIDE 30 MG: 15 TABLET ORAL at 08:01

## 2017-05-30 RX ADMIN — GABAPENTIN 600 MG: 600 TABLET, FILM COATED ORAL at 08:02

## 2017-05-30 RX ADMIN — FLUOXETINE 60 MG: 20 CAPSULE ORAL at 08:01

## 2017-05-30 RX ADMIN — NICOTINE POLACRILEX 8 MG: 4 LOZENGE ORAL at 19:49

## 2017-05-30 RX ADMIN — NICOTINE POLACRILEX 8 MG: 4 LOZENGE ORAL at 07:34

## 2017-05-30 RX ADMIN — NICOTINE POLACRILEX 8 MG: 4 LOZENGE ORAL at 12:24

## 2017-05-30 RX ADMIN — OLANZAPINE 5 MG: 5 TABLET, ORALLY DISINTEGRATING ORAL at 13:50

## 2017-05-30 ASSESSMENT — ACTIVITIES OF DAILY LIVING (ADL)
ORAL_HYGIENE: INDEPENDENT
LAUNDRY: WITH SUPERVISION
DRESS: INDEPENDENT
GROOMING: INDEPENDENT
GROOMING: INDEPENDENT
ORAL_HYGIENE: INDEPENDENT

## 2017-05-30 NOTE — PLAN OF CARE
"Problem: Depressive Symptoms  Goal: Depressive Symptoms  Signs and symptoms of listed problems will be absent or manageable.     Patient, prior to discharge, will:  -verbalize an understanding of medication regimen 5/25/17 Completed  -verbalize absence of SI/SIB 5/25/17 Completed    -verbalize decrease in depressive signs/symptoms  -verbalize a decrease in anxiety   -develop a safety plan  -identify a support system   -will participate in coordination of discharge planning    To promote safety/ mental health         48 hour nursing assessment     Pt out in the lounge most of the shift.  Pt is quiet and flat.  Pt reported \"a little bit of depression and anxiety\".  Pt denies SI/SIB, hallucinations, and pain.  Pt med compliant.  Pt requested Hydroxyzine 100 mg PRN with hs meds.  Nicotine lozenges requested PRN.        "

## 2017-05-30 NOTE — PLAN OF CARE
Problem: Depressive Symptoms  Goal: Social and Therapeutic (Depression)  Signs and symptoms of listed problems will be absent or manageable.         Pt. Attended 1 of 3 scheduled OT sessions today. Pt. Actively participated in goal directed task session. Pt worked independently.  Asked directly for needed supplies.  Independently solved problems that arose.  Organized in his efforts.  Briefly came to two other OT groups but only stayed a few minutes before leaving without explanation.

## 2017-05-30 NOTE — PROGRESS NOTES
Pt visible in milieu all shift attending and participating in groups. Flat affect , polite and cooperative when approached for a 1:1 check in. Denies SI/SIB, rated depression at a 4/10, anxiety 3/10 and being  irritable  Basim a 1/10.  Pt reports his thoughts are clear  and is waiting on placement for treatment.       05/30/17 1400   Behavioral Health   Hallucinations denies / not responding to hallucinations   Thinking poor concentration   Orientation person: oriented;place: oriented;date: oriented;time: oriented   Memory baseline memory   Insight poor   Judgement impaired   Eye Contact at examiner   Affect blunted, flat   Mood mood is calm   Physical Appearance/Attire neat   Hygiene well groomed   Suicidality other (see comments)  (Pt denies)   Self Injury other (see comment)  (Pt denies.)   Activity other (see comment)  (Pt visible in milieu attending and participating in groups.)   Speech clear;coherent   Medication Sensitivity no stated side effects;no observed side effects   Psychomotor / Gait balanced;steady   Psycho Education   Type of Intervention 1:1 intervention   Response participates, initiates socially appropriate   Hours 0.5   Treatment Detail (1:1 check in)   Activities of Daily Living   Hygiene/Grooming independent   Oral Hygiene independent   Dress independent   Laundry with supervision   Room Organization independent   Activity   Activity Level of Assistance independent   Behavioral Health Interventions   Depression maintain safety precautions   Social and Therapeutic Interventions (Depression) encourage participation in therapeutic groups and milieu activities

## 2017-05-30 NOTE — PROGRESS NOTES
[facility]   Psychiatric Progress Note      Impression:     Mr. Morgan Bosch is a 32-year-old male admitted to the [facility], [place], [address]. He was admitted as a voluntary patient under ongoing MICD commitment through the Emergency Department on 05/15/2017 following a relapse on dextromethorphan, methamphetamine, alcohol and marijuana.  He had been discharged from station [address] earlier in the day and took a cab to [facility], but then eloped rather than entering treatment.  PD has been revoked.  Buspar, Neurontin and PRN Vistaril were continued.  Prozac was increased.  PRN Zyprexa is available.  Wellbutrin XL was added but discontinued due to agitation.  Mood is somewhat depressed and anxious.  He denies SI.         Diagnoses:     1. Major depressive disorder, moderate, recurrent.   2. Generalized anxiety disorder.   3. Stimulant use disorder, severe.  4. Polysubstance abuse.   5. Antisocial personality traits versus disorder.  6. Tooth pain (right upper central incisor, right lower molar).           Plan:     Medications:  Continue Buspar, Prozac and Neurontin.  Continue Vistaril 50-100mg TID PRN.  Continue PRN Zyprexa.  Ordered discharge meds.      He is under commitment MICD.  His PD was revoked.  Plan to transfer to [facility] when a bed is available.    Attestation:  Patient has been seen and evaluated by me,  VERONIKA Hugo CNP  The patient was counseled on  nature of illness and treatment plan/options  Care was coordinated with  tx team  Total amount of time: 26 minutes  Coordination of care/counselin minutes          Interim History:     The patient's care was discussed with the treatment team and chart notes were reviewed.  Pt was documented as sleeping 7, 7.5 and 7 hours during the weekend overnights.  He has been using PRN Vistaril regularly and used PRN Zyprexa once for severe anxiety.  He has been  "attending some groups.  He characterizes his mood as moderately depressed and anxious.  He denies SI.  He has been sleeping well.  Tooth pain is less problematic.  Reviewed discharge medications.           Medications:     Current Facility-Administered Medications   Medication     nicotine polacrilex lozenge 4-8 mg     hydrOXYzine (ATARAX) tablet  mg     OLANZapine zydis (zyPREXA) ODT tab 5-10 mg    Or     OLANZapine (zyPREXA) injection 5-10 mg     benzocaine (ANBESOL) 20 % liquid     FLUoxetine (PROzac) capsule 60 mg     busPIRone (BUSPAR) tablet 30 mg     gabapentin (NEURONTIN) tablet 600 mg     multivitamin, therapeutic with minerals (THERA-VIT-M) tablet 1 tablet     ibuprofen (ADVIL/MOTRIN) tablet 600 mg     acetaminophen (TYLENOL) tablet 650 mg             Allergies:     Allergies   Allergen Reactions     Adhesive Tape Itching     Blisters (band aids)            Psychiatric Examination:   /79  Pulse 99  Temp 98.5  F (36.9  C)  Resp 16  Ht 1.778 m (5' 10\")  Wt 86.5 kg (190 lb 11.2 oz)  SpO2 93%  BMI 27.36 kg/m2  Weight is 190 lbs 11.2 oz  Body mass index is 27.36 kg/(m^2).    Appearance:  awake, alert, adequate grooming/hygiene  Attitude:  cooperative   Eye Contact:  fair  Mood:   \"okay\"  Affect:  appropriate and in normal range  Speech:  clear, coherent  Psychomotor Behavior:  no evidence of tardive dyskinesia, dystonia, or tics  Thought Process:  linear and goal oriented  Associations:  no loose associations  Thought Content:  no evidence of suicidal ideation or homicidal ideation and no evidence of psychotic thought  Insight:  fair  Judgment:  fair  Oriented to: date, time, person, and place  Attention Span and Concentration:  intact  Recent and Remote Memory:  intact  Language: Able to name objects, Able to repeat phrases and Able to read and write  Fund of Knowledge: appropriate  Muscle Strength and Tone: normal  Gait and Station: Normal         Labs:      Ref. Range 5/15/2017 21:59 " 5/16/2017 08:50   Alcohol Breath Test Latest Ref Range: 0.00 - 0.01  0.053 (A)    Amphetamine Qual Urine Latest Ref Range: NEG   Positive... (A)   Cocaine Qual Urine Latest Ref Range: NEG   Negative...   Opiates Qualitative Urine Latest Ref Range: NEG   Negative...   Cannabinoids Qual Urine Latest Ref Range: NEG   Positive... (A)   Barbiturates Qual Urine Latest Ref Range: NEG   Positive... (A)   Pcp Qual Urine Latest Ref Range: NEG   Negative...   Benzodiazepine Qual Urine Latest Ref Range: NEG   Negative...   Ethanol Qual Urine Latest Ref Range: NEG   Negative...

## 2017-05-30 NOTE — PROGRESS NOTES
Spoke with Lila in [place] Pre-admit, [phone]. Will need routine standing orders and MAR, 30 days plus refill.     Fax: [phone]    Working on funding verification. Once the funding is verified and they have the orders and MAR they can set up transport.     Received the standing order form, filled out by provider. Faxed that and the signed MAR back to Lila in [place] pre-admit.     [facility] called back, spoke with Lila regarding any other paperwork. Also spoke with Isabela who works with [facility] more closely. She indicated that the refills should be sent to the pharmacy by [facility]. States that the pharmacy will not fill the prescriptions until requested to do so by [facility]. Provider e-filed the prescriptions.     [facility] [address] in [place], [place] Phone: [phone]  Fax: [phone]

## 2017-05-31 PROCEDURE — 99207 ZZC CDG-MDM COMPONENT: MEETS MODERATE - UP CODED: CPT | Performed by: NURSE PRACTITIONER

## 2017-05-31 PROCEDURE — 25000132 ZZH RX MED GY IP 250 OP 250 PS 637: Performed by: PSYCHIATRY & NEUROLOGY

## 2017-05-31 PROCEDURE — 25000132 ZZH RX MED GY IP 250 OP 250 PS 637: Performed by: NURSE PRACTITIONER

## 2017-05-31 PROCEDURE — 99232 SBSQ HOSP IP/OBS MODERATE 35: CPT | Performed by: NURSE PRACTITIONER

## 2017-05-31 PROCEDURE — 25000132 ZZH RX MED GY IP 250 OP 250 PS 637: Performed by: EMERGENCY MEDICINE

## 2017-05-31 PROCEDURE — 12400001 ZZH R&B MH UMMC

## 2017-05-31 RX ADMIN — NICOTINE POLACRILEX 8 MG: 4 LOZENGE ORAL at 12:00

## 2017-05-31 RX ADMIN — BUSPIRONE HYDROCHLORIDE 30 MG: 15 TABLET ORAL at 07:42

## 2017-05-31 RX ADMIN — NICOTINE POLACRILEX 8 MG: 4 LOZENGE ORAL at 17:42

## 2017-05-31 RX ADMIN — NICOTINE POLACRILEX 8 MG: 4 LOZENGE ORAL at 18:57

## 2017-05-31 RX ADMIN — MULTIPLE VITAMINS W/ MINERALS TAB 1 TABLET: TAB at 07:42

## 2017-05-31 RX ADMIN — GABAPENTIN 600 MG: 600 TABLET, FILM COATED ORAL at 07:42

## 2017-05-31 RX ADMIN — NICOTINE POLACRILEX 8 MG: 4 LOZENGE ORAL at 07:38

## 2017-05-31 RX ADMIN — NICOTINE POLACRILEX 8 MG: 4 LOZENGE ORAL at 09:47

## 2017-05-31 RX ADMIN — NICOTINE POLACRILEX 8 MG: 4 LOZENGE ORAL at 13:57

## 2017-05-31 RX ADMIN — HYDROXYZINE HYDROCHLORIDE 100 MG: 50 TABLET, FILM COATED ORAL at 13:56

## 2017-05-31 RX ADMIN — NICOTINE POLACRILEX 8 MG: 4 LOZENGE ORAL at 20:10

## 2017-05-31 RX ADMIN — FLUOXETINE 60 MG: 20 CAPSULE ORAL at 07:42

## 2017-05-31 RX ADMIN — BUSPIRONE HYDROCHLORIDE 30 MG: 15 TABLET ORAL at 20:10

## 2017-05-31 RX ADMIN — GABAPENTIN 600 MG: 600 TABLET, FILM COATED ORAL at 20:11

## 2017-05-31 RX ADMIN — NICOTINE POLACRILEX 8 MG: 4 LOZENGE ORAL at 08:42

## 2017-05-31 RX ADMIN — HYDROXYZINE HYDROCHLORIDE 100 MG: 50 TABLET, FILM COATED ORAL at 20:10

## 2017-05-31 RX ADMIN — GABAPENTIN 600 MG: 600 TABLET, FILM COATED ORAL at 13:56

## 2017-05-31 ASSESSMENT — ACTIVITIES OF DAILY LIVING (ADL)
ORAL_HYGIENE: INDEPENDENT
ORAL_HYGIENE: INDEPENDENT
GROOMING: INDEPENDENT
LAUNDRY: WITH SUPERVISION
DRESS: INDEPENDENT
GROOMING: HANDWASHING;SHOWER;INDEPENDENT
LAUNDRY: WITH SUPERVISION
DRESS: SCRUBS (BEHAVIORAL HEALTH);INDEPENDENT

## 2017-05-31 NOTE — PROGRESS NOTES
Voice mail from Isabela in [place] pre-admit. She has sent everything to [facility]. States that everything is ready to go from this facility. Now waiting for the  to come through with funding verification.

## 2017-05-31 NOTE — PROGRESS NOTES
[facility]   Psychiatric Progress Note      Impression:     Mr. Morgan Bosch is a 32-year-old male admitted to the [facility], [place], [address]. He was admitted as a voluntary patient under ongoing MICD commitment through the Emergency Department on 05/15/2017 following a relapse on dextromethorphan, methamphetamine, alcohol and marijuana.  He had been discharged from station [address] earlier in the day and took a cab to [facility], but then eloped rather than entering treatment.  PD has been revoked.  Buspar, Neurontin and PRN Vistaril were continued.  Prozac was increased.  PRN Zyprexa is available.  Wellbutrin XL was added but discontinued due to agitation.  Mood is somewhat depressed and anxious.  He denies SI.         Diagnoses:     1. Major depressive disorder, moderate, recurrent.   2. Generalized anxiety disorder.   3. Stimulant use disorder, severe.  4. Polysubstance abuse.   5. Antisocial personality traits versus disorder.  6. Tooth pain (right upper central incisor, right lower molar).           Plan:     Medications:  Continue Buspar, Prozac and Neurontin.  Continue Vistaril 100mg TID PRN.  Continue PRN Zyprexa.  Discharge meds have been ordered and are on the unit.      He is under commitment MICD.  His PD was revoked.  Plan to transfer to [facility] on Friday 6/2.    Transfer care to Dr. Malloy      Attestation:  Patient has been seen and evaluated by me,  Ni Harrison, APRN CNP  The patient was counseled on  nature of illness and treatment plan/options  Care was coordinated with  tx team  Total amount of time: 20 minutes  Coordination of care/counseling:  minutes          Interim History:     The patient's care was discussed with the treatment team and chart notes were reviewed. Pt was documented as sleeping 7 hours during the overnight.  He has been attending some groups.  He has been spending time in the  "milieu.  He took PRN Vistaril x 1 and PRN Zyprexa x 1 yesterday.  Mood is moderately anxious and depressed.  He denies SI.  Tolerating medications well.  He is aware of the plan to discharge to [facility], likely later this week.           Medications:     Current Facility-Administered Medications   Medication     nicotine polacrilex lozenge 4-8 mg     hydrOXYzine (ATARAX) tablet  mg     OLANZapine zydis (zyPREXA) ODT tab 5-10 mg    Or     OLANZapine (zyPREXA) injection 5-10 mg     benzocaine (ANBESOL) 20 % liquid     FLUoxetine (PROzac) capsule 60 mg     busPIRone (BUSPAR) tablet 30 mg     gabapentin (NEURONTIN) tablet 600 mg     multivitamin, therapeutic with minerals (THERA-VIT-M) tablet 1 tablet     ibuprofen (ADVIL/MOTRIN) tablet 600 mg     acetaminophen (TYLENOL) tablet 650 mg             Allergies:     Allergies   Allergen Reactions     Adhesive Tape Itching     Blisters (band aids)            Psychiatric Examination:   /79  Pulse 99  Temp 98.5  F (36.9  C)  Resp 16  Ht 1.778 m (5' 10\")  Wt 86.5 kg (190 lb 11.2 oz)  SpO2 93%  BMI 27.36 kg/m2  Weight is 190 lbs 11.2 oz  Body mass index is 27.36 kg/(m^2).    Appearance:  awake, alert, adequate grooming/hygiene  Attitude:  cooperative   Eye Contact:  fair  Mood:   \"okay\"  Affect:  appropriate and in normal range  Speech:  clear, coherent  Psychomotor Behavior:  no evidence of tardive dyskinesia, dystonia, or tics  Thought Process:  linear and goal oriented  Associations:  no loose associations  Thought Content:  no evidence of suicidal ideation or homicidal ideation and no evidence of psychotic thought  Insight:  fair  Judgment:  fair  Oriented to: date, time, person, and place  Attention Span and Concentration:  intact  Recent and Remote Memory:  intact  Language: Able to name objects, Able to repeat phrases and Able to read and write  Fund of Knowledge: appropriate  Muscle Strength and Tone: normal  Gait and Station: Normal         Labs: "      Ref. Range 5/15/2017 21:59 5/16/2017 08:50   Alcohol Breath Test Latest Ref Range: 0.00 - 0.01  0.053 (A)    Amphetamine Qual Urine Latest Ref Range: NEG   Positive... (A)   Cocaine Qual Urine Latest Ref Range: NEG   Negative...   Opiates Qualitative Urine Latest Ref Range: NEG   Negative...   Cannabinoids Qual Urine Latest Ref Range: NEG   Positive... (A)   Barbiturates Qual Urine Latest Ref Range: NEG   Positive... (A)   Pcp Qual Urine Latest Ref Range: NEG   Negative...   Benzodiazepine Qual Urine Latest Ref Range: NEG   Negative...   Ethanol Qual Urine Latest Ref Range: NEG   Negative...

## 2017-05-31 NOTE — PLAN OF CARE
"Problem: Depressive Symptoms  Goal: Depressive Symptoms  Signs and symptoms of listed problems will be absent or manageable.     Patient, prior to discharge, will:  -verbalize an understanding of medication regimen 5/25/17 Completed  -verbalize absence of SI/SIB 5/25/17 Completed  -verbalize decrease in depressive signs/symptoms 5/30/17   -verbalize a decrease in anxiety 5/30/17   -develop a safety plan 5/30/17   -identify a support system 5/30/17   -will participate in coordination of discharge planning 5/30/17     To promote safety/ mental health   Outcome: Adequate for Discharge Date Met:  05/31/17  RN48/      Patient is hopeful stating \"I feel better and hope to discharge soon\" and stated GOAL is \"[place].\"     Patient rates depression 4/10* c/o \"worried\"  Patient rates anxiety at 5/10* c/o Trouble relaxing  Patient describes mood as \"depressed and anxious.\"     Patient is cooperative appearing Reactive/Full range and anxious . Patient is med-compliant, is eating 100% and reports \"sleeps through the night\". Patient is attending groups.     Patient denies current or recent suicidal ideation    SIB denies    HI: denies current or recent homicidal ideation or behaviors.     VS reviewed: /79  Pulse 99  Temp 98.5  F (36.9  C)  Resp 16  Ht 1.778 m (5' 10\")  Wt 86.5 kg (190 lb 11.2 oz)  SpO2 93%  BMI 27.36 kg/m2 . Patient denies  pain.     Patient evaluation continues. Assessed mood,anxiety,thoughts and behavior. Patient is progressing towards goals. Patient is encouraged to participate in groups and assisted to develop healthy coping skills.      Length of stay: 15     Refer to daily team meeting notes for individualized plan of care. Nursing will continue to assess.     * Scale is offered as scale of 1 to 10 with 10 being the worst.                                                                                                                    "

## 2017-05-31 NOTE — PROGRESS NOTES
Call from [facility] in [place] [facility]. Deputies will transport pt on Friday.     [facility] would like to get a heads up once pt leaves on Friday, [phone]

## 2017-06-01 PROCEDURE — 99232 SBSQ HOSP IP/OBS MODERATE 35: CPT | Performed by: PSYCHIATRY & NEUROLOGY

## 2017-06-01 PROCEDURE — 25000132 ZZH RX MED GY IP 250 OP 250 PS 637: Performed by: NURSE PRACTITIONER

## 2017-06-01 PROCEDURE — 25000132 ZZH RX MED GY IP 250 OP 250 PS 637: Performed by: EMERGENCY MEDICINE

## 2017-06-01 PROCEDURE — 25000132 ZZH RX MED GY IP 250 OP 250 PS 637: Performed by: PSYCHIATRY & NEUROLOGY

## 2017-06-01 PROCEDURE — 12400001 ZZH R&B MH UMMC

## 2017-06-01 RX ADMIN — BUSPIRONE HYDROCHLORIDE 30 MG: 15 TABLET ORAL at 20:14

## 2017-06-01 RX ADMIN — BUSPIRONE HYDROCHLORIDE 30 MG: 15 TABLET ORAL at 07:42

## 2017-06-01 RX ADMIN — GABAPENTIN 600 MG: 600 TABLET, FILM COATED ORAL at 20:14

## 2017-06-01 RX ADMIN — NICOTINE POLACRILEX 8 MG: 4 LOZENGE ORAL at 19:12

## 2017-06-01 RX ADMIN — NICOTINE POLACRILEX 8 MG: 4 LOZENGE ORAL at 15:27

## 2017-06-01 RX ADMIN — MULTIPLE VITAMINS W/ MINERALS TAB 1 TABLET: TAB at 07:43

## 2017-06-01 RX ADMIN — GABAPENTIN 600 MG: 600 TABLET, FILM COATED ORAL at 07:43

## 2017-06-01 RX ADMIN — GABAPENTIN 600 MG: 600 TABLET, FILM COATED ORAL at 14:08

## 2017-06-01 RX ADMIN — NICOTINE POLACRILEX 8 MG: 4 LOZENGE ORAL at 12:49

## 2017-06-01 RX ADMIN — NICOTINE POLACRILEX 8 MG: 4 LOZENGE ORAL at 07:43

## 2017-06-01 RX ADMIN — NICOTINE POLACRILEX 8 MG: 4 LOZENGE ORAL at 11:27

## 2017-06-01 RX ADMIN — NICOTINE POLACRILEX 8 MG: 4 LOZENGE ORAL at 14:09

## 2017-06-01 RX ADMIN — NICOTINE POLACRILEX 8 MG: 4 LOZENGE ORAL at 08:47

## 2017-06-01 RX ADMIN — NICOTINE POLACRILEX 8 MG: 4 LOZENGE ORAL at 18:12

## 2017-06-01 RX ADMIN — NICOTINE POLACRILEX 8 MG: 4 LOZENGE ORAL at 17:02

## 2017-06-01 RX ADMIN — HYDROXYZINE HYDROCHLORIDE 50 MG: 50 TABLET, FILM COATED ORAL at 20:15

## 2017-06-01 RX ADMIN — FLUOXETINE 60 MG: 20 CAPSULE ORAL at 07:42

## 2017-06-01 RX ADMIN — HYDROXYZINE HYDROCHLORIDE 100 MG: 50 TABLET, FILM COATED ORAL at 15:27

## 2017-06-01 ASSESSMENT — ACTIVITIES OF DAILY LIVING (ADL)
DRESS: SCRUBS (BEHAVIORAL HEALTH);INDEPENDENT
DRESS: SCRUBS (BEHAVIORAL HEALTH)
ORAL_HYGIENE: INDEPENDENT
LAUNDRY: WITH SUPERVISION
GROOMING: HANDWASHING;SHOWER;INDEPENDENT
GROOMING: INDEPENDENT;PROMPTS
LAUNDRY: WITH SUPERVISION
ORAL_HYGIENE: INDEPENDENT

## 2017-06-01 NOTE — PROGRESS NOTES
06/01/17 1200   General Information   Date Initially Attended OT 05/30/17   Special Considerations Only seen by coverage OT 5/30. Unable to do an assessment.   General Observation/Plan   General Observations/Plan See Comments     Did not attend with writer. Is being discharged today. Unable to assess further.

## 2017-06-01 NOTE — PROGRESS NOTES
"Pt isolative to room all day. Reports he feels \"just fine\" . When asked if he was looking forward to discharge   tomorrow he said ,\" I don't really have an opinion either way\" Pt declined groups & was out only for meals & medications. He did not fel like sharing much.      06/01/17 1400   Behavioral Health   Hallucinations denies / not responding to hallucinations   Thinking poor concentration   Orientation person: oriented;place: oriented;date: oriented   Memory baseline memory   Judgement impaired   Eye Contact at examiner   Mood depressed;anxious   Physical Appearance/Attire attire appropriate to age and situation   Hygiene well groomed   Suicidality other (see comments)  (denies)   Self Injury other (see comment)  (denies)   Activity isolative;withdrawn   Speech clear;coherent   Medication Sensitivity no stated side effects;no observed side effects   Psychomotor / Gait balanced;steady   Coping/Psychosocial   Verbalized Emotional State acceptance   Plan Of Care Reviewed With patient   Psycho Education   Type of Intervention 1:1 intervention   Response participates with cues/redirection   Hours 0.5   Treatment Detail check in   Activities of Daily Living   Hygiene/Grooming independent;prompts   Oral Hygiene independent   Dress scrubs (behavioral health)   Laundry with supervision   Room Organization independent   Behavioral Health Interventions   Depression maintain safety precautions;maintain safe secure environment   Social and Therapeutic Interventions (Depression) encourage participation in therapeutic groups and milieu activities     "

## 2017-06-01 NOTE — PROGRESS NOTES
[facility]   Psychiatric Progress Note      Impression:     Mr. Morgan Bosch is a 32-year-old male admitted to the [facility], [place], [address]. He was admitted as a voluntary patient under ongoing MICD commitment through the Emergency Department on 05/15/2017 following a relapse on dextromethorphan, methamphetamine, alcohol and marijuana.  He had been discharged from [address] earlier in the day and took a cab to [facility], but then eloped rather than entering treatment.  PD has been revoked.  Buspar, Neurontin and PRN Vistaril were continued.  Prozac was increased.  PRN Zyprexa is available.  Wellbutrin XL was added but discontinued due to agitation.  Mood is somewhat depressed and anxious.  He denies SI.         Diagnoses:     1. Major depressive disorder, moderate, recurrent.   2. Generalized anxiety disorder.   3. Stimulant use disorder, severe.  4. Polysubstance abuse.   5. Antisocial personality traits versus disorder.  6. Tooth pain (right upper central incisor, right lower molar).           Plan:     Medications:  Continue Buspar, Prozac and Neurontin.  Continue Vistaril 100mg TID PRN.  Continue PRN Zyprexa.  Discharge meds have been ordered and are on the unit.      He is under commitment MICD.  His PD was revoked.  Plan to transfer to [facility] on Friday 6/2.    Attestation:  Patient has been seen and evaluated by me,  Quoc Malloy MD  The patient was counseled on  nature of illness and treatment plan/options  Care was coordinated with  tx team          Interim History:     The patient's care was discussed with the treatment team and chart notes were reviewed. Sleeping and eating well. He denies SI or HI.  Tolerating medications well.  He is aware of the plan to discharge to [facility] tomorrow.         Medications:     Current Facility-Administered Medications   Medication     nicotine polacrilex  "lozenge 4-8 mg     hydrOXYzine (ATARAX) tablet  mg     OLANZapine zydis (zyPREXA) ODT tab 5-10 mg    Or     OLANZapine (zyPREXA) injection 5-10 mg     benzocaine (ANBESOL) 20 % liquid     FLUoxetine (PROzac) capsule 60 mg     busPIRone (BUSPAR) tablet 30 mg     gabapentin (NEURONTIN) tablet 600 mg     multivitamin, therapeutic with minerals (THERA-VIT-M) tablet 1 tablet     ibuprofen (ADVIL/MOTRIN) tablet 600 mg     acetaminophen (TYLENOL) tablet 650 mg             Allergies:     Allergies   Allergen Reactions     Adhesive Tape Itching     Blisters (band aids)            Psychiatric Examination:   /70  Pulse 73  Temp 98.3  F (36.8  C) (Tympanic)  Resp 16  Ht 1.778 m (5' 10\")  Wt 86.5 kg (190 lb 11.2 oz)  SpO2 93%  BMI 27.36 kg/m2  Weight is 190 lbs 11.2 oz  Body mass index is 27.36 kg/(m^2).    Appearance:  awake, alert, adequate grooming/hygiene  Attitude:  cooperative   Eye Contact:  fair  Mood:   \"okay\"  Affect:  appropriate and in normal range  Speech:  clear, coherent  Psychomotor Behavior:  no evidence of tardive dyskinesia, dystonia, or tics  Thought Process:  linear and goal oriented  Associations:  no loose associations  Thought Content:  no evidence of suicidal ideation or homicidal ideation and no evidence of psychotic thought  Insight:  fair  Judgment:  fair  Oriented to: date, time, person, and place  Attention Span and Concentration:  intact  Recent and Remote Memory:  intact  Language: Able to name objects, Able to repeat phrases and Able to read and write  Fund of Knowledge: appropriate  Muscle Strength and Tone: normal  Gait and Station: Normal         Labs:      Ref. Range 5/15/2017 21:59 5/16/2017 08:50   Alcohol Breath Test Latest Ref Range: 0.00 - 0.01  0.053 (A)    Amphetamine Qual Urine Latest Ref Range: NEG   Positive... (A)   Cocaine Qual Urine Latest Ref Range: NEG   Negative...   Opiates Qualitative Urine Latest Ref Range: NEG   Negative...   Cannabinoids Qual Urine " Latest Ref Range: NEG   Positive... (A)   Barbiturates Qual Urine Latest Ref Range: NEG   Positive... (A)   Pcp Qual Urine Latest Ref Range: NEG   Negative...   Benzodiazepine Qual Urine Latest Ref Range: NEG   Negative...   Ethanol Qual Urine Latest Ref Range: NEG   Negative...

## 2017-06-02 VITALS
OXYGEN SATURATION: 93 % | WEIGHT: 190.7 LBS | HEIGHT: 70 IN | SYSTOLIC BLOOD PRESSURE: 104 MMHG | DIASTOLIC BLOOD PRESSURE: 70 MMHG | BODY MASS INDEX: 27.3 KG/M2 | RESPIRATION RATE: 16 BRPM | TEMPERATURE: 97.2 F | HEART RATE: 73 BPM

## 2017-06-02 PROCEDURE — 25000132 ZZH RX MED GY IP 250 OP 250 PS 637: Performed by: PSYCHIATRY & NEUROLOGY

## 2017-06-02 PROCEDURE — 25000132 ZZH RX MED GY IP 250 OP 250 PS 637: Performed by: EMERGENCY MEDICINE

## 2017-06-02 PROCEDURE — 99238 HOSP IP/OBS DSCHRG MGMT 30/<: CPT | Performed by: PSYCHIATRY & NEUROLOGY

## 2017-06-02 RX ADMIN — NICOTINE POLACRILEX 4 MG: 4 LOZENGE ORAL at 09:25

## 2017-06-02 RX ADMIN — MULTIPLE VITAMINS W/ MINERALS TAB 1 TABLET: TAB at 08:00

## 2017-06-02 RX ADMIN — FLUOXETINE 60 MG: 20 CAPSULE ORAL at 08:00

## 2017-06-02 RX ADMIN — GABAPENTIN 600 MG: 600 TABLET, FILM COATED ORAL at 08:00

## 2017-06-02 RX ADMIN — BUSPIRONE HYDROCHLORIDE 30 MG: 15 TABLET ORAL at 08:00

## 2017-06-02 RX ADMIN — NICOTINE POLACRILEX 8 MG: 4 LOZENGE ORAL at 07:49

## 2017-06-02 ASSESSMENT — ACTIVITIES OF DAILY LIVING (ADL)
LAUNDRY: WITH SUPERVISION
GROOMING: INDEPENDENT
ORAL_HYGIENE: INDEPENDENT
DRESS: INDEPENDENT

## 2017-06-02 NOTE — DISCHARGE SUMMARY
"Psychiatric Discharge Summary    Morgan Bosch MRN# [id number]   Age: 32 year old YOB: 1985     Date of Admission:  5/15/2017  Date of Discharge:  6/2/2017 11:59 AM  Admitting Physician:  Marta Harrison NP  Discharge Physician:  Quoc Malloy MD          Event Leading to Hospitalization:   HISTORY OF PRESENT ILLNESS:  Mr. Bosch provides information for this assessment.  He is a reliable historian.  Intake data, records from the ER and records from previous hospitalizations were reviewed.       Mr. Bosch has previous diagnoses of major depressive disorder, generalized anxiety disorder, stimulant use disorder and polysubstance abuse.  He was most recently hospitalized on [place] [address] [place] [address] from 04/25/17 through 05/15/2017.  He had completed treatment at [facility] a few months prior and was residing at his mother's home.  She kicked him out due to his drug relapse.  He attempted to commit suicide by carbon monoxide poisoning.  He placed a hose from the exhaust into his van's window.  He changed his mind and aborted the suicide attempt.  Prior to his hospitalization on [place] [address] [place] [address], he had been using Xanax 2 mg per day.  He had also been snorting and using IV methamphetamine.  He had been consuming a 6 pack of alcohol daily and had been sporadically abusing dextromethorphan.  During his hospitalization, his mood stabilized.  He was committed MICD.   He was transported to [facility] via taxi following his provisional discharge.  He states that he had been thinking about eloping prior to this and once the taxi arrived at [facility] he eloped without entering the facility.  He used dextromethorphan, methamphetamine, alcohol and marijuana.  He said that he had a \"peaceful good day.\"  He was singing in the streets.  He estimates that he walked 12 miles.  He then walked to the Emergency Department because \"I'm on commitment and I didn't know what else to do.\"  The patient reports that he feels " hopeless about his ability to maintain sobriety.  He does not want to participate in chemical dependency treatment and states that if he is forced to do so through commitment he will not go to groups and he will spend his time writing.       He reports that his mood is mildly depressed.  He denies suicidal thoughts.  Sleep and appetite have been normal.  Energy has been fair.  He has some social anxiety.  He denies feeling irritable.  He denies symptoms consistent with psychosis or marilyn.  He denies homicidal ideation.        See Admission note for additional details.          DIagnoses:     1. Major depressive disorder, moderate, recurrent.   2. Generalized anxiety disorder.   3. Stimulant use disorder, severe.  4. Polysubstance abuse.   5. Antisocial personality traits versus disorder.  6. Tooth pain (right upper central incisor, right lower molar).           Labs:          Lab Results   Component Value Date     04/26/2017    Lab Results   Component Value Date    CHLORIDE 110 04/26/2017    Lab Results   Component Value Date    BUN 10 04/26/2017      Lab Results   Component Value Date    POTASSIUM 4.5 04/26/2017    Lab Results   Component Value Date    CO2 29 04/26/2017    Lab Results   Component Value Date    CR 0.88 04/26/2017        Lab Results   Component Value Date    WBC 6.0 04/26/2017    HGB 13.2 (L) 04/26/2017    HCT 39.0 (L) 04/26/2017    MCV 92 04/26/2017     04/26/2017     Lab Results   Component Value Date    AST 18 04/26/2017    ALT 22 04/26/2017    GGT 15 03/08/2011    ALKPHOS 84 04/26/2017    BILITOTAL 1.0 04/26/2017    BILICONJ 0.0 02/25/2011     Lab Results   Component Value Date    TSH 0.18 (L) 04/26/2017            Consults:   No consultations were requested during this admission         Hospital Course:   Morgan Bosch was admitted to Station [address] with attending Marta Harrison NP and transferred to Quoc Malloy MD under an ongoing civil commitment. The patient was placed  under status 15 (15 minute checks) to ensure patient safety.   CBC, BMP and utox obtained.    He was admitted as a voluntary patient under ongoing MICD commitment through the Emergency Department on 05/15/2017 following a relapse on dextromethorphan, methamphetamine, alcohol and marijuana.  He had been discharged from [address] earlier in the day and took a cab to [facility], but then eloped rather than entering treatment.  PD has been revoked.  Buspar, Neurontin and PRN Vistaril were continued.  Prozac was increased.  PRN Zyprexa is available.  Wellbutrin XL was added but discontinued due to agitation    Morgan Bosch did participate in groups and was visible in the milieu.     The patient's symptoms of depression improved.     Morgan Bosch was released to  treatment. At the time of discharge Morgan Bosch was determined to not be a danger to himself or others. At the current time of discharge, the patient does not meet criteria for involuntary hospitalization. On the day of discharge, the patient reports that they do not have suicidal or homicidal ideation and would never hurt themselves or others. Steps taken to minimize risk include: assessing patient s behavior and thought process daily during hospital stay, discharging patient with adequate plan for follow up for mental and physical health and discussing safety plan of returning to the hospital should the patient ever have thoughts of harming themselves or others. Therefore, based on all available evidence including the factors cited above, the patient does not appear to be at imminent risk for self-harm, and is appropriate for outpatient level of care.            Discharge Medications:     Current Discharge Medication List      START taking these medications    Details   FLUoxetine (PROZAC) 20 MG capsule Take 3 capsules (60 mg) by mouth daily  Qty: 90 capsule, Refills: 1      OLANZapine (ZYPREXA) 10 MG tablet Take 1 tablet (10 mg) by mouth  daily as needed for agitation or severe anxiety  Qty: 10 tablet, Refills: 1      benzocaine (ANBESOL) 20 % LIQD liquid Take 1 mL by mouth every 3 hours as needed for moderate pain  Qty: 30 mL, Refills: 1         CONTINUE these medications which have CHANGED    Details   BusPIRone HCl 30 MG TABS Take 30 mg by mouth 2 times daily  Qty: 60 tablet, Refills: 1      multivitamin, therapeutic with minerals (THERA-VIT-M) TABS tablet Take 1 tablet by mouth daily  Qty: 30 each, Refills: 1      gabapentin (NEURONTIN) 600 MG tablet Take 1 tablet (600 mg) by mouth 3 times daily  Qty: 90 tablet, Refills: 1      hydrOXYzine (ATARAX) 50 MG tablet Take 2 tablets (100 mg) by mouth 3 times daily as needed for anxiety  Qty: 180 tablet, Refills: 1      nicotine polacrilex 4 MG lozenge Place 2 lozenges (8 mg) inside cheek every hour as needed for other (nicotine withdrawal symptoms)  Qty: 168 tablet, Refills: 1         STOP taking these medications       FLUoxetine 20 MG tablet Comments:   Reason for Stopping:                    Psychiatric Examination:   Appearance:  awake, alert and adequately groomed  Attitude:  cooperative  Eye Contact:  good  Mood:  good  Affect:  mood congruent  Speech:  clear, coherent  Psychomotor Behavior:  no evidence of tardive dyskinesia, dystonia, or tics  Thought Process:  linear  Associations:  no loose associations  Thought Content:  no evidence of suicidal ideation or homicidal ideation and no evidence of psychotic thought  Insight:  fair  Judgment:  fair  Oriented to:  time, person, and place  Attention Span and Concentration:  intact  Recent and Remote Memory:  intact  Language: Able to read and write  Fund of Knowledge: appropriate  Muscle Strength and Tone: normal  Gait and Station: Normal         Discharge Plan:   Continue medications as above.     Major Treatments, Procedures and Findings: You met with Marta Harrison NP for psychiatric assessment and medication management. Direct care was provided  by unit nurses and staff. You met with case management. You had opportunities to participate in therapeutic groups on the unit.      Symptoms to Report: feeling more aggressive, increased confusion, losing more sleep, mood getting worse, thoughts of suicide or use of drugs or alcohol     Lifestyle Adjustment: Take medications only as prescribed. Do not use alcohol or illicit drugs. Attend all scheduled appointments with your outpatient providers. Call at least 24 hours in advance if you need to reschedule an appointment to ensure continued access to your outpatient providers. Contact the appropriate professional or hotline listed below as needed for support if your symptoms continue, or call 9-1-1 in an emergency.         Psychiatry Follow-up:   You are being discharged to [facility]     Resources:   Crisis Intervention: 647.991.9749 or 485-576-7318 (TTY: 964.523.1869).  Call anytime for help.  National [place] on Mental Illness (www.mn.mark.org): 210.889.2670 or 050-762-4097.  Alcoholics Anonymous (www.alcoholics-anonymous.org): Check your phone book for your local chapter.  Suicide Awareness Voices of Education (SAVE) (www.save.org): 733-499-CLDP (6883)  National Suicide Prevention Line (www.mentalhealthmn.org): 780-883-XYPC (4826)  Mental Health Consumer/Survivor Network of [place] (www.mhcsn.net): 759.913.6317 or 602-153-6314  Mental Health Association of [place] (www.mentalhealth.org): 697.235.8215 or 615-816-3030    Attestation:  The patient was seen and evaluated by me. I spent less than 30 minutes on discharge day activities. Quoc Malloy MD

## 2017-06-02 NOTE — PROGRESS NOTES
Patient discharging 6/2/2017 accompanied by Meliton Jacob and destination is [place].    Discharge paperwork and medications reviewed with patient who verbalizes understanding.     Copies provided: AVS X      Med Rec X MedsX  Security X   Locker X     DISCHARGE FLOW SHEET: X    CARE PLAN COMPLETE: X    EDUCATION COMPLETE: X    Illness Management Recovery model: Personal Plan of Care    Patient completed Personal Plan of Care, identifying reasons for hospitalization and goals for discharge.     Survey provided.

## 2017-06-02 NOTE — PROGRESS NOTES
[facility] called [facility], [phone], to ask if they know when transport will occur. They do have confirmation from [facility] that transport will occur. Do not have a time.     [facility] called the [facility] office [phone], spoke with Sgt Downs. He will check with the transport officers.     Call back from Jennifer at Transport. She is investigating. Call from [place]. Will send this to one of his deputies and have her call CTC with [place]. Call again from Transport; will be here within the hour.

## 2017-08-04 ENCOUNTER — OFFICE VISIT (OUTPATIENT)
Dept: FAMILY MEDICINE | Facility: CLINIC | Age: 32
End: 2017-08-04
Payer: MEDICAID

## 2017-08-04 VITALS
TEMPERATURE: 98.7 F | RESPIRATION RATE: 16 BRPM | HEART RATE: 120 BPM | OXYGEN SATURATION: 95 % | SYSTOLIC BLOOD PRESSURE: 110 MMHG | DIASTOLIC BLOOD PRESSURE: 70 MMHG | HEIGHT: 70 IN | WEIGHT: 196 LBS | BODY MASS INDEX: 28.06 KG/M2

## 2017-08-04 DIAGNOSIS — F33.1 MAJOR DEPRESSIVE DISORDER, RECURRENT EPISODE, MODERATE (H): ICD-10-CM

## 2017-08-04 DIAGNOSIS — F19.20 CHEMICAL DEPENDENCY (H): Primary | ICD-10-CM

## 2017-08-04 PROCEDURE — 99213 OFFICE O/P EST LOW 20 MIN: CPT | Performed by: PHYSICIAN ASSISTANT

## 2017-08-04 ASSESSMENT — ANXIETY QUESTIONNAIRES
5. BEING SO RESTLESS THAT IT IS HARD TO SIT STILL: NOT AT ALL
6. BECOMING EASILY ANNOYED OR IRRITABLE: NOT AT ALL
GAD7 TOTAL SCORE: 5
IF YOU CHECKED OFF ANY PROBLEMS ON THIS QUESTIONNAIRE, HOW DIFFICULT HAVE THESE PROBLEMS MADE IT FOR YOU TO DO YOUR WORK, TAKE CARE OF THINGS AT HOME, OR GET ALONG WITH OTHER PEOPLE: SOMEWHAT DIFFICULT
1. FEELING NERVOUS, ANXIOUS, OR ON EDGE: MORE THAN HALF THE DAYS
3. WORRYING TOO MUCH ABOUT DIFFERENT THINGS: SEVERAL DAYS
2. NOT BEING ABLE TO STOP OR CONTROL WORRYING: SEVERAL DAYS
7. FEELING AFRAID AS IF SOMETHING AWFUL MIGHT HAPPEN: SEVERAL DAYS

## 2017-08-04 ASSESSMENT — PATIENT HEALTH QUESTIONNAIRE - PHQ9
SUM OF ALL RESPONSES TO PHQ QUESTIONS 1-9: 3
5. POOR APPETITE OR OVEREATING: NOT AT ALL

## 2017-08-04 NOTE — NURSING NOTE
"Chief Complaint   Patient presents with     Consult     initial /70 (BP Location: Right arm, Cuff Size: Adult Regular)  Pulse 120  Temp 98.7  F (37.1  C) (Oral)  Resp 16  Ht 5' 10\" (1.778 m)  Wt 196 lb (88.9 kg)  SpO2 95%  BMI 28.12 kg/m2 Estimated body mass index is 28.12 kg/(m^2) as calculated from the following:    Height as of this encounter: 5' 10\" (1.778 m).    Weight as of this encounter: 196 lb (88.9 kg).  BP completed using cuff size: regular.   R arm      Health Maintenance that is potentially due pending provider review:  NONE    n/a    Cesar Montes ma  "

## 2017-08-04 NOTE — MR AVS SNAPSHOT
"              After Visit Summary   2017    Morgan Bosch    MRN: [id number]           Patient Information     Date Of Birth          1985        Visit Information        Provider Department      2017 2:00 PM Nnamdi Tolbert PA-C [facility] [place] [facility]        Today's Diagnoses     Chemical dependency (H)    -  1       Follow-ups after your visit        Who to contact     If you have questions or need follow up information about today's clinic visit or your schedule please contact [facility] directly at [phone].  Normal or non-critical lab and imaging results will be communicated to you by [employer], letter or phone within 4 business days after the clinic has received the results. If you do not hear from us within 7 days, please contact the clinic through [employer] or phone. If you have a critical or abnormal lab result, we will notify you by phone as soon as possible.  Submit refill requests through [facility] or call your pharmacy and they will forward the refill request to us. Please allow 3 business days for your refill to be completed.          Additional Information About Your Visit        MyChart Information     [facility] lets you send messages to your doctor, view your test results, renew your prescriptions, schedule appointments and more. To sign up, go to [email or web address] . Click on \"Log in\" on the left side of the screen, which will take you to the Welcome page. Then click on \"Sign up Now\" on the right side of the page.     You will be asked to enter the access code listed below, as well as some personal information. Please follow the directions to create your username and password.     Your access code is: [email or web address]  Expires: 10/30/2017  9:45 AM     Your access code will  in 90 days. If you need help or a new code, please call your [facility] or [phone].        Care EveryWhere ID     This is your Care EveryWhere ID. This could be used by other " "organizations to access your [facility] medical records  [id number]        Your Vitals Were     Pulse Temperature Respirations Height Pulse Oximetry BMI (Body Mass Index)    120 98.7  F (37.1  C) (Oral) 16 5' 10\" (1.778 m) 95% 28.12 kg/m2       Blood Pressure from Last 3 Encounters:   08/04/17 110/70   05/31/17 104/70   05/01/17 110/64    Weight from Last 3 Encounters:   08/04/17 196 lb (88.9 kg)   05/23/17 190 lb 11.2 oz (86.5 kg)   05/14/17 193 lb (87.5 kg)              Today, you had the following     No orders found for display       Primary Care Provider    Physician No Ref-Primary       No address on file        Equal Access to Services     MUSTAPHA COSTELLO : Gris andersono Sokaveh, waaxda luqadaha, qaybta kaalmada adeegyada, yasir ferrell . So [facility] [phone].    ATENCIÓN: Si habla español, tiene a diamond disposición servicios gratuitos de asistencia lingüística. Llame al [phone].    We comply with applicable federal civil rights laws and [place] laws. We do not discriminate on the basis of race, color, national origin, age, disability sex, sexual orientation or gender identity.            Thank you!     Thank you for choosing [facility]  for your care. Our goal is always to provide you with excellent care. Hearing back from our patients is one way we can continue to improve our services. Please take a few minutes to complete the written survey that you may receive in the mail after your visit with us. Thank you!             Your Updated Medication List - Protect others around you: Learn how to safely use, store and throw away your medicines at www.disposemymeds.org.          This list is accurate as of: 8/4/17  2:29 PM.  Always use your most recent med list.                   Brand Name Dispense Instructions for use Diagnosis    benzocaine 20 % Liqd liquid    ANBESOL    30 mL    Take 1 mL by mouth every 3 hours as needed for moderate pain        BusPIRone HCl 30 " MG Tabs     60 tablet    Take 30 mg by mouth 2 times daily        FLUoxetine 20 MG capsule    PROzac    90 capsule    Take 3 capsules (60 mg) by mouth daily        gabapentin 600 MG tablet    NEURONTIN    90 tablet    Take 1 tablet (600 mg) by mouth 3 times daily        hydrOXYzine 50 MG tablet    ATARAX    180 tablet    Take 2 tablets (100 mg) by mouth 3 times daily as needed for anxiety        multivitamin, therapeutic with minerals Tabs tablet     30 each    Take 1 tablet by mouth daily        nicotine polacrilex 4 MG lozenge     168 tablet    Place 2 lozenges (8 mg) inside cheek every hour as needed for other (nicotine withdrawal symptoms)        OLANZapine 10 MG tablet    zyPREXA    10 tablet    Take 1 tablet (10 mg) by mouth daily as needed for agitation or severe anxiety

## 2017-08-04 NOTE — LETTER
My Depression Action Plan  Name: Morgan Bosch   Date of Birth 1985  Date: 8/4/2017    My doctor: No Ref-Primary, Physician   My clinic: [facility]  [address] [address] [phone]          GREEN    ZONE   Good Control    What it looks like:     Things are going generally well. You have normal up s and down s. You may even feel depressed from time to time, but bad moods usually last less than a day.   What you need to do:  1. Continue to care for yourself (see self care plan)  2. Check your depression survival kit and update it as needed  3. Follow your physician s recommendations including any medication.  4. Do not stop taking medication unless you consult with your physician first.           YELLOW         ZONE Getting Worse    What it looks like:     Depression is starting to interfere with your life.     It may be hard to get out of bed; you may be starting to isolate yourself from others.    Symptoms of depression are starting to last most all day and this has happened for several days.     You may have suicidal thoughts but they are not constant.   What you need to do:     1. Call your care team, your response to treatment will improve if you keep your care team informed of your progress. Yellow periods are signs an adjustment may need to be made.     2. Continue your self-care, even if you have to fake it!    3. Talk to someone in your support network    4. Open up your depression survival kit           RED    ZONE Medical Alert - Get Help    What it looks like:     Depression is seriously interfering with your life.     You may experience these or other symptoms: You can t get out of bed most days, can t work or engage in other necessary activities, you have trouble taking care of basic hygiene, or basic responsibilities, thoughts of suicide or death that will not go away, self-injurious behavior.     What you need to do:  1. Call your care  team and request a same-day appointment. If they are not available (weekends or after hours) call your local crisis line, emergency room or 911.      Electronically signed by: Cesar Montes, August 4, 2017    Depression Self Care Plan / Survival Kit    Self-Care for Depression  Here s the deal. Your body and mind are really not as separate as most people think.  What you do and think affects how you feel and how you feel influences what you do and think. This means if you do things that people who feel good do, it will help you feel better.  Sometimes this is all it takes.  There is also a place for medication and therapy depending on how severe your depression is, so be sure to consult with your medical provider and/ or Behavioral Health Consultant if your symptoms are worsening or not improving.     In order to better manage my stress, I will:    Exercise  Get some form of exercise, every day. This will help reduce pain and release endorphins, the  feel good  chemicals in your brain. This is almost as good as taking antidepressants!  This is not the same as joining a gym and then never going! (they count on that by the way ) It can be as simple as just going for a walk or doing some gardening, anything that will get you moving.      Hygiene   Maintain good hygiene (Get out of bed in the morning, Make your bed, Brush your teeth, Take a shower, and Get dressed like you were going to work, even if you are unemployed).  If your clothes don't fit try to get ones that do.    Diet  I will strive to eat foods that are good for me, drink plenty of water, and avoid excessive sugar, caffeine, alcohol, and other mood-altering substances.  Some foods that are helpful in depression are: complex carbohydrates, B vitamins, flaxseed, fish or fish oil, fresh fruits and vegetables.    Psychotherapy  I agree to participate in Individual Therapy (if recommended).    Medication  If prescribed medications, I agree to take them.   Missing doses can result in serious side effects.  I understand that drinking alcohol, or other illicit drug use, may cause potential side effects.  I will not stop my medication abruptly without first discussing it with my provider.    Staying Connected With Others  I will stay in touch with my friends, family members, and my primary care provider/team.    Use your imagination  Be creative.  We all have a creative side; it doesn t matter if it s oil painting, sand castles, or mud pies! This will also kick up the endorphins.    Witness Beauty  (AKA stop and smell the roses) Take a look outside, even in mid-winter. Notice colors, textures. Watch the squirrels and birds.     Service to others  Be of service to others.  There is always someone else in need.  By helping others we can  get out of ourselves  and remember the really important things.  This also provides opportunities for practicing all the other parts of the program.    Humor  Laugh and be silly!  Adjust your TV habits for less news and crime-drama and more comedy.    Control your stress  Try breathing deep, massage therapy, biofeedback, and meditation. Find time to relax each day.     My support system    Clinic Contact:  Phone number:    Contact 1:  Phone number:    Contact 2:  Phone number:    Baptism/:  Phone number:    Therapist:  Phone number:    Local crisis center:    Phone number:    Other community support:  Phone number:

## 2017-08-05 ASSESSMENT — ANXIETY QUESTIONNAIRES: GAD7 TOTAL SCORE: 5

## 2017-08-11 NOTE — PROGRESS NOTES
"  SUBJECTIVE:                                                    Morgan Bosch is a 32 year old male who presents to clinic today for the following health issues:              Problem list and histories reviewed & adjusted, as indicated.  Additional history: 33 y/o new to me male here for a check up.  Patient has struggle with chemical dependency for years, and he was in patient treated from 4/20- 5/15.  He was then planning on transferring to another facility but through 6/2, but eloped and relapsed.  He then voluntarily returned to ER where he was again treated from 5/15-6/2/17.   He did well during that time.  His mood was stable during the time as meds were adjusted.      He has since been in recovering housing and done well.  He does daily meetings and feels that he is making progress.  He does not have any medical concerns at this time.  He does need form filled out for his residence.  He is planning on being there for another couple of months.    BP Readings from Last 3 Encounters:   08/04/17 110/70   05/31/17 104/70   05/01/17 110/64    Wt Readings from Last 3 Encounters:   08/04/17 196 lb (88.9 kg)   05/23/17 190 lb 11.2 oz (86.5 kg)   05/14/17 193 lb (87.5 kg)                      Reviewed and updated as needed this visit by clinical staffTobacco  Allergies  Meds       Reviewed and updated as needed this visit by Provider         ROS:  Constitutional, HEENT, cardiovascular, pulmonary, gi and gu systems are negative, except as otherwise noted.      OBJECTIVE:   /70 (BP Location: Right arm, Cuff Size: Adult Regular)  Pulse 120  Temp 98.7  F (37.1  C) (Oral)  Resp 16  Ht 5' 10\" (1.778 m)  Wt 196 lb (88.9 kg)  SpO2 95%  BMI 28.12 kg/m2  Body mass index is 28.12 kg/(m^2).  GENERAL: alert and no distress  EYES: Eyes grossly normal to inspection  RESP: lungs clear to auscultation - no rales, rhonchi or wheezes  CV: regular rate and rhythm, normal S1 S2, no S3 or S4, no murmur, click or rub, no " peripheral edema and peripheral pulses strong  PSYCH: mentation appears normal, affect normal/bright    Diagnostic Test Results:  none     ASSESSMENT/PLAN:             1. Chemical dependency (H)  Will make no changes to medications, and symptoms seem to be under control.  Feels he is in a safe environment that will help support his recovery.    2. Major depressive disorder, recurrent episode, moderate (H)  Improving.      Follow up 3 months or sooner if needed.      Nnamdi Tolbert PA-C  [facility]

## 2017-09-10 ENCOUNTER — APPOINTMENT (OUTPATIENT)
Dept: CT IMAGING | Facility: CLINIC | Age: 32
End: 2017-09-10
Attending: PHYSICIAN ASSISTANT
Payer: MEDICAID

## 2017-09-10 ENCOUNTER — HOSPITAL ENCOUNTER (EMERGENCY)
Facility: CLINIC | Age: 32
Discharge: HOME | End: 2017-09-10
Attending: PHYSICIAN ASSISTANT | Admitting: PHYSICIAN ASSISTANT
Payer: MEDICAID

## 2017-09-10 VITALS
WEIGHT: 184 LBS | RESPIRATION RATE: 18 BRPM | DIASTOLIC BLOOD PRESSURE: 68 MMHG | OXYGEN SATURATION: 97 % | BODY MASS INDEX: 26.4 KG/M2 | TEMPERATURE: 98.4 F | SYSTOLIC BLOOD PRESSURE: 134 MMHG

## 2017-09-10 DIAGNOSIS — S02.401A MAXILLARY SINUS FRACTURE, CLOSED, INITIAL ENCOUNTER (H): ICD-10-CM

## 2017-09-10 DIAGNOSIS — S09.90XA CLOSED HEAD INJURY, INITIAL ENCOUNTER: ICD-10-CM

## 2017-09-10 DIAGNOSIS — S02.32XA CLOSED FRACTURE OF LEFT ORBITAL FLOOR, INITIAL ENCOUNTER (H): ICD-10-CM

## 2017-09-10 DIAGNOSIS — S02.19XA: ICD-10-CM

## 2017-09-10 PROCEDURE — 25000132 ZZH RX MED GY IP 250 OP 250 PS 637: Performed by: PHYSICIAN ASSISTANT

## 2017-09-10 PROCEDURE — 70486 CT MAXILLOFACIAL W/O DYE: CPT

## 2017-09-10 PROCEDURE — 99284 EMERGENCY DEPT VISIT MOD MDM: CPT | Mod: 25

## 2017-09-10 RX ORDER — HYDROCODONE BITARTRATE AND ACETAMINOPHEN 5; 325 MG/1; MG/1
1-2 TABLET ORAL EVERY 4 HOURS PRN
Qty: 15 TABLET | Refills: 0 | Status: SHIPPED | OUTPATIENT
Start: 2017-09-10 | End: 2019-02-11

## 2017-09-10 RX ORDER — ACETAMINOPHEN 325 MG/1
975 TABLET ORAL ONCE
Status: COMPLETED | OUTPATIENT
Start: 2017-09-10 | End: 2017-09-10

## 2017-09-10 RX ADMIN — ACETAMINOPHEN 975 MG: 325 TABLET, FILM COATED ORAL at 18:52

## 2017-09-10 ASSESSMENT — ENCOUNTER SYMPTOMS
FEVER: 0
NUMBNESS: 0
WOUND: 1
FACIAL SWELLING: 1
WEAKNESS: 0

## 2017-09-10 ASSESSMENT — VISUAL ACUITY
OD: 20/25
OS: OTHER (SEE COMMENTS)

## 2017-09-10 NOTE — ED AVS SNAPSHOT
Emergency Department    [address] [facility] [address]    Phone:  [phone]    Fax:  [phone]                                       Morgan Bosch   MRN: [id number]    Department:   Emergency Department   Date of Visit:  9/10/2017           Patient Information     Date Of Birth          1985        Your diagnoses for this visit were:     Closed fracture of left orbital floor, initial encounter (H)     Maxillary sinus fracture, closed, initial encounter (H)     Lamina papyracea fracture (H)     Closed head injury, initial encounter        You were seen by Brenda Mayes PA-C.      Follow-up Information     Follow up with Gisela Hamilton MD In 1 day.    Specialty:  Ophthalmology    Contact information:    [facility]  [address] [facility] [address] [phone]          Follow up with Pako Argueta MD In 2 days.    Specialty:  Otolaryngology    Contact information:    [facility]  [address] [address] [phone]          Follow up with  Emergency Department.    Specialty:  EMERGENCY MEDICINE    Why:  If symptoms worsen    Contact information:    [address] [facility] [address] [phone]        Discharge Instructions       Take the Augmentin as prescribed.     Use Acetaminophen and/or Ibuprofen as needed for pain.  Use Norco as needed for pain. There is acetaminophen (tylenol) in this medication so do not use additional acetaminophen with this. Do not drive, operate machinery or drink alcohol with this medication. This medication can make you constipated so you can eat yogurt or take probiotics to help avoid this.      Do not blow your nose or use you left contact until you have been followed up. Eat a soft diet.     Call Opthalmology tomorrow (at 8 am) to be seen tomorrow. Call Ear, Nose and Throat specialists tomorrow to follow-up with them in the next 1-2 days.     Return to the ED with  worsening pain, difficulty with moving you eye, vision changes, fever, or if you become worse in any way.       Discharge Instructions  Head Injury    You have been seen today for a head injury. You were checked for serious problems, like bleeding on the brain, but these problems cannot always be found right away.  Due to this risk, you should not be alone for 24 hours after your injury.  Follow up with your regular physician in 2-3 days. If you are taking a blood thinner, such as aspirin, Pradaxa  (dabigatran), Coumadin  (warfarin), or Plavix  (clopidogrel), you are at especially high risk for immediate or delayed bleeding, and need to re-check with a physician in 24 hours, or sooner if any of the symptoms below happen.     Return to the Emergency Department if:    You are confused, have amnesia, or you are not acting right.    Your headache gets worse or you start to have a really bad headache even with your recommended treatment plan.    You vomit more than once.    You have a convulsion or seizure.    You have trouble walking.    You have weakness or paralysis in an arm or a leg.    You have blood or fluid coming from your ears or nose.    You have new symptoms or anything that worries you.    Sleeping:  It is okay for you to sleep, but someone should wake you up as instructed by your doctor, and someone should check on you at your usual time to wake up.     Activity:    Do not drive for at least 24 hours.    Do not drive if you have dizzy spells or trouble concentrating, or remembering things.    Do not return to any contact sports until cleared by your regular doctor.     Follow-up:  It is very important that you make an appointment with your clinic and go to the appointment.  If you do not follow-up with your regular doctor, it may result in missing an important development which could result in permanent injury or disability and/or lasting pain.  If there is any problem keeping your appointment, call your  doctor or return to the Emergency Department.    MORE INFORMATION:    Concussion:  A concussion is a minor head injury that may cause temporary problems with the way your brain works.  Some symptoms include:  confusion, amnesia, nausea and vomiting, dizziness, fatigue, memory or concentration problems, irritability and sleep problems.    CT Scans: Your evaluation today may have included a CT scan (CAT scan) to look for things like bleeding or a skull fracture (break).  CT scans involve radiation and too many CT scans can cause serious health problems like cancer, especially in children.  Because of this, your doctor may not have ordered a CT scan today if they think you are at low risk for a serious or life threatening problem.    If you were given a prescription for medicine here today, be sure to read all of the information (including the package insert) that comes with your prescription.  This will include important information about the medicine, its side effects, and any warnings that you need to know about.  The pharmacist who fills the prescription can provide more information and answer questions you may have about the medicine.  If you have questions or concerns that the pharmacist cannot address, please call or return to the Emergency Department.     Opioid Medication Information    Pain medications are among the most commonly prescribed medicines, so we are including this information for all our patients. If you did not receive pain medication or get a prescription for pain medicine, you can ignore it.     You may have been given a prescription for an opioid (narcotic) pain medicine and/or have received a pain medicine while here in the Emergency Department. These medicines can make you drowsy or impaired. You must not drive, operate dangerous equipment, or engage in any other dangerous activities while taking these medications. If you drive while taking these medications, you could be arrested for DUI,  or driving under the influence. Do not drink any alcohol while you are taking these medications.     Opioid pain medications can cause addiction. If you have a history of chemical dependency of any type, you are at a higher risk of becoming addicted to pain medications.  Only take these prescribed medications to treat your pain when all other options have been tried. Take it for as short a time and as few doses as possible. Store your pain pills in a secure place, as they are frequently stolen and provide a dangerous opportunity for children or visitors in your house to start abusing these powerful medications. We will not replace any lost or stolen medicine.  As soon as your pain is better, you should flush all your remaining medication.     Many prescription pain medications contain Tylenol  (acetaminophen), including Vicodin , Tylenol #3 , Norco , Lortab , and Percocet .  You should not take any extra pills of Tylenol  if you are using these prescription medications or you can get very sick.  Do not ever take more than 3000 mg of acetaminophen in any 24 hour period.    All opioids tend to cause constipation. Drink plenty of water and eat foods that have a lot of fiber, such as fruits, vegetables, prune juice, apple juice and high fiber cereal.  Take a laxative if you don t move your bowels at least every other day. Miralax , Milk of Magnesia, Colace , or Senna  can be used to keep you regular.      Remember that you can always come back to the Emergency Department if you are not able to see your regular doctor in the amount of time listed above, if you get any new symptoms, or if there is anything that worries you.            Discharge References/Attachments     FRACTURE, FACIAL (ENGLISH)      24 Hour Appointment Hotline       To make an appointment at any [facility] clinic, call [phone] ([phone]). If you don't have a family doctor or clinic, we will help you find one. [facility] are  conveniently located to serve the needs of you and your family.             Review of your medicines      START taking        Dose / Directions Last dose taken    amoxicillin-clavulanate 875-125 MG per tablet   Commonly known as:  AUGMENTIN   Dose:  1 tablet   Quantity:  14 tablet        Take 1 tablet by mouth 2 times daily for 7 days   Refills:  0        HYDROcodone-acetaminophen 5-325 MG per tablet   Commonly known as:  NORCO   Dose:  1-2 tablet   Quantity:  15 tablet        Take 1-2 tablets by mouth every 4 hours as needed for moderate to severe pain   Refills:  0          Our records show that you are taking the medicines listed below. If these are incorrect, please call your family doctor or clinic.        Dose / Directions Last dose taken    benzocaine 20 % Liqd liquid   Commonly known as:  ANBESOL   Dose:  1 spray   Quantity:  30 mL        Take 1 mL by mouth every 3 hours as needed for moderate pain   Refills:  1        BusPIRone HCl 30 MG Tabs   Dose:  30 mg   Quantity:  60 tablet        Take 30 mg by mouth 2 times daily   Refills:  1        FLUoxetine 20 MG capsule   Commonly known as:  PROzac   Dose:  60 mg   Quantity:  90 capsule        Take 3 capsules (60 mg) by mouth daily   Refills:  1        gabapentin 600 MG tablet   Commonly known as:  NEURONTIN   Dose:  600 mg   Quantity:  90 tablet        Take 1 tablet (600 mg) by mouth 3 times daily   Refills:  1        hydrOXYzine 50 MG tablet   Commonly known as:  ATARAX   Dose:  100 mg   Quantity:  180 tablet        Take 2 tablets (100 mg) by mouth 3 times daily as needed for anxiety   Refills:  1        multivitamin, therapeutic with minerals Tabs tablet   Dose:  1 tablet   Quantity:  30 each        Take 1 tablet by mouth daily   Refills:  1        nicotine polacrilex 4 MG lozenge   Dose:  8 mg   Quantity:  168 tablet        Place 2 lozenges (8 mg) inside cheek every hour as needed for other (nicotine withdrawal symptoms)   Refills:  1        OLANZapine 10 MG  tablet   Commonly known as:  zyPREXA   Dose:  10 mg   Quantity:  10 tablet        Take 1 tablet (10 mg) by mouth daily as needed for agitation or severe anxiety   Refills:  1                Prescriptions were sent or printed at these locations (2 Prescriptions)                   Other Prescriptions                Printed at Department/Unit printer (2 of 2)         amoxicillin-clavulanate (AUGMENTIN) 875-125 MG per tablet               HYDROcodone-acetaminophen (NORCO) 5-325 MG per tablet                Procedures and tests performed during your visit     CT Maxillofacial w/o Contrast      Orders Needing Specimen Collection     None      Pending Results     No orders found from 9/8/2017 to 9/11/2017.            Pending Culture Results     No orders found from 9/8/2017 to 9/11/2017.            Pending Results Instructions     If you had any lab results that were not finalized at the time of your Discharge, you can call the ED Lab Result RN at [phone]. You will be contacted by this team for any positive Lab results or changes in treatment. The nurses are available 7 days a week from 10A to 6:30P.  You can leave a message 24 hours per day and they will return your call.        Test Results From Your Hospital Stay        9/10/2017  7:41 PM      Narrative     CT SCAN OF THE FACE WITHOUT CONTRAST 9/10/2017 7:04 PM     HISTORY: Left nasal and orbit injury.    TECHNIQUE: Radiation dose for this scan was reduced using automated  exposure control, adjustment of the mA and/or kV according to patient  size, or iterative reconstruction technique. Noncontrast axial scans  and coronal and sagittal reformations.     COMPARISON: None.    FINDINGS: There are comminuted impacted fractures of the left  maxillary sinus anterior and posterolateral walls and of the left  orbital floor with depression of the orbital floor fracture fragment  by about 1.0 cm. The left inferior rectus muscle is displaced along  with orbital fat inferiorly  but there is no muscular entrapment. There  is blood in left maxillary sinus. There is overlying left facial  subcutaneous soft tissue swelling.    There is nondisplaced fracture of the left lamina papyracea with  adjacent mucosal thickening or blood in left maxillary sinus. No other  fracture is seen.    There is air in the soft tissues lateral and inferior to the left  maxillary sinus. Mandibles intact.        Impression     IMPRESSION:  1. Comminuted, impacted fracture of the left maxillary sinus anterior  and posterior lateral walls and of the left orbital floor, with  depression of left orbital floor fracture fragment by 1 cm.  2. Left lateral facial soft tissue swelling.  3. Nondisplaced fractures of the left lamina papyracea.       SHARON STODDARD MD                Clinical Quality Measure: Blood Pressure Screening     Your blood pressure was checked while you were in the emergency department today. The last reading we obtained was  BP: 134/68 . Please read the guidelines below about what these numbers mean and what you should do about them.  If your systolic blood pressure (the top number) is less than 120 and your diastolic blood pressure (the bottom number) is less than 80, then your blood pressure is normal. There is nothing more that you need to do about it.  If your systolic blood pressure (the top number) is 120-139 or your diastolic blood pressure (the bottom number) is 80-89, your blood pressure may be higher than it should be. You should have your blood pressure rechecked within a year by a primary care provider.  If your systolic blood pressure (the top number) is 140 or greater or your diastolic blood pressure (the bottom number) is 90 or greater, you may have high blood pressure. High blood pressure is treatable, but if left untreated over time it can put you at risk for heart attack, stroke, or kidney failure. You should have your blood pressure rechecked by a primary care provider within the  "next 4 weeks.  If your provider in the emergency department today gave you specific instructions to follow-up with your doctor or provider even sooner than that, you should follow that instruction and not wait for up to 4 weeks for your follow-up visit.        Thank you for choosing [facility]       Thank you for choosing [facility] for your care. Our goal is always to provide you with excellent care. Hearing back from our patients is one way we can continue to improve our services. Please take a few minutes to complete the written survey that you may receive in the mail after you visit with us. Thank you!        [employer] Information     [employer] lets you send messages to your doctor, view your test results, renew your prescriptions, schedule appointments and more. To sign up, go to [email or web address] . Click on \"Log in\" on the left side of the screen, which will take you to the Welcome page. Then click on \"Sign up Now\" on the right side of the page.     You will be asked to enter the access code listed below, as well as some personal information. Please follow the directions to create your username and password.     Your access code is: [email or web address]  Expires: 10/30/2017  9:45 AM     Your access code will  in 90 days. If you need help or a new code, please call your [facility] clinic or [phone].        Care EveryWhere ID     This is your Care EveryWhere ID. This could be used by other organizations to access your [facility] medical records  [id number]        Equal Access to Services     MUSTAPHA COSTELLO AH: Hadii umair Berrios, waaxda gurdeep, qaybta kaalmada krunal, yasir julio. So [facility] [phone].    ATENCIÓN: Si habla español, tiene a diamond disposición servicios gratuitos de asistencia lingüística. Delvis al [phone].    We comply with applicable federal civil rights laws and [place] laws. We do not discriminate on the basis of race, color, national origin, age, " disability sex, sexual orientation or gender identity.            After Visit Summary       This is your record. Keep this with you and show to your community pharmacist(s) and doctor(s) at your next visit.
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
## 2017-09-10 NOTE — ED AVS SNAPSHOT
Emergency Department    [address] [place] [address]    Phone:  [phone]    Fax:  [phone]                                       Morgan Bosch   MRN: [id number]    Department:   Emergency Department   Date of Visit:  9/10/2017           After Visit Summary Signature Page     I have received my discharge instructions, and my questions have been answered. I have discussed any challenges I see with this plan with the nurse or doctor.    ..........................................................................................................................................  Patient/Patient Representative Signature      ..........................................................................................................................................  Patient Representative Print Name and Relationship to Patient    ..................................................               ................................................  Date                                            Time    ..........................................................................................................................................  Reviewed by Signature/Title    ...................................................              ..............................................  Date                                                            Time

## 2017-09-11 ASSESSMENT — ENCOUNTER SYMPTOMS
NAUSEA: 0
VOMITING: 0
DIZZINESS: 0
CONFUSION: 0
HEADACHES: 0
NECK PAIN: 0
ARTHRALGIAS: 0

## 2017-09-11 NOTE — DISCHARGE INSTRUCTIONS
Take the Augmentin as prescribed.     Use Acetaminophen and/or Ibuprofen as needed for pain.  Use Norco as needed for pain. There is acetaminophen (tylenol) in this medication so do not use additional acetaminophen with this. Do not drive, operate machinery or drink alcohol with this medication. This medication can make you constipated so you can eat yogurt or take probiotics to help avoid this.      Do not blow your nose or use you left contact until you have been followed up. Eat a soft diet.     Call Opthalmology tomorrow (at 8 am) to be seen tomorrow. Call [facility], Nose [facility] Throat specialists tomorrow to follow-up with them in the next 1-2 days.     Return to the ED with worsening pain, difficulty with moving you eye, vision changes, fever, or if you become worse in any way.       Discharge Instructions  Head Injury    You have been seen today for a head injury. You were checked for serious problems, like bleeding on the brain, but these problems cannot always be found right away.  Due to this risk, you should not be alone for 24 hours after your injury.  Follow up with your regular physician in 2-3 days. If you are taking a blood thinner, such as aspirin, Pradaxa  (dabigatran), Coumadin  (warfarin), or Plavix  (clopidogrel), you are at especially high risk for immediate or delayed bleeding, and need to re-check with a physician in 24 hours, or sooner if any of the symptoms below happen.     Return to the Emergency Department if:    You are confused, have amnesia, or you are not acting right.    Your headache gets worse or you start to have a really bad headache even with your recommended treatment plan.    You vomit more than once.    You have a convulsion or seizure.    You have trouble walking.    You have weakness or paralysis in an arm or a leg.    You have blood or fluid coming from your ears or nose.    You have new symptoms or anything that worries you.    Sleeping:  It is okay for you to sleep, but someone  should wake you up as instructed by your doctor, and someone should check on you at your usual time to wake up.     Activity:    Do not drive for at least 24 hours.    Do not drive if you have dizzy spells or trouble concentrating, or remembering things.    Do not return to any contact sports until cleared by your regular doctor.     Follow-up:  It is very important that you make an appointment with your clinic and go to the appointment.  If you do not follow-up with your regular doctor, it may result in missing an important development which could result in permanent injury or disability and/or lasting pain.  If there is any problem keeping your appointment, call your doctor or return to the Emergency Department.    MORE INFORMATION:    Concussion:  A concussion is a minor head injury that may cause temporary problems with the way your brain works.  Some symptoms include:  confusion, amnesia, nausea and vomiting, dizziness, fatigue, memory or concentration problems, irritability and sleep problems.    CT Scans: Your evaluation today may have included a CT scan (CAT scan) to look for things like bleeding or a skull fracture (break).  CT scans involve radiation and too many CT scans can cause serious health problems like cancer, especially in children.  Because of this, your doctor may not have ordered a CT scan today if they think you are at low risk for a serious or life threatening problem.    If you were given a prescription for medicine here today, be sure to read all of the information (including the package insert) that comes with your prescription.  This will include important information about the medicine, its side effects, and any warnings that you need to know about.  The pharmacist who fills the prescription can provide more information and answer questions you may have about the medicine.  If you have questions or concerns that the pharmacist cannot address, please call or return to the Emergency  Department.     Opioid Medication Information    Pain medications are among the most commonly prescribed medicines, so we are including this information for all our patients. If you did not receive pain medication or get a prescription for pain medicine, you can ignore it.     You may have been given a prescription for an opioid (narcotic) pain medicine and/or have received a pain medicine while here in the Emergency Department. These medicines can make you drowsy or impaired. You must not drive, operate dangerous equipment, or engage in any other dangerous activities while taking these medications. If you drive while taking these medications, you could be arrested for DUI, or driving under the influence. Do not drink any alcohol while you are taking these medications.     Opioid pain medications can cause addiction. If you have a history of chemical dependency of any type, you are at a higher risk of becoming addicted to pain medications.  Only take these prescribed medications to treat your pain when all other options have been tried. Take it for as short a time and as few doses as possible. Store your pain pills in a secure place, as they are frequently stolen and provide a dangerous opportunity for children or visitors in your house to start abusing these powerful medications. We will not replace any lost or stolen medicine.  As soon as your pain is better, you should flush all your remaining medication.     Many prescription pain medications contain Tylenol  (acetaminophen), including Vicodin , Tylenol #3 , Norco , Lortab , and Percocet .  You should not take any extra pills of Tylenol  if you are using these prescription medications or you can get very sick.  Do not ever take more than 3000 mg of acetaminophen in any 24 hour period.    All opioids tend to cause constipation. Drink plenty of water and eat foods that have a lot of fiber, such as fruits, vegetables, prune juice, apple juice and high fiber cereal.   Take a laxative if you don t move your bowels at least every other day. Miralax , Milk of Magnesia, Colace , or Senna  can be used to keep you regular.      Remember that you can always come back to the Emergency Department if you are not able to see your regular doctor in the amount of time listed above, if you get any new symptoms, or if there is anything that worries you.
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
## 2017-10-03 NOTE — TELEPHONE ENCOUNTER
Gabapentin       Last Written Prescription Date:  05/30/2017  Last Fill Quantity: 90,   # refills: 1  Last Office Visit with [facility] prescribing provider: 08/0/2017  Future Office visit:       Routing refill request to provider for review/approval because:  Drug not on the [facility] refill protocol or controlled substance

## 2017-10-04 NOTE — TELEPHONE ENCOUNTER
JS,    Controlled Substance Refill Request for Gabapentin    Last refill: 9/6/2017 #90    Last clinic visit: 8/4/2017     Clinic visit frequency required: not documented   Next appt: none    Controlled substance agreement on file: No.    Documentation in problem list reviewed:  No - not sure what diagnosis pt on Gabapentin for    Processing:  Fax Rx to [facility]    [place] reviewed:  reviewed- recommend provider review  Patient filled #15 Hydrocodone on 9/11/2017 from outside provider   [place]:  [email or web address]    Please authorize if appropriate.  Thanks,  Sneha HOOD RN

## 2017-10-05 RX ORDER — GABAPENTIN 600 MG/1
600 TABLET ORAL 3 TIMES DAILY
Qty: 90 TABLET | Refills: 1 | OUTPATIENT
Start: 2017-10-05

## 2017-10-29 ENCOUNTER — APPOINTMENT (OUTPATIENT)
Dept: GENERAL RADIOLOGY | Facility: CLINIC | Age: 32
End: 2017-10-29
Attending: EMERGENCY MEDICINE
Payer: COMMERCIAL

## 2017-10-29 ENCOUNTER — HOSPITAL ENCOUNTER (EMERGENCY)
Facility: CLINIC | Age: 32
Discharge: HOME | End: 2017-10-29
Attending: EMERGENCY MEDICINE | Admitting: EMERGENCY MEDICINE
Payer: COMMERCIAL

## 2017-10-29 VITALS
SYSTOLIC BLOOD PRESSURE: 112 MMHG | DIASTOLIC BLOOD PRESSURE: 77 MMHG | TEMPERATURE: 99 F | RESPIRATION RATE: 20 BRPM | BODY MASS INDEX: 26.05 KG/M2 | HEART RATE: 126 BPM | WEIGHT: 182 LBS | HEIGHT: 70 IN | OXYGEN SATURATION: 95 %

## 2017-10-29 DIAGNOSIS — J18.9 PNEUMONIA OF LEFT LOWER LOBE DUE TO INFECTIOUS ORGANISM: ICD-10-CM

## 2017-10-29 LAB
ALBUMIN SERPL-MCNC: 3.5 G/DL (ref 3.4–5)
ALBUMIN UR-MCNC: NEGATIVE MG/DL
ALP SERPL-CCNC: 97 U/L (ref 40–150)
ALT SERPL W P-5'-P-CCNC: 36 U/L (ref 0–70)
AMPHETAMINES UR QL SCN: POSITIVE
ANION GAP SERPL CALCULATED.3IONS-SCNC: 6 MMOL/L (ref 3–14)
APPEARANCE UR: CLEAR
AST SERPL W P-5'-P-CCNC: 21 U/L (ref 0–45)
BARBITURATES UR QL: NEGATIVE
BASOPHILS # BLD AUTO: 0 10E9/L (ref 0–0.2)
BASOPHILS NFR BLD AUTO: 0.2 %
BENZODIAZ UR QL: NEGATIVE
BILIRUB SERPL-MCNC: 0.3 MG/DL (ref 0.2–1.3)
BILIRUB UR QL STRIP: NEGATIVE
BUN SERPL-MCNC: 10 MG/DL (ref 7–30)
CALCIUM SERPL-MCNC: 8.5 MG/DL (ref 8.5–10.1)
CANNABINOIDS UR QL SCN: NEGATIVE
CHLORIDE SERPL-SCNC: 98 MMOL/L (ref 94–109)
CO2 SERPL-SCNC: 28 MMOL/L (ref 20–32)
COCAINE UR QL: NEGATIVE
COLOR UR AUTO: YELLOW
CREAT SERPL-MCNC: 0.96 MG/DL (ref 0.66–1.25)
DEPRECATED S PYO AG THROAT QL EIA: NORMAL
DIFFERENTIAL METHOD BLD: NORMAL
EOSINOPHIL # BLD AUTO: 0 10E9/L (ref 0–0.7)
EOSINOPHIL NFR BLD AUTO: 0.2 %
ERYTHROCYTE [DISTWIDTH] IN BLOOD BY AUTOMATED COUNT: 13.8 % (ref 10–15)
FLUAV+FLUBV AG SPEC QL: NEGATIVE
FLUAV+FLUBV AG SPEC QL: NEGATIVE
GFR SERPL CREATININE-BSD FRML MDRD: >90 ML/MIN/1.7M2
GLUCOSE SERPL-MCNC: 108 MG/DL (ref 70–99)
GLUCOSE UR STRIP-MCNC: 30 MG/DL
HCT VFR BLD AUTO: 41.3 % (ref 40–53)
HGB BLD-MCNC: 13.9 G/DL (ref 13.3–17.7)
HGB UR QL STRIP: NEGATIVE
IMM GRANULOCYTES # BLD: 0 10E9/L (ref 0–0.4)
IMM GRANULOCYTES NFR BLD: 0.2 %
KETONES UR STRIP-MCNC: NEGATIVE MG/DL
LACTATE BLD-SCNC: 1.2 MMOL/L (ref 0.7–2)
LEUKOCYTE ESTERASE UR QL STRIP: NEGATIVE
LIPASE SERPL-CCNC: 182 U/L (ref 73–393)
LYMPHOCYTES # BLD AUTO: 0.9 10E9/L (ref 0.8–5.3)
LYMPHOCYTES NFR BLD AUTO: 11.6 %
MCH RBC QN AUTO: 31.2 PG (ref 26.5–33)
MCHC RBC AUTO-ENTMCNC: 33.7 G/DL (ref 31.5–36.5)
MCV RBC AUTO: 93 FL (ref 78–100)
MONOCYTES # BLD AUTO: 0.7 10E9/L (ref 0–1.3)
MONOCYTES NFR BLD AUTO: 8.8 %
MUCOUS THREADS #/AREA URNS LPF: PRESENT /LPF
NEUTROPHILS # BLD AUTO: 6.4 10E9/L (ref 1.6–8.3)
NEUTROPHILS NFR BLD AUTO: 79 %
NITRATE UR QL: NEGATIVE
NRBC # BLD AUTO: 0 10*3/UL
NRBC BLD AUTO-RTO: 0 /100
OPIATES UR QL SCN: NEGATIVE
PCP UR QL SCN: NEGATIVE
PH UR STRIP: 6.5 PH (ref 5–7)
PLATELET # BLD AUTO: 245 10E9/L (ref 150–450)
POTASSIUM SERPL-SCNC: 3.9 MMOL/L (ref 3.4–5.3)
PROT SERPL-MCNC: 6.9 G/DL (ref 6.8–8.8)
RBC # BLD AUTO: 4.46 10E12/L (ref 4.4–5.9)
RBC #/AREA URNS AUTO: <1 /HPF (ref 0–2)
SODIUM SERPL-SCNC: 132 MMOL/L (ref 133–144)
SOURCE: ABNORMAL
SP GR UR STRIP: 1.02 (ref 1–1.03)
SPECIMEN SOURCE: NORMAL
SPECIMEN SOURCE: NORMAL
SQUAMOUS #/AREA URNS AUTO: <1 /HPF (ref 0–1)
UROBILINOGEN UR STRIP-MCNC: NORMAL MG/DL (ref 0–2)
WBC # BLD AUTO: 8.1 10E9/L (ref 4–11)
WBC #/AREA URNS AUTO: 0 /HPF (ref 0–2)

## 2017-10-29 PROCEDURE — 80053 COMPREHEN METABOLIC PANEL: CPT | Performed by: EMERGENCY MEDICINE

## 2017-10-29 PROCEDURE — 96374 THER/PROPH/DIAG INJ IV PUSH: CPT

## 2017-10-29 PROCEDURE — 85025 COMPLETE CBC W/AUTO DIFF WBC: CPT | Performed by: EMERGENCY MEDICINE

## 2017-10-29 PROCEDURE — 80307 DRUG TEST PRSMV CHEM ANLYZR: CPT | Performed by: EMERGENCY MEDICINE

## 2017-10-29 PROCEDURE — 81001 URINALYSIS AUTO W/SCOPE: CPT | Performed by: EMERGENCY MEDICINE

## 2017-10-29 PROCEDURE — 87804 INFLUENZA ASSAY W/OPTIC: CPT | Performed by: EMERGENCY MEDICINE

## 2017-10-29 PROCEDURE — 25000132 ZZH RX MED GY IP 250 OP 250 PS 637: Performed by: EMERGENCY MEDICINE

## 2017-10-29 PROCEDURE — 96375 TX/PRO/DX INJ NEW DRUG ADDON: CPT

## 2017-10-29 PROCEDURE — 83690 ASSAY OF LIPASE: CPT | Performed by: EMERGENCY MEDICINE

## 2017-10-29 PROCEDURE — 83605 ASSAY OF LACTIC ACID: CPT | Performed by: EMERGENCY MEDICINE

## 2017-10-29 PROCEDURE — 87880 STREP A ASSAY W/OPTIC: CPT | Performed by: EMERGENCY MEDICINE

## 2017-10-29 PROCEDURE — 96361 HYDRATE IV INFUSION ADD-ON: CPT

## 2017-10-29 PROCEDURE — 71020 XR CHEST 2 VW: CPT

## 2017-10-29 PROCEDURE — 99284 EMERGENCY DEPT VISIT MOD MDM: CPT | Mod: 25

## 2017-10-29 PROCEDURE — 25000128 H RX IP 250 OP 636: Performed by: EMERGENCY MEDICINE

## 2017-10-29 PROCEDURE — 87081 CULTURE SCREEN ONLY: CPT | Performed by: EMERGENCY MEDICINE

## 2017-10-29 RX ORDER — AZITHROMYCIN 250 MG/1
TABLET, FILM COATED ORAL
Qty: 6 TABLET | Refills: 0 | Status: SHIPPED | OUTPATIENT
Start: 2017-10-29 | End: 2017-11-03

## 2017-10-29 RX ORDER — KETOROLAC TROMETHAMINE 30 MG/ML
30 INJECTION, SOLUTION INTRAMUSCULAR; INTRAVENOUS ONCE
Status: COMPLETED | OUTPATIENT
Start: 2017-10-29 | End: 2017-10-29

## 2017-10-29 RX ORDER — SODIUM CHLORIDE 9 MG/ML
1000 INJECTION, SOLUTION INTRAVENOUS CONTINUOUS
Status: DISCONTINUED | OUTPATIENT
Start: 2017-10-29 | End: 2017-10-30 | Stop reason: HOSPADM

## 2017-10-29 RX ORDER — ACETAMINOPHEN 325 MG/1
650 TABLET ORAL ONCE
Status: COMPLETED | OUTPATIENT
Start: 2017-10-29 | End: 2017-10-29

## 2017-10-29 RX ORDER — ONDANSETRON 2 MG/ML
4 INJECTION INTRAMUSCULAR; INTRAVENOUS EVERY 30 MIN PRN
Status: DISCONTINUED | OUTPATIENT
Start: 2017-10-29 | End: 2017-10-30 | Stop reason: HOSPADM

## 2017-10-29 RX ADMIN — ACETAMINOPHEN 650 MG: 325 TABLET, FILM COATED ORAL at 20:54

## 2017-10-29 RX ADMIN — ONDANSETRON 4 MG: 2 SOLUTION INTRAMUSCULAR; INTRAVENOUS at 20:54

## 2017-10-29 RX ADMIN — KETOROLAC TROMETHAMINE 30 MG: 30 INJECTION, SOLUTION INTRAMUSCULAR at 20:54

## 2017-10-29 RX ADMIN — SODIUM CHLORIDE 1000 ML: 9 INJECTION, SOLUTION INTRAVENOUS at 21:00

## 2017-10-29 RX ADMIN — SODIUM CHLORIDE 1000 ML: 9 INJECTION, SOLUTION INTRAVENOUS at 21:40

## 2017-10-29 NOTE — ED AVS SNAPSHOT
Emergency Department    [address] [place] [address]    Phone:  [phone]    Fax:  [phone]                                       Morgan Bosch   MRN: [id number]    Department:   Emergency Department   Date of Visit:  10/29/2017           After Visit Summary Signature Page     I have received my discharge instructions, and my questions have been answered. I have discussed any challenges I see with this plan with the nurse or doctor.    ..........................................................................................................................................  Patient/Patient Representative Signature      ..........................................................................................................................................  Patient Representative Print Name and Relationship to Patient    ..................................................               ................................................  Date                                            Time    ..........................................................................................................................................  Reviewed by Signature/Title    ...................................................              ..............................................  Date                                                            Time

## 2017-10-29 NOTE — ED AVS SNAPSHOT
Emergency Department    [address] [facility] [address]    Phone:  [phone]    Fax:  [phone]                                       Morgan Bosch   MRN: [id number]    Department:   Emergency Department   Date of Visit:  10/29/2017           Patient Information     Date Of Birth          1985        Your diagnoses for this visit were:     Pneumonia of left lower lobe due to infectious organism (H)        You were seen by Doc Lewis MD.      Follow-up Information     Schedule an appointment as soon as possible for a visit with Isaac Ding MD.    Specialty:  Internal Medicine    Contact information:    [facility] (formerly [facility])  [address] [place] [address] [facility] [address]  [facility] [id number] [phone]          Follow up with  Emergency Department.    Specialty:  EMERGENCY MEDICINE    Why:  If symptoms worsen    Contact information:    [address] [facility] [id number] [phone]        Discharge Instructions         Pneumonia (Adult)  Pneumonia is an infection deep within the lungs. It is in the small air sacs (alveoli). Pneumonia may be caused by a virus or bacteria. Pneumonia caused by bacteria is usually treated with an antibiotic. Severe cases may need to be treated in the hospital. Milder cases can be treated at home. Symptoms usually start to get better during the first 2 days of treatment.    Home care  Follow these guidelines when caring for yourself at home:    Rest at home for the first 2 to 3 days, or until you feel stronger. Don t let yourself get overly tired when you go back to your activities.    Stay away from cigarette smoke - yours or other people s.    You may use acetaminophen or ibuprofen to control fever or pain, unless another medicine was prescribed. If you have chronic liver or kidney disease, talk with your healthcare provider before using these medicines. Also talk with your provider if you ve had a stomach ulcer or gastrointestinal  bleeding. Don t give aspirin to anyone younger than 18 years of age who is ill with a fever. It may cause severe liver damage.    Your appetite may be poor, so a light diet is fine.    Drink 6 to 8 glasses of fluids every day to make sure you are getting enough fluids. Beverages can include water, sport drinks, sodas without caffeine, juices, tea, or soup. Fluids will help loosen secretions in the lung. This will make it easier for you to cough up the phlegm (sputum). If you also have heart or kidney disease, check with your healthcare provider before you drink extra fluids.    Take antibiotic medicine prescribed until it is all gone, even if you are feeling better after a few days.  Follow-up care  Follow up with your healthcare provider in the next 2 to 3 days, or as advised. This is to be sure the medicine is helping you get better.  If you are 65 or older, you should get a pneumococcal vaccine and a yearly flu (influenza) shot. You should also get these vaccines if you have chronic lung disease like asthma, emphysema, or COPD. Recently, a second type of pneumonia vaccine has become available for everyone over 65 years old. This is in addition to the previous vaccine. Ask your provider about this.  When to seek medical advice  Call your healthcare provider right away if any of these occur:    You don t get better within the first 48 hours of treatment    Shortness of breath gets worse    Rapid breathing (more than 25 breaths per minute)    Coughing up blood    Chest pain gets worse with breathing    Fever of 100.4 F (38 C) or higher that doesn t get better with fever medicine    Weakness, dizziness, or fainting that gets worse    Thirst or dry mouth that gets worse    Sinus pain, headache, or a stiff neck    Chest pain not caused by coughing  Date Last Reviewed: 1/1/2017 2000-2017 [employer]. [address] [place] [address], [place] [address]. All rights reserved. This information is not intended as a  substitute for professional medical care. Always follow your healthcare professional's instructions.          Future Appointments        Provider Department Dept Phone Center    11/16/2017 1:20 PM Nnamdi Tolbert PA-C [facility] [phone] UP      24 Hour Appointment Hotline       To make an appointment at any [facility], call [phone]). If you don't have a family doctor or clinic, we will help you find one. [facility] are conveniently located to serve the needs of you and your family.             Review of your medicines      START taking        Dose / Directions Last dose taken    azithromycin 250 MG tablet   Commonly known as:  ZITHROMAX Z-JESSICA   Quantity:  6 tablet        Two tablets on the first day, then one tablet daily for the next 4 days   Refills:  0          Our records show that you are taking the medicines listed below. If these are incorrect, please call your family doctor or clinic.        Dose / Directions Last dose taken    benzocaine 20 % Liqd liquid   Commonly known as:  ANBESOL   Dose:  1 spray   Quantity:  30 mL        Take 1 mL by mouth every 3 hours as needed for moderate pain   Refills:  1        BusPIRone HCl 30 MG Tabs   Dose:  30 mg   Quantity:  60 tablet        Take 30 mg by mouth 2 times daily   Refills:  1        FLUoxetine 20 MG capsule   Commonly known as:  PROzac   Dose:  60 mg   Quantity:  90 capsule        Take 3 capsules (60 mg) by mouth daily   Refills:  1        gabapentin 600 MG tablet   Commonly known as:  NEURONTIN   Dose:  600 mg   Quantity:  90 tablet        Take 1 tablet (600 mg) by mouth 3 times daily   Refills:  1        HYDROcodone-acetaminophen 5-325 MG per tablet   Commonly known as:  NORCO   Dose:  1-2 tablet   Quantity:  15 tablet        Take 1-2 tablets by mouth every 4 hours as needed for moderate to severe pain   Refills:  0        hydrOXYzine 50 MG tablet   Commonly known as:  ATARAX   Dose:  100 mg    Quantity:  180 tablet        Take 2 tablets (100 mg) by mouth 3 times daily as needed for anxiety   Refills:  1        multivitamin, therapeutic with minerals Tabs tablet   Dose:  1 tablet   Quantity:  30 each        Take 1 tablet by mouth daily   Refills:  1        nicotine polacrilex 4 MG lozenge   Dose:  8 mg   Quantity:  168 tablet        Place 2 lozenges (8 mg) inside cheek every hour as needed for other (nicotine withdrawal symptoms)   Refills:  1        OLANZapine 10 MG tablet   Commonly known as:  zyPREXA   Dose:  10 mg   Quantity:  10 tablet        Take 1 tablet (10 mg) by mouth daily as needed for agitation or severe anxiety   Refills:  1                Prescriptions were sent or printed at these locations (1 Prescription)                   Other Prescriptions                Printed at Department/Unit printer (1 of 1)         azithromycin (ZITHROMAX Z-JESSICA) 250 MG tablet                Procedures and tests performed during your visit     Beta strep group A culture    CBC with platelets differential    Comprehensive metabolic panel    Drug abuse screen 77 urine ([place],[facility])    Influenza A/B antigen    Lactic acid whole blood    Lipase    Peripheral IV: Standard    Rapid strep screen    UA with Microscopic    XR Chest 2 Views      Orders Needing Specimen Collection     None      Pending Results     Date and Time Order Name Status Description    10/29/2017 2046 XR Chest 2 Views Preliminary     10/29/2017 2046 Drug abuse screen 77 urine ([place],[facility]) In process     10/29/2017 2044 Beta strep group A culture In process             Pending Culture Results     Date and Time Order Name Status Description    10/29/2017 2046 Drug abuse screen 77 urine ([place],[facility]) In process     10/29/2017 2044 Beta strep group A culture In process             Pending Results Instructions     If you had any lab results that were not finalized at the time of your Discharge, you can call the ED Lab Result RN at [phone]. You will be  contacted by this team for any positive Lab results or changes in treatment. The nurses are available 7 days a week from 10A to 6:30P.  You can leave a message 24 hours per day and they will return your call.        Test Results From Your Hospital Stay        10/29/2017  8:59 PM      Component Results     Component Value Ref Range & Units Status    WBC 8.1 4.0 - 11.0 10e9/L Final    RBC Count 4.46 4.4 - 5.9 10e12/L Final    Hemoglobin 13.9 13.3 - 17.7 g/dL Final    Hematocrit 41.3 40.0 - 53.0 % Final    MCV 93 78 - 100 fl Final    MCH 31.2 26.5 - 33.0 pg Final    MCHC 33.7 31.5 - 36.5 g/dL Final    RDW 13.8 10.0 - 15.0 % Final    Platelet Count 245 150 - 450 10e9/L Final    Diff Method Automated Method  Final    % Neutrophils 79.0 % Final    % Lymphocytes 11.6 % Final    % Monocytes 8.8 % Final    % Eosinophils 0.2 % Final    % Basophils 0.2 % Final    % Immature Granulocytes 0.2 % Final    Nucleated RBCs 0 0 /100 Final    Absolute Neutrophil 6.4 1.6 - 8.3 10e9/L Final    Absolute Lymphocytes 0.9 0.8 - 5.3 10e9/L Final    Absolute Monocytes 0.7 0.0 - 1.3 10e9/L Final    Absolute Eosinophils 0.0 0.0 - 0.7 10e9/L Final    Absolute Basophils 0.0 0.0 - 0.2 10e9/L Final    Abs Immature Granulocytes 0.0 0 - 0.4 10e9/L Final    Absolute Nucleated RBC 0.0  Final         10/29/2017  9:15 PM      Component Results     Component Value Ref Range & Units Status    Sodium 132 (L) 133 - 144 mmol/L Final    Potassium 3.9 3.4 - 5.3 mmol/L Final    Chloride 98 94 - 109 mmol/L Final    Carbon Dioxide 28 20 - 32 mmol/L Final    Anion Gap 6 3 - 14 mmol/L Final    Glucose 108 (H) 70 - 99 mg/dL Final    Urea Nitrogen 10 7 - 30 mg/dL Final    Creatinine 0.96 0.66 - 1.25 mg/dL Final    GFR Estimate >90 >60 mL/min/1.7m2 Final    Non  GFR Calc    GFR Estimate If Black >90 >60 mL/min/1.7m2 Final    African American GFR Calc    Calcium 8.5 8.5 - 10.1 mg/dL Final    Bilirubin Total 0.3 0.2 - 1.3 mg/dL Final    Albumin 3.5 3.4 -  5.0 g/dL Final    Protein Total 6.9 6.8 - 8.8 g/dL Final    Alkaline Phosphatase 97 40 - 150 U/L Final    ALT 36 0 - 70 U/L Final    AST 21 0 - 45 U/L Final         10/29/2017  9:13 PM      Component Results     Component Value Ref Range & Units Status    Lipase 182 73 - 393 U/L Final         10/29/2017  8:59 PM      Component Results     Component Value Ref Range & Units Status    Lactic Acid 1.2 0.7 - 2.0 mmol/L Final         10/29/2017  9:20 PM      Component Results     Component Value Ref Range & Units Status    Color Urine Yellow  Final    Appearance Urine Clear  Final    Glucose Urine 30 (A) NEG^Negative mg/dL Final    Bilirubin Urine Negative NEG^Negative Final    Ketones Urine Negative NEG^Negative mg/dL Final    Specific Gravity Urine 1.017 1.003 - 1.035 Final    Blood Urine Negative NEG^Negative Final    pH Urine 6.5 5.0 - 7.0 pH Final    Protein Albumin Urine Negative NEG^Negative mg/dL Final    Urobilinogen mg/dL Normal 0.0 - 2.0 mg/dL Final    Nitrite Urine Negative NEG^Negative Final    Leukocyte Esterase Urine Negative NEG^Negative Final    Source Midstream Urine  Final    WBC Urine 0 0 - 2 /HPF Final    RBC Urine <1 0 - 2 /HPF Final    Squamous Epithelial /HPF Urine <1 0 - 1 /HPF Final    Mucous Urine Present (A) NEG^Negative /LPF Final         10/29/2017  9:04 PM         10/29/2017  9:40 PM      Narrative     CHEST TWO VIEWS  10/29/2017 9:17 PM     HISTORY: Cough, fever.    COMPARISON: 6/12/2010.        Impression     IMPRESSION: Endotracheal tube and nasogastric tube are no longer seen.  Interval development of mild patchy infiltrate in the left mid to  lower lung zone peripherally, seen best on the PA view. The remainder  of the lungs are clear bilaterally. The heart size is normal.            10/29/2017  9:38 PM      Component Results     Component Value Ref Range & Units Status    Influenza A/B Agn Specimen Nares  Final    Influenza A Negative NEG^Negative Final    Influenza B Negative  NEG^Negative Final    Test results must be correlated with clinical data. If necessary, results   should be confirmed by a molecular assay or viral culture.           10/29/2017  9:04 PM      Component Results     Component    Specimen Description    Throat    Rapid Strep A Screen    NEGATIVE: No Group A streptococcal antigen detected by immunoassay, await culture report.         10/29/2017  9:05 PM                Clinical Quality Measure: Blood Pressure Screening     Your blood pressure was checked while you were in the emergency department today. The last reading we obtained was  BP: 121/78 . Please read the guidelines below about what these numbers mean and what you should do about them.  If your systolic blood pressure (the top number) is less than 120 and your diastolic blood pressure (the bottom number) is less than 80, then your blood pressure is normal. There is nothing more that you need to do about it.  If your systolic blood pressure (the top number) is 120-139 or your diastolic blood pressure (the bottom number) is 80-89, your blood pressure may be higher than it should be. You should have your blood pressure rechecked within a year by a primary care provider.  If your systolic blood pressure (the top number) is 140 or greater or your diastolic blood pressure (the bottom number) is 90 or greater, you may have high blood pressure. High blood pressure is treatable, but if left untreated over time it can put you at risk for heart attack, stroke, or kidney failure. You should have your blood pressure rechecked by a primary care provider within the next 4 weeks.  If your provider in the emergency department today gave you specific instructions to follow-up with your doctor or provider even sooner than that, you should follow that instruction and not wait for up to 4 weeks for your follow-up visit.        Thank you for choosing [facility]       Thank you for choosing [facility] for your care. Our goal is always to  "provide you with excellent care. Hearing back from our patients is one way we can continue to improve our services. Please take a few minutes to complete the written survey that you may receive in the mail after you visit with us. Thank you!        [employer] Information     [employer] lets you send messages to your doctor, view your test results, renew your prescriptions, schedule appointments and more. To sign up, go to [email or web address] . Click on \"Log in\" on the left side of the screen, which will take you to the Welcome page. Then click on \"Sign up Now\" on the right side of the page.     You will be asked to enter the access code listed below, as well as some personal information. Please follow the directions to create your username and password.     Your access code is: [email or web address]  Expires: 10/30/2017  9:45 AM     Your access code will  in 90 days. If you need help or a new code, please call your [place] clinic or [phone].        Care EveryWhere ID     This is your Care EveryWhere ID. This could be used by other organizations to access your [place] medical records  [id number]        Equal Access to Services     MUSTAPHA COSTELLO : Hadii umair Berrios, silva mackenzie, qafernando kaaloscar hector, yasir julio. So [facility] [phone].    ATENCIÓN: Si habla español, tiene a diamond disposición servicios gratuitos de asistencia lingüística. Devlis al [phone].    We comply with applicable federal civil rights laws and [place] laws. We do not discriminate on the basis of race, color, national origin, age, disability, sex, sexual orientation, or gender identity.            After Visit Summary       This is your record. Keep this with you and show to your community pharmacist(s) and doctor(s) at your next visit.                  "

## 2017-10-30 NOTE — ED PROVIDER NOTES
CHIEF COMPLAINT:  Generalized body aches, fever and cough.      HISTORY OF PRESENT ILLNESS:  Morgan Bosch is a 32-year-old male who came in with a few days of cough, fevers and chills as well as some diarrhea.  He feels a little short of breath, but no chest pain and no abdominal pain.  He has not actually taken his temperature, but he has felt warm.  He indicates that he has not coughed anything up.  He indicates that he drank a whole bottle of cough syrup earlier to help with his symptoms.  He denies significant headache, but he does have a sore throat and some mild bilateral ear pain.      PAST MEDICAL HISTORY:   1.  Anxiety.   2.  Depression.   3.  Psychosis.   4.  Methamphetamine substance abuse.   5.  Antisocial personality disorder.      MEDICATIONS:   1.  Buspirone.   2.  Prozac.   3.  Neurontin.   4.  Atarax.   5.  Multivitamin.   6.  Zyprexa.      ALLERGIES:  Adhesive tape.      SOCIAL HISTORY:  He is a former smoker.  He drinks alcohol.  He denies any current drug use.      REVIEW OF SYSTEMS:  See HPI.  All others are negative.      PHYSICAL EXAMINATION:   VITAL SIGNS:  Temperature is 102.7, blood pressure 152/77, heart rate 126 and respiratory rate 20.  He is satting 94% on room air.   GENERAL:  This is a young male who is awake, alert and nontoxic.   EYES:  Normal.   ENT:  Normal, TM's with some fluid behind them, but no erythema or bulging.  CARDIAC:  Tachycardic, but regular.  No murmurs.   RESPIRATORY:  Clear to auscultation without any rales, crackles or expiratory wheezes.   GASTROINTESTINAL:  Positive bowel sounds.  Soft and nontender.   MUSCULOSKELETAL:  Extremities are atraumatic x4 without any lower extremity edema.   SKIN:  Normal.   NEUROLOGIC:  Awake, alert and oriented x3.  Cranial nerves II-XII are intact.  Motor and sensation are normal.   PSYCHIATRIC:  Normal.      LABORATORY VALUES AND STUDIES:  Influenza is negative.  UA is negative.  Rapid Strep is negative.  Lactic acid is 1.2.   CBC is within normal limits.  Comprehensive metabolic panel is within normal limits with the exception of a slightly low sodium of 132.  Lipase is 182.  Chest x-ray shows a mild patchy infiltrate in the left mid to lower lung.      MEDICAL DECISION MAKING:  This is a 32-year-old male who came in with infectious symptoms.  He actually was not the best historian, and therefore I considered multiple different etiologies, including strep throat, influenza, pneumonia, possibly some sort of gastroenteritis or viral syndrome.  I also considered the possibility of endocarditis due to the fact that he does have some substance abuse history.  However, I do not see any track marks, and he denies any IV drug use, and I do not hear a murmur.  I proceeded with the above workup, including the blood work, urine, chest x-ray, and influenza and strep testing.  He does not appear septic, and in fact, his only real abnormality is the chest x-ray showing pneumonia.  I think he is safe for discharge and outpatient management.  I will provide him a prescription for a Z-William.  He is instructed to return, though, if his symptoms worsen and to follow up with the clinic I am providing for him as soon as possible.      IMPRESSION:  Pneumonia, left lower lobe.         ESTELLE CUMMINGS MD             D: 10/29/2017 22:25   T: 10/30/2017 11:41   MT: [id number]      Name:     NAVJOT HAINES   MRN:      [id number]        Account:      [id number]   :      1985           Visit Date:   10/29/2017      Document: [id number]

## 2017-10-30 NOTE — DISCHARGE INSTRUCTIONS

## 2017-10-31 LAB
BACTERIA SPEC CULT: NORMAL
Lab: NORMAL
SPECIMEN SOURCE: NORMAL

## 2018-04-01 ENCOUNTER — COMMUNICATION - HEALTHEAST (OUTPATIENT)
Dept: SCHEDULING | Facility: CLINIC | Age: 33
End: 2018-04-01

## 2018-12-10 ENCOUNTER — TELEPHONE (OUTPATIENT)
Dept: BEHAVIORAL HEALTH | Facility: CLINIC | Age: 33
End: 2018-12-10

## 2018-12-10 NOTE — TELEPHONE ENCOUNTER
Per client he was ref to [facility] by his CLAUDIA @ [employer] ph# [phone]  Dx: dep/anxiety   Hx of cd but has been sober 6  Mos and is working a 12 step program with support   He will have CLAUDIA send ad clinical

## 2018-12-13 ENCOUNTER — BEH TREATMENT PLAN (OUTPATIENT)
Dept: BEHAVIORAL HEALTH | Facility: CLINIC | Age: 33
End: 2018-12-13
Attending: PSYCHIATRY & NEUROLOGY

## 2018-12-13 ENCOUNTER — HOSPITAL ENCOUNTER (OUTPATIENT)
Dept: BEHAVIORAL HEALTH | Facility: CLINIC | Age: 33
Discharge: HOME | End: 2018-12-13
Attending: PSYCHOLOGIST | Admitting: PSYCHOLOGIST
Payer: COMMERCIAL

## 2018-12-13 DIAGNOSIS — F25.1 SCHIZOAFFECTIVE DISORDER, DEPRESSIVE TYPE (H): ICD-10-CM

## 2018-12-13 PROCEDURE — 90791 PSYCH DIAGNOSTIC EVALUATION: CPT | Performed by: PSYCHOLOGIST

## 2018-12-13 ASSESSMENT — PAIN SCALES - GENERAL: PAINLEVEL: NO PAIN (0)

## 2018-12-13 NOTE — PROGRESS NOTES
Acknowledgement of Current Treatment Plan       I have reviewed my treatment plan with my therapist / counselor on 12/13/2018  . I agree with the plan as it is written in the electronic health record.    Name Signature   Morgan Bosch    Name of Therapist / Counselor    Juvenal Benton., D,  L.P.
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
## 2018-12-13 NOTE — PROGRESS NOTES
Initial Individual Treatment Plan     Patient: Morgan Bosch   MRN: [id number]  : 1985  Age: 33 year old  Sex: male    Diagnostic Assessment Date / Date of Initial Individual Treatment Plan: 18      Immediate Health Concerns:  No     Immediate Safety Concerns:  Yes. Identify safety concern and plan to address: report any increase in symptoms to the treatment team    Identify the issues to be addressed in treatment:  Symptom Management, Personal Safety, Community Resources/Discharge Planning, Abstinence/Relapse Prevention and Develop Socialization / Interpersonal Relationship Skills    Client Initial Individualized Goals for Treatment:   Continue to attend AA for support and maintain sobriety  Report any increase in symptoms to the team  Consider getting an individual therapist to talk about difficult emotions  Reduce anxiety in social situations  Manage panic attacks  Find ways to combat the depressed mindset  Manage grief and loss from the past    Initial Treatment suggestions for the client during the time between Diagnostic Assessment and completion of the Individualized Treatment Plan:  Follow Safety Plan  Abstain from Substance Use   Ask for more information, support and/or assistance as needed.  Follow up with providers/community supports as needed:   Report increases or changes in symptoms to staff.  Report any personal safety concerns to staff.   Take medications as prescribed.  Report medication changes and/or side effects to staff.  Attend and participate in groups as scheduled or notify staff if unable to do so.  Report any use of substances to staff as this may impact your symptoms and/or  personal safety.  Notify staff if you have any other issues that need to be addressed. This may include  any current abuse / neglect / exploitation or other vulnerability.  Follow recommendations of your treatment team and discuss concerns if not in  agreement.     Treatment Team Responsible: Day  Treatment (DT)      Therapeutic Interventions/Treatment Strategies may include:  Support, Redirection, Feedback, Limit/Boundaries, Safety Assessments, Structured Activity, Problem Solving, Clarification, Education, Motivational Enhancement and Relapse Prevention as needed.    Juvenal Benotn., D,  L.P.

## 2018-12-13 NOTE — PROGRESS NOTES
Adult Mental Health Intensive Outpatient Discharge Summary/Instructions      Patient: Morgan Bosch MRN: [id number]   : 1985 Age: 33 year old Sex: male     Admission Date: 2018    Discharge Date: 19  Diagnosis:   295.70 Schizoaffective Disorder, Depressed Type  296.34 Major Depressive Disorder with psychotic features  300.01 Panic Disorder    Focus of Treatment / Progress    Personal Safety:  Juice reported being safe during group sessions.     * Follow your safety plan     * Call crisis lines as needed:    [facility] [place] [phone] [facility] [phone]  [facility] [phone] Crisis Connection [phone]  [facility] [phone] [facility] [phone]  [facility] [phone] National Suicide Prevention [phone]  [facility] [phone] Suicide Prevention [phone]  [facility] [phone]    Managing symptoms of:  Depression, grief and loss, substance abuse with psychosis symptoms, low energy, anxiety, worry, suicidal ideation, some symptoms of paranoia.    Community support/health:  AKI.org National [place] of Mental Illness     [phone]  [facility], [facility]   3-4 pm,      [facility]: [phone],  5:45 pm AIK support group  [facility] Door: Panic & Anxiety: through AKI.org    [facility], [address] [place] [address]:  [phone]    [facility], 2nd floor, [place] [address]  [phone]  First Episode Family Education: : 6-8 pm, [facility], 2nd floor    First Episode Young Adult Group Support : 3-4 pm  [place] [address]    Mon or Wednesday 1-2:30 pm  Living With Schizophrenia group  [facility]  [address] [place] [address] [address] [address]      Managing Symptoms and Preventing Relapse    * Go to all of your appointments    * Take all medications as directed.      * Carry a current list if medication with you    * Do not use  "illicit (street) drugs.  Avoid alcohol    * Report these symptoms to your care team. These are early signs of relapse:   Thoughts of suicide   Losing more sleep   Increased confusion   Mood getting worse   Feeling more aggressive   Other:  CD:  He reported being sober from alcohol for 6 months and sober from marijuana for 2.5 months at admission    *Use these skills daily:  Practice Mindfulness, Reality Checks, Opposite to Emotion, Identify thought distortions & feelings, distractions, express self, keep appointments, take medications daily, journal, use gratitude, self-compassion, Wise Mind, Use of the 5 Senses, grounding, exercise, Stay on a sleep schedule       Copy of summary sent to:   : Vivianed Perales, [employer] [phone]  Psychiatry:   Managed by PCP  Therapy:  He will decide  PCP: Dr. Suhas Tolbert:  [phone]    Follow up with psychiatrist / main caregiver:   Dr. Suhas Tolbert    Next visit: He will schedule    Follow up with your therapist:  He chose not to have one   Next visit:     Go to group therapy and / or support groups at:   AKI.[facility] MS  [facility]  [facility], [phone]    See your medical doctor about:  Physical health concerns    Other:   Your team appreciates the opportunity to work with you and wishes you the best of luck.  Please call [phone] with any further questions.      Client Signature:_______________________  Date / Time: 4/25/19    Staff Signature:____Sree Benton D,  L.P.__________    Date / Time:4/25/19, 1550              Adult Outpatient Mental Health Programs  MY COPING PLAN FOR SAFETY    NAME: Morgan Bosch  MRN: [id number]    SAFETY PLAN:  Step 1: Warning signs / cues (Thoughts, images, mood, situation, behavior) that a crisis may be developing:    Thoughts: \"I don't matter\", \"People would be better off without me\", \"I'm a burden\", \"I can't do this anymore\", \"I just want this to end\" and \"Nothing " "makes it better\"    Images: ruminating thoughts about the past, flashbacks     Thinking Processes: ruminations (can't stop thinking about my problems): -,during panic attack:  racing thoughts, intrusive thoughts (bothersome, unwanted thoughts that come out of nowhere): -, highly critical and negative thoughts, unwanted thoughts : -, disorganized thinking:         Mood: worsening depression, hopelessness, helplessness, intense anger, intense worry, agitation, disinhibited (not caring about things or consequences) and mood swings        Behaviors: isolating/withdrawing , can't stop crying, giving things away, saying good-bye, not taking care of myself, not taking care of my responsibilities,  sleeping too much    Situations:  loss: -, public shame: -, legal issues: in the past , changes in symptoms: increase in anxiety -,  relationship problems, trauma , relapse, financial stress, medical condition / diagnosis: for his girlfriend, who has cancer      Step 2: Coping strategies - Things I can do to take my mind off of my problems without contacting another person (relaxation technique, physical activity):    Distress Tolerance Strategies:  relaxation activities: -, arts and crafts: -,  listen to positive and upbeat music: -, sensory based activities/self-soothe with five senses: -,  pray, read a book: -,paced breathing/progressive muscle relaxation,    Physical Activities: go for a walk, exercise: -, yoga, gardening, meditation, deep breathing, reading    Focus on helpful thoughts:  \"This is temporary\", \"I will get through this\", \"It always passes\", \"Ride the wave\", think about happy memories: -     remind myself of what is important to me:, self-compassion statements:   Step 3: People and social settings that provide distraction:   Name:  Radhika Bosch Phone: [phone]   Name: Joaquina Tucker Phone: [phone]   Name:   Phone:       Doing art work at home       Step 4: Remind myself of people and things that are " important to me and worth living for:   (Family, Friends, kids, relatives, pets ?)  Step 5: When I am in crisis, I can ask these people to help me use my safety plan:   Name:  Radhika Bosch Phone: [phone]   Name: Joaquina Tucker Phone: [phone]    Step 6: Making the environment safe:     remove alcohol, remove drugs, secure medications: -, dispose of old medications , remove access to firearms: -, remove things I could use to hurt myself: -, alternate routes: -, arrange transportation: -, be around others and -  Step 7: Professionals or agencies I can contact during a crisis:    Suicide Prevention Lifeline: 3-873-124-MLPF (6459)    Crisis Text Line Service (available 24 hours a day, 7 days a week): Text [place] to 053249    Call  **CRISIS (344199) from a cell phone to talk to a team of professionals who can help you.      Crisis Services By [facility]: Phone Number:   [place]     [phone]   [place]    [phone]   [place]    [phone]   [place]    [phone]   [place]    [phone]   [place] [phone]   [place]     [phone]       Call 911 or go to my nearest emergency department.     I helped develop this safety plan and agree to use it when needed.  I have been given a copy of this plan.      Client signature _________________________________________________________________  Today s date:    Adapted from Safety Plan Template 2008 Deanna Romero and [place] is reprinted with the express permission of the authors.  No portion of the Safety Plan Template may be reproduced without the express, written permission.  You can contact the authors at [email or web address] or [email or web address].                              .

## 2018-12-17 ENCOUNTER — HOSPITAL ENCOUNTER (OUTPATIENT)
Dept: BEHAVIORAL HEALTH | Facility: CLINIC | Age: 33
End: 2018-12-17
Attending: PSYCHIATRY & NEUROLOGY
Payer: COMMERCIAL

## 2018-12-17 PROBLEM — F25.1 SCHIZOAFFECTIVE DISORDER, DEPRESSIVE TYPE (H): Status: ACTIVE | Noted: 2018-12-17

## 2018-12-17 PROCEDURE — H2012 BEHAV HLTH DAY TREAT, PER HR: HCPCS

## 2018-12-17 ASSESSMENT — ANXIETY QUESTIONNAIRES
5. BEING SO RESTLESS THAT IT IS HARD TO SIT STILL: NOT AT ALL
1. FEELING NERVOUS, ANXIOUS, OR ON EDGE: MORE THAN HALF THE DAYS
GAD7 TOTAL SCORE: 10
3. WORRYING TOO MUCH ABOUT DIFFERENT THINGS: MORE THAN HALF THE DAYS
IF YOU CHECKED OFF ANY PROBLEMS ON THIS QUESTIONNAIRE, HOW DIFFICULT HAVE THESE PROBLEMS MADE IT FOR YOU TO DO YOUR WORK, TAKE CARE OF THINGS AT HOME, OR GET ALONG WITH OTHER PEOPLE: SOMEWHAT DIFFICULT
6. BECOMING EASILY ANNOYED OR IRRITABLE: MORE THAN HALF THE DAYS
7. FEELING AFRAID AS IF SOMETHING AWFUL MIGHT HAPPEN: SEVERAL DAYS
2. NOT BEING ABLE TO STOP OR CONTROL WORRYING: MORE THAN HALF THE DAYS

## 2018-12-17 ASSESSMENT — PATIENT HEALTH QUESTIONNAIRE - PHQ9
SUM OF ALL RESPONSES TO PHQ QUESTIONS 1-9: 8
5. POOR APPETITE OR OVEREATING: SEVERAL DAYS

## 2018-12-17 NOTE — PROGRESS NOTES
Individualized Treatment Plan     Date of Plan: 2019    Name: Morgan Bosch MRN: [id number]    : 1985    Programs:  Adult Day Treatment (DT)     Clinical Track (if applicable):  4C    DSM5 Diagnosis  296.34 Major Depressive Disorder with psychotic features  300.01 Panic Disorder      Adult Day Treatment Program Multidisciplinary Team Members: Pako Sotelo MD and/or Jaiden Jefferson NP;   4C Treatment Team - Adult Day Treatment Program    KEITH Cevallos,  [phone]  Sree Comer, Licensed Psychologist,  [phone]  Dayami Edmonds MSN, RN, PHN  [phone]    Morgan Bosch will participate in the Adult Day Treatment Program  3 days per week, 3 hours per day. Anticipated duration/discharge: 12 weeks    Program Start Date: 18  Anticipated Discharge Date:  3/28/19  (pending authorization/clinical changes)    NOTE: Complete CGI     Review Date: Does Morgan Bosch continue to meet criteria to participate in the Adult Day Treatment Program, 3 days per week; 3 hours per day?   18 yes   1/15/19 yes   19 yes   2019   yes      Client Strengths:  caring, committed to sobriety, creative, educated, empathetic, employed, goal-focused, good listener, has a previous history of therapy, insightful, intelligent, motivated, open to learning, open to suggestions / feedback,, support of family, friends and providers, supportive, wants to learn, willing to ask questions, willing to relate to others, work history     Client Participation in Plan:  Contributed to goals and plan   Agrees with plan   Received copy of treatment plan   Discussed with staff     Areas of Vulnerability:  Suicidal Ideation   Psychotic symptoms/behavior   Anxiety  Depressive symptoms   Trauma/Abuse/Neglect  History of substance abuse with psychosis     Long-Term Goals:  Knowledge about illness and management of symptoms   Maintenance of personal safety   Maintenance of sobriety     Abuse Prevention  Plan:  Safe, therapeutic environment   Safety coping plan as needed   Education regarding illness and skill development   Coordination with care providers     Discharge Criteria:  Satisfactory progress toward treatment goals   Improvement re: identified problems and symptoms   Ability to continue recovery at next level of service   Has a discharge plan in place   Ability to maintain sobriety  Regular attendance as scheduled     Tentative Discharge Date:requested more time, due to  Grief and loss issues, 5 /14/19    Areas of Treatment Focus        Psychiatry:  [employer]  Therapy:  Being arranged by   : Vivian Perales: [employer] [phone]  PCP: Dr. Dai Scmidt: [phone]     Area of Treatment Focus:   Personal Safety  Start Date:    12/20/18    Goal:  Target Date: 1/15/19, 2/19,4/16 Status: Active  Client will notify staff when needing assistance to develop or implement a coping plan to manage suicidal or self injurious urges.      Progress:   12/20/2018: Juice reported being safe during group sessions.   1/22/2019 Juice reported being safe during group therapy sessions  2/19/19: Juice denies any suicidal thoughts or urges to self harm at this time.  3/25/2019  Juice reported that he wanted to die in exchange for his girlfriend's failing health from terminal brain cancer and talked to the group about survivor guilt and suicidal thoughts.   4/2/2019  Juice reported being safe.        Treatment Strategies:   Teach adaptive coping skills and communication skills            Area of Treatment Focus:   Symptom Stabilization and Management   Life Skills  Start Date:    1/15/19    Goal:  Target Date: 2/19/19,4/16 Status: Active  When in life skills group Juice will focus on building new friendships and social support providing an update of progress weekly.        Progress:   2/19/19: Juice has had recent family concerns that have taken up a lot of his time. He is still interested in social  friendships.    4/2/19: Juice has attended [facility] for cooking night and other activities to meet people and strengthen his support system. He stated he has enjoyed going to [employer] and plans to continue going.    4/19/19: Juice is planning to check out [facility] tpday as an additional support upon discharge and as a place to establish new connections.     Treatment Strategies:   Facilitate increased self awareness  Provide education regarding the improtance of having a healthy social support system.          Area of Treatment Focus:   Symptom Stabilization and Management   Group Therapy  Start Date:    12/20/18    Goal:  Target Date: 1/15/19,2/19,4/16 Status: Active  Will learn and practice 1-2 coping skills to help improve anxiety, depression, grief and loss, any psychosis symptoms, maintain sobriety      Progress:   12/20/2018 Juice participates in mindfulness with the group.   1/22/2019 Juice reported that he uses mindfulness, self-compassion: patience with self and others, One Thing At A Time, Reality Checks, identifying emotions, identifying thought distortions, Wise Mind, and staying in the present moment.   2/19/19: Juice practices mindfulness weekly and engages in distraction skills when needed. He gives good feedback to group members. He is supporting his overall wellbeing by choosing nutritious foods and exercising.   3/25/2019  Juice reported that he uses distraction skills, and does Reality Checks with his girlfriend and his mom  4/2/2019  Juice reported that he sets limits with his relationships so that he doesn't feel overwhelmed.        Treatment Strategies:   Assist clients in establishing / strengthening support network  Teach adaptive coping skills and communication skills  Use reality based supportive approach  Utilize and reinforce no-use contract for substance use / abuse          Area of Treatment Focus:   Community Resources / Support and Discharge Planning  Start Date:    12/20/18    Goal:  Target  Date: 1/15/19,2/19,4/16 Status: Active  build suports and get an individual therapist      Progress:   12/20/2018: Juice is interested in getting a psychiatrist and therapist.   1/22/2019: Juiec has a list of different clinics he could use and is considering his options.     2/19/19: Juice has been given information about AKI support groups but has not attended one. He is working with [facility] on setting up individual therapy.   3/25/2019  Juice went to the [facility] and got medications from Dr. Dilshad Tolbert.  He has not scheduled an individual therapist.   4/2/2019  Juice went to the [facility] and participated in activities.  Juice works with his   At [employer] Matty Perales. [phone]  4/19/19: Juice is planning to check out [facility] tpday as an additional support upon discharge.       Treatment Strategies:   Teach adaptive coping skills and communication skills  Use reality based supportive approach

## 2018-12-17 NOTE — TELEPHONE ENCOUNTER
----- Message from Jeannine Garcia sent at 12/17/2018  2:07 PM CST -----      ----- Message -----  From: Tiffany Wooten PsyD  Sent: 12/13/2018  12:46 PM  To: Ashwin Gibbs, OTR/L, Lashell Hong, [facility], #    Juice will start in the 4C track on Monday 12/17  Therapist: [address] thur,  1-4    He has Desiree/Ma  Requesting 48 visits or 144 hours for Adult Day Tx  12/17/2018 - 4/30/2019    Thanks, from Tiffany Wooten

## 2018-12-17 NOTE — PROGRESS NOTES
"Adult Mental Health Outpatient Group Therapy Progress Note           Client Initial Individualized Goals for Treatment:   \" get help with returning to work, helping narrow my career path, feel better about social situations, and increase self-esteem\".      See Initial Treatment suggestions for the client during the time between Diagnostic Assessment and completion of the Master Individualized Treatment Plan.      Treatment Goals:  1. Client will notify staff when needing assistance to develop or implement a coping plan to manage suicidal or self injurious urges.  2. When in life skills groups will   3. Group Therapy :    4. Health/Wellness / Discharge :      Area of Treatment Focus:  Symptom Management      Therapeutic Interventions/Treatment Strategies:  Support, Feedback, Safety Assessments, Structured Activity and Education      Area of Treatment Focus:  Symptom Management, Personal Safety, Community Resources/Discharge Planning, Abstinence/Relapse Prevention, Develop/Improve Independent Living/Socialization Skills/Interpersonal Relationships and Cultural/Racial/Health and Spiritual      Therapeutic Interventions/Treatment Strategies:  Support, Redirection, Feedback, Limit/Boundaries, Safety Assessments, Structuresd Activity, Problem Solving, Clarification, Education, Motivational Enhancement Therapy, Cognitive Behavioral Therapy and Structured Activity      Response to Treatment Strategies:  Accepted Feedback, Gave Feedback, Listened, Focused on Goals, Attentive, Accepted Support, Alert, Demonstrates Behavior Change      Name of Group:     Group Psychotherapy        2:00 - 2:50 pm       12/17/2018                Therapist:  Sree Benton, D,  L.P.      Number of Group Participants:   5  Description and Outcome:          Group Therapy               Juice reported being safe today.   He stated that his girlfriend came over and they talked about their argument, and he felt that they had resolved it. He " talked with the group about this and how she needed to explain how she felt, and that he listened to her, and then she stated that she felt better about their relationship.  He reported that he decided not to interrupt her and realized that she needed to explain herself, but he felt that she was beating up on him, but later discovered that she needed to process her emotions, and that it wasn't beating up on him.  He reported that sleep was ok, his thoughts were ok, and his mood was up and down. He stated that he will contact his doctor about medications.  He reported that he enjoyed watching [employer] movies, playing guitar, drawing, painting, doing photography and dancing to music.  He denies any psychosis symptoms, but did state that he can get paranoia around trusting others, even friends or family.  He stated that he did talk with his mom, and that he will talk to his doctor about medications.        He participated in a mindfulness activity of 10 minutes while practicing deep breathing.      Client would benefit from additional opportunities to practice and implement content from this session in everyday life, relationships and mental health recovery.          Is this a Weekly Review of the Progress on the Treatment Plan?  Yes.        Are Treatment Plan Goals being addressed?  Yes, continue treatment goals          Are Treatment Plan Strategies to Address Goals Effective?  Yes, continue treatment strategies          Are there any current contracts in place?  No

## 2018-12-18 ENCOUNTER — HOSPITAL ENCOUNTER (OUTPATIENT)
Dept: BEHAVIORAL HEALTH | Facility: CLINIC | Age: 33
End: 2018-12-18
Attending: PSYCHIATRY & NEUROLOGY
Payer: COMMERCIAL

## 2018-12-18 PROCEDURE — H2012 BEHAV HLTH DAY TREAT, PER HR: HCPCS

## 2018-12-18 ASSESSMENT — ANXIETY QUESTIONNAIRES: GAD7 TOTAL SCORE: 10

## 2018-12-18 NOTE — PROGRESS NOTES
Adult Mental Health Outpatient Group Therapy Progress Note     Client Initial Individualized Goals for Treatment:  Continue to attend AA for support and maintain sobriety  Report any increase in symptoms to the team  Consider getting an individual therapist to talk about difficult emotions  Reduce anxiety in social situations  Manage panic attacks  Find ways to combat the depressed mindset  Manage grief and loss from the past      See Initial Treatment suggestions for the client during the time between Diagnostic Assessment and completion of the Master Individualized Treatment Plan.    Treatment Goals:  Follow Safety Plan  Abstain from Substance Use   Ask for more information, support and/or assistance as needed.  Follow up with providers/community supports as needed:   Report increases or changes in symptoms to staff.  Report any personal safety concerns to staff.   Take medications as prescribed.  Report medication changes and/or side effects to staff.  Attend and participate in groups as scheduled or notify staff if unable to do so.  Report any use of substances to staff as this may impact your symptoms and/or personal safety.  Notify staff if you have any other issues that need to be addressed. This may include any current abuse / neglect / exploitation or other vulnerability.  Follow recommendations of your treatment team and discuss concerns if not in  agreement.     Area of Treatment Focus:  Symptom Management    Therapeutic Interventions/Treatment Strategies:  Support, Feedback, Safety Assessments, Structured Activity and Education    Response to Treatment Strategies:  Accepted Feedback, Listened, Attentive, Accepted Support and Alert     Name of Group: Life  Skills(3:00-4:00)  Group Participants: 5    Description and Outcome:  patient presented with calm,even mood during first session in this group therapy program.Good focus and concentration during a discussion related to time management. Thought process  clear,goal directed and reality based. Treatment goals identified by patient included ..........decrease perfectionistic thoughts and behaviors,improve social support decision making.  Patient would benefit from additional opportunities to practice and implement content from this session  in every day life,relationships and mental health recovery.    Is this a Weekly Review of the Progress on the Treatment Plan?  Yes.      Are Treatment Plan Goals being addressed?  Yes, continue treatment goals      Are Treatment Plan Strategies to Address Goals Effective?  Yes, continue treatment strategies      Are there any current contracts in place?  No

## 2018-12-18 NOTE — PROGRESS NOTES
"        Adult Mental Health Outpatient Group Therapy Progress Note           Client Initial Individualized Goals for Treatment:   \" get help with returning to work, helping narrow my career path, feel better about social situations, and increase self-esteem\".      See Initial Treatment suggestions for the client during the time between Diagnostic Assessment and completion of the Master Individualized Treatment Plan.      Treatment Goals:  1. Client will notify staff when needing assistance to develop or implement a coping plan to manage suicidal or self injurious urges.  2. When in life skills groups will   3. Group Therapy :    4. Health/Wellness / Discharge :      Area of Treatment Focus:  Symptom Management      Therapeutic Interventions/Treatment Strategies:  Support, Feedback, Safety Assessments, Structured Activity and Education      Area of Treatment Focus:  Symptom Management, Personal Safety, Community Resources/Discharge Planning, Abstinence/Relapse Prevention, Develop/Improve Independent Living/Socialization Skills/Interpersonal Relationships and Cultural/Racial/Health and Spiritual      Therapeutic Interventions/Treatment Strategies:  Support, Redirection, Feedback, Limit/Boundaries, Safety Assessments, Structuresd Activity, Problem Solving, Clarification, Education, Motivational Enhancement Therapy, Cognitive Behavioral Therapy and Structured Activity      Response to Treatment Strategies:  Accepted Feedback, Gave Feedback, Listened, Focused on Goals, Attentive, Accepted Support, Alert, Demonstrates Behavior Change      Name of Group:     Group Psychotherapy        2:00 - 2:50 pm       12/18/18      Therapist:  Sree Benton, D,  L.P.      Number of Group Participants:   5  Description and Outcome:      Group Therapy               Juice reported being safe today.   He stated that he has a few friends, and some of them live in [place], so he calls or e-mails them, but that he is trying to make safe " and sober friends.  He reported that he doesn't see his old friends who used, and wants to take care of himself, first.  He reported that he has problems with anxiety and panic disorder and talked to the group about panic disorder symptoms.  He stated that he was in the ED with a panic attack and that he thought he was dying.  He reported that he is interested in seeing a psychiatrist and asked for assistance in re-connecting with one.  He stated that he will contact his PCP doctor about medications.  He reported that he enjoyed watching funny movies, playing guitar, drawing, painting, doing photography and dancing to music.  He denies any psychosis symptoms, but did state that he can get paranoia around trusting others, even friends or family.  He stated that he did talk with his mom, and that he will talk to his doctor about medications.  He reported that he sometimes has hope, but that he has felt depressed and anxious.     He reported that he has had problems with checking and was organizing and checking things at his home, lost track of time and came a little late to the group.           He participated in a mindfulness activity of 10 minutes while practicing deep breathing.      Client would benefit from additional opportunities to practice and implement content from this session in everyday life, relationships and mental health recovery.          Is this a Weekly Review of the Progress on the Treatment Plan?  Yes.        Are Treatment Plan Goals being addressed?  Yes, continue treatment goals          Are Treatment Plan Strategies to Address Goals Effective?  Yes, continue treatment strategies          Are there any current contracts in place?  No

## 2018-12-19 NOTE — PROGRESS NOTES
Adult Mental Health Outpatient Group Therapy Progress Note     Client Initial Individualized Goals for Treatment:  Continue to attend AA for support and maintain sobriety  Report any increase in symptoms to the team  Consider getting an individual therapist to talk about difficult emotions  Reduce anxiety in social situations  Manage panic attacks  Find ways to combat the depressed mindset  Manage grief and loss from the past      See Initial Treatment suggestions for the client during the time between Diagnostic Assessment and completion of the Master Individualized Treatment Plan.    Treatment Goals:  Follow Safety Plan  Abstain from Substance Use   Ask for more information, support and/or assistance as needed.  Follow up with providers/community supports as needed:   Report increases or changes in symptoms to staff.  Report any personal safety concerns to staff.   Take medications as prescribed.  Report medication changes and/or side effects to staff.  Attend and participate in groups as scheduled or notify staff if unable to do so.  Report any use of substances to staff as this may impact your symptoms and/or personal safety.  Notify staff if you have any other issues that need to be addressed. This may include any current abuse / neglect / exploitation or other vulnerability.  Follow recommendations of your treatment team and discuss concerns if not in  agreement.     Area of Treatment Focus:  Symptom Management    Therapeutic Interventions/Treatment Strategies:  Support, Feedback, Safety Assessments, Structured Activity and Education    Response to Treatment Strategies:  Accepted Feedback, Listened, Attentive, Accepted Support and Alert     Name of Group: Life  Skills(3:00-4:00)  Group Participants: 5    Description and Outcome:  patient presented with calm,even mood during first session in this group therapy program.Good focus and concentration during a discussion related to effective communication  strategies. Thought process clear,goal directed and reality based.  Patient would benefit from additional opportunities to practice and implement content from this session  in every day life,relationships and mental health recovery.    Is this a Weekly Review of the Progress on the Treatment Plan?  Yes.      Are Treatment Plan Goals being addressed?  Yes, continue treatment goals      Are Treatment Plan Strategies to Address Goals Effective?  Yes, continue treatment strategies      Are there any current contracts in place?  No

## 2018-12-20 ENCOUNTER — HOSPITAL ENCOUNTER (OUTPATIENT)
Dept: BEHAVIORAL HEALTH | Facility: CLINIC | Age: 33
End: 2018-12-20
Attending: PSYCHIATRY & NEUROLOGY
Payer: COMMERCIAL

## 2018-12-20 PROCEDURE — H2012 BEHAV HLTH DAY TREAT, PER HR: HCPCS

## 2018-12-20 NOTE — PROGRESS NOTES
Acknowledgement of Current Treatment Plan       I have reviewed my treatment plan with my therapist / counselor on 4/18/2019    I agree with the plan as it is written in the electronic health record. (4C)    Name:      Signature:  Morgan Sotelo MD  Psychiatrist    Jaiden Jefferson, NP    Tiffany Wooten PsyD, LP  Psychotherapist    Carolyne Edmonds, MSN, RN, PHN  Nurse Liaison    Mc Gibbs, OTR/L  Occupational Therapist

## 2018-12-20 NOTE — PROGRESS NOTES
"Adult Mental Health Outpatient Group Therapy Progress Note           Client Initial Individualized Goals for Treatment:   \" get help with returning to work, helping narrow my career path, feel better about social situations, and increase self-esteem\".      See Initial Treatment suggestions for the client during the time between Diagnostic Assessment and completion of the Master Individualized Treatment Plan.      Treatment Goals:  1. Client will notify staff when needing assistance to develop or implement a coping plan to manage suicidal or self injurious urges.  2. When in life skills groups will   3. Group Therapy :    4. Health/Wellness / Discharge :      Area of Treatment Focus:  Symptom Management      Therapeutic Interventions/Treatment Strategies:  Support, Feedback, Safety Assessments, Structured Activity and Education      Area of Treatment Focus:  Symptom Management, Personal Safety, Community Resources/Discharge Planning, Abstinence/Relapse Prevention, Develop/Improve Independent Living/Socialization Skills/Interpersonal Relationships and Cultural/Racial/Health and Spiritual      Therapeutic Interventions/Treatment Strategies:  Support, Redirection, Feedback, Limit/Boundaries, Safety Assessments, Structuresd Activity, Problem Solving, Clarification, Education, Motivational Enhancement Therapy, Cognitive Behavioral Therapy and Structured Activity      Response to Treatment Strategies:  Accepted Feedback, Gave Feedback, Listened, Focused on Goals, Attentive, Accepted Support, Alert, Demonstrates Behavior Change           Therapist:  Sree Benton, D,  L.P.      Name of Group:  Wellness and Group Therapy       1:00 to 1:50 and 2:00 to 2:50    Group Psychotherapy        2:00 - 2:50 pm       12/20/18  Group Participants: 5 of 6      Description and Outcome:     Wellness            The group participated in a structured activity that involved reading a worksheet and completing it. The group focused " on explaining what they did that was well for themselves, and planning for their weekend. The group discussed different activities, medication changes, sleep, nutrition and exercise, and use of community resources. The group completed and shared areas of their life that the had gratitude, something that they learned and achieved, and something interesting for themselves. The group discussed any stressors that are in their lives.      Group Therapy               Juice reported being safe today.   He stated that he talked to his family and his girlfriend and has plans to see both of them over the holiday. He stated that his sister and he will cook breakfast for their parents, as neither has a lot of money.  He reported that he is happy to do small things for others. He talked to the group about his time when he was homeless and how he felt free.  He also talked about how he felt more spiritually connected to God and talked to God about his troubles.  He talked about how he moved from [place] to [place] and felt that he needed to go there, because of a strong feeling, so he went and managed to get involved in a [employer] group that helped him with food and shelter.  He talked to the group about being here, and wasn't sure that he liked it, but was reminded that he was involved with his family and his girlfriend.  He reported that he has problems with anxiety and panic disorder and talked to the group about panic disorder symptoms.         He reported that he enjoyed watching funny movies, playing guitar, drawing, painting, doing photography and dancing to music.  He denies any psychosis symptoms, but did state that he can get paranoia around trusting others, even friends or family.  He stated that he did talk with his mom, and that he will talk to his doctor about medications.                  He reported that he has had problems with checking and was organizing and checking things at his home, lost track of time and came  a little late to the group.           He participated in a mindfulness activity of 10 minutes while practicing deep breathing.      Client would benefit from additional opportunities to practice and implement content from this session in everyday life, relationships and mental health recovery.          Is this a Weekly Review of the Progress on the Treatment Plan?  Yes.        Are Treatment Plan Goals being addressed?  Yes, continue treatment goals          Are Treatment Plan Strategies to Address Goals Effective?  Yes, continue treatment strategies          Are there any current contracts in place?  No

## 2018-12-20 NOTE — TELEPHONE ENCOUNTER
Phone call to : Vivian Perales: [employer]  [phone]  To coordinate care on getting a therapist and a psychiatrist for Juice, as she has already placed referrals into clinics.    Juvenal Benton., D,  L.P.

## 2018-12-21 NOTE — PROGRESS NOTES
Past Medical History:   Diagnosis Date     Anxiety      Depressive disorder      MDD (major depressive disorder)      Psychosis (H)     substance induced     Substance abuse (H)     methamphetamine       Current Outpatient Medications:      benzocaine (ANBESOL) 20 % LIQD liquid, Take 1 mL by mouth every 3 hours as needed for moderate pain (Patient not taking: Reported on 12/13/2018), Disp: 30 mL, Rfl: 1     BusPIRone HCl 30 MG TABS, Take 30 mg by mouth 2 times daily (Patient not taking: Reported on 12/13/2018), Disp: 60 tablet, Rfl: 1     FLUoxetine (PROZAC) 20 MG capsule, Take 3 capsules (60 mg) by mouth daily (Patient not taking: Reported on 12/13/2018), Disp: 90 capsule, Rfl: 1     gabapentin (NEURONTIN) 600 MG tablet, Take 1 tablet (600 mg) by mouth 3 times daily (Patient not taking: Reported on 12/13/2018), Disp: 90 tablet, Rfl: 1     HYDROcodone-acetaminophen (NORCO) 5-325 MG per tablet, Take 1-2 tablets by mouth every 4 hours as needed for moderate to severe pain (Patient not taking: Reported on 12/13/2018), Disp: 15 tablet, Rfl: 0     hydrOXYzine (ATARAX) 50 MG tablet, Take 2 tablets (100 mg) by mouth 3 times daily as needed for anxiety (Patient not taking: Reported on 12/13/2018), Disp: 180 tablet, Rfl: 1     multivitamin, therapeutic with minerals (THERA-VIT-M) TABS tablet, Take 1 tablet by mouth daily (Patient not taking: Reported on 12/13/2018), Disp: 30 each, Rfl: 1     nicotine polacrilex 4 MG lozenge, Place 2 lozenges (8 mg) inside cheek every hour as needed for other (nicotine withdrawal symptoms) (Patient not taking: Reported on 12/13/2018), Disp: 168 tablet, Rfl: 1     OLANZapine (ZYPREXA) 10 MG tablet, Take 1 tablet (10 mg) by mouth daily as needed for agitation or severe anxiety (Patient not taking: Reported on 12/13/2018), Disp: 10 tablet, Rfl: 1  Psychiatry staffing: case discussed  Diagnosis:  Schizoaffective, panic, CD in remission.  Improving, more stable.

## 2018-12-21 NOTE — PROGRESS NOTES
Adult Mental Health Outpatient Group Therapy Progress Note     Client Initial Individualized Goals for Treatment:  Continue to attend AA for support and maintain sobriety  Report any increase in symptoms to the team  Consider getting an individual therapist to talk about difficult emotions  Reduce anxiety in social situations  Manage panic attacks  Find ways to combat the depressed mindset  Manage grief and loss from the past      See Initial Treatment suggestions for the client during the time between Diagnostic Assessment and completion of the Master Individualized Treatment Plan.    Treatment Goals:  Follow Safety Plan  Abstain from Substance Use   Ask for more information, support and/or assistance as needed.  Follow up with providers/community supports as needed:   Report increases or changes in symptoms to staff.  Report any personal safety concerns to staff.   Take medications as prescribed.  Report medication changes and/or side effects to staff.  Attend and participate in groups as scheduled or notify staff if unable to do so.  Report any use of substances to staff as this may impact your symptoms and/or personal safety.  Notify staff if you have any other issues that need to be addressed. This may include any current abuse / neglect / exploitation or other vulnerability.  Follow recommendations of your treatment team and discuss concerns if not in  agreement.     Area of Treatment Focus:  Symptom Management    Therapeutic Interventions/Treatment Strategies:  Support, Feedback, Safety Assessments, Structured Activity and Education    Response to Treatment Strategies:  Accepted Feedback, Listened, Attentive, Accepted Support and Alert     Name of Group: Life  Skills(3:00-4:00)  Group Participants: 5    Description and Outcome:  patient presented with calm,even mood during first session in this group therapy program.Good focus and concentration during a discussion related to self compassion strategiesto help  improve self esteem. Thought process clear,goal directed and reality based.  Patient would benefit from additional opportunities to practice and implement content from this session  in every day life,relationships and mental health recovery.    Is this a Weekly Review of the Progress on the Treatment Plan?  Yes.      Are Treatment Plan Goals being addressed?  Yes, continue treatment goals      Are Treatment Plan Strategies to Address Goals Effective?  Yes, continue treatment strategies      Are there any current contracts in place?  No

## 2018-12-31 ENCOUNTER — HOSPITAL ENCOUNTER (OUTPATIENT)
Dept: BEHAVIORAL HEALTH | Facility: CLINIC | Age: 33
End: 2018-12-31
Attending: PSYCHIATRY & NEUROLOGY
Payer: COMMERCIAL

## 2018-12-31 PROCEDURE — H2012 BEHAV HLTH DAY TREAT, PER HR: HCPCS

## 2019-01-03 ENCOUNTER — TELEPHONE (OUTPATIENT)
Dept: BEHAVIORAL HEALTH | Facility: CLINIC | Age: 34
End: 2019-01-03

## 2019-01-07 ENCOUNTER — HOSPITAL ENCOUNTER (OUTPATIENT)
Dept: BEHAVIORAL HEALTH | Facility: CLINIC | Age: 34
End: 2019-01-07
Attending: PSYCHIATRY & NEUROLOGY
Payer: COMMERCIAL

## 2019-01-07 PROCEDURE — H2012 BEHAV HLTH DAY TREAT, PER HR: HCPCS

## 2019-01-08 ENCOUNTER — HOSPITAL ENCOUNTER (OUTPATIENT)
Dept: BEHAVIORAL HEALTH | Facility: CLINIC | Age: 34
End: 2019-01-08
Attending: PSYCHIATRY & NEUROLOGY
Payer: COMMERCIAL

## 2019-01-08 PROCEDURE — H2012 BEHAV HLTH DAY TREAT, PER HR: HCPCS

## 2019-01-08 NOTE — PROGRESS NOTES
Adult Mental Health Outpatient Group Therapy Progress Note     Client Initial Individualized Goals for Treatment:  Continue to attend AA for support and maintain sobriety  Report any increase in symptoms to the team  Consider getting an individual therapist to talk about difficult emotions  Reduce anxiety in social situations  Manage panic attacks  Find ways to combat the depressed mindset  Manage grief and loss from the past      See Initial Treatment suggestions for the client during the time between Diagnostic Assessment and completion of the Master Individualized Treatment Plan.    Treatment Goals:  1. Client will notify staff when needing assistance to develop or implement a coping plan to manage suicidal or self injurious urges.  2. When in life skills group Juice will focus on building new friendships and social support providing an update of progress weekly.  3. Will learn and practice 1-2 coping skills to help improve anxiety, depression, grief and loss, any psychosis symptoms, maintain sobriety  4. build suports and get an individual therapist    Area of Treatment Focus:  Symptom Management    Therapeutic Interventions/Treatment Strategies:  Support, Feedback, Safety Assessments, Structured Activity and Education    Response to Treatment Strategies:  Accepted Feedback, Listened, Attentive, Accepted Support and Alert     Name of Group: Life  Skills(3:00-4:00)  Group Participants: 4    Description and Outcome:  patient presented with calm,even mood with good focus and concentration during a discussion related to stress management with a focus on healthy lifestyle.Thought process clear,goal directed and reality based. Goal #1 (no personal safety concerns reported or observed).Goal #2 Suggested that client rty attending [facility] support groups.  Patient would benefit from additional opportunities to practice and implement content from this session  in every day life,relationships and mental health  recovery.    Is this a Weekly Review of the Progress on the Treatment Plan?  Yes.      Are Treatment Plan Goals being addressed?  Yes, continue treatment goals      Are Treatment Plan Strategies to Address Goals Effective?  Yes, continue treatment strategies      Are there any current contracts in place?  No

## 2019-01-08 NOTE — PROGRESS NOTES
"Adult Mental Health Outpatient Group Therapy Progress Note           Client Initial Individualized Goals for Treatment:   \" get help with returning to work, helping narrow my career path, feel better about social situations, and increase self-esteem\".      See Initial Treatment suggestions for the client during the time between Diagnostic Assessment and completion of the Master Individualized Treatment Plan.      Treatment Goals:  1. Client will notify staff when needing assistance to develop or implement a coping plan to manage suicidal or self injurious urges.  2. When in life skills groups will report on how he builds supports  3. Group Therapy :  Learn and practice 1-2 coping skills to help reduce anxiety, depression, grief and loss, and any psychosis symptoms, maintain sobriety.  4. Health/Wellness / Discharge :   Get an individual therapist and a psychiatrist    Area of Treatment Focus:  Symptom Management      Therapeutic Interventions/Treatment Strategies:  Support, Feedback, Safety Assessments, Structured Activity and Education      Area of Treatment Focus:  Symptom Management, Personal Safety, Community Resources/Discharge Planning, Abstinence/Relapse Prevention, Develop/Improve Independent Living/Socialization Skills/Interpersonal Relationships and Cultural/Racial/Health and Spiritual      Therapeutic Interventions/Treatment Strategies:  Support, Redirection, Feedback, Limit/Boundaries, Safety Assessments, Structuresd Activity, Problem Solving, Clarification, Education, Motivational Enhancement Therapy, Cognitive Behavioral Therapy and Structured Activity      Response to Treatment Strategies:  Accepted Feedback, Gave Feedback, Listened, Focused on Goals, Attentive, Accepted Support, Alert, Demonstrates Behavior Change            Therapist:  Sree Benton, D,  L.P.       Name of Group:    Group Psychotherapy        2:00 - 2:50 pm       1/7/2019    Group Participants: 4      Description and " Outcome:    Group Therapy               Juice reported being safe today.   He reported that he felt ill with a virus and went to the doctor last week and that he is feeling better now.  He reported that he doesn't have money for the bus, but that he could get some from his mother. He stated that he talked to his girlfriend in depth about her brain cancer and that he feels depressed about this. The group validated his feelings.   He reported that he has problems with anxiety and panic disorder and talked to the group about panic disorder symptoms.               He reported that he has had poor sleep and wakes at night with racing thoughts about what needs to be done. He stated that he has not called the [facility] to schedule a psychiatry or therapy appointment and that he forgot to do this. He was given the phone number by more than one person.  He stated that he enjoyed watching funny movies, playing guitar, drawing, painting, doing photography and dancing to music.  He denies any psychosis symptoms, but did state that he can get paranoia around trusting others, even friends or family.  He stated that he did talk with his mom, and that he will talk to his doctor about medications.                   He reported that he has had problems with checking and was organizing and checking things at his home, lost track of time and came a little late to the group, due to poor sleep.           He participated in a mindfulness activity of 10 minutes while practicing deep breathing.      Client would benefit from additional opportunities to practice and implement content from this session in everyday life, relationships and mental health recovery.          Is this a Weekly Review of the Progress on the Treatment Plan?  Yes.        Are Treatment Plan Goals being addressed?  Yes, continue treatment goals          Are Treatment Plan Strategies to Address Goals Effective?  Yes, continue treatment strategies          Are there any  current contracts in place?  No

## 2019-01-08 NOTE — PROGRESS NOTES
"Adult Mental Health Outpatient Group Therapy Progress Note           Client Initial Individualized Goals for Treatment:   \" get help with returning to work, helping narrow my career path, feel better about social situations, and increase self-esteem\".      See Initial Treatment suggestions for the client during the time between Diagnostic Assessment and completion of the Master Individualized Treatment Plan.      Treatment Goals:  1. Client will notify staff when needing assistance to develop or implement a coping plan to manage suicidal or self injurious urges.  2. When in life skills groups will report on how he builds supports  3. Group Therapy :  Learn and practice 1-2 coping skills to help reduce anxiety, depression, grief and loss, and any psychosis symptoms, maintain sobriety.  4. Health/Wellness / Discharge :   Get an individual therapist and a psychiatrist       Area of Treatment Focus:  Symptom Management      Therapeutic Interventions/Treatment Strategies:  Support, Feedback, Safety Assessments, Structured Activity and Education      Area of Treatment Focus:  Symptom Management, Personal Safety, Community Resources/Discharge Planning, Abstinence/Relapse Prevention, Develop/Improve Independent Living/Socialization Skills/Interpersonal Relationships and Cultural/Racial/Health and Spiritual      Therapeutic Interventions/Treatment Strategies:  Support, Redirection, Feedback, Limit/Boundaries, Safety Assessments, Structuresd Activity, Problem Solving, Clarification, Education, Motivational Enhancement Therapy, Cognitive Behavioral Therapy and Structured Activity      Response to Treatment Strategies:  Accepted Feedback, Gave Feedback, Listened, Focused on Goals, Attentive, Accepted Support, Alert, Demonstrates Behavior Change     Therapist:  Sree Benton, D,  L.P.       Name of Group:  Mental Health Management and Group Therapy       1:00 to 1:50 and 2:00 to 2:50    Group Psychotherapy        2:00 " - 2:50 pm       1/8/2019    Group Participants: 4      Description and Outcome:      Mental Health Management         The group participated in a structured activity that involved reading worksheets and discussing positive coping skills and positive statements. The group read the statements and shared their experiences using different statements.  Examples were taken from previous discussions and the group discussed their thoughts, feelings, and how to react to them, underlying feelings that accompanied them, what to do to try to calm symptoms, and the relationship of the feelings to the psychosis symptoms. The group discussed strategies to manage difficult moods, negative thoughts, voices, paranoia, and other sensations.  The group placed them into their coping skills book.    Group Therapy               Juice reported being safe today.   He stated that he talked to his family and his girlfriend and has plans to see both of them over the holiday. He stated that his sister and he will cook breakfast for their parents, as neither has a lot of money.  He reported that he is happy to do small things for others. He talked to the group about his time when he was homeless and how he felt free.  He also talked about how he felt more spiritually connected to God and talked to God about his troubles.  He talked about how he moved from [place] to [place] and felt that he needed to go there, because of a strong feeling, so he went and managed to get involved in a [employer] group that helped him with food and shelter.  He talked to the group about being here, and wasn't sure that he liked it, but was reminded that he was involved with his family and his girlfriend.  He reported that he has problems with anxiety and panic disorder and talked to the group about panic disorder symptoms.                                                               He reported that he enjoyed watching [employer] movies, playing guitar, drawing, painting,  doing photography and dancing to music.  He denies any psychosis symptoms, but did state that he can get paranoia around trusting others, even friends or family.  He stated that he did talk with his mom, and that he will talk to his doctor about medications.                   He reported that he has had problems with checking and was organizing and checking things at his home, lost track of time and came a little late to the group.           He participated in a mindfulness activity of 10 minutes while practicing deep breathing.      Client would benefit from additional opportunities to practice and implement content from this session in everyday life, relationships and mental health recovery.          Is this a Weekly Review of the Progress on the Treatment Plan?  Yes.        Are Treatment Plan Goals being addressed?  Yes, continue treatment goals          Are Treatment Plan Strategies to Address Goals Effective?  Yes, continue treatment strategies          Are there any current contracts in place?  No

## 2019-01-09 NOTE — PROGRESS NOTES
Adult Mental Health Outpatient Group Therapy Progress Note     Client Initial Individualized Goals for Treatment:  Continue to attend AA for support and maintain sobriety  Report any increase in symptoms to the team  Consider getting an individual therapist to talk about difficult emotions  Reduce anxiety in social situations  Manage panic attacks  Find ways to combat the depressed mindset  Manage grief and loss from the past      See Initial Treatment suggestions for the client during the time between Diagnostic Assessment and completion of the Master Individualized Treatment Plan.    Treatment Goals:  1. Client will notify staff when needing assistance to develop or implement a coping plan to manage suicidal or self injurious urges.  2. When in life skills group Juice will focus on building new friendships and social support providing an update of progress weekly.  3. Will learn and practice 1-2 coping skills to help improve anxiety, depression, grief and loss, any psychosis symptoms, maintain sobriety  4. build suports and get an individual therapist    Area of Treatment Focus:  Symptom Management    Therapeutic Interventions/Treatment Strategies:  Support, Feedback, Safety Assessments, Structured Activity and Education    Response to Treatment Strategies:  Accepted Feedback, Listened, Attentive, Accepted Support and Alert     Name of Group: Life  Skills(3:00-4:00)  Group Participants: 4    Description and Outcome:  patient presented with calm,even mood with good focus and concentration during a discussion related to communication skills and strategies. Goal #1 (no personal safety concerns reported or observed).  Patient would benefit from additional opportunities to practice and implement content from this session  in every day life,relationships and mental health recovery.    Is this a Weekly Review of the Progress on the Treatment Plan?  Yes.      Are Treatment Plan Goals being addressed?  Yes, continue  treatment goals      Are Treatment Plan Strategies to Address Goals Effective?  Yes, continue treatment strategies      Are there any current contracts in place?  No

## 2019-01-10 ENCOUNTER — HOSPITAL ENCOUNTER (OUTPATIENT)
Dept: BEHAVIORAL HEALTH | Facility: CLINIC | Age: 34
End: 2019-01-10
Attending: PSYCHIATRY & NEUROLOGY
Payer: COMMERCIAL

## 2019-01-10 PROCEDURE — H2012 BEHAV HLTH DAY TREAT, PER HR: HCPCS

## 2019-01-10 NOTE — PROGRESS NOTES
"Adult Mental Health Outpatient Group Therapy Progress Note           Client Initial Individualized Goals for Treatment:   \" get help with returning to work, helping narrow my career path, feel better about social situations, and increase self-esteem\".      See Initial Treatment suggestions for the client during the time between Diagnostic Assessment and completion of the Master Individualized Treatment Plan.      Treatment Goals:  1. Client will notify staff when needing assistance to develop or implement a coping plan to manage suicidal or self injurious urges.  2. When in life skills groups will report on how he builds supports  3. Group Therapy :  Learn and practice 1-2 coping skills to help reduce anxiety, depression, grief and loss, and any psychosis symptoms, maintain sobriety.  4. Health/Wellness / Discharge :   Get an individual therapist and a psychiatrist     Area of Treatment Focus:  Symptom Management      Therapeutic Interventions/Treatment Strategies:  Support, Feedback, Safety Assessments, Structured Activity and Education      Area of Treatment Focus:  Symptom Management, Personal Safety, Community Resources/Discharge Planning, Abstinence/Relapse Prevention, Develop/Improve Independent Living/Socialization Skills/Interpersonal Relationships and Cultural/Racial/Health and Spiritual      Therapeutic Interventions/Treatment Strategies:  Support, Redirection, Feedback, Limit/Boundaries, Safety Assessments, Structuresd Activity, Problem Solving, Clarification, Education, Motivational Enhancement Therapy, Cognitive Behavioral Therapy and Structured Activity      Response to Treatment Strategies:  Accepted Feedback, Gave Feedback, Listened, Focused on Goals, Attentive, Accepted Support, Alert, Demonstrates Behavior Change            Therapist:  Sree Benton, D,  L.P.       Name of Group:    Group Psychotherapy        2:00 - 2:50 pm       1/7/2019     Group Participants: 4      Description and " Outcome:     Group Therapy               Juice reported being safe today.   He reported that he talks to his girlfriend each day and that he is worried about her situation. He stated that he does not have any psychosis, lately. He did talk with the group about feeling paranoid that others can hear him when he is in his apartment, so he leaves his TV on to mask any noise that he might make walking around in his apartment.     He stated that he talked to his girlfriend in depth about her brain cancer and that he feels depressed about this. The group validated his feelings.   He reported that he has problems with anxiety and panic disorders and talked to the group about panic disorder symptoms.               He reported that he slept 8 hours last night and that his sleep was better. He stated that he has an appointment tomorrow with the [facility], but is uncertain if he will go to it.  The group discussed how he could use it for his grief and loss issues.  He stated that he found a home-made guitar at the [employer], bought it and re-did it in a new way to play it and compose music.  He talked to the group about how he attached it to the back of a skateboard.  He reported that he enjoyed watching funny movies, playing guitar, drawing, painting, doing photography and has been making art work lately.  He denies any psychosis symptoms, but did state that he can get paranoia around trusting others, even friends or family.  He stated that he did talk with his mom, and that he will talk to his doctor about medications.  He stated that he does live in a hostel and that there are international visitors who come to the hostel, and that it is interesting.         He participated in a mindfulness activity of 10 minutes while practicing deep breathing.      Client would benefit from additional opportunities to practice and implement content from this session in everyday life, relationships and mental health  recovery.          Is this a Weekly Review of the Progress on the Treatment Plan?  Yes.        Are Treatment Plan Goals being addressed?  Yes, continue treatment goals          Are Treatment Plan Strategies to Address Goals Effective?  Yes, continue treatment strategies          Are there any current contracts in place?  No

## 2019-01-11 NOTE — PROGRESS NOTES
Adult Mental Health Outpatient Group Therapy Progress Note     Client Initial Individualized Goals for Treatment:  Continue to attend AA for support and maintain sobriety  Report any increase in symptoms to the team  Consider getting an individual therapist to talk about difficult emotions  Reduce anxiety in social situations  Manage panic attacks  Find ways to combat the depressed mindset  Manage grief and loss from the past      See Initial Treatment suggestions for the client during the time between Diagnostic Assessment and completion of the Master Individualized Treatment Plan.    Treatment Goals:  1. Client will notify staff when needing assistance to develop or implement a coping plan to manage suicidal or self injurious urges.  2. When in life skills group Juice will focus on building new friendships and social support providing an update of progress weekly.  3. Will learn and practice 1-2 coping skills to help improve anxiety, depression, grief and loss, any psychosis symptoms, maintain sobriety  4. build suports and get an individual therapist    Area of Treatment Focus:  Symptom Management    Therapeutic Interventions/Treatment Strategies:  Support, Feedback, Safety Assessments, Structured Activity and Education    Response to Treatment Strategies:  Accepted Feedback, Listened, Attentive, Accepted Support and Alert     Name of Group: Life  Skills(3:00-4:00)  Group Participants: 3    Description and Outcome:  Client presented with calm,even mood during first session in this group therapy program.Good focus and concentration during a discussion related to self esteem with a focus on decision making effectiveness.He spoke about his struggles finding career goal direction and purpose in his life. Goal #1 (no personal safety concerns reported or observed).  Patient would benefit from additional opportunities to practice and implement content from this session  in every day life,relationships and mental health  recovery.    Is this a Weekly Review of the Progress on the Treatment Plan?  Yes.      Are Treatment Plan Goals being addressed?  Yes, continue treatment goals      Are Treatment Plan Strategies to Address Goals Effective?  Yes, continue treatment strategies      Are there any current contracts in place?  No

## 2019-01-14 ENCOUNTER — HOSPITAL ENCOUNTER (OUTPATIENT)
Dept: BEHAVIORAL HEALTH | Facility: CLINIC | Age: 34
End: 2019-01-14
Attending: PSYCHIATRY & NEUROLOGY
Payer: COMMERCIAL

## 2019-01-14 PROCEDURE — H2012 BEHAV HLTH DAY TREAT, PER HR: HCPCS

## 2019-01-14 NOTE — PROGRESS NOTES
"Adult Mental Health Outpatient Group Therapy Progress Note           Client Initial Individualized Goals for Treatment:   \" get help with returning to work, helping narrow my career path, feel better about social situations, and increase self-esteem\".      See Initial Treatment suggestions for the client during the time between Diagnostic Assessment and completion of the Master Individualized Treatment Plan.      Treatment Goals:  1. Client will notify staff when needing assistance to develop or implement a coping plan to manage suicidal or self injurious urges.  2. When in life skills groups will report on how he builds supports  3. Group Therapy :  Learn and practice 1-2 coping skills to help reduce anxiety, depression, grief and loss, and any psychosis symptoms, maintain sobriety.  4. Health/Wellness / Discharge :   Get an individual therapist and a psychiatrist     Area of Treatment Focus:  Symptom Management      Therapeutic Interventions/Treatment Strategies:  Support, Feedback, Safety Assessments, Structured Activity and Education      Area of Treatment Focus:  Symptom Management, Personal Safety, Community Resources/Discharge Planning, Abstinence/Relapse Prevention, Develop/Improve Independent Living/Socialization Skills/Interpersonal Relationships and Cultural/Racial/Health and Spiritual      Therapeutic Interventions/Treatment Strategies:  Support, Redirection, Feedback, Limit/Boundaries, Safety Assessments, Structuresd Activity, Problem Solving, Clarification, Education, Motivational Enhancement Therapy, Cognitive Behavioral Therapy and Structured Activity      Response to Treatment Strategies:  Accepted Feedback, Gave Feedback, Listened, Focused on Goals, Attentive, Accepted Support, Alert, Demonstrates Behavior Change            Therapist:  Sree Benton, D,  L.P.       Name of Group:    Group Psychotherapy        2:00 - 2:50 pm       1/14/2019     Group Participants: 5      Description and " Outcome:     Group Therapy               Juice reported being safe today.   He reported that his grandma  on Saturday and that he spent time with his family.  He stated that he talked to his mom, sister, and his girlfriend. He reported that his girlfriend is in a better mood than last week and he was happy about that. He stated that he helped his family clean out his grandmother's belongings from the nursing home and get her things organized. He stated that she had dementia for over 10 years and didn't know who he was, and talked with the group about grief and loss issues.  He stated that he does not have any psychosis, lately.                He talked with the group about getting an appointment for a psychiatrist and was given suggestions to see the psychiatrist and therapist at the [facility], but he didn't want to do that. He was given information about [facility] that would prescribe, but he did not go there. He was given information about getting a PCP at a [facility] and said that he didn't usually keep his doctor appointments, so he was unsure that he would make one.  He reported decreased sleep and feeling bogged down, anxious, and having a rough weekend due to grandma dying.               He participated in a mindfulness activity of 10 minutes while practicing deep breathing.      Client would benefit from additional opportunities to practice and implement content from this session in everyday life, relationships and mental health recovery.          Is this a Weekly Review of the Progress on the Treatment Plan?  Yes.        Are Treatment Plan Goals being addressed?  Yes, continue treatment goals          Are Treatment Plan Strategies to Address Goals Effective?  Yes, continue treatment strategies          Are there any current contracts in place?  No

## 2019-01-15 ENCOUNTER — TELEPHONE (OUTPATIENT)
Dept: BEHAVIORAL HEALTH | Facility: CLINIC | Age: 34
End: 2019-01-15

## 2019-01-15 NOTE — PROGRESS NOTES
Adult Mental Health Outpatient Group Therapy Progress Note     Client Initial Individualized Goals for Treatment:  Continue to attend AA for support and maintain sobriety  Report any increase in symptoms to the team  Consider getting an individual therapist to talk about difficult emotions  Reduce anxiety in social situations  Manage panic attacks  Find ways to combat the depressed mindset  Manage grief and loss from the past      See Initial Treatment suggestions for the client during the time between Diagnostic Assessment and completion of the Master Individualized Treatment Plan.    Treatment Goals:  1. Client will notify staff when needing assistance to develop or implement a coping plan to manage suicidal or self injurious urges.  2. When in life skills group Juice will focus on building new friendships and social support providing an update of progress weekly.  3. Will learn and practice 1-2 coping skills to help improve anxiety, depression, grief and loss, any psychosis symptoms, maintain sobriety  4. build suports and get an individual therapist    Area of Treatment Focus:  Symptom Management    Therapeutic Interventions/Treatment Strategies:  Support, Feedback, Safety Assessments, Structured Activity and Education    Response to Treatment Strategies:  Accepted Feedback, Listened, Attentive, Accepted Support and Alert     Name of Group: Life  Skills(3:00-4:00)  Group Participants: 5    Description and Outcome:  Client presented with calm,even mood during first session in this group therapy program.Good focus and concentration during a discussion related to sources of stress and coping skills. Goal #1 (no personal safety concerns reported or observed).  Patient would benefit from additional opportunities to practice and implement content from this session  in every day life,relationships and mental health recovery.    Is this a Weekly Review of the Progress on the Treatment Plan?  Yes.      Are Treatment Plan  Goals being addressed?  Yes, continue treatment goals      Are Treatment Plan Strategies to Address Goals Effective?  Yes, continue treatment strategies      Are there any current contracts in place?  No

## 2019-01-17 ENCOUNTER — HOSPITAL ENCOUNTER (OUTPATIENT)
Dept: BEHAVIORAL HEALTH | Facility: CLINIC | Age: 34
End: 2019-01-17
Attending: PSYCHIATRY & NEUROLOGY
Payer: COMMERCIAL

## 2019-01-17 PROCEDURE — H2012 BEHAV HLTH DAY TREAT, PER HR: HCPCS

## 2019-01-17 NOTE — PROGRESS NOTES
"Adult Mental Health Outpatient Group Therapy Progress Note           Client Initial Individualized Goals for Treatment:   \" get help with returning to work, helping narrow my career path, feel better about social situations, and increase self-esteem\".      See Initial Treatment suggestions for the client during the time between Diagnostic Assessment and completion of the Master Individualized Treatment Plan.      Treatment Goals:  1. Client will notify staff when needing assistance to develop or implement a coping plan to manage suicidal or self injurious urges.  2. When in life skills groups will report on how he builds supports  3. Group Therapy :  Learn and practice 1-2 coping skills to help reduce anxiety, depression, grief and loss, and any psychosis symptoms, maintain sobriety.  4. Health/Wellness / Discharge :   Get an individual therapist and a psychiatrist     Area of Treatment Focus:  Symptom Management      Therapeutic Interventions/Treatment Strategies:  Support, Feedback, Safety Assessments, Structured Activity and Education      Area of Treatment Focus:  Symptom Management, Personal Safety, Community Resources/Discharge Planning, Abstinence/Relapse Prevention, Develop/Improve Independent Living/Socialization Skills/Interpersonal Relationships and Cultural/Racial/Health and Spiritual      Therapeutic Interventions/Treatment Strategies:  Support, Redirection, Feedback, Limit/Boundaries, Safety Assessments, Structuresd Activity, Problem Solving, Clarification, Education, Motivational Enhancement Therapy, Cognitive Behavioral Therapy and Structured Activity      Response to Treatment Strategies:  Accepted Feedback, Gave Feedback, Listened, Focused on Goals, Attentive, Accepted Support, Alert, Demonstrates Behavior Change            Therapist:  Sree Benton, D,  L.P.       Name of Group:    Group Psychotherapy    1:00 - 1:50 pm       1/17/2019     Group Participants: 7      Description and " Outcome:     Group Therapy               Juice reported being safe today.   He reported that he talked to his girlfriend and his mom yesterday and that he is worried about her, since she has had falls, due to muscle deterioration and neurological issues.  He stated that he couldn't sleep well last night, and that he is worried about her situation. He stated that he does not have any psychosis, lately. He did talk with the group about feeling paranoid that others can hear him when he is in his apartment, so he leaves his TV on to mask any noise that he might make walking around in his apartment.     The group validated his feelings.   He reported that he has problems with anxiety and panic disorders and talked to the group about panic disorder symptoms.     He stated that he did errands and chores around the house, and that his mood is low, but he is doing ok.  He reported that his thoughts are normal, and he notices depression thoughts, but that the anxiety is the main problem.               He reported that he wants to see a doctor to get some Gabapentin, but is reluctant to make an appointment.  The group suggested several different clinics.  The group discussed how he could use it for his grief and loss issues.   He reported that he enjoyed watching funny movies, playing guitar, drawing, painting, doing photography. Listening to music, and has been making art work lately.   He stated that he does live in a hostel and that there are international visitors who come to the hostel, and that it is interesting.     He participated in a mindfulness activity of 10 minutes while practicing deep breathing.      Client would benefit from additional opportunities to practice and implement content from this session in everyday life, relationships and mental health recovery.          Is this a Weekly Review of the Progress on the Treatment Plan?  Yes.        Are Treatment Plan Goals being addressed?  Yes, continue treatment  goals          Are Treatment Plan Strategies to Address Goals Effective?  Yes, continue treatment strategies          Are there any current contracts in place?  No

## 2019-01-18 NOTE — PROGRESS NOTES
Past Medical History:   Diagnosis Date     Anxiety      Depressive disorder      MDD (major depressive disorder)      Psychosis (H)     substance induced     Substance abuse (H)     methamphetamine     Current Outpatient Medications   Medication     benzocaine (ANBESOL) 20 % LIQD liquid     BusPIRone HCl 30 MG TABS     FLUoxetine (PROZAC) 20 MG capsule     gabapentin (NEURONTIN) 600 MG tablet     HYDROcodone-acetaminophen (NORCO) 5-325 MG per tablet     hydrOXYzine (ATARAX) 50 MG tablet     multivitamin, therapeutic with minerals (THERA-VIT-M) TABS tablet     nicotine polacrilex 4 MG lozenge     OLANZapine (ZYPREXA) 10 MG tablet     No current facility-administered medications for this encounter.      Psychiatry staffing: case discussed  Diagnosis:  Schizoaffective, cd in remission, depressed.

## 2019-01-18 NOTE — PROGRESS NOTES
Adult Mental Health Outpatient Group Therapy Progress Note     Client Initial Individualized Goals for Treatment:  Continue to attend AA for support and maintain sobriety  Report any increase in symptoms to the team  Consider getting an individual therapist to talk about difficult emotions  Reduce anxiety in social situations  Manage panic attacks  Find ways to combat the depressed mindset  Manage grief and loss from the past      See Initial Treatment suggestions for the client during the time between Diagnostic Assessment and completion of the Master Individualized Treatment Plan.    Treatment Goals:  1. Client will notify staff when needing assistance to develop or implement a coping plan to manage suicidal or self injurious urges.  2. When in life skills group Juice will focus on building new friendships and social support providing an update of progress weekly.  3. Will learn and practice 1-2 coping skills to help improve anxiety, depression, grief and loss, any psychosis symptoms, maintain sobriety  4. build suports and get an individual therapist    Area of Treatment Focus:  Symptom Management    Therapeutic Interventions/Treatment Strategies:  Support, Feedback, Safety Assessments, Structured Activity and Education    Response to Treatment Strategies:  Accepted Feedback, Listened, Attentive, Accepted Support and Alert     Name of Group: Life  Skills(3:00-4:00)  Group Participants: 7    Description and Outcome:  Client presented with calm,even mood with fair to good focus and concentration during a discussion related to self esteem with a focus on strategies to decrease procrastination.Thought process clear,goal directed and reality based. Goal #1 (no personal safety concerns reported or observed).  Patient would benefit from additional opportunities to practice and implement content from this session  in every day life,relationships and mental health recovery.    Is this a Weekly Review of the Progress on the  Treatment Plan?  Yes.      Are Treatment Plan Goals being addressed?  Yes, continue treatment goals      Are Treatment Plan Strategies to Address Goals Effective?  Yes, continue treatment strategies      Are there any current contracts in place?  No

## 2019-01-21 ENCOUNTER — HOSPITAL ENCOUNTER (OUTPATIENT)
Dept: BEHAVIORAL HEALTH | Facility: CLINIC | Age: 34
End: 2019-01-21
Attending: PSYCHIATRY & NEUROLOGY
Payer: COMMERCIAL

## 2019-01-21 PROCEDURE — H2012 BEHAV HLTH DAY TREAT, PER HR: HCPCS

## 2019-01-21 NOTE — PROGRESS NOTES
"Health Outpatient Group Therapy Progress Note           Client Initial Individualized Goals for Treatment:   \" get help with returning to work, helping narrow my career path, feel better about social situations, and increase self-esteem\".      See Initial Treatment suggestions for the client during the time between Diagnostic Assessment and completion of the Master Individualized Treatment Plan.      Treatment Goals:  1. Client will notify staff when needing assistance to develop or implement a coping plan to manage suicidal or self injurious urges.  2. When in life skills groups will report on how he builds supports  3. Group Therapy :  Learn and practice 1-2 coping skills to help reduce anxiety, depression, grief and loss, and any psychosis symptoms, maintain sobriety.  4. Health/Wellness / Discharge :   Get an individual therapist and a psychiatrist     Area of Treatment Focus:  Symptom Management      Therapeutic Interventions/Treatment Strategies:  Support, Feedback, Safety Assessments, Structured Activity and Education      Area of Treatment Focus:  Symptom Management, Personal Safety, Community Resources/Discharge Planning, Abstinence/Relapse Prevention, Develop/Improve Independent Living/Socialization Skills/Interpersonal Relationships and Cultural/Racial/Health and Spiritual      Therapeutic Interventions/Treatment Strategies:  Support, Redirection, Feedback, Limit/Boundaries, Safety Assessments, Structuresd Activity, Problem Solving, Clarification, Education, Motivational Enhancement Therapy, Cognitive Behavioral Therapy and Structured Activity      Response to Treatment Strategies:  Accepted Feedback, Gave Feedback, Listened, Focused on Goals, Attentive, Accepted Support, Alert, Demonstrates Behavior Change            Therapist:  Sree Benton, D,  L.P.       Name of Group:    Group Psychotherapy    2:00 - 2:50 pm       1/21/2019     Group Participants: 3      Description and Outcome:     Group " Therapy               Juice reported being safe today.   He reported that he talked to his girlfriend yesterday and that she was worried that he didn't call sooner, since she had sent him a message hours before and she thought he didn't care about her.  He stated that he was patient with the situation, but was doing his art work and didn't want to be interrupted, so he didn't answer the call.  He talked with the group about the grief and loss issue and how he tried to stay focused on the present, but noticed that she was showing more signs of cognitive impairment and memory loss from the cancer in her brain.  He talked to the group about grief and loss issues and his level of sadness and depression.  He stated that he  is worried about her situation. He stated that he does not have any psychosis, lately.          The group validated his feelings.   He reported that he has problems with anxiety and panic disorders and talked to the group about panic disorder symptoms.               He stated that he did errands and chores around the house, and that his mood is low, but he is doing ok.  He reported that his thoughts are normal, and he notices depression thoughts, but that the anxiety is the main problem.  He stated that he does deep breathing.              He reported that he wants to see a doctor to get some Gabapentin, but is reluctant to make an appointment.  The group suggested several different clinics.  The group discussed how he could use it for his grief and loss issues.   He reported that he enjoyed watching funny movies, playing guitar, drawing, painting, doing photography. Listening to music, and has been making art work lately.             He talked to a group member who was laughing inappropriately and told him it was ok to laugh and that the group accepted it.    He participated in a mindfulness activity of 10 minutes while practicing deep breathing.      Client would benefit from additional opportunities  to practice and implement content from this session in everyday life, relationships and mental health recovery.          Is this a Weekly Review of the Progress on the Treatment Plan?  Yes.        Are Treatment Plan Goals being addressed?  Yes, continue treatment goals          Are Treatment Plan Strategies to Address Goals Effective?  Yes, continue treatment strategies          Are there any current contracts in place?  No

## 2019-01-22 ENCOUNTER — TELEPHONE (OUTPATIENT)
Dept: BEHAVIORAL HEALTH | Facility: CLINIC | Age: 34
End: 2019-01-22

## 2019-01-22 NOTE — TELEPHONE ENCOUNTER
Phone call from Juice, who said he was   Ill and wouldn't be here today.    Tiffany Wooten, Juvenal., D,  L.P.

## 2019-01-24 ENCOUNTER — TELEPHONE (OUTPATIENT)
Dept: BEHAVIORAL HEALTH | Facility: CLINIC | Age: 34
End: 2019-01-24

## 2019-01-24 NOTE — TELEPHONE ENCOUNTER
Phone call from Juice, who said that he would not be at the program today, but will  Be there next Monday.    Juvenal Benton., D,  L.P.

## 2019-01-28 ENCOUNTER — HOSPITAL ENCOUNTER (OUTPATIENT)
Dept: BEHAVIORAL HEALTH | Facility: CLINIC | Age: 34
End: 2019-01-28
Attending: PSYCHIATRY & NEUROLOGY
Payer: COMMERCIAL

## 2019-01-28 PROCEDURE — H2012 BEHAV HLTH DAY TREAT, PER HR: HCPCS

## 2019-01-28 NOTE — PROGRESS NOTES
"Health Outpatient Group Therapy Progress Note           Client Initial Individualized Goals for Treatment:   \" get help with returning to work, helping narrow my career path, feel better about social situations, and increase self-esteem\".      See Initial Treatment suggestions for the client during the time between Diagnostic Assessment and completion of the Master Individualized Treatment Plan.      Treatment Goals:  1. Client will notify staff when needing assistance to develop or implement a coping plan to manage suicidal or self injurious urges.  2. When in life skills groups will report on how he builds supports  3. Group Therapy :  Learn and practice 1-2 coping skills to help reduce anxiety, depression, grief and loss, and any psychosis symptoms, maintain sobriety.  4. Health/Wellness / Discharge :   Get an individual therapist and a psychiatrist     Area of Treatment Focus:  Symptom Management      Therapeutic Interventions/Treatment Strategies:  Support, Feedback, Safety Assessments, Structured Activity and Education      Area of Treatment Focus:  Symptom Management, Personal Safety, Community Resources/Discharge Planning, Abstinence/Relapse Prevention, Develop/Improve Independent Living/Socialization Skills/Interpersonal Relationships and Cultural/Racial/Health and Spiritual      Therapeutic Interventions/Treatment Strategies:  Support, Redirection, Feedback, Limit/Boundaries, Safety Assessments, Structuresd Activity, Problem Solving, Clarification, Education, Motivational Enhancement Therapy, Cognitive Behavioral Therapy and Structured Activity      Response to Treatment Strategies:  Accepted Feedback, Gave Feedback, Listened, Focused on Goals, Attentive, Accepted Support, Alert, Demonstrates Behavior Change     Therapist:  Sree Benton, D,  L.P.       Name of Group:    Group Psychotherapy    1:00 - 1:50 pm  Life Skills: 3:00 - 3:50 pm    Date:       1/28/2019   Group " Participants: 6      Description and Outcome:     Group Therapy               Juice reported being safe today.   He reported that he talked to his mom and his girlfriend yesterday and on the weekend.  He stated that he talked with his mom and the  for his grandma is 19, which makes him feel sad.   He talked with the group about the grief and loss issue and how he tried to stay focused on the present, and talked about his level of sadness and depression.   The group validated his feelings.   He reported that he has problems with anxiety and panic disorders and talked to the group about panic disorder symptoms.  He reported that his thoughts are normal, and he notices depression thoughts, but that the anxiety is the main problem.  He stated that he does deep breathing.          He stated that he does not have any psychosis, lately.  He stated that he did errands and chores around the house, and that his mood is better.  He reported that he wants to see a doctor to get some Gabapentin, but is reluctant to make an appointment.  The group suggested several different clinics, and he was given a list of clinics with their locations.  The group discussed how he could use it for his grief and loss issues.   He reported that he enjoyed playing guitar, drawing, painting, doing photography, listening to music, and he has been making art work lately.       Life Skills    The group participated in a structured exercise that involved reading symptoms of anxiety and discussing them. The group discussed panic attacks and how to manage them. The group also discussed which symptoms they noticed were most bothersome.  The group discussed the depression symptoms and filled out a form to describe how intensely they were experienced. The group discussed recurrent mental health and how to take care of themselves. The group discussed grief and loss issues and how to manage them as well as depression.     He participated in a  mindfulness activity of 10 minutes while practicing deep breathing.      Client would benefit from additional opportunities to practice and implement content from this session in everyday life, relationships and mental health recovery.          Is this a Weekly Review of the Progress on the Treatment Plan?  Yes.        Are Treatment Plan Goals being addressed?  Yes, continue treatment goals          Are Treatment Plan Strategies to Address Goals Effective?  Yes, continue treatment strategies          Are there any current contracts in place?  No

## 2019-01-29 ENCOUNTER — TELEPHONE (OUTPATIENT)
Dept: BEHAVIORAL HEALTH | Facility: CLINIC | Age: 34
End: 2019-01-29

## 2019-01-29 NOTE — TELEPHONE ENCOUNTER
Phone call to say that the client would not  Be at the program due to the extremely cold weather today.    Juvenal Benton., D,  L.P.

## 2019-01-31 ENCOUNTER — TELEPHONE (OUTPATIENT)
Dept: BEHAVIORAL HEALTH | Facility: CLINIC | Age: 34
End: 2019-01-31

## 2019-01-31 NOTE — TELEPHONE ENCOUNTER
Phone call from Juice, who said he wouldn't be at the program, due to the cold weather.    Tiffany Wooten, Juvenal., D,  L.P.

## 2019-02-01 ASSESSMENT — PATIENT HEALTH QUESTIONNAIRE - PHQ9
SUM OF ALL RESPONSES TO PHQ QUESTIONS 1-9: 5
5. POOR APPETITE OR OVEREATING: NOT AT ALL

## 2019-02-01 ASSESSMENT — ANXIETY QUESTIONNAIRES
GAD7 TOTAL SCORE: 3
5. BEING SO RESTLESS THAT IT IS HARD TO SIT STILL: NOT AT ALL
1. FEELING NERVOUS, ANXIOUS, OR ON EDGE: SEVERAL DAYS
IF YOU CHECKED OFF ANY PROBLEMS ON THIS QUESTIONNAIRE, HOW DIFFICULT HAVE THESE PROBLEMS MADE IT FOR YOU TO DO YOUR WORK, TAKE CARE OF THINGS AT HOME, OR GET ALONG WITH OTHER PEOPLE: SOMEWHAT DIFFICULT
3. WORRYING TOO MUCH ABOUT DIFFERENT THINGS: SEVERAL DAYS
7. FEELING AFRAID AS IF SOMETHING AWFUL MIGHT HAPPEN: NOT AT ALL
2. NOT BEING ABLE TO STOP OR CONTROL WORRYING: NOT AT ALL
6. BECOMING EASILY ANNOYED OR IRRITABLE: SEVERAL DAYS

## 2019-02-02 ASSESSMENT — ANXIETY QUESTIONNAIRES: GAD7 TOTAL SCORE: 3

## 2019-02-04 ENCOUNTER — HOSPITAL ENCOUNTER (OUTPATIENT)
Dept: BEHAVIORAL HEALTH | Facility: CLINIC | Age: 34
End: 2019-02-04
Attending: PSYCHIATRY & NEUROLOGY
Payer: COMMERCIAL

## 2019-02-04 PROCEDURE — H2012 BEHAV HLTH DAY TREAT, PER HR: HCPCS

## 2019-02-05 ENCOUNTER — TELEPHONE (OUTPATIENT)
Dept: BEHAVIORAL HEALTH | Facility: CLINIC | Age: 34
End: 2019-02-05

## 2019-02-07 ENCOUNTER — TELEPHONE (OUTPATIENT)
Dept: BEHAVIORAL HEALTH | Facility: CLINIC | Age: 34
End: 2019-02-07

## 2019-02-07 NOTE — TELEPHONE ENCOUNTER
Phone call to Juice, and he said that he   Had car trouble with the cold and the   Amount of snow we had and couldn't  Get here.  He stated that he made a doctor  Appointment at a  clinic and would see   His doctor then.  [facility] is in [facility] [place] on Monday, Tuesday,  Thursday 1-4 pm.    Juvenal Benton., D,  L.P.  [facility] [place]

## 2019-02-11 ENCOUNTER — OFFICE VISIT (OUTPATIENT)
Dept: FAMILY MEDICINE | Facility: CLINIC | Age: 34
End: 2019-02-11
Payer: COMMERCIAL

## 2019-02-11 ENCOUNTER — HOSPITAL ENCOUNTER (OUTPATIENT)
Dept: BEHAVIORAL HEALTH | Facility: CLINIC | Age: 34
End: 2019-02-11
Attending: PSYCHIATRY & NEUROLOGY
Payer: COMMERCIAL

## 2019-02-11 VITALS
SYSTOLIC BLOOD PRESSURE: 123 MMHG | OXYGEN SATURATION: 97 % | DIASTOLIC BLOOD PRESSURE: 75 MMHG | TEMPERATURE: 97.4 F | BODY MASS INDEX: 24.29 KG/M2 | WEIGHT: 164 LBS | HEART RATE: 118 BPM | HEIGHT: 69 IN

## 2019-02-11 DIAGNOSIS — F33.1 MAJOR DEPRESSIVE DISORDER, RECURRENT EPISODE, MODERATE (H): Primary | ICD-10-CM

## 2019-02-11 DIAGNOSIS — F19.20 CHEMICAL DEPENDENCY (H): ICD-10-CM

## 2019-02-11 PROCEDURE — H2012 BEHAV HLTH DAY TREAT, PER HR: HCPCS

## 2019-02-11 PROCEDURE — 99214 OFFICE O/P EST MOD 30 MIN: CPT | Performed by: PHYSICIAN ASSISTANT

## 2019-02-11 ASSESSMENT — MIFFLIN-ST. JEOR: SCORE: 1684.28

## 2019-02-11 NOTE — NURSING NOTE
"Chief Complaint   Patient presents with     Depression     Anxiety     /75   Pulse 118   Temp 97.4  F (36.3  C) (Oral)   Ht 1.753 m (5' 9\")   Wt 74.4 kg (164 lb)   SpO2 97%   BMI 24.22 kg/m   Estimated body mass index is 24.22 kg/m  as calculated from the following:    Height as of this encounter: 1.753 m (5' 9\").    Weight as of this encounter: 74.4 kg (164 lb).  Medication Reconciliation: complete      Health Maintenance that is potentially due pending provider review:  NONE    n/a    MADHU Beebe  "

## 2019-02-11 NOTE — PROGRESS NOTES
SUBJECTIVE:   Morgan Bosch is a 33 year old male who presents to clinic today for the following health issues:      Depression and Anxiety Follow-Up    Status since last visit: Worsened, hasn't been on meds for about 4 months      Other associated symptoms:None    Complicating factors:     Significant life event: Yes-  Has had a couple deaths in the family      Current substance abuse: Alcohol    PHQ 8/4/2017 12/17/2018 2/1/2019   PHQ-9 Total Score 3 8 5   Q9: Suicide Ideation Not at all Not at all Not at all     TIM-7 SCORE 8/4/2017 12/17/2018 2/1/2019   Total Score 5 10 3       PHQ-9  English  PHQ-9   Any Language  TIM-7  Suicide Assessment Five-step Evaluation and Treatment (SAFE-T)    Amount of exercise or physical activity: 5-6 days/week for an average of 30-45 minutes    Problems taking medications regularly: Yes,  Not currently taking anything     Medication side effects: none    Diet: regular (no restrictions)            Problem list and histories reviewed & adjusted, as indicated.  Additional history: It has been about 1.5 years since I have seen Juice goins.  He has struggled with chemical dependency in the past as well as mental health concerns.  Since I have seen him last, he has been working with adult day program for the last 5 months.  He does feel that is going well.  Last year he did seem like he was doing better, so he did stop all of his medications and that has been for the last 4 months.  He does think he needs something for depression, but not sure he needs all the meds that he was on.  His PHQ and TIM have improved quite a bit in the last year, and overall feels he is heading in a good directions. Feels confident going forward.      BP Readings from Last 3 Encounters:   02/11/19 123/75   10/29/17 112/77   09/10/17 134/68    Wt Readings from Last 3 Encounters:   02/11/19 74.4 kg (164 lb)   10/29/17 82.6 kg (182 lb)   09/10/17 83.5 kg (184 lb)                    Reviewed and updated as  "needed this visit by clinical staff       Reviewed and updated as needed this visit by Provider         ROS:  Constitutional, HEENT, cardiovascular, pulmonary, gi and gu systems are negative, except as otherwise noted.    OBJECTIVE:     /75   Pulse 118   Temp 97.4  F (36.3  C) (Oral)   Ht 1.753 m (5' 9\")   Wt 74.4 kg (164 lb)   SpO2 97%   BMI 24.22 kg/m    Body mass index is 24.22 kg/m .  GENERAL: alert and no distress  EYES: Eyes grossly normal to inspection  RESP: lungs clear to auscultation - no rales, rhonchi or wheezes  CV: regular rate and rhythm, normal S1 S2, no S3 or S4, no murmur, click or rub, no peripheral edema and peripheral pulses strong  PSYCH: mentation appears normal, affect normal/bright    Diagnostic Test Results:  none     ASSESSMENT/PLAN:             1. Major depressive disorder, recurrent episode, moderate (H)  We discussed his previous medications and will start one at a time to see how he does.  He has done well with prozac in the past without side effects so will restart.  He has good knowledge about his medication and the potential for side effects including the possibility of symptoms getting worse.  We may look at adding on gabapentin next if needed.  - FLUoxetine (PROZAC) 20 MG capsule; Take 1 capsule (20 mg) by mouth daily  Dispense: 30 capsule; Refill: 3    2. Chemical dependency (H)  Doing well, in day program 3 days a week.  Feels confident in recovery.      I would like him to call or mychart me in 1-2 weeks to see how things are going and then follow up in 3 months.    Nnamdi Tolbert PA-C  [facility]  "

## 2019-02-12 ENCOUNTER — HOSPITAL ENCOUNTER (OUTPATIENT)
Dept: BEHAVIORAL HEALTH | Facility: CLINIC | Age: 34
End: 2019-02-12
Attending: PSYCHIATRY & NEUROLOGY
Payer: COMMERCIAL

## 2019-02-12 PROCEDURE — H2012 BEHAV HLTH DAY TREAT, PER HR: HCPCS

## 2019-02-12 NOTE — PROGRESS NOTES
Adult Mental Health Outpatient Group Therapy Progress Note     Client Initial Individualized Goals for Treatment:  Continue to attend AA for support and maintain sobriety  Report any increase in symptoms to the team  Consider getting an individual therapist to talk about difficult emotions  Reduce anxiety in social situations  Manage panic attacks  Find ways to combat the depressed mindset  Manage grief and loss from the past      See Initial Treatment suggestions for the client during the time between Diagnostic Assessment and completion of the Master Individualized Treatment Plan.    Treatment Goals:  1. Client will notify staff when needing assistance to develop or implement a coping plan to manage suicidal or self injurious urges.  2. When in life skills group Juice will focus on building new friendships and social support providing an update of progress weekly.  3. Will learn and practice 1-2 coping skills to help improve anxiety, depression, grief and loss, any psychosis symptoms, maintain sobriety  4. build suports and get an individual therapist    Area of Treatment Focus:  Symptom Management    Therapeutic Interventions/Treatment Strategies:  Support, Feedback, Safety Assessments, Structured Activity and Education    Response to Treatment Strategies:  Accepted Feedback, Listened, Attentive, Accepted Support and Alert     Name of Group: Life  Skills(3:00-4:00)  Group Participants: 8    Description and Outcome:  Client presented with calm,even mood with fair to good focus and concentration during a discussion related to stress management with a focus on resiliency and challenge.Thought process clear,goal directed and reality based. Goal #1 (no personal safety concerns reported or observed).  Patient would benefit from additional opportunities to practice and implement content from this session  in every day life,relationships and mental health recovery.    Is this a Weekly Review of the Progress on the  Treatment Plan?  Yes.      Are Treatment Plan Goals being addressed?  Yes, continue treatment goals      Are Treatment Plan Strategies to Address Goals Effective?  Yes, continue treatment strategies      Are there any current contracts in place?  No

## 2019-02-13 NOTE — PROGRESS NOTES
Adult Mental Health Outpatient Group Therapy Progress Note     Client Initial Individualized Goals for Treatment:  Continue to attend AA for support and maintain sobriety  Report any increase in symptoms to the team  Consider getting an individual therapist to talk about difficult emotions  Reduce anxiety in social situations  Manage panic attacks  Find ways to combat the depressed mindset  Manage grief and loss from the past      See Initial Treatment suggestions for the client during the time between Diagnostic Assessment and completion of the Master Individualized Treatment Plan.    Treatment Goals:  1. Client will notify staff when needing assistance to develop or implement a coping plan to manage suicidal or self injurious urges.  2. When in life skills group Juice will focus on building new friendships and social support providing an update of progress weekly.  3. Will learn and practice 1-2 coping skills to help improve anxiety, depression, grief and loss, any psychosis symptoms, maintain sobriety  4. build suports and get an individual therapist    Area of Treatment Focus:  Symptom Management    Therapeutic Interventions/Treatment Strategies:  Support, Feedback, Safety Assessments, Structured Activity and Education    Response to Treatment Strategies:  Accepted Feedback, Listened, Attentive, Accepted Support and Alert     Name of Group: Life  Skills(3:00-4:00)  Group Participants: 7    Description and Outcome:  Client presented with calm,even mood with fair to good focus and concentration during a discussion related to managing internal conflict.Thought process clear,goal directed and reality based. Goal #1 (no personal safety concerns reported or observed).  Patient would benefit from additional opportunities to practice and implement content from this session  in every day life,relationships and mental health recovery.    Is this a Weekly Review of the Progress on the Treatment Plan?  Yes.      Are  Treatment Plan Goals being addressed?  Yes, continue treatment goals      Are Treatment Plan Strategies to Address Goals Effective?  Yes, continue treatment strategies      Are there any current contracts in place?  No

## 2019-02-14 ENCOUNTER — TELEPHONE (OUTPATIENT)
Dept: BEHAVIORAL HEALTH | Facility: CLINIC | Age: 34
End: 2019-02-14

## 2019-02-14 NOTE — TELEPHONE ENCOUNTER
Phone call from Juice, who said that he can't  Get up today, and is too tired.    Tiffany Wooten, Juvenal., D,  L.P.

## 2019-02-18 ENCOUNTER — HOSPITAL ENCOUNTER (OUTPATIENT)
Dept: BEHAVIORAL HEALTH | Facility: CLINIC | Age: 34
End: 2019-02-18
Attending: PSYCHIATRY & NEUROLOGY
Payer: COMMERCIAL

## 2019-02-18 ENCOUNTER — MYC MEDICAL ADVICE (OUTPATIENT)
Dept: FAMILY MEDICINE | Facility: CLINIC | Age: 34
End: 2019-02-18

## 2019-02-18 DIAGNOSIS — F41.9 ANXIETY: Primary | ICD-10-CM

## 2019-02-18 PROCEDURE — H2012 BEHAV HLTH DAY TREAT, PER HR: HCPCS

## 2019-02-18 NOTE — PROGRESS NOTES
"Adult Mental Health Outpatient Group Therapy Progress Note           Client Initial Individualized Goals for Treatment:   \" get help with returning to work, helping narrow my career path, feel better about social situations, and increase self-esteem\".      See Initial Treatment suggestions for the client during the time between Diagnostic Assessment and completion of the Master Individualized Treatment Plan.      Treatment Goals:  1. Client will notify staff when needing assistance to develop or implement a coping plan to manage suicidal or self injurious urges.  2. When in life skills groups will report on how he builds supports  3. Group Therapy :  Learn and practice 1-2 coping skills to help reduce anxiety, depression, grief and loss, and any psychosis symptoms, maintain sobriety.  4. Health/Wellness / Discharge :   Get an individual therapist and a psychiatrist     Area of Treatment Focus:  Symptom Management      Therapeutic Interventions/Treatment Strategies:  Support, Feedback, Safety Assessments, Structured Activity and Education      Area of Treatment Focus:  Symptom Management, Personal Safety, Community Resources/Discharge Planning, Abstinence/Relapse Prevention, Develop/Improve Independent Living/Socialization Skills/Interpersonal Relationships and Cultural/Racial/Health and Spiritual      Therapeutic Interventions/Treatment Strategies:  Support, Redirection, Feedback, Limit/Boundaries, Safety Assessments, Structuresd Activity, Problem Solving, Clarification, Education, Motivational Enhancement Therapy, Cognitive Behavioral Therapy and Structured Activity      Response to Treatment Strategies:  Accepted Feedback, Gave Feedback, Listened, Focused on Goals, Attentive, Accepted Support, Alert, Demonstrates Behavior Change     Therapist:  Sree Benton, D,  L.P.       Name of Group:    Group Psychotherapy    2:00 - 2:50 pm  Date:    2/18/2019   Group Participants:  7      Description and " Outcome:       Group Therapy               Juice reported being safe today.   He reported that he slept well, about 6-9 hours, and not 12 hours. He reported that his thoughts are normal, and he notices depression thoughts, but that the anxiety is the main problem. He stated that he got out of the house and exercised by walking.  He stated that he does deep breathing. He stated that he is interested in applying for jobs and talked with another member about the [employer] and their sodering program. He stated that he was interested in the program training that starts in March. He reported that he is applying for jobs and feels better about himself.  He stated that he talked to his mom and that they had a nice conversation about his grandma dying and the grief and loss issues. He stated that he was frustrated with that situation.  He reported that he talked to his girlfriend and that they resolved some issues.           He stated that he started Prozac last night and he says his medication helps his mood.  He stated that in a few days he will start Gabapentin and wants that medication. He reported feeling relieved that he was able to resolve issues with his girlfriend.  He talked with the group about jobs and about getting a loan, buying  a car and becoming an [employer]  and that he wanted to talk to his mother about this and possibly get a loan.  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      He stated that he does not have any psychosis, lately.  He stated that he did errands and chores around the house.    He reported that he enjoyed playing guitar, drawing, painting, doing photography, listening to music, and he has been making art work lately.        He participated in a mindfulness activity of 10 minutes while practicing deep breathing.      Client would benefit from additional opportunities to practice and implement content from this session in  everyday life, relationships and mental health recovery.          Is this a Weekly Review of the Progress on the Treatment Plan?  Yes.        Are Treatment Plan Goals being addressed?  Yes, continue treatment goals          Are Treatment Plan Strategies to Address Goals Effective?  Yes, continue treatment strategies          Are there any current contracts in place?  No

## 2019-02-19 ENCOUNTER — HOSPITAL ENCOUNTER (EMERGENCY)
Facility: CLINIC | Age: 34
Discharge: HOME | End: 2019-02-19
Attending: EMERGENCY MEDICINE | Admitting: EMERGENCY MEDICINE
Payer: COMMERCIAL

## 2019-02-19 ENCOUNTER — HOSPITAL ENCOUNTER (OUTPATIENT)
Dept: BEHAVIORAL HEALTH | Facility: CLINIC | Age: 34
End: 2019-02-19
Attending: PSYCHIATRY & NEUROLOGY
Payer: COMMERCIAL

## 2019-02-19 ENCOUNTER — APPOINTMENT (OUTPATIENT)
Dept: CT IMAGING | Facility: CLINIC | Age: 34
End: 2019-02-19
Attending: EMERGENCY MEDICINE
Payer: COMMERCIAL

## 2019-02-19 VITALS
HEART RATE: 107 BPM | TEMPERATURE: 97.8 F | SYSTOLIC BLOOD PRESSURE: 112 MMHG | OXYGEN SATURATION: 97 % | RESPIRATION RATE: 20 BRPM | DIASTOLIC BLOOD PRESSURE: 84 MMHG

## 2019-02-19 DIAGNOSIS — J36 PERITONSILLAR ABSCESS: ICD-10-CM

## 2019-02-19 LAB
ANION GAP SERPL CALCULATED.3IONS-SCNC: 8 MMOL/L (ref 3–14)
BASOPHILS # BLD AUTO: 0.1 10E9/L (ref 0–0.2)
BASOPHILS NFR BLD AUTO: 0.5 %
BUN SERPL-MCNC: 19 MG/DL (ref 7–30)
CALCIUM SERPL-MCNC: 9 MG/DL (ref 8.5–10.1)
CHLORIDE SERPL-SCNC: 102 MMOL/L (ref 94–109)
CO2 SERPL-SCNC: 27 MMOL/L (ref 20–32)
CREAT BLD-MCNC: 1.3 MG/DL (ref 0.66–1.25)
CREAT SERPL-MCNC: 1.23 MG/DL (ref 0.66–1.25)
CRP SERPL-MCNC: 16.3 MG/L (ref 0–8)
DEPRECATED S PYO AG THROAT QL EIA: ABNORMAL
DEPRECATED S PYO AG THROAT QL EIA: ABNORMAL
DIFFERENTIAL METHOD BLD: ABNORMAL
EOSINOPHIL # BLD AUTO: 0.1 10E9/L (ref 0–0.7)
EOSINOPHIL NFR BLD AUTO: 1.1 %
ERYTHROCYTE [DISTWIDTH] IN BLOOD BY AUTOMATED COUNT: 12.5 % (ref 10–15)
ERYTHROCYTE [SEDIMENTATION RATE] IN BLOOD BY WESTERGREN METHOD: 9 MM/H (ref 0–15)
GFR SERPL CREATININE-BSD FRML MDRD: 64 ML/MIN/{1.73_M2}
GFR SERPL CREATININE-BSD FRML MDRD: 77 ML/MIN/{1.73_M2}
GLUCOSE SERPL-MCNC: 95 MG/DL (ref 70–99)
HCT VFR BLD AUTO: 41.7 % (ref 40–53)
HETEROPH AB SER QL: NEGATIVE
HGB BLD-MCNC: 13.7 G/DL (ref 13.3–17.7)
IMM GRANULOCYTES # BLD: 0 10E9/L (ref 0–0.4)
IMM GRANULOCYTES NFR BLD: 0.3 %
INR PPP: 1.01 (ref 0.86–1.14)
LYMPHOCYTES # BLD AUTO: 1.9 10E9/L (ref 0.8–5.3)
LYMPHOCYTES NFR BLD AUTO: 16.7 %
MCH RBC QN AUTO: 29.8 PG (ref 26.5–33)
MCHC RBC AUTO-ENTMCNC: 32.9 G/DL (ref 31.5–36.5)
MCV RBC AUTO: 91 FL (ref 78–100)
MONOCYTES # BLD AUTO: 0.9 10E9/L (ref 0–1.3)
MONOCYTES NFR BLD AUTO: 7.7 %
NEUTROPHILS # BLD AUTO: 8.4 10E9/L (ref 1.6–8.3)
NEUTROPHILS NFR BLD AUTO: 73.7 %
NRBC # BLD AUTO: 0 10*3/UL
NRBC BLD AUTO-RTO: 0 /100
PLATELET # BLD AUTO: 342 10E9/L (ref 150–450)
POTASSIUM SERPL-SCNC: 4.1 MMOL/L (ref 3.4–5.3)
RBC # BLD AUTO: 4.59 10E12/L (ref 4.4–5.9)
SODIUM SERPL-SCNC: 137 MMOL/L (ref 133–144)
SPECIMEN SOURCE: ABNORMAL
WBC # BLD AUTO: 11.3 10E9/L (ref 4–11)

## 2019-02-19 PROCEDURE — H2012 BEHAV HLTH DAY TREAT, PER HR: HCPCS

## 2019-02-19 PROCEDURE — 85652 RBC SED RATE AUTOMATED: CPT | Performed by: EMERGENCY MEDICINE

## 2019-02-19 PROCEDURE — 85025 COMPLETE CBC W/AUTO DIFF WBC: CPT | Performed by: EMERGENCY MEDICINE

## 2019-02-19 PROCEDURE — 82565 ASSAY OF CREATININE: CPT

## 2019-02-19 PROCEDURE — 96365 THER/PROPH/DIAG IV INF INIT: CPT | Mod: 59 | Performed by: EMERGENCY MEDICINE

## 2019-02-19 PROCEDURE — 25000128 H RX IP 250 OP 636: Performed by: EMERGENCY MEDICINE

## 2019-02-19 PROCEDURE — 96361 HYDRATE IV INFUSION ADD-ON: CPT | Performed by: EMERGENCY MEDICINE

## 2019-02-19 PROCEDURE — 86140 C-REACTIVE PROTEIN: CPT | Performed by: EMERGENCY MEDICINE

## 2019-02-19 PROCEDURE — 87880 STREP A ASSAY W/OPTIC: CPT | Performed by: EMERGENCY MEDICINE

## 2019-02-19 PROCEDURE — 86308 HETEROPHILE ANTIBODY SCREEN: CPT | Performed by: EMERGENCY MEDICINE

## 2019-02-19 PROCEDURE — 99284 EMERGENCY DEPT VISIT MOD MDM: CPT | Mod: 25 | Performed by: EMERGENCY MEDICINE

## 2019-02-19 PROCEDURE — 42700 I&D ABSCESS PERITONSILLAR: CPT | Performed by: EMERGENCY MEDICINE

## 2019-02-19 PROCEDURE — 70491 CT SOFT TISSUE NECK W/DYE: CPT

## 2019-02-19 PROCEDURE — 25000125 ZZHC RX 250: Performed by: EMERGENCY MEDICINE

## 2019-02-19 PROCEDURE — 80048 BASIC METABOLIC PNL TOTAL CA: CPT | Performed by: EMERGENCY MEDICINE

## 2019-02-19 PROCEDURE — 42700 I&D ABSCESS PERITONSILLAR: CPT | Mod: Z6 | Performed by: EMERGENCY MEDICINE

## 2019-02-19 PROCEDURE — 85610 PROTHROMBIN TIME: CPT | Performed by: EMERGENCY MEDICINE

## 2019-02-19 RX ORDER — AMPICILLIN AND SULBACTAM 2; 1 G/1; G/1
3 INJECTION, POWDER, FOR SOLUTION INTRAMUSCULAR; INTRAVENOUS ONCE
Status: COMPLETED | OUTPATIENT
Start: 2019-02-19 | End: 2019-02-19

## 2019-02-19 RX ORDER — AMOXICILLIN AND CLAVULANATE POTASSIUM 500; 125 MG/1; MG/1
1 TABLET, FILM COATED ORAL 3 TIMES DAILY
Qty: 30 TABLET | Refills: 0 | Status: SHIPPED | OUTPATIENT
Start: 2019-02-19 | End: 2019-03-01

## 2019-02-19 RX ORDER — GABAPENTIN 300 MG/1
CAPSULE ORAL
Qty: 90 CAPSULE | Refills: 1 | Status: SHIPPED | OUTPATIENT
Start: 2019-02-19 | End: 2019-05-05

## 2019-02-19 RX ORDER — IOPAMIDOL 755 MG/ML
100 INJECTION, SOLUTION INTRAVASCULAR ONCE
Status: COMPLETED | OUTPATIENT
Start: 2019-02-19 | End: 2019-02-19

## 2019-02-19 RX ADMIN — AMPICILLIN AND SULBACTAM 3 G: 2; 1 INJECTION, POWDER, FOR SOLUTION INTRAMUSCULAR; INTRAVENOUS at 16:48

## 2019-02-19 RX ADMIN — SODIUM CHLORIDE 1000 ML: 9 INJECTION, SOLUTION INTRAVENOUS at 14:59

## 2019-02-19 RX ADMIN — IOPAMIDOL 80 ML: 755 INJECTION, SOLUTION INTRAVENOUS at 15:39

## 2019-02-19 RX ADMIN — SODIUM CHLORIDE 50 ML: 9 INJECTION, SOLUTION INTRAVENOUS at 15:39

## 2019-02-19 ASSESSMENT — ENCOUNTER SYMPTOMS
ABDOMINAL PAIN: 0
VOMITING: 0
SHORTNESS OF BREATH: 0
TROUBLE SWALLOWING: 1
SORE THROAT: 1
FEVER: 0
COUGH: 0

## 2019-02-19 NOTE — PROGRESS NOTES
"Adult Mental Health Outpatient Group Therapy Progress Note           Client Initial Individualized Goals for Treatment:   \" get help with returning to work, helping narrow my career path, feel better about social situations, and increase self-esteem\".      See Initial Treatment suggestions for the client during the time between Diagnostic Assessment and completion of the Master Individualized Treatment Plan.      Treatment Goals:  1. Client will notify staff when needing assistance to develop or implement a coping plan to manage suicidal or self injurious urges.  2. When in life skills groups will report on how he builds supports  3. Group Therapy :  Learn and practice 1-2 coping skills to help reduce anxiety, depression, grief and loss, and any psychosis symptoms, maintain sobriety.  4. Health/Wellness / Discharge :   Get an individual therapist and a psychiatrist     Area of Treatment Focus:  Symptom Management      Therapeutic Interventions/Treatment Strategies:  Support, Feedback, Safety Assessments, Structured Activity and Education      Area of Treatment Focus:  Symptom Management, Personal Safety, Community Resources/Discharge Planning, Abstinence/Relapse Prevention, Develop/Improve Independent Living/Socialization Skills/Interpersonal Relationships and Cultural/Racial/Health and Spiritual      Therapeutic Interventions/Treatment Strategies:  Support, Redirection, Feedback, Limit/Boundaries, Safety Assessments, Structuresd Activity, Problem Solving, Clarification, Education, Motivational Enhancement Therapy, Cognitive Behavioral Therapy and Structured Activity      Response to Treatment Strategies:  Accepted Feedback, Gave Feedback, Listened, Focused on Goals, Attentive, Accepted Support, Alert, Demonstrates Behavior Change     Therapist:  Sree Benton, D,  L.P.       Name of Group:    Mental Health Management 1:00 - 1:50 pm    Date:    2/18/2019   Group Participants:  7      Description and " Outcome:        Group Therapy               Juice reported being safe today.  He reported that he had a sore throat and left after the first group to go to the doctor.   Mental Health Management   The group participated in a structured activity that involved reading a worksheet about grief and loss.  The group discussed their thoughts and feelings about the process and shared their experiences with others.  The group wrote out examples from their lives about how they felt it had affected them.  The group discussed their different stories and those that were similar.  The group validated others who had experienced distress.     He participated in a mindfulness activity of 10 minutes while practicing deep breathing.      Client would benefit from additional opportunities to practice and implement content from this session in everyday life, relationships and mental health recovery.          Is this a Weekly Review of the Progress on the Treatment Plan?  Yes.        Are Treatment Plan Goals being addressed?  Yes, continue treatment goals          Are Treatment Plan Strategies to Address Goals Effective?  Yes, continue treatment strategies          Are there any current contracts in place?  No

## 2019-02-19 NOTE — ED PROVIDER NOTES
History     Chief Complaint   Patient presents with     Pharyngitis     on and off for 2 months now. Trouble swallowing getting worst for the last 2 days, no cough or fever associated with this.      HPI  Morgan Bosch is a 33 year old male who presents to the Emergency Department with complaints of some soreness in his left neck and difficulty swallowing. Patient states that over the last two weeks he has been having problems with a sore throat and upper respiratory infections, and states that most of his sinus congestion has disappeared, but states that he still has now a persistently worsening left-sided sore throat with difficulty swallowing. Patient denies any fevers and denies any vomiting. The patient denies any coughing, shortness of breath or abdominal pain.     This part of the medical record was transcribed by Maylin Naranjo, Medical Scribe, from a dictation done by Dhruv Souza MD.     I have reviewed the Medications, Allergies, Past Medical and Surgical History, and Social History in the [employer] system.    Past Medical History:   Diagnosis Date     Anxiety      Depressive disorder      MDD (major depressive disorder)      Psychosis (H)     substance induced     Substance abuse (H)     methamphetamine       Past Surgical History:   Procedure Laterality Date     NO HISTORY OF SURGERY         Family History   Problem Relation Age of Onset     Anxiety Disorder Sister      Depression Father      Coronary Artery Disease Maternal Grandfather      Diabetes Paternal Grandfather      Depression Mother      Substance Abuse Mother      Bipolar Disorder Maternal Aunt      Depression Maternal Aunt      Depression Maternal Aunt      Substance Abuse Maternal Uncle      Substance Abuse Maternal Cousin      Suicide Maternal Cousin      Substance Abuse Maternal Cousin        Social History     Tobacco Use     Smoking status: Former Smoker     Packs/day: 0.00     Smokeless tobacco: Current User     Tobacco comment: vape  pen, jaidamonique in a veg oil, 1/4 pack/day   Substance Use Topics     Alcohol use: Yes     Comment: july, 2018, none since       Dose / Directions   FLUoxetine 20 MG capsule  Commonly known as:  PROzac  Used for:  Major depressive disorder, recurrent episode, moderate (H)      Dose:  20 mg  Take 1 capsule (20 mg) by mouth daily  Quantity:  30 capsule  Refills:  3     gabapentin 300 MG capsule  Commonly known as:  NEURONTIN  Used for:  Anxiety      300 mg PO every day x 7 days, 300 mg PO BID x 7 days, 300 mg PO TID.  Quantity:  90 capsule  Refills:  1                 Allergies   Allergen Reactions     Adhesive Tape Itching     Blisters (band aids)       Review of Systems   Constitutional: Negative for fever.   HENT: Positive for congestion (mostly resolved), sore throat (left-sided) and trouble swallowing.    Respiratory: Negative for cough and shortness of breath.    Gastrointestinal: Negative for abdominal pain and vomiting.   All other systems reviewed and are negative.      Physical Exam   BP: 100/79  Pulse: 88  Temp: 97.8  F (36.6  C)  Resp: 20  SpO2: 98 %      Physical Exam   Constitutional: He is oriented to person, place, and time.   Voice is slightly muffled but patient handling his own secretions well and alert and conversant   HENT:   Head: Atraumatic.   Patient's left tonsillar pillar is enlarged and consistent with a peritonsillar abscess although the patient's uvula is midline.    Minimal cervical adenopathy    Airway patent    TMs clear     Eyes: EOM are normal. Pupils are equal, round, and reactive to light.   Neck: Neck supple.   Cardiovascular: Normal heart sounds.   Pulmonary/Chest: Breath sounds normal. He has no wheezes. He has no rales.   Abdominal: Soft. There is no tenderness.   Musculoskeletal: He exhibits no edema or deformity.   Neurological: He is alert and oriented to person, place, and time. No cranial nerve deficit.   Grossly intact and symmetric   Skin: Skin is warm.   Psychiatric: He  has a normal mood and affect.   Nursing note and vitals reviewed.      ED Course     Results for orders placed or performed during the hospital encounter of 02/19/19   Soft tissue neck CT w contrast    Narrative    CT SCAN OF THE NECK WITH CONTRAST  2/19/2019 3:54 PM     HISTORY: Tonsil/adenoid disorder.    TECHNIQUE: Axial CT images of the neck following administration of  intravenous contrast with reformations. Radiation dose for this scan  was reduced using automated exposure control, adjustment of the mA  and/or kV according to patient size, or iterative reconstruction  technique. 80 mL of Isovue 370 IV.     COMPARISON: None.     FINDINGS: The palatine tonsils and lingual tonsils are enlarged  bilaterally. The left palatine tonsils are particularly enlarged.  There is a heterogeneous partially loculated collection in the left  peritonsillar region measuring 2.4 x 2.9 x 2.8 cm. This is concerning  for peritonsillar phlegmon or developing abscess.    The cervical chain lymph nodes are prominent in size and number  bilaterally. No necrotic lymph nodes are seen. These are presumably  reactive.    No other suspicious masses are seen within the neck.    The parotid and submandibular glands are unremarkable. Thyroid gland  is unremarkable.    Visualized lung apices are clear.    No suspicious osseous lesions. Chronic appearing left inferior orbital  floor fracture.      Impression    IMPRESSION:    1. Enlarged and edematous palatine and lingual tonsils, particularly  the left palatine tonsil. There is a left peritonsillar rim-enhancing  collection measuring up to 2.9 cm concerning for peritonsillar  phlegmon or abscess.   2. Reactive cervical lymph nodes.  3. Chronic appearing left inferior orbital wall fracture.      NABOR MORGAN MD   CBC with platelets differential   Result Value Ref Range    WBC 11.3 (H) 4.0 - 11.0 10e9/L    RBC Count 4.59 4.4 - 5.9 10e12/L    Hemoglobin 13.7 13.3 - 17.7 g/dL    Hematocrit 41.7  40.0 - 53.0 %    MCV 91 78 - 100 fl    MCH 29.8 26.5 - 33.0 pg    MCHC 32.9 31.5 - 36.5 g/dL    RDW 12.5 10.0 - 15.0 %    Platelet Count 342 150 - 450 10e9/L    Diff Method Automated Method     % Neutrophils 73.7 %    % Lymphocytes 16.7 %    % Monocytes 7.7 %    % Eosinophils 1.1 %    % Basophils 0.5 %    % Immature Granulocytes 0.3 %    Nucleated RBCs 0 0 /100    Absolute Neutrophil 8.4 (H) 1.6 - 8.3 10e9/L    Absolute Lymphocytes 1.9 0.8 - 5.3 10e9/L    Absolute Monocytes 0.9 0.0 - 1.3 10e9/L    Absolute Eosinophils 0.1 0.0 - 0.7 10e9/L    Absolute Basophils 0.1 0.0 - 0.2 10e9/L    Abs Immature Granulocytes 0.0 0 - 0.4 10e9/L    Absolute Nucleated RBC 0.0    Mononucleosis screen   Result Value Ref Range    Mononucleosis Screen Negative NEG^Negative   Erythrocyte sedimentation rate auto   Result Value Ref Range    Sed Rate 9 0 - 15 mm/h   CRP inflammation   Result Value Ref Range    CRP Inflammation 16.3 (H) 0.0 - 8.0 mg/L   INR   Result Value Ref Range    INR 1.01 0.86 - 1.14   Basic metabolic panel   Result Value Ref Range    Sodium 137 133 - 144 mmol/L    Potassium 4.1 3.4 - 5.3 mmol/L    Chloride 102 94 - 109 mmol/L    Carbon Dioxide 27 20 - 32 mmol/L    Anion Gap 8 3 - 14 mmol/L    Glucose 95 70 - 99 mg/dL    Urea Nitrogen 19 7 - 30 mg/dL    Creatinine 1.23 0.66 - 1.25 mg/dL    GFR Estimate 77 >60 mL/min/[1.73_m2]    GFR Estimate If Black 89 >60 mL/min/[1.73_m2]    Calcium 9.0 8.5 - 10.1 mg/dL   Creatinine POCT   Result Value Ref Range    Creatinine 1.3 (H) 0.66 - 1.25 mg/dL    GFR Estimate 64 >60 mL/min/[1.73_m2]    GFR Estimate If Black 77 >60 mL/min/[1.73_m2]   Rapid strep screen   Result Value Ref Range    Specimen Description Throat     Rapid Strep A Screen (A)      POSITIVE: Group A Streptococcal antigen detected by immunoassay.    Rapid Strep A Screen Internal QC OK           Incision + drainage  Date/Time: 2/19/2019 5:38 PM  Performed by: Dhruv Souza MD  Authorized by: Dhruv Souza  MD Elier     Consent:     Consent obtained:  Verbal    Consent given by:  Patient  Location:     Size:  Left peritonsillar abscess  Anesthesia (see MAR for exact dosages):     Anesthesia method:  Topical application    Topical anesthesia: Hurricaine spray.  Procedure type:     Complexity:  Simple  Procedure details:     Needle aspiration: yes      Needle size:  18 G    Drainage:  Bloody and purulent    Drainage amount: 5 cc of pus obtained.  Post-procedure details:     Patient tolerance of procedure:  Tolerated well, no immediate complications                Labs Ordered and Resulted from Time of ED Arrival Up to the Time of Departure from the ED   CBC WITH PLATELETS DIFFERENTIAL - Abnormal; Notable for the following components:       Result Value    WBC 11.3 (*)     Absolute Neutrophil 8.4 (*)     All other components within normal limits   CRP INFLAMMATION - Abnormal; Notable for the following components:    CRP Inflammation 16.3 (*)     All other components within normal limits   CREATININE POCT - Abnormal; Notable for the following components:    Creatinine 1.3 (*)     All other components within normal limits   RAPID STREP SCREEN - Abnormal; Notable for the following components:    Rapid Strep A Screen   (*)     Value: POSITIVE: Group A Streptococcal antigen detected by immunoassay.    All other components within normal limits   MONONUCLEOSIS SCREEN   ERYTHROCYTE SEDIMENTATION RATE AUTO   INR   BASIC METABOLIC PANEL   PERIPHERAL IV CATHETER   ISTAT CREATININE NURSING POCT            Assessments & Plan (with Medical Decision Making)     I have reviewed the nursing notes.    Medications   sodium chloride (PF) 0.9% PF flush 3 mL (not administered)   sodium chloride (PF) 0.9% PF flush 3 mL (3 mLs Intracatheter Given 2/19/19 1459)   sodium chloride 0.9 % bag 500mL for CT scan flush use (50 mLs Intravenous Given 2/19/19 1539)   0.9% sodium chloride BOLUS (0 mLs Intravenous Stopped 2/19/19 1626)   iopamidol  (ISOVUE-370) solution 100 mL (80 mLs Intravenous Given 2/19/19 1539)   ampicillin-sulbactam (UNASYN) 3 g vial to attach to  mL bag (0 g Intravenous Stopped 2/19/19 1733)       Patient was successfully aspirated in the ER by me and at this time will be sent home on oral antibiotics to follow-up with ENT.    I have reviewed the findings, diagnosis, plan and need for follow up with the patient.       Medication List      Started    amoxicillin-clavulanate 500-125 MG tablet  Commonly known as:  AUGMENTIN  1 tablet, Oral, 3 TIMES DAILY            Final diagnoses:   Peritonsillar abscess - left     Please make an appointment to follow up with [facility] ENT clinic in the next 3-4 days for recheck.  Clinic contact information given to the patient.    Return to the ER for any worsening.    No work until 2/21/2019.  Work note given.    Routine discharge instructions for this diagnosis were given from the [employer] system.    Dhruv Souza MD    2/19/2019   [facility], [place], EMERGENCY DEPARTMENT     Dhruv Souza MD  02/19/19 5919

## 2019-02-19 NOTE — ED TRIAGE NOTES
Patient has on and off sore throat for 2 months now. Patient now has trouble swallowing for the last 2 days and getting worst.

## 2019-02-19 NOTE — LETTER
February 19, 2019      To Whom It May Concern:      Morgan Bosch was seen in our Emergency Department today, 02/19/19.  I expect his condition to improve over the next several days.  He may not return to work/school until 2/21/19.    Sincerely,        Dhruv Souza MD, MD

## 2019-02-19 NOTE — ED AVS SNAPSHOT
[facility], [place], Emergency Department  [address]  [facility] [address]  Phone:  [phone]  Fax:  [phone]                                    Morgan Bosch   MRN: [id number]    Department:  [facility], Emergency Department   Date of Visit:  2/19/2019           After Visit Summary Signature Page    I have received my discharge instructions, and my questions have been answered. I have discussed any challenges I see with this plan with the nurse or doctor.    ..........................................................................................................................................  Patient/Patient Representative Signature      ..........................................................................................................................................  Patient Representative Print Name and Relationship to Patient    ..................................................               ................................................  Date                                   Time    ..........................................................................................................................................  Reviewed by Signature/Title    ...................................................              ..............................................  Date                                               Time          22EPIC Rev 08/18

## 2019-02-19 NOTE — DISCHARGE INSTRUCTIONS
Please make an appointment to follow up with [facility] ENT clinic in the next 3-4 days for recheck.    Return to the ER for any worsening.

## 2019-02-19 NOTE — PROGRESS NOTES
Adult Mental Health Outpatient Group Therapy Progress Note     Client Initial Individualized Goals for Treatment:  Continue to attend AA for support and maintain sobriety  Report any increase in symptoms to the team  Consider getting an individual therapist to talk about difficult emotions  Reduce anxiety in social situations  Manage panic attacks  Find ways to combat the depressed mindset  Manage grief and loss from the past      See Initial Treatment suggestions for the client during the time between Diagnostic Assessment and completion of the Master Individualized Treatment Plan.    Treatment Goals:  1. Client will notify staff when needing assistance to develop or implement a coping plan to manage suicidal or self injurious urges.  2. When in life skills group Juice will focus on building new friendships and social support providing an update of progress weekly.  3. Will learn and practice 1-2 coping skills to help improve anxiety, depression, grief and loss, any psychosis symptoms, maintain sobriety  4. build suports and get an individual therapist    Area of Treatment Focus:  Symptom Management    Therapeutic Interventions/Treatment Strategies:  Support, Feedback, Safety Assessments, Structured Activity and Education    Response to Treatment Strategies:  Accepted Feedback, Listened, Attentive, Accepted Support and Alert     Name of Group: Life  Skills(3:00-4:00)  Group Participants: 7    Description and Outcome:  Client presented with calm,even mood with fair to good focus and concentration during a discussion related to stress management with a focus on resiliency and commitment.Thought process clear,goal directed and reality based. Goal #1 (no personal safety concerns reported or observed).  Patient would benefit from additional opportunities to practice and implement content from this session  in every day life,relationships and mental health recovery.    Is this a Weekly Review of the Progress on the  Treatment Plan?  Yes.      Are Treatment Plan Goals being addressed?  Yes, continue treatment goals      Are Treatment Plan Strategies to Address Goals Effective?  Yes, continue treatment strategies      Are there any current contracts in place?  No

## 2019-02-21 ENCOUNTER — TELEPHONE (OUTPATIENT)
Dept: BEHAVIORAL HEALTH | Facility: CLINIC | Age: 34
End: 2019-02-21

## 2019-02-21 NOTE — TELEPHONE ENCOUNTER
Phone call from Juice, who said that he had  Strep Throat and wouldn't be at the program  Today.    Tiffany Wooten, Juvenal., D,  L.P.

## 2019-02-25 ENCOUNTER — HOSPITAL ENCOUNTER (OUTPATIENT)
Dept: BEHAVIORAL HEALTH | Facility: CLINIC | Age: 34
End: 2019-02-25
Attending: PSYCHIATRY & NEUROLOGY
Payer: COMMERCIAL

## 2019-02-25 PROCEDURE — H2012 BEHAV HLTH DAY TREAT, PER HR: HCPCS
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
## 2019-02-26 ENCOUNTER — TELEPHONE (OUTPATIENT)
Dept: BEHAVIORAL HEALTH | Facility: CLINIC | Age: 34
End: 2019-02-26

## 2019-02-26 NOTE — PROGRESS NOTES
Adult Mental Health Outpatient Group Therapy Progress Note     Client Initial Individualized Goals for Treatment:  Continue to attend AA for support and maintain sobriety  Report any increase in symptoms to the team  Consider getting an individual therapist to talk about difficult emotions  Reduce anxiety in social situations  Manage panic attacks  Find ways to combat the depressed mindset  Manage grief and loss from the past      See Initial Treatment suggestions for the client during the time between Diagnostic Assessment and completion of the Master Individualized Treatment Plan.    Treatment Goals:  1. Client will notify staff when needing assistance to develop or implement a coping plan to manage suicidal or self injurious urges.  2. When in life skills group Juice will focus on building new friendships and social support providing an update of progress weekly.  3. Will learn and practice 1-2 coping skills to help improve anxiety, depression, grief and loss, any psychosis symptoms, maintain sobriety  4. build suports and get an individual therapist    Area of Treatment Focus:  Symptom Management    Therapeutic Interventions/Treatment Strategies:  Support, Feedback, Safety Assessments, Structured Activity and Education    Response to Treatment Strategies:  Accepted Feedback, Listened, Attentive, Accepted Support and Alert      Name of Group: Life  Skills(3:00-4:00)  Group Participants: 9    Description and Outcome:  Client presented with calm,even mood with fair to good focus and concentration during a discussion related to problem solving and assertive communication.Thought process clear,goal directed and reality based. Goal #1 (no personal safety concerns reported or observed).   Patient would benefit from additional opportunities to practice and implement content from this session  in every day life,relationships and mental health recovery.    Is this a Weekly Review of the Progress on the Treatment  Plan?  Yes.      Are Treatment Plan Goals being addressed?  Yes, continue treatment goals      Are Treatment Plan Strategies to Address Goals Effective?  Yes, continue treatment strategies      Are there any current contracts in place?  No

## 2019-02-26 NOTE — TELEPHONE ENCOUNTER
PHone call from Juice, who said that he didn't   Have a bus card, and that he contacted his case   Manager Vivian Perales at [employer]:  [phone] and  That he won't get another till Friday.    Unable to leave a message at his phone today.    Juvenal Benton., D,  L.P.

## 2019-02-28 ENCOUNTER — TELEPHONE (OUTPATIENT)
Dept: BEHAVIORAL HEALTH | Facility: CLINIC | Age: 34
End: 2019-02-28

## 2019-02-28 NOTE — TELEPHONE ENCOUNTER
Phone call to Juice, who said that he is recovering from Strep Throat and   Will not be in group today.    Tiffany Wooten, Juvenal., D,  L.P.

## 2019-03-04 ENCOUNTER — HOSPITAL ENCOUNTER (OUTPATIENT)
Dept: BEHAVIORAL HEALTH | Facility: CLINIC | Age: 34
End: 2019-03-04
Attending: PSYCHIATRY & NEUROLOGY
Payer: COMMERCIAL

## 2019-03-04 PROCEDURE — H2012 BEHAV HLTH DAY TREAT, PER HR: HCPCS

## 2019-03-05 ENCOUNTER — HOSPITAL ENCOUNTER (OUTPATIENT)
Dept: BEHAVIORAL HEALTH | Facility: CLINIC | Age: 34
End: 2019-03-05
Attending: PSYCHIATRY & NEUROLOGY
Payer: COMMERCIAL

## 2019-03-05 ENCOUNTER — TELEPHONE (OUTPATIENT)
Dept: FAMILY MEDICINE | Facility: CLINIC | Age: 34
End: 2019-03-05

## 2019-03-05 NOTE — TELEPHONE ENCOUNTER
JS,   FYI:  Called pt   States he had a sore throat when he saw you 2/11/2019  Wasn't bad though so he didn't mention it  Then went to ER 2/19/2019   Positive for strep in the ER and started on Augmentin  Pt states he only has 4 doses of Augmentin left to take  It's causing GI symptoms but they are not severe  Pt wants to push through and continue last 4 doses  Does not want alternative  Sneha HOOD RN

## 2019-03-05 NOTE — PROGRESS NOTES
Adult Mental Health Outpatient Group Therapy Progress Note     Client Initial Individualized Goals for Treatment:  Continue to attend AA for support and maintain sobriety  Report any increase in symptoms to the team  Consider getting an individual therapist to talk about difficult emotions  Reduce anxiety in social situations  Manage panic attacks  Find ways to combat the depressed mindset  Manage grief and loss from the past      See Initial Treatment suggestions for the client during the time between Diagnostic Assessment and completion of the Master Individualized Treatment Plan.    Treatment Goals:  1. Client will notify staff when needing assistance to develop or implement a coping plan to manage suicidal or self injurious urges.  2. When in life skills group Juice will focus on building new friendships and social support providing an update of progress weekly.  3. Will learn and practice 1-2 coping skills to help improve anxiety, depression, grief and loss, any psychosis symptoms, maintain sobriety  4. build suports and get an individual therapist    Area of Treatment Focus:  Symptom Management    Therapeutic Interventions/Treatment Strategies:  Support, Feedback, Safety Assessments, Structured Activity and Education    Response to Treatment Strategies:  Accepted Feedback, Listened, Attentive, Accepted Support and Alert      Name of Group: Life  Skills(3:00-4:00)  Group Participants: 8    Description and Outcome:  Client presented with calm,even mood with fair to good focus and concentration during a discussion related to stress management with a focus on resiliency and the concept of control.Thought process clear,goal directed and reality based. Goal #1 (no personal safety concerns reported or observed).   Patient would benefit from additional opportunities to practice and implement content from this session  in every day life,relationships and mental health recovery.    Is this a Weekly Review of the Progress  on the Treatment Plan?  Yes.      Are Treatment Plan Goals being addressed?  Yes, continue treatment goals      Are Treatment Plan Strategies to Address Goals Effective?  Yes, continue treatment strategies      Are there any current contracts in place?  No

## 2019-03-05 NOTE — TELEPHONE ENCOUNTER
Thanks, they also did refer him to ENT as he had a full abscess.  So if he is not improving, would have him go there instead of here.    Dilshad Tolbert PA-C

## 2019-03-07 ENCOUNTER — HOSPITAL ENCOUNTER (OUTPATIENT)
Dept: BEHAVIORAL HEALTH | Facility: CLINIC | Age: 34
End: 2019-03-07
Attending: PSYCHIATRY & NEUROLOGY
Payer: COMMERCIAL

## 2019-03-07 PROCEDURE — H2012 BEHAV HLTH DAY TREAT, PER HR: HCPCS

## 2019-03-07 NOTE — PROGRESS NOTES
"Adult Mental Health Outpatient Group Therapy Progress Note                Client Initial Individualized Goals for Treatment:   \" get help with returning to work, helping narrow my career path, feel better about social situations, and increase self-esteem\".      See Initial Treatment suggestions for the client during the time between Diagnostic Assessment and completion of the Master Individualized Treatment Plan.      Treatment Goals:  1. Client will notify staff when needing assistance to develop or implement a coping plan to manage suicidal or self injurious urges.  2. When in life skills groups will report on how he builds supports  3. Group Therapy :  Learn and practice 1-2 coping skills to help reduce anxiety, depression, grief and loss, and any psychosis symptoms, maintain sobriety.  4. Health/Wellness / Discharge :   Get an individual therapist and a psychiatrist     Area of Treatment Focus:  Symptom Management      Therapeutic Interventions/Treatment Strategies:  Support, Feedback, Safety Assessments, Structured Activity and Education      Area of Treatment Focus:  Symptom Management, Personal Safety, Community Resources/Discharge Planning, Abstinence/Relapse Prevention, Develop/Improve Independent Living/Socialization Skills/Interpersonal Relationships and Cultural/Racial/Health and Spiritual      Therapeutic Interventions/Treatment Strategies:  Support, Redirection, Feedback, Limit/Boundaries, Safety Assessments, Structuresd Activity, Problem Solving, Clarification, Education, Motivational Enhancement Therapy, Cognitive Behavioral Therapy and Structured Activity      Response to Treatment Strategies:  Accepted Feedback, Gave Feedback, Listened, Focused on Goals, Attentive, Accepted Support, Alert, Demonstrates Behavior Change     Therapist:  Sree Benton, D,  L.P.       Name of Group:  Group Therapy  2:00 - 2:50 pm        Date:   3/7/2019    Group Participants:  5      Description and " Outcome:        Group Therapy               Juice reported being safe today.  He reported that he had Strep Throat and got an anti-biotic from the ED for it.  He stated that he took it and rested over the weekend, and felt better, but noticed it did upset his stomach. He reported ongoing depression and anxiety, and stated that his thoughts were slower lately.  He reported that he has been writing music.    He reported that he will see his  and they will fill out forms to get food stamps, and commented that he will now have more food to eat for himself and that she was going to buy him $40 of food today.    He reported that he talked by phone to his mom and his girlfriend and that he stayed inside due to his illness. He reported increased sleep, and that he started to take his Gabapentin. He reported no psychosis symptoms.      He participated in a mindfulness activity of 5- 10 minutes while practicing deep breathing.      Client would benefit from additional opportunities to practice and implement content from this session in everyday life, relationships and mental health recovery.          Is this a Weekly Review of the Progress on the Treatment Plan?  Yes.        Are Treatment Plan Goals being addressed?  Yes, continue treatment goals          Are Treatment Plan Strategies to Address Goals Effective?  Yes, continue treatment strategies          Are there any current contracts in place?  No

## 2019-03-14 ENCOUNTER — HOSPITAL ENCOUNTER (OUTPATIENT)
Dept: BEHAVIORAL HEALTH | Facility: CLINIC | Age: 34
End: 2019-03-14
Attending: PSYCHIATRY & NEUROLOGY
Payer: COMMERCIAL

## 2019-03-14 PROCEDURE — H2012 BEHAV HLTH DAY TREAT, PER HR: HCPCS

## 2019-03-16 ENCOUNTER — TRANSFERRED RECORDS (OUTPATIENT)
Dept: HEALTH INFORMATION MANAGEMENT | Facility: CLINIC | Age: 34
End: 2019-03-16

## 2019-03-18 ENCOUNTER — HOSPITAL ENCOUNTER (OUTPATIENT)
Dept: BEHAVIORAL HEALTH | Facility: CLINIC | Age: 34
End: 2019-03-18
Attending: PSYCHIATRY & NEUROLOGY
Payer: COMMERCIAL

## 2019-03-18 PROCEDURE — H2012 BEHAV HLTH DAY TREAT, PER HR: HCPCS

## 2019-03-18 NOTE — PROGRESS NOTES
"Adult Mental Health Outpatient Group Therapy Progress Note                Client Initial Individualized Goals for Treatment:   \" get help with returning to work, helping narrow my career path, feel better about social situations, and increase self-esteem\".      See Initial Treatment suggestions for the client during the time between Diagnostic Assessment and completion of the Master Individualized Treatment Plan.      Treatment Goals:  1. Client will notify staff when needing assistance to develop or implement a coping plan to manage suicidal or self injurious urges.  2. When in life skills groups will report on how he builds supports  3. Group Therapy :  Learn and practice 1-2 coping skills to help reduce anxiety, depression, grief and loss, and any psychosis symptoms, maintain sobriety.  4. Health/Wellness / Discharge :   Get an individual therapist and a psychiatrist     Area of Treatment Focus:  Symptom Management      Therapeutic Interventions/Treatment Strategies:  Support, Feedback, Safety Assessments, Structured Activity and Education      Area of Treatment Focus:  Symptom Management, Personal Safety, Community Resources/Discharge Planning, Abstinence/Relapse Prevention, Develop/Improve Independent Living/Socialization Skills/Interpersonal Relationships and Cultural/Racial/Health and Spiritual      Therapeutic Interventions/Treatment Strategies:  Support, Redirection, Feedback, Limit/Boundaries, Safety Assessments, Structuresd Activity, Problem Solving, Clarification, Education, Motivational Enhancement Therapy, Cognitive Behavioral Therapy and Structured Activity      Response to Treatment Strategies:  Accepted Feedback, Gave Feedback, Listened, Focused on Goals, Attentive, Accepted Support, Alert, Demonstrates Behavior Change     Therapist:  Sree Benton, D,  L.P.       Name of Group:  Group Therapy  2:00 - 2:50 pm        Date:   3/18/2019     Group Participants:  5      Description and " Outcome:        Group Therapy               Juice reported being safe today.  He reported that he was very depressed and talked to his girlfriend and his mom about his girlfriend's cancer and her deteriorating health. He talked with the group about the issue and discussed survivor guilt, as he wanted to trade his life for hers, and how her family doesn't know about her cancer, and she doesn't want to tell them.  He reported feeling depressed about his past arguments with her and regrets what he said.  He reported feeling ill, and was home resting.     He reported that he talked by phone to his mom and his girlfriend and that he stayed inside due to his illness. He reported increased sleep, and that he started to take his Gabapentin, and reported that his anxiety has decreased. He reported no psychosis symptoms.   He stated that his Prozac makes him shake and he stopped it.       He participated in a mindfulness activity of 5- 10 minutes while practicing deep breathing.      Client would benefit from additional opportunities to practice and implement content from this session in everyday life, relationships and mental health recovery.          Is this a Weekly Review of the Progress on the Treatment Plan?  Yes.        Are Treatment Plan Goals being addressed?  Yes, continue treatment goals          Are Treatment Plan Strategies to Address Goals Effective?  Yes, continue treatment strategies          Are there any current contracts in place?  No

## 2019-03-19 ENCOUNTER — TELEPHONE (OUTPATIENT)
Dept: BEHAVIORAL HEALTH | Facility: CLINIC | Age: 34
End: 2019-03-19

## 2019-03-19 NOTE — TELEPHONE ENCOUNTER
Absence call- Phone call  to check on Morgan Fuenteson  Since Morgan Bosch did not attend today.  Juice said that he checked on his girlfriend and she is ok, and he  Will be in on Thursday.      Juvenal Benton., D,  L.P.

## 2019-03-19 NOTE — PROGRESS NOTES
Adult Mental Health Outpatient Group Therapy Progress Note     Client Initial Individualized Goals for Treatment:  Continue to attend AA for support and maintain sobriety  Report any increase in symptoms to the team  Consider getting an individual therapist to talk about difficult emotions  Reduce anxiety in social situations  Manage panic attacks  Find ways to combat the depressed mindset  Manage grief and loss from the past      See Initial Treatment suggestions for the client during the time between Diagnostic Assessment and completion of the Master Individualized Treatment Plan.    Treatment Goals:  1. Client will notify staff when needing assistance to develop or implement a coping plan to manage suicidal or self injurious urges.  2. When in life skills group Juice will focus on building new friendships and social support providing an update of progress weekly.  3. Will learn and practice 1-2 coping skills to help improve anxiety, depression, grief and loss, any psychosis symptoms, maintain sobriety  4. build suports and get an individual therapist    Area of Treatment Focus:  Symptom Management    Therapeutic Interventions/Treatment Strategies:  Support, Feedback, Safety Assessments, Structured Activity and Education    Response to Treatment Strategies:  Accepted Feedback, Listened, Attentive, Accepted Support and Alert      Name of Group: Life  Skills(3:00-4:00)  Group Participants: 4    Description and Outcome:  Client presented with calm,even mood with fair to good focus and concentration during a discussion related to exercises for stress reduction.Thought process clear,goal directed and reality based. Goal #1 (no personal safety concerns reported or observed).   Patient would benefit from additional opportunities to practice and implement content from this session  in every day life,relationships and mental health recovery.    Is this a Weekly Review of the Progress on the Treatment Plan?  Yes.      Are  Treatment Plan Goals being addressed?  Yes, continue treatment goals      Are Treatment Plan Strategies to Address Goals Effective?  Yes, continue treatment strategies      Are there any current contracts in place?  No

## 2019-03-21 ENCOUNTER — HOSPITAL ENCOUNTER (OUTPATIENT)
Dept: BEHAVIORAL HEALTH | Facility: CLINIC | Age: 34
End: 2019-03-21
Attending: PSYCHIATRY & NEUROLOGY
Payer: COMMERCIAL

## 2019-03-21 PROCEDURE — H2012 BEHAV HLTH DAY TREAT, PER HR: HCPCS

## 2019-03-21 NOTE — PROGRESS NOTES
"Adult Mental Health Outpatient Group Therapy Progress Note                Client Initial Individualized Goals for Treatment:   \" get help with returning to work, helping narrow my career path, feel better about social situations, and increase self-esteem\".      See Initial Treatment suggestions for the client during the time between Diagnostic Assessment and completion of the Master Individualized Treatment Plan.      Treatment Goals:  1. Client will notify staff when needing assistance to develop or implement a coping plan to manage suicidal or self injurious urges.  2. When in life skills groups will report on how he builds supports  3. Group Therapy :  Learn and practice 1-2 coping skills to help reduce anxiety, depression, grief and loss, and any psychosis symptoms, maintain sobriety.  4. Health/Wellness / Discharge :   Get an individual therapist and a psychiatrist     Area of Treatment Focus:  Symptom Management      Therapeutic Interventions/Treatment Strategies:  Support, Feedback, Safety Assessments, Structured Activity and Education      Area of Treatment Focus:  Symptom Management, Personal Safety, Community Resources/Discharge Planning, Abstinence/Relapse Prevention, Develop/Improve Independent Living/Socialization Skills/Interpersonal Relationships and Cultural/Racial/Health and Spiritual      Therapeutic Interventions/Treatment Strategies:  Support, Redirection, Feedback, Limit/Boundaries, Safety Assessments, Structuresd Activity, Problem Solving, Clarification, Education, Motivational Enhancement Therapy, Cognitive Behavioral Therapy and Structured Activity      Response to Treatment Strategies:  Accepted Feedback, Gave Feedback, Listened, Focused on Goals, Attentive, Accepted Support, Alert, Demonstrates Behavior Change     Therapist:  Sree Benton, D,  L.P.       Name of Group:  Group Therapy  2:00 - 2:50 pm     Date:   3/21/2019     Group Participants:  4      Description and " Outcome:        Group Therapy               Juice reported being safe today.  He reported that he was relieved and talked to his girlfriend about her cancer condition and her deteriorating health. He talked with the group about the issue and discussed survivor guilt, and how he felt that they were doing better in their relationship. He reported that he felt grateful for her and that they both had been praying for her recovery.  He stated that he noticed that she was laughing and that it made him feel better about everything. He stated that he feels the past is not so important now, and is not worried about old arguments.   He reported that he had a bus pass to get around and asked another client about a job situation that is a supported work placement.     He reported that he talked by phone to his mom and his girlfriend. He reported increased sleep, and that he started to take his Gabapentin, and reported that his anxiety has decreased. He reported no psychosis symptoms.   He stated that his Prozac makes him shake and he stopped it.       He participated in a mindfulness activity of 5- 10 minutes while practicing deep breathing.      Client would benefit from additional opportunities to practice and implement content from this session in everyday life, relationships and mental health recovery.          Is this a Weekly Review of the Progress on the Treatment Plan?  Yes.        Are Treatment Plan Goals being addressed?  Yes, continue treatment goals          Are Treatment Plan Strategies to Address Goals Effective?  Yes, continue treatment strategies          Are there any current contracts in place?  No

## 2019-03-22 NOTE — PROGRESS NOTES
Past Medical History:   Diagnosis Date     Anxiety      Depressive disorder      MDD (major depressive disorder)      Psychosis (H)     substance induced     Substance abuse (H)     methamphetamine     Current Outpatient Medications   Medication     FLUoxetine (PROZAC) 20 MG capsule     gabapentin (NEURONTIN) 300 MG capsule     No current facility-administered medications for this encounter.      Psychiatry staffing: case discussed  Diagnosis:  MDD with psychosis, panic disorder.  Is caregiver for SO with cancer.  Trauma hx, does well when here.

## 2019-03-22 NOTE — PROGRESS NOTES
Adult Mental Health Outpatient Group Therapy Progress Note     Client Initial Individualized Goals for Treatment:  Continue to attend AA for support and maintain sobriety  Report any increase in symptoms to the team  Consider getting an individual therapist to talk about difficult emotions  Reduce anxiety in social situations  Manage panic attacks  Find ways to combat the depressed mindset  Manage grief and loss from the past      See Initial Treatment suggestions for the client during the time between Diagnostic Assessment and completion of the Master Individualized Treatment Plan.    Treatment Goals:  1. Client will notify staff when needing assistance to develop or implement a coping plan to manage suicidal or self injurious urges.  2. When in life skills group Juice will focus on building new friendships and social support providing an update of progress weekly.  3. Will learn and practice 1-2 coping skills to help improve anxiety, depression, grief and loss, any psychosis symptoms, maintain sobriety  4. build suports and get an individual therapist    Area of Treatment Focus:  Symptom Management    Therapeutic Interventions/Treatment Strategies:  Support, Feedback, Safety Assessments, Structured Activity and Education    Response to Treatment Strategies:  Accepted Feedback, Listened, Attentive, Accepted Support and Alert      Name of Group: Life  Skills(3:00-4:00)  Group Participants: 4    Description and Outcome:  Client presented with calm,even mood with fair to good focus and concentration during a discussion related to self compassion strategies to help improve self esteem.Thought process clear,goal directed and reality based. Goal #1 (no personal safety concerns reported or observed).   Patient would benefit from additional opportunities to practice and implement content from this session  in every day life,relationships and mental health recovery.    Is this a Weekly Review of the Progress on the Treatment  Plan?  Yes.      Are Treatment Plan Goals being addressed?  Yes, continue treatment goals      Are Treatment Plan Strategies to Address Goals Effective?  Yes, continue treatment strategies      Are there any current contracts in place?  No

## 2019-03-25 ENCOUNTER — TELEPHONE (OUTPATIENT)
Dept: BEHAVIORAL HEALTH | Facility: CLINIC | Age: 34
End: 2019-03-25

## 2019-03-25 NOTE — TELEPHONE ENCOUNTER
Phone fletcher from Juice, who stated that he wouldn't be  At the program and was helping a friend.    Tiffany Wooten, Psbeto., D,  L.P.

## 2019-03-28 ENCOUNTER — HOSPITAL ENCOUNTER (OUTPATIENT)
Dept: BEHAVIORAL HEALTH | Facility: CLINIC | Age: 34
End: 2019-03-28
Attending: PSYCHIATRY & NEUROLOGY
Payer: COMMERCIAL

## 2019-03-28 PROCEDURE — H2012 BEHAV HLTH DAY TREAT, PER HR: HCPCS

## 2019-03-28 NOTE — PROGRESS NOTES
"Adult Mental Health Outpatient Group Therapy Progress Note                Client Initial Individualized Goals for Treatment:   \" get help with returning to work, helping narrow my career path, feel better about social situations, and increase self-esteem\".      See Initial Treatment suggestions for the client during the time between Diagnostic Assessment and completion of the Master Individualized Treatment Plan.      Treatment Goals:  1. Client will notify staff when needing assistance to develop or implement a coping plan to manage suicidal or self injurious urges.  2. When in life skills groups will report on how he builds supports  3. Group Therapy :  Learn and practice 1-2 coping skills to help reduce anxiety, depression, grief and loss, and any psychosis symptoms, maintain sobriety.  4. Health/Wellness / Discharge :   Get an individual therapist and a psychiatrist     Area of Treatment Focus:  Symptom Management      Therapeutic Interventions/Treatment Strategies:  Support, Feedback, Safety Assessments, Structured Activity and Education      Area of Treatment Focus:  Symptom Management, Personal Safety, Community Resources/Discharge Planning, Abstinence/Relapse Prevention, Develop/Improve Independent Living/Socialization Skills/Interpersonal Relationships and Cultural/Racial/Health and Spiritual      Therapeutic Interventions/Treatment Strategies:  Support, Redirection, Feedback, Limit/Boundaries, Safety Assessments, Structuresd Activity, Problem Solving, Clarification, Education, Motivational Enhancement Therapy, Cognitive Behavioral Therapy and Structured Activity      Response to Treatment Strategies:  Accepted Feedback, Gave Feedback, Listened, Focused on Goals, Attentive, Accepted Support, Alert, Demonstrates Behavior Change     Therapist:  Sree Benton, D,  L.P.       Name of Group:  Group Therapy  3:00 - 3:50 pm       Date:   3/28/2019     Group Participants:  4      Description and " Outcome:        Group Therapy               Juice reported being safe today.  He reported that he was relieved to be back and that he was stuck in [place] for a while, since his girlfriend's cat had to be put down, and was ill, and he was a support for her during this process. He stated that he feels overwhelmed with her cancer diagnosis and that she is unable to get help for it. He stated that her cancer is in multiple places in her brain and that she cannot have some treatments, and that she chooses not to have some done.  He stated that he wants to be a support, but sometimes it can be overwhelming, and he talked to his girlfriend about her cancer condition and her deteriorating health. He talked with the group about the issue and discussed survivor guilt, and how he felt that they were doing better in their relationship. He reported that he felt grateful for her and that they both had been praying for her recovery. He reported that he needed time to be away from her, since the whole situation is overwhelming for him, and he couldn't handle being her support longer than he did for the few days, and was glad to be back at the group.  He talked with another member about shame and about manic symptoms.        He reported that he talked by phone to his mom and his girlfriend. He reported increased sleep, and that he started to take his Gabapentin, and reported that his anxiety has decreased. He reported no psychosis symptoms.   He stated that his Prozac makes him shake and he stopped it.       He participated in a mindfulness activity of 5- 10 minutes while practicing deep breathing.      Client would benefit from additional opportunities to practice and implement content from this session in everyday life, relationships and mental health recovery.          Is this a Weekly Review of the Progress on the Treatment Plan?  Yes.        Are Treatment Plan Goals being addressed?  Yes, continue treatment goals          Are  Treatment Plan Strategies to Address Goals Effective?  Yes, continue treatment strategies          Are there any current contracts in place?  No

## 2019-03-29 NOTE — PROGRESS NOTES
"Adult Mental Health Outpatient Group Therapy Progress Note     Client Initial Individualized Goals for Treatment: \" get help with returning to work, helping narrow my career path, feel better about social situations, and increase self-esteem\".    Individualized Treatment Plan Goals:  . Client will notify staff when needing assistance to develop or implement a coping plan to manage suicidal or self injurious urges.  2. When in life skills groups will report on how he builds supports  3. Group Therapy :  Learn and practice 1-2 coping skills to help reduce anxiety, depression, grief and loss, and any psychosis symptoms, maintain sobriety.  4. Health/Wellness / Discharge : Get an individual therapist and a psychiatrist    Area of Treatment Focus:  Symptom Management and Develop / Improve Independent Living Skills    Therapeutic Interventions/Treatment Strategies:  Support, Feedback, Structured Activity and Education    Response to Treatment Strategies:  Accepted Feedback, Listened, Attentive, Accepted Support and Alert     Name of Group: Mental Health Management   Date: 3/28/19  Time:1:00-1:50  Number of Group Participants: 5    Description and Outcome:Juice attended and participated in a structured psychoeducation group where intervention focuses on experiential learning, developing through practice, and generalizing taught skills. Through the use of supported social interactions, structured therapeutic and functional tasks in context, group members work towards stabilizing and managing mental health symptoms for improved participation and function in valued roles, routines, relationships, and independent living.       Intervention topic : Self regulation: sensory modulation. Education and facilitated experiential about the calming and alerting properties of specific sensory input. Guided process of identifying sensory preferences for self regulation and grounding skills.    Presentation and Assessment: Juice presents " with calm even affect. He demonstrates good focus and puts forth good effort during experiential process. He identifies one way to apply taught content to his daily life. He would benefit from additional opportunities to practice the content to be able to generalize it to her everyday life with increased intentionality, consistency, and efficacy in support of her psychiatric recovery.     Progress towards ITP goals: Juice worked towards ITP goal number 2 today.      Is this a Weekly Review of the Progress on the Treatment Plan?  Yes.      Are Treatment Plan Goals being addressed?  Yes, continue treatment goals      Are Treatment Plan Strategies to Address Goals Effective?  Yes, continue treatment strategies      Are there any current contracts in place?  No

## 2019-04-01 ENCOUNTER — HOSPITAL ENCOUNTER (OUTPATIENT)
Dept: BEHAVIORAL HEALTH | Facility: CLINIC | Age: 34
End: 2019-04-01
Attending: PSYCHIATRY & NEUROLOGY
Payer: COMMERCIAL

## 2019-04-01 PROCEDURE — H2012 BEHAV HLTH DAY TREAT, PER HR: HCPCS

## 2019-04-02 ENCOUNTER — HOSPITAL ENCOUNTER (OUTPATIENT)
Dept: BEHAVIORAL HEALTH | Facility: CLINIC | Age: 34
End: 2019-04-02
Attending: PSYCHIATRY & NEUROLOGY
Payer: COMMERCIAL

## 2019-04-02 PROCEDURE — H2012 BEHAV HLTH DAY TREAT, PER HR: HCPCS

## 2019-04-02 NOTE — PROGRESS NOTES
Adult Mental Health Outpatient Group Therapy Progress Note     Client Initial Individualized Goals for Treatment:  Continue to attend AA for support and maintain sobriety  Report any increase in symptoms to the team  Consider getting an individual therapist to talk about difficult emotions  Reduce anxiety in social situations  Manage panic attacks  Find ways to combat the depressed mindset  Manage grief and loss from the past      See Initial Treatment suggestions for the client during the time between Diagnostic Assessment and completion of the Master Individualized Treatment Plan.    Treatment Goals:  1. Client will notify staff when needing assistance to develop or implement a coping plan to manage suicidal or self injurious urges.  2. When in life skills group Juice will focus on building new friendships and social support providing an update of progress weekly.  3. Will learn and practice 1-2 coping skills to help improve anxiety, depression, grief and loss, any psychosis symptoms, maintain sobriety  4. build suports and get an individual therapist    Area of Treatment Focus:  Symptom Management    Therapeutic Interventions/Treatment Strategies:  Support, Feedback, Safety Assessments, Structured Activity and Education    Response to Treatment Strategies:  Accepted Feedback, Listened, Attentive, Accepted Support and Alert      Name of Group: Life  Skills(2:00-3:00)  Group Participants: 6    Description and Outcome:  Client presented with calm,even mood with good focus and concentration. Psychosocial skills addressed included stress avoidance strategies,leisure education,self esteem and practice of communication strategies.Thought process clear,goal directed and reality based. Goal #1 (no personal safety concerns reported or observed).   Patient would benefit from additional opportunities to practice and implement content from this session  in every day life,relationships and mental health recovery.    Is this a  Weekly Review of the Progress on the Treatment Plan?  Yes.      Are Treatment Plan Goals being addressed?  Yes, continue treatment goals      Are Treatment Plan Strategies to Address Goals Effective?  Yes, continue treatment strategies      Are there any current contracts in place?  No

## 2019-04-02 NOTE — PROGRESS NOTES
"Adult Mental Health Outpatient Group Therapy Progress Note                Client Initial Individualized Goals for Treatment:   \" get help with returning to work, helping narrow my career path, feel better about social situations, and increase self-esteem\".      See Initial Treatment suggestions for the client during the time between Diagnostic Assessment and completion of the Master Individualized Treatment Plan.      Treatment Goals:  1. Client will notify staff when needing assistance to develop or implement a coping plan to manage suicidal or self injurious urges.  2. When in life skills groups will report on how he builds supports  3. Group Therapy :  Learn and practice 1-2 coping skills to help reduce anxiety, depression, grief and loss, and any psychosis symptoms, maintain sobriety.  4. Health/Wellness / Discharge :   Get an individual therapist and a psychiatrist     Area of Treatment Focus:  Symptom Management      Therapeutic Interventions/Treatment Strategies:  Support, Feedback, Safety Assessments, Structured Activity and Education      Area of Treatment Focus:  Symptom Management, Personal Safety, Community Resources/Discharge Planning, Abstinence/Relapse Prevention, Develop/Improve Independent Living/Socialization Skills/Interpersonal Relationships and Cultural/Racial/Health and Spiritual      Therapeutic Interventions/Treatment Strategies:  Support, Redirection, Feedback, Limit/Boundaries, Safety Assessments, Structuresd Activity, Problem Solving, Clarification, Education, Motivational Enhancement Therapy, Cognitive Behavioral Therapy and Structured Activity      Response to Treatment Strategies:  Accepted Feedback, Gave Feedback, Listened, Focused on Goals, Attentive, Accepted Support, Alert, Demonstrates Behavior Change     Therapist:  Sree Benton, D,  L.P.       Name of Group:  Group Therapy  1:00 - 1:50 pm  Date:   4/2/2019       Group Participants:  4      Description and " Outcome:        Group Therapy               Juice reported being safe today. He reported that he was setting limits with his relationship with his girlfriend and that he could only manage so much of the depressing events that accompanied her cancer issues. He reported that he feels very stressed about her status and that she may not be able to get treatment, since it is in her brain.  He reported that he has to have time away from her to remain calm.  He reported that he enjoys being in nature and also enjoys being in the city. He talked with others about music and how slow music can be uplifting for him.  He also reported that he does stretches, watches movies and reads.  He stated that friends and family, his dog, spirituality, traveling, and skateboarding are helpful activities for him.    He stated that her cancer is in multiple places in her brain and that she cannot have some treatments, and that she chooses not to have some done.  He stated that he wants to be a support, but sometimes it can be overwhelming, and he talked to his girlfriend about her cancer condition and her deteriorating health. He talked with the group about the issue and discussed survivor guilt, and how he felt that they were doing better in their relationship. He reported that he felt grateful for her and that they both had been praying for her recovery. He reported that he needed time to be away from her, since the whole situation is overwhelming for him, and he couldn't handle being her support longer than he did for the few days, and was glad to be back at the group.  He talked with another member about shame and about depression and manic symptoms.         He reported that he talked by phone to his mom and his girlfriend. He reported increased sleep, and that he started to take his Gabapentin, and reported that his anxiety has decreased. He reported no psychosis symptoms. He stated that he didn't feel well and left early from the  program.   He stated that his Prozac makes him shake and he stopped it.       He participated in a mindfulness activity of 5- 10 minutes while practicing deep breathing.      Client would benefit from additional opportunities to practice and implement content from this session in everyday life, relationships and mental health recovery.          Is this a Weekly Review of the Progress on the Treatment Plan?  Yes.        Are Treatment Plan Goals being addressed?  Yes, continue treatment goals          Are Treatment Plan Strategies to Address Goals Effective?  Yes, continue treatment strategies          Are there any current contracts in place?  No

## 2019-04-04 ENCOUNTER — HOSPITAL ENCOUNTER (OUTPATIENT)
Dept: BEHAVIORAL HEALTH | Facility: CLINIC | Age: 34
End: 2019-04-04
Attending: PSYCHIATRY & NEUROLOGY
Payer: COMMERCIAL

## 2019-04-04 PROCEDURE — H2012 BEHAV HLTH DAY TREAT, PER HR: HCPCS

## 2019-04-04 NOTE — PROGRESS NOTES
"Adult Mental Health Outpatient Group Therapy Progress Note                Client Initial Individualized Goals for Treatment:   \" get help with returning to work, helping narrow my career path, feel better about social situations, and increase self-esteem\".      See Initial Treatment suggestions for the client during the time between Diagnostic Assessment and completion of the Master Individualized Treatment Plan.      Treatment Goals:  1. Client will notify staff when needing assistance to develop or implement a coping plan to manage suicidal or self injurious urges.  2. When in life skills groups will report on how he builds supports  3. Group Therapy :  Learn and practice 1-2 coping skills to help reduce anxiety, depression, grief and loss, and any psychosis symptoms, maintain sobriety.  4. Health/Wellness / Discharge :   Get an individual therapist and a psychiatrist     Area of Treatment Focus:  Symptom Management      Therapeutic Interventions/Treatment Strategies:  Support, Feedback, Safety Assessments, Structured Activity and Education      Area of Treatment Focus:  Symptom Management, Personal Safety, Community Resources/Discharge Planning, Abstinence/Relapse Prevention, Develop/Improve Independent Living/Socialization Skills/Interpersonal Relationships and Cultural/Racial/Health and Spiritual      Therapeutic Interventions/Treatment Strategies:  Support, Redirection, Feedback, Limit/Boundaries, Safety Assessments, Structuresd Activity, Problem Solving, Clarification, Education, Motivational Enhancement Therapy, Cognitive Behavioral Therapy and Structured Activity      Response to Treatment Strategies:  Accepted Feedback, Gave Feedback, Listened, Focused on Goals, Attentive, Accepted Support, Alert, Demonstrates Behavior Change     Therapist:  Sree Benton, D,  L.P.       Name of Group:  Group Therapy  3:00 - 3:50 pm  Date:   4/4/2019  Group Participants:  6      Description and " Outcome:        Group Therapy               Juice reported being safe today. He reported that he was a fair and kind person and was forgiving. He noted some depression, lately and talked to the group about this.  He reported that he was setting limits with his relationship with his girlfriend and that he could only manage so much of the depressing events that accompanied her cancer issues. He reported that he feels very stressed about her status and that she may not be able to get treatment, since it is in her brain.  He reported that he has to have time away from her to remain calm.  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    He talked with others about music and how slow music can be uplifting for him.  He also reported that he does stretches, watches movies and reads.  He stated that friends and family, his dog, spirituality, traveling, and skateboarding are helpful activities for him.              He stated that he will see his mom tonight and visit with her, and talked to the group about how she was away on vacation for a long time.  He talked with another member about shame and about depression and manic symptoms.         He reported that he talked by phone to his mom and his girlfriend. He reported increased sleep, and that he started to take his Gabapentin, and reported that his anxiety has decreased. He reported no psychosis symptoms. He stated that his Prozac makes him shake and he stopped it.       He participated in a mindfulness activity of 5- 10 minutes while practicing deep breathing.      Client would benefit from additional opportunities to practice and implement content from this session in everyday life, relationships and mental health recovery.          Is this a Weekly Review of the Progress on the Treatment Plan?  Yes.        Are Treatment Plan Goals being addressed?  Yes, continue  treatment goals          Are Treatment Plan Strategies to Address Goals Effective?  Yes, continue treatment strategies          Are there any current contracts in place?  No

## 2019-04-08 ENCOUNTER — HOSPITAL ENCOUNTER (OUTPATIENT)
Dept: BEHAVIORAL HEALTH | Facility: CLINIC | Age: 34
End: 2019-04-08
Attending: PSYCHIATRY & NEUROLOGY
Payer: COMMERCIAL

## 2019-04-08 PROCEDURE — H2012 BEHAV HLTH DAY TREAT, PER HR: HCPCS

## 2019-04-08 NOTE — PROGRESS NOTES
"Adult Mental Health Outpatient Group Therapy Progress Note                Client Initial Individualized Goals for Treatment:   \" get help with returning to work, helping narrow my career path, feel better about social situations, and increase self-esteem\".      See Initial Treatment suggestions for the client during the time between Diagnostic Assessment and completion of the Master Individualized Treatment Plan.      Treatment Goals:  1. Client will notify staff when needing assistance to develop or implement a coping plan to manage suicidal or self injurious urges.  2. When in life skills groups will report on how he builds supports  3. Group Therapy :  Learn and practice 1-2 coping skills to help reduce anxiety, depression, grief and loss, and any psychosis symptoms, maintain sobriety.  4. Health/Wellness / Discharge :   Get an individual therapist and a psychiatrist     Area of Treatment Focus:  Symptom Management      Therapeutic Interventions/Treatment Strategies:  Support, Feedback, Safety Assessments, Structured Activity and Education      Area of Treatment Focus:  Symptom Management, Personal Safety, Community Resources/Discharge Planning, Abstinence/Relapse Prevention, Develop/Improve Independent Living/Socialization Skills/Interpersonal Relationships and Cultural/Racial/Health and Spiritual      Therapeutic Interventions/Treatment Strategies:  Support, Redirection, Feedback, Limit/Boundaries, Safety Assessments, Structuresd Activity, Problem Solving, Clarification, Education, Motivational Enhancement Therapy, Cognitive Behavioral Therapy and Structured Activity      Response to Treatment Strategies:  Accepted Feedback, Gave Feedback, Listened, Focused on Goals, Attentive, Accepted Support, Alert, Demonstrates Behavior Change     Therapist:  Sree Benton, D,  L.P.       Name of Group:  Group Therapy  1:00 - 1:50 pm    Date:   4/8/2019  Group Participants:  9      Description and " Outcome:        Group Therapy               Juice reported being safe today. He reported that he talked to his mom and stayed overnight at his parent's house. He stated that he was happy to visit with his dog and his dad.  He reported that he did some chores around the house, and talked to his girlfriend on the phone. He reported feeling sad, since his girlfriend was ok, but has terminal cancer, and has now outlived the timeline that her doctors expected.  He stated that he was a fair and kind person and was forgiving. He noted some depression, lately and talked to the group about this.  He reported that he was setting limits with his relationship with his girlfriend and that he could only manage so much of the depressing events that accompanied her cancer issues. He reported that he feels very stressed about her status and that she may not be able to get treatment, since it is in her brain.  He reported that he has to have time away from her to remain calm.  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 He talked to staff about this and determined it was the [employer] [facility] in [place].  He stated that he talked to his  about being an [employer]  and she said he was on a list to get a donated car, and he stated that he was surprised by this.    He talked with others about music and how slow music can be uplifting for him.  He also reported that he does stretches, watches movies and reads.  He stated that friends and family, his dog, spirituality, traveling, and skateboarding are helpful activities for him.                He talked with another member about shame and about depression and manic symptoms.       He reported increased sleep, and that he started to take his Gabapentin, and reported that his anxiety has decreased. He reported no psychosis symptoms. He stated that his Prozac makes him  shake and he stopped it.       He participated in a mindfulness activity of 5- 10 minutes while practicing deep breathing.      Client would benefit from additional opportunities to practice and implement content from this session in everyday life, relationships and mental health recovery.          Is this a Weekly Review of the Progress on the Treatment Plan?  Yes.        Are Treatment Plan Goals being addressed?  Yes, continue treatment goals          Are Treatment Plan Strategies to Address Goals Effective?  Yes, continue treatment strategies          Are there any current contracts in place?  No

## 2019-04-08 NOTE — PROGRESS NOTES
"Adult Mental Health Outpatient Group Therapy Progress Note     Client Initial Individualized Goals for Treatment: \" get help with returning to work, helping narrow my career path, feel better about social situations, and increase self-esteem\".    Individualized Treatment Plan Goals:  . Client will notify staff when needing assistance to develop or implement a coping plan to manage suicidal or self injurious urges.  2. When in life skills groups will report on how he builds supports  3. Group Therapy :  Learn and practice 1-2 coping skills to help reduce anxiety, depression, grief and loss, and any psychosis symptoms, maintain sobriety.  4. Health/Wellness / Discharge : Get an individual therapist and a psychiatrist    Area of Treatment Focus:  Symptom Management and Develop / Improve Independent Living Skills    Therapeutic Interventions/Treatment Strategies:  Support, Feedback, Structured Activity and Education    Response to Treatment Strategies:  Accepted Feedback, Listened, Attentive, Accepted Support and Alert     Name of Group: Mental Health Management   Date: 4/04/19  Time:1:00-1:50  Number of Group Participants: 6    Description and Outcome: Juice attended and participated in a structured psychoeducation group where skilled intervention provides written and verbal evidence based material, facilitates experiential learning, creates opportunity to develop skills through practice, increases self awareness, and provides support in problem solving ways to apply and generalize taught skills. Through the use of supported reflective social interactions and structured therapeutic processes in context, group members work towards stabilizing and managing mental health symptoms for improved participation and function in valued roles, routines, relationships, and independent living.       Intervention topic: Continued psychoeducation and experiential focused sensory based self-regulation including education on " proprioceptive input and a hands on process of creating a sensory tool for calming to use in times of distress.     Presentation and Assessment: Juice presents focused and puts forth good effort. He independently problem solves and assists his peers. He identified one potential use for the sensory tool to help him stay grounded when feeling distressed.  He would benefit from additional opportunities to practice the content to be able to generalize it to her everyday life with increased intentionality, consistency, and efficacy in support of her psychiatric recovery.     Progress towards ITP goals: Juice worked towards ITP goal number 2 today.      Is this a Weekly Review of the Progress on the Treatment Plan?  Yes.      Are Treatment Plan Goals being addressed?  Yes, continue treatment goals      Are Treatment Plan Strategies to Address Goals Effective?  Yes, continue treatment strategies      Are there any current contracts in place?  No

## 2019-04-09 ENCOUNTER — TELEPHONE (OUTPATIENT)
Dept: BEHAVIORAL HEALTH | Facility: CLINIC | Age: 34
End: 2019-04-09

## 2019-04-09 NOTE — TELEPHONE ENCOUNTER
Phone call to coordinate care and to inform him that  Tuesday 4/16 there is a staff training and there will be no   Program groups that day.     Juvenal Benton., D,  L.P.

## 2019-04-09 NOTE — PROGRESS NOTES
Adult Mental Health Outpatient Group Therapy Progress Note     Client Initial Individualized Goals for Treatment:  Continue to attend AA for support and maintain sobriety  Report any increase in symptoms to the team  Consider getting an individual therapist to talk about difficult emotions  Reduce anxiety in social situations  Manage panic attacks  Find ways to combat the depressed mindset  Manage grief and loss from the past      See Initial Treatment suggestions for the client during the time between Diagnostic Assessment and completion of the Master Individualized Treatment Plan.    Treatment Goals:  1. Client will notify staff when needing assistance to develop or implement a coping plan to manage suicidal or self injurious urges.  2. When in life skills group Juice will focus on building new friendships and social support providing an update of progress weekly.  3. Will learn and practice 1-2 coping skills to help improve anxiety, depression, grief and loss, any psychosis symptoms, maintain sobriety  4. build suports and get an individual therapist    Area of Treatment Focus:  Symptom Management    Therapeutic Interventions/Treatment Strategies:  Support, Feedback, Safety Assessments, Structured Activity and Education    Response to Treatment Strategies:  Accepted Feedback, Listened, Attentive, Accepted Support and Alert      Name of Group: Life  Skills(2:00-3:00)  Group Participants: 9    Description and Outcome:  Client presented with calm,even mood with good focus and concentration. Psychosocial skills addressed included stress avoidance strategies,leisure education,self esteem and practice of communication strategies.Thought process clear,goal directed and reality based. Goal #1 (no personal safety concerns reported or observed). Goal #2 Trying to find support groups which include art.  Patient would benefit from additional opportunities to practice and implement content from this session  in every day  life,relationships and mental health recovery.    Is this a Weekly Review of the Progress on the Treatment Plan?  Yes.      Are Treatment Plan Goals being addressed?  Yes, continue treatment goals      Are Treatment Plan Strategies to Address Goals Effective?  Yes, continue treatment strategies      Are there any current contracts in place?  No

## 2019-04-09 NOTE — TELEPHONE ENCOUNTER
4/9/2019    Phone call to [facility] to request further visits/units for Juice Bosch,  And Daniela or Cristobal will call tomorrow about the additional auth    Juvenal Benton., D,  L.P.  Adult Day Tx

## 2019-04-10 ENCOUNTER — TELEPHONE (OUTPATIENT)
Dept: FAMILY MEDICINE | Facility: CLINIC | Age: 34
End: 2019-04-10

## 2019-04-10 NOTE — TELEPHONE ENCOUNTER
Forms received from [facility]  for review and signature  Placed forms:  In your box  Forms need to be faxed to [phone]    Patient call? no  Copy sent to scanning? yes    All.MADHU Wallis

## 2019-04-11 ENCOUNTER — TELEPHONE (OUTPATIENT)
Dept: FAMILY MEDICINE | Facility: CLINIC | Age: 34
End: 2019-04-11

## 2019-04-11 ENCOUNTER — HOSPITAL ENCOUNTER (OUTPATIENT)
Dept: BEHAVIORAL HEALTH | Facility: CLINIC | Age: 34
End: 2019-04-11
Attending: PSYCHIATRY & NEUROLOGY
Payer: COMMERCIAL

## 2019-04-11 ENCOUNTER — TELEPHONE (OUTPATIENT)
Dept: BEHAVIORAL HEALTH | Facility: CLINIC | Age: 34
End: 2019-04-11

## 2019-04-11 PROCEDURE — H2012 BEHAV HLTH DAY TREAT, PER HR: HCPCS

## 2019-04-11 NOTE — TELEPHONE ENCOUNTER
Phone call to [facility] to check status of re-auth  And she said that it was pending, call back on Monday.    Tiffany Wooten, Marioy., D,  L.P.

## 2019-04-11 NOTE — TELEPHONE ENCOUNTER
Reason for Call:  Other     Detailed comments: Juice is being discharged from adult day program and will be going into a step down program.  The program will be faxing over a form for Dilshad Tolbert to sign    Phone Number Patient can be reached at: Tiffany from the adult day program: [phone]    Best Time: any    Can we leave a detailed message on this number? YES    Call taken on 4/11/2019 at 1:15 PM by Tina Ruiz

## 2019-04-11 NOTE — PROGRESS NOTES
Adult Mental Health Outpatient Group Therapy Progress Note     Client Initial Individualized Goals for Treatment:  Continue to attend AA for support and maintain sobriety  Report any increase in symptoms to the team  Consider getting an individual therapist to talk about difficult emotions  Reduce anxiety in social situations  Manage panic attacks  Find ways to combat the depressed mindset  Manage grief and loss from the past      See Initial Treatment suggestions for the client during the time between Diagnostic Assessment and completion of the Master Individualized Treatment Plan.    Treatment Goals:  1. Client will notify staff when needing assistance to develop or implement a coping plan to manage suicidal or self injurious urges.  2. When in life skills group Juice will focus on building new friendships and social support providing an update of progress weekly.  3. Will learn and practice 1-2 coping skills to help improve anxiety, depression, grief and loss, any psychosis symptoms, maintain sobriety  4. build suports and get an individual therapist    Area of Treatment Focus:  Symptom Management    Therapeutic Interventions/Treatment Strategies:  Support, Feedback, Safety Assessments, Structured Activity and Education    Response to Treatment Strategies:  Accepted Feedback, Listened, Attentive, Accepted Support and Alert      Group:  Group Therapy Date and Time 4/11/2019 2616-7940 Group total: 3    Description and outcome:  Mental Health Professional checked it with group by asking them: how they are feeling, what goal they are working on, what skill will be used to reach their goal, what is something they are proud of, what might be a barrier to their goal, how the therapist can support them, and if they need additional time to process.   Psychotherapist encouraged group to support each other by providing feedback as appropriate.   Juice checked in and processed how he is feeling level right now, he processed  how he is working on his discharge plan and setting up aftercare and what that looks like, he is hopeful he will get a car from a [employer] and then he will be able to drive for [employer] and work for himself, he processed how he gets anxious when working with other people.    Therapist validated and normalized emotions.  Pt accepted feedback from others and provided appropriate feedback to others in the group.    Pt did not endorse safety concerns at this time.     Pt verbalized understanding of the session by engaging with the feedback from other group members.    Is this a Weekly Review of the Progress on the Treatment Plan?  no      Are Treatment Plan Goals being addressed?  Yes, continue treatment goals      Are Treatment Plan Strategies to Address Goals Effective?  Yes, continue treatment strategies      Are there any current contracts in place?  No

## 2019-04-11 NOTE — PROGRESS NOTES
"Adult Mental Health Outpatient Group Therapy Progress Note                Client Initial Individualized Goals for Treatment:   \" get help with returning to work, helping narrow my career path, feel better about social situations, and increase self-esteem\".      See Initial Treatment suggestions for the client during the time between Diagnostic Assessment and completion of the Master Individualized Treatment Plan.      Treatment Goals:  1. Client will notify staff when needing assistance to develop or implement a coping plan to manage suicidal or self injurious urges.  2. When in life skills groups will report on how he builds supports  3. Group Therapy :  Learn and practice 1-2 coping skills to help reduce anxiety, depression, grief and loss, and any psychosis symptoms, maintain sobriety.  4. Health/Wellness / Discharge :   Get an individual therapist and a psychiatrist     Area of Treatment Focus:  Symptom Management      Therapeutic Interventions/Treatment Strategies:  Support, Feedback, Safety Assessments, Structured Activity and Education      Area of Treatment Focus:  Symptom Management, Personal Safety, Community Resources/Discharge Planning, Abstinence/Relapse Prevention, Develop/Improve Independent Living/Socialization Skills/Interpersonal Relationships and Cultural/Racial/Health and Spiritual      Therapeutic Interventions/Treatment Strategies:  Support, Redirection, Feedback, Limit/Boundaries, Safety Assessments, Structuresd Activity, Problem Solving, Clarification, Education, Motivational Enhancement Therapy, Cognitive Behavioral Therapy and Structured Activity      Response to Treatment Strategies:  Accepted Feedback, Gave Feedback, Listened, Focused on Goals, Attentive, Accepted Support, Alert, Demonstrates Behavior Change     Therapist:  Sree Benton, D,  L.P.       Name of Group:  Group Therapy  2:00 - 2:50 pm    Date:   4/11/2019  Group Participants:  3      Description and " Outcome:     Group Therapy               Juice reported being safe today. He reported that he was anxious and somewhat content. He reported that he talked with his mom and girlfriend for support. He stated that he wrote music on an keith on his phone called garage band and enjoyed doing it yesterday.   He reported that [facility] and the [facility].  He reported that when he was younger, he had more OCD, and he felt that he overcame this.  He stated that he felt mindful when he was cleaning and organizing his room and said that he could focus better. He talked to the group about being open and being able to relate to others, even though he had problems with guilt and shame from being in correction in his past.      He talked with others about music and how slow music can be uplifting for him.  He also reported that he does stretches, watches movies and reads.  He stated that friends and family, his dog, spirituality, traveling, and skateboarding are helpful activities for him. He reported that watching the snow was meditative for him.             He stated that he will see his mom tonight and visit with her, and talked to the group about how she was away on vacation for a long time.  He talked with another member about shame and about depression and manic symptoms.         He reported that he talked by phone to his mom and his girlfriend. He reported increased sleep, and that he started to take his Gabapentin, and reported that his anxiety has decreased. He reported no psychosis symptoms. He stated that his Prozac makes him shake and he stopped it.       He participated in a mindfulness activity of 5- 10 minutes while practicing deep breathing.      Client would benefit from additional opportunities to practice and implement content from this session in everyday life, relationships and mental health recovery.          Is this a Weekly Review of the Progress on the Treatment Plan?  Yes.        Are  Treatment Plan Goals being addressed?  Yes, continue treatment goals          Are Treatment Plan Strategies to Address Goals Effective?  Yes, continue treatment strategies          Are there any current contracts in place?  No

## 2019-04-15 ENCOUNTER — TELEPHONE (OUTPATIENT)
Dept: BEHAVIORAL HEALTH | Facility: CLINIC | Age: 34
End: 2019-04-15

## 2019-04-15 ENCOUNTER — HOSPITAL ENCOUNTER (OUTPATIENT)
Dept: BEHAVIORAL HEALTH | Facility: CLINIC | Age: 34
End: 2019-04-15
Attending: PSYCHIATRY & NEUROLOGY
Payer: COMMERCIAL

## 2019-04-15 PROCEDURE — H2012 BEHAV HLTH DAY TREAT, PER HR: HCPCS

## 2019-04-15 NOTE — TELEPHONE ENCOUNTER
Phone call to [facility] [employer] [facility]  to coordinate   Care and inform her that Juice has an 11:30 am orientation appointment  With [facility] in [facility] on 4/18/19.    Juvenal Benton., D,  L.P.

## 2019-04-15 NOTE — TELEPHONE ENCOUNTER
Phone call to [facility] to schedule an orientation  For Juice on Thurs 4/18 at 11:30 am.  Juice was informed of this appointment, and will go.    Juvenal Benton., D,  L.P.

## 2019-04-16 ASSESSMENT — ANXIETY QUESTIONNAIRES
6. BECOMING EASILY ANNOYED OR IRRITABLE: SEVERAL DAYS
IF YOU CHECKED OFF ANY PROBLEMS ON THIS QUESTIONNAIRE, HOW DIFFICULT HAVE THESE PROBLEMS MADE IT FOR YOU TO DO YOUR WORK, TAKE CARE OF THINGS AT HOME, OR GET ALONG WITH OTHER PEOPLE: SOMEWHAT DIFFICULT
2. NOT BEING ABLE TO STOP OR CONTROL WORRYING: NOT AT ALL
3. WORRYING TOO MUCH ABOUT DIFFERENT THINGS: NOT AT ALL
7. FEELING AFRAID AS IF SOMETHING AWFUL MIGHT HAPPEN: SEVERAL DAYS
1. FEELING NERVOUS, ANXIOUS, OR ON EDGE: SEVERAL DAYS

## 2019-04-16 ASSESSMENT — PATIENT HEALTH QUESTIONNAIRE - PHQ9
SUM OF ALL RESPONSES TO PHQ QUESTIONS 1-9: 4
5. POOR APPETITE OR OVEREATING: SEVERAL DAYS

## 2019-04-18 ENCOUNTER — TELEPHONE (OUTPATIENT)
Dept: FAMILY MEDICINE | Facility: CLINIC | Age: 34
End: 2019-04-18

## 2019-04-18 ENCOUNTER — TELEPHONE (OUTPATIENT)
Dept: BEHAVIORAL HEALTH | Facility: CLINIC | Age: 34
End: 2019-04-18

## 2019-04-18 NOTE — TELEPHONE ENCOUNTER
Received form(s) from [facility] for request for medical opinion.  Placed form(s) in/on JS's box.  Forms need to be filled out and signed and faxed to number listed on form..    Call pt to verify form was sent: No  Copy needs to be sent for scanning after completion: Yes    MADHU Beebe

## 2019-04-18 NOTE — PROGRESS NOTES
Adult Mental Health Outpatient Group Therapy Progress Note     Client Initial Individualized Goals for Treatment:  Continue to attend AA for support and maintain sobriety  Report any increase in symptoms to the team  Consider getting an individual therapist to talk about difficult emotions  Reduce anxiety in social situations  Manage panic attacks  Find ways to combat the depressed mindset  Manage grief and loss from the past    Treatment Goals:  1. Client will notify staff when needing assistance to develop or implement a coping plan to manage suicidal or self injurious urges.  2. When in life skills group Juice will focus on building new friendships and social support providing an update of progress weekly.  3. Will learn and practice 1-2 coping skills to help improve anxiety, depression, grief and loss, any psychosis symptoms, maintain sobriety  4. build supports and get an individual therapist    Area of Treatment Focus:  Symptom Management, Personal Safety, Develop / Improve Independent Living Skills     Therapeutic Interventions/Treatment Strategies:  Support, Feedback, Safety Assessments, Structured Activity and Education    Response to Treatment Strategies:  Accepted Feedback, Listened, Attentive, Accepted Support and Alert      Name of Group: Life  Skills 4/15/19  [id number]  Group Participants: 6    Description and Outcome:  Provided verbal and experiential psychoeducation on how engagement in mindful based focused activity can aid in symptom management and self regulation.  Client presented with calm, even mood with good focus and concentration.   Juice demonstrated understanding of the session content by actively participating in a mindful focused creative task.  Reported he finds it relaxing.      Is this a Weekly Review of the Progress on the Treatment Plan?  No

## 2019-04-18 NOTE — TELEPHONE ENCOUNTER
PHone call to Matty Perales, [employer]   To coordinate care on Juice Bosch.  His medical opinion form needs to go to his medical doctor  Dr. Nnamdi Tolbert.    Juvenal Benton., D,  L.P.

## 2019-04-19 NOTE — TELEPHONE ENCOUNTER
Place call to patient--form requires his signature in a few different spots. These signatures are needed before we can fax form to [facility]; one is an SHOAIB for Dilshad's medical opinion.     No answer. Patient did not have voicemail. Please advise when patient calls back.    MITCH Orozco

## 2019-04-22 NOTE — TELEPHONE ENCOUNTER
Tried calling pt again, no answer and no voicemail set up, will try again tomorrow.    MADHU Beebe

## 2019-04-23 ENCOUNTER — TELEPHONE (OUTPATIENT)
Dept: BEHAVIORAL HEALTH | Facility: CLINIC | Age: 34
End: 2019-04-23

## 2019-04-23 NOTE — TELEPHONE ENCOUNTER
Absence call- Phone call and message left to check on Morgan Bosch  Since Morgan Bosch did not attend today.    Juvenal Benton., D,  L.P.

## 2019-04-26 NOTE — TELEPHONE ENCOUNTER
Form faxed to [facility] [phone]& copy sent for scan.      Thanks  Elena Sam  TC

## 2019-04-30 ENCOUNTER — TRANSFERRED RECORDS (OUTPATIENT)
Dept: HEALTH INFORMATION MANAGEMENT | Facility: CLINIC | Age: 34
End: 2019-04-30

## 2019-04-30 ENCOUNTER — TELEPHONE (OUTPATIENT)
Dept: FAMILY MEDICINE | Facility: CLINIC | Age: 34
End: 2019-04-30

## 2019-04-30 NOTE — TELEPHONE ENCOUNTER
Received form(s) from [facility].  Placed form(s) JS desk.  Forms need to be signed and faxed to [phone].    Call pt to verify form was sent: no  Copy needs to be sent for scanning after completion: yes

## 2019-05-30 ENCOUNTER — HOSPITAL ENCOUNTER (OUTPATIENT)
Dept: BEHAVIORAL HEALTH | Facility: CLINIC | Age: 34
Discharge: HOME | End: 2019-05-30
Attending: PSYCHIATRY & NEUROLOGY | Admitting: PSYCHIATRY & NEUROLOGY
Payer: COMMERCIAL

## 2019-05-30 ENCOUNTER — BEH TREATMENT PLAN (OUTPATIENT)
Dept: BEHAVIORAL HEALTH | Facility: CLINIC | Age: 34
End: 2019-05-30
Attending: PSYCHIATRY & NEUROLOGY

## 2019-05-30 DIAGNOSIS — F25.0 SCHIZOAFFECTIVE DISORDER, BIPOLAR TYPE (H): ICD-10-CM

## 2019-05-30 PROCEDURE — 90791 PSYCH DIAGNOSTIC EVALUATION: CPT | Performed by: PSYCHOLOGIST

## 2019-05-30 RX ORDER — MULTIPLE VITAMINS W/ MINERALS TAB 9MG-400MCG
1 TAB ORAL DAILY
Status: ON HOLD | COMMUNITY
End: 2020-10-26

## 2019-05-30 ASSESSMENT — PAIN SCALES - GENERAL: PAINLEVEL: NO PAIN (0)

## 2019-05-30 NOTE — PROGRESS NOTES
[]Lilli for details        Acknowledgement of Current Treatment Plan         I have reviewed my Initial Individual Treatment Plan with my therapist / on 5/30/2019  .   I agree with the plan as it is written in the electronic health record.                                                                   Signature Below:  Morgan Bosch        Patient        Tiffany Juvenal Negro., D,  L.P.     DA Psychotherapist     Admitting Provider: Admitting Provider Signature Below:   Dr Montrell Martinez MD   Psychiatrist     Dr Candace Kendall MD  Psychiatrist     Dr Javed Sam MD  Psychiatrist     Dr Pako Sotelo MD  Psychiatrist     Jaiden Jefferson, DION  Psychiatric Provider

## 2019-05-30 NOTE — PROGRESS NOTES
Adult Dual Disorder Program Addendum - Comprehensive Assessment     Detox: Morgan reports 1 lifetime detox admissions. Date of last detox was [facility] in 2014 for the following substances: I was on cough syrup and drinking at the [facility], I was picked up on the street for acting delusional.    Treatment of Substance Use Disorder:   Morgan is currently receiving the following services: participate(s) in AA / NA     Morgan has received chemical dependency treatment in the past at about 7 or 8 times, is not able to remember all of the places he has been    Dates: 2013   [facility]  Dual: No:   CD Treatment: Inpatient  .  Length of Treatment: 21 days  Completion: Yes:   Court Order: No:   Period of Abstinence: 35 days total  Additional Comments: reports he finished the program and went voluntarily        Dates: 2017 November-december  Program: [facility]  Dual: Yes:   CD Treatment: Inpatient  .  Length of Treatment: 60 days  Completion: Yes:   Court Order: No:   Period of Abstinence: 62 total  Additional Comments: na      Dates: 2014  Program: [facility]  Dual: Yes: .  CD Treatment: Inpatient  .  Length of Treatment: 60 days  Completion: Yes:   Court Order: No:   Period of Abstinence: 90 days total  Additional Comments: na      Dates: 2014  Program: [facility]  Dual: No:   CD Treatment: Inpatient  .  Length of Treatment: 90 days  Completion: Yes:   Court Order: yes was on commitment   Period of Abstinence: 100 days total  Additional Comments: na      Dates: 2017  Program: [facility]  Dual: Yes:   CD Treatment: Inpatient  .  Length of Treatment: 90 days   Completion: Yes:   Court Order: Yes: was on commitment  Period of Abstinence: 91 days  Additional Comments: na    Addiction Pharmacology: Morgan denies use of addiction pharmacology.      Contraindicated Medication Use: Morgan denies current Rx/use of stimulant, benzodiazapine and/or narcotic medications.     Prioritization Status:    Morgan reports a history of substance use by injection.   Injection of substances occurred: last used by injection 5-6 years ago-meth.     Morgan denies current pregnancy.    Discussed the general effects of drugs and alcohol on health and well-being.     Substances Use History:     Substances Used:       Age of First Use Last Use Date / Amount (if available)  Most Recent Pattern of Use & Duration    (both frequency & amounts)  Method of use:       Alcohol    yes     Age of 1st  Intoxication:    20  Date: 5/30/2019  Amount: 2 drinks    # Days Used Past 30 Days:  10  reports drinking about 2-3 times per week and having 2-8 drinks at times, reports he is using alcohol to cope with anxiety       Oral   Cocaine Powder/  Crack-Cocaine      yes         21  Date: 5/16/2019  Amount: $20 worth of cocaine    # Days Used Past 30 Days:  1      reports that use has been rare, his last use was opportunistic  Snort   Cannabis/Marijuana/  Synthetic/Hashish       yes          20  Date: 5/29/2019  Amount: 2 puffs    # Days Used Past 30 Days:  5    reports that he uses cannabis rarely since it gives him delusions and paranoia, will use if someone else has some  Smoke   Heroin    yes          21 Date: used 3 times in lifetime      # Days Used Past 30 Days:  0    reports that he used heroin 3 times, he reports that he does not like opioids, he reports trying it out and knew that he did not like it  Inject   Non-Prescription Methadone    no         Other Opiates/Synthetics     yes          21 Date:experimented at age 21  # Days Used Past 30 Days:  0    reports trying the pills but not really liking them  Oral   PCP/  Other Hallucinogens    yes acid and mushrooms          21 Date: experimented at age 21 has not used since    # Days Used Past 30 Days:  0    experimented at age 21  Oral   Meth/Amphetamine  Other Stimulants (Ecstasy/Other Club Drugs)    yes          24    Date: 5/22/2019  Amount: $10 worth    # Days Used Past 30  "Days:  2    reports regular use in the past, reports he prefers meth as his drug of choice  Smoke  Snort  Inject   Benzodiazapines    yes xanax          23 Date: has tried intermittently       # Days Used Past 30 Days:  1   Has tried them, would like to use regularely  Oral   Ketamine/  Other Tranquilizers    no         Barbiturates/  Sedatives/  Hypnotics    no         Inhalants    no         Over-the-Counter Drugs    yes cough syrup     Last use was a year ago, reports it was convienient to use  Oral   Other    no         Nicotine/Tobacco    yes     Using a vape pen      Current/Hx of withdrawal symptoms:   Sweating (rapid pulse)  Agitation  Sad/depressed feeling  Irritability  Shaky/jittery  Headache  Muscle aches  Anxiety/worried  Fatigue/extremely tired  Vivid/unpleasant dreams  Hallucinations    Current Risk of withdrawal (if yes, identify substance):  no    Client Impressions:   Morgan identifies his primary substance as: Alcohol.      Impact:  Morgan reports the following life areas have been negatively impacted by his substance use:  academic, family problems, financial problems, legal issues, occupational / vocational problems and relationship problems.     Morgan reports his substance use has been a problem and has been out of control.    Morgan associates his substance use with the following leisure activities: \"I drink because of anxiety, to go outside and meet a friend I will drink\" I want to use meth so that I feel better and not feel so depressed and to have more energy.     Morgan attributes his relapses/continued use to: depression and anxiety, it seems to be a quick fix for it, but if I use more like how I used to use, I get worse.     Morgan reports his substance use and mental health have influenced each other in the following way(s): I use to manage my depression and anxiety.    Concern/Support:  Morgan identifies the following people who have been concerned about his substance use " and/or have been supportive of his recovery in the past 30 days: my mom and my girlfriend.    Morgan reports a history of trying to hide his substance use from others.       Morgan does not agree to have  contact collateral resources to obtain information.  Release of Information has not been obtained.    Recovery Goals:  Morgan reports a goal to be abstinent.     Morgan reports the following period(s) of abstinence: Lifetime:  periodically when was in treatment and afterwards, sometimes i have stopped on my own, the longest times has been 6 months that i got 2x, the 3rd longest period of time is 4 months     Morgan identifies the following coping skills/strategies to prevent relapse of substance use or mental health instability: talking to people helps me, I am open when I get to know someone, but its hard to get there, I go to AA a few times a week.     Morgan reports attending voluntary self-helps support groups.  In the past 30 days. Morgan does not have a sponsor.     DSM-5 Criteria Substance Use Disorder Criteria: At least two criteria must be met within a twelve month period.    Impaired Control:    No  Alcohol Methamphetamine  1. Substance is taken in larger amounts or over a longer period than was intended.   Yes  Alcohol Methamphetamine  2. There is a persistent desire or unsuccessful efforts to cut down or control use.   Yes  Alcohol .  3. A great deal of time is spent in activities necessary to obtain substance, use substance, or recover from its effects.   Yes  Alcohol  Methamphetamine 4. Craving, or a strong desire or urge to use the substance.    Social Impairment:    Yes  Alcohol Methamphetamine  5. Recurrent substance use resulting in failure to fulfill major role obligations at work, school or home.   Yes  Alcohol Methamphetamine 6. Continued substance use despite having persistent or recurrent social or interpersonal problems caused or exacerbated by the effects of the substance.    Yes  Alcohol Methamphetamine 7.Important social, occupational, or recreational activities are given up or reduced because of the substance use.      Risky Use:   No  Alcohol Methamphetamine 8. Recurrent substance use in situations in which it is physically hazardous.   Yes  Alcohol Methamphetamine 9. Substance use is continued despite knowledge of having a persistent or recurrent physical or psychological problem that is likely to have been caused or exacerbated by the substance.     Pharmacological:  No  Alcohol Methamphetamine 10. Tolerance, as defined by either of the following:   a. A need for markedly increased amounts of the substance to achieve intoxication or  desired effect.   b. A markedly diminished effect with continued use of the same amount of the substance.  Yes  Alcohol Methamphetamine 11. Withdrawal, as manifested by either of the following:     a. The characteristic withdrawal syndrome for the substance.   b. Substance (or a closely related substance) is taken to relieve or avoid withdrawal symptoms.     Mild: Presence of 2-3 symptoms  Moderate: Presence of 4-5 symptoms  Severe: Presence of 6 or more symptoms.    Specify if :  In early remission - no criteria met (except craving) for at least 3 months and less than 12 months  In sustained remission - no criteria met (except craving) for 12 months or longer    In a controlled environment - individual is in an environment where access to the substance is restricted.    Morgan met 8 of 11 criteria for a alcohol use disorder, severe  Morgan met 7 of 11 criteria for stimulant use disorder, methamphetamine type, severe     Morgan does agree to follow treatment recommendations including abstinence and regular attendance.      Morgan reports motivation/primary condition leading to admission to treatment: to get back on track with life goals like employment and improving my relatinship    Legal History:    Morgan reported that he has been involved  "with the legal system.   History of arrests, [facility] or care home include: 1    Morgan denies current/history of having a 's license revoked because of an incident involving alcohol or other substances. License is: never revoked.    Morgan denies currently being under the jurisdiction of the court or on probation or parole.     Legal Status involving Children:   Morgan denies having children under the age of 18.      Morgan denies current/history of losing his parental rights.     Morgan denies involvement of Child Protection Services.        Morgan presented for today's CD addendum and was forthright and open about his chemical use, he struggled at times to remember exact dates of treatment and last use of different substances.  He reports he uses alcohol most frequently and would identify it as the substance he uses most often, however he reports his substance of choice is methamphetamine due to the way it makes him feel.  He reports his alcohol use is due to social anxiety and paranoia, alcohol \"gets him through the situations.  He reports his methamphetamine use is due to depressive symptoms, when he is feeling sad or hopeless or depressed he uses methamphetamine to alleviate the feelings.  Morgan reports 6 CD treatments that he can remember and name, he reports it may have been more.  Morgan has had one lifetime detox admission.  He reports he was formerly under commitment and that is when he completed 2 of his inpatient treatments.  Morgan reports that his longest period of sobriety has been 6 months.  He had achieved sobriety for 6 months 2 times in his lifetime.  He reports that his second longest period of sobriety was 4 months.  Pt meets criteria for admittance to the [facility].  He reports a goal of \"getting back on track with his life goals like getting a job, and improving my relationship with my girlfriend\".  Pt meets criteria for:  303.90 (F10.20) Alcohol use disorder, severe  304.40 (F15.250) " Stimulant use disorder, Amphetamine-type substance, severe

## 2019-05-30 NOTE — PROGRESS NOTES
Acknowledgement of Current Treatment Plan       I have reviewed my Initial Individual Treatment Plan with my therapist / on 5/30/2019  .   I agree with the plan as it is written in the electronic health record.                      Signature Below:  Morgan Bosch      Patient      Tiffany Sree Negro, D,  L.P.    DA Psychotherapist    Admitting Provider: Admitting Provider Signature Below:   Dr Montrell Martinez MD   Psychiatrist    Dr Candace Kendall MD  Psychiatrist    Dr Javed Sam MD  Psychiatrist    Dr Pako Sotelo MD  Psychiatrist    Jaiden Jefferson, DION  Psychiatric Provider
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
## 2019-05-30 NOTE — PROGRESS NOTES
"Adult Outpatient Mental Health Programs  MY COPING PLAN FOR SAFETY     NAME: Morgan Bosch  MRN: [id number]     SAFETY PLAN:  Step 1: Warning signs / cues (Thoughts, images, mood, situation, behavior) that a crisis may be developing:  ? Thoughts: \"I don't matter\", \"People would be better off without me\", \"I'm a burden\", \"I can't do this anymore\", \"I just want this to end\" and \"Nothing makes it better\"  ? Images: ruminating thoughts about the past,   ? Thinking Processes: ruminations (can't stop thinking about my problems): -,during panic attack:  racing thoughts, intrusive thoughts (bothersome, unwanted thoughts that come out of nowhere): -, highly critical and negative thoughts, unwanted thoughts : -, disorganized thinking:   ?    ? Mood: worsening depression, hopelessness, helplessness,  Some anger towards himself, intense worry, agitation, disinhibited (not caring about things or consequences) and some mood swings  ?    ? Behaviors: isolating/withdrawing , can't stop crying,  not taking care of myself, not taking care of my responsibilities,  sleeping too much  ? Situations:  loss: -, public shame: -, legal issues in the past , changes in symptoms: increase in anxiety  and depression-,  relationship problems, trauma , relapse, financial stress, medical condition / diagnosis: for his girlfriend, who has cancer  ? Missing events with the family and feeling shame about missing them  ?    Step 2: Coping strategies - Things I can do to take my mind off of my problems without contacting another person (relaxation technique, physical activity):  ? Distress Tolerance Strategies:  relaxation activities: -, arts and crafts: -,  listen to positive and upbeat music: -, sensory based activities/self-soothe with five senses: -,  pray, read a book: -,paced breathing/progressive muscle relaxation,  ? Physical Activities: go for a walk, exercise: -, yoga, biking, meditation, deep breathing, reading  ? Focus on helpful " "thoughts:  \"This is temporary\", \"I will get through this\", \"It always passes\", \"Ride the wave\", think about happy memories: -  ?  remind myself of what is important to me:, self-compassion statements:   Step 3: People and social settings that provide distraction:              Name:  Radhika Bosch     Phone: [phone]              Name:  Joaquina Tucker    Phone: [phone]              Name:              Phone:      ? Doing art work at home       ?    Step 4: Remind myself of people and things that are important to me and worth living for:   (Family, Friends, kids, relatives, pets ?)  Step 5: When I am in crisis, I can ask these people to help me use my safety plan:              Name:  Radhika Bosch     Phone: [phone]              Name:  Joaquina Tucker    Phone: [phone]     Step 6: Making the environment safe:   ? remove alcohol, remove drugs, , -,-, arrange transportation: -, be around others , build structure  Step 7: Professionals or agencies I can contact during a crisis:  ? Suicide Prevention Lifeline: 7-047-292-TALK (2497)  ? Crisis Text Line Service (available 24 hours a day, 7 days a week): Text [place] to 368767  ? Call  **CRISIS (910610) from a cell phone to talk to a team of professionals who can help you.  ?            Crisis Services By [facility]: Phone Number:   [place]     [phone]   [place]    [phone]   [place]    [phone]   [place]    [phone]   [place]    [phone]   [place] [phone]   [place]     [phone]      ? Call [phone] or go to my nearest emergency department.             I helped develop this safety plan and agree to use it when needed.  I have been given a copy of this plan.       Client signature _________________________________________________________________  Today s date:    Adapted from Safety Plan Template 2008 Deanna Woodsory K. Brown is reprinted with the express permission of the authors.  No portion of the Safety Plan Template may be " reproduced without the express, written permission.  You can contact the authors at [email or web address] or [email or web address].

## 2019-05-30 NOTE — PROGRESS NOTES
Initial Individual Treatment Plan      Patient: Morgan Bosch        MRN: [id number]  : 1985  Age: 33 year old  Sex: male     Diagnostic Assessment Date / Date of Initial Individual Treatment Plan: 2019          Immediate Health Concerns:  No          Immediate Safety Concerns:  Yes. Identify safety concern and plan to address: report any increase in symptoms to the treatment team     Identify the issues to be addressed in treatment:  Symptom Management, Personal Safety, Community Resources/Discharge Planning, Abstinence/Relapse Prevention and Develop Socialization / Interpersonal Relationship Skills     Client Initial Individualized Goals for Treatment:   Continue to attend AA for support and maintain sobriety  Report any increase in symptoms to the team  Consider getting an individual therapist to talk about difficult emotions  Reduce anxiety in social situations  Manage panic attacks  Find ways to combat the depressed mindset, build self-compassion skills and gratitude  Manage grief and loss from the past  Consider a Psychiatry consultation      Initial Treatment suggestions for the client during the time between Diagnostic Assessment and completion of the Individualized Treatment Plan:  Follow Safety Plan  Abstain from Substance Use   Ask for more information, support and/or assistance as needed.  Follow up with providers/community supports as needed:   Report increases or changes in symptoms to staff.  Report any personal safety concerns to staff.   Take medications as prescribed.  Report medication changes and/or side effects to staff.  Attend and participate in groups as scheduled or notify staff if unable to do so.  Report any use of substances to staff as this may impact your symptoms and/or            personal safety.  Notify staff if you have any other issues that need to be addressed. This may include    any current abuse / neglect / exploitation or other vulnerability.  Follow  recommendations of your treatment team and discuss concerns if not in  agreement.     Treatment Team Responsible: Day Treatment (DT)       Therapeutic Interventions/Treatment Strategies may include:  Support, Redirection, Feedback, Limit/Boundaries, Safety Assessments, Structured Activity, Problem Solving, Clarification, Education, Motivational Enhancement and Relapse Prevention as needed.     Juvenal Benton., D,  L.P.          HPI      ROS      Physical Exam

## 2019-05-30 NOTE — PROGRESS NOTES
"         Adult Outpatient Mental Health Programs  MY COPING PLAN FOR SAFETY     NAME: Morgan Bosch  MRN: [id number]     SAFETY PLAN:  Step 1: Warning signs / cues (Thoughts, images, mood, situation, behavior) that a crisis may be developing:  ? Thoughts: \"I don't matter\", \"People would be better off without me\", \"I'm a burden\", \"I can't do this anymore\", \"I just want this to end\" and \"Nothing makes it better\"  ? Images: ruminating thoughts about the past,   ? Thinking Processes: ruminations (can't stop thinking about my problems): -,during panic attack:  racing thoughts, intrusive thoughts (bothersome, unwanted thoughts that come out of nowhere): -, highly critical and negative thoughts, unwanted thoughts : -, disorganized thinking:   ?    ? Mood: worsening depression, hopelessness, helplessness,  Some anger towards himself, intense worry, agitation, disinhibited (not caring about things or consequences) and some mood swings  ?    ? Behaviors: isolating/withdrawing , can't stop crying,  not taking care of myself, not taking care of my responsibilities,  sleeping too much  ? Situations:  loss: -, public shame: -, legal issues in the past , changes in symptoms: increase in anxiety  and depression-,  relationship problems, trauma , relapse, financial stress, medical condition / diagnosis: for his girlfriend, who has cancer  ? Missing events with the family and feeling shame about missing them  ?    Step 2: Coping strategies - Things I can do to take my mind off of my problems without contacting another person (relaxation technique, physical activity):  ? Distress Tolerance Strategies:  relaxation activities: -, arts and crafts: -,  listen to positive and upbeat music: -, sensory based activities/self-soothe with five senses: -,  pray, read a book: -,paced breathing/progressive muscle relaxation,  ? Physical Activities: go for a walk, exercise: -, yoga, biking, meditation, deep breathing, reading  ? Focus on helpful " "thoughts:  \"This is temporary\", \"I will get through this\", \"It always passes\", \"Ride the wave\", think about happy memories: -  ?  remind myself of what is important to me:, self-compassion statements:   Step 3: People and social settings that provide distraction:              Name:  Radhika Bosch     Phone: [phone]              Name:  Joaquina Tucker    Phone: [phone]              Name:              Phone:      ? Doing art work at home       ?    Step 4: Remind myself of people and things that are important to me and worth living for:   (Family, Friends, kids, relatives, pets ?)  Step 5: When I am in crisis, I can ask these people to help me use my safety plan:              Name:  Radhika Bosch     Phone: [phone]              Name:  Joaquina Tucker    Phone: [phone]     Step 6: Making the environment safe:   ? remove alcohol, remove drugs, , -,-, arrange transportation: -, be around others , build structure  Step 7: Professionals or agencies I can contact during a crisis:  ? Suicide Prevention Lifeline: 4-884-923-TALK (7306)  ? Crisis Text Line Service (available 24 hours a day, 7 days a week): Text [place] to 884617  ? Call  **CRISIS (708448) from a cell phone to talk to a team of professionals who can help you.  ?         Crisis Services By [facility]: Phone Number:   [place]     [phone]   [place]    [phone]   [place]    [phone]   [place]    [phone]   [place]    [phone]   [place] [phone]   [place]     [phone]      ? Call 407 or go to my nearest emergency department.             I helped develop this safety plan and agree to use it when needed.  I have been given a copy of this plan.       Client signature _________________________________________________________________  Today s date:    Adapted from Safety Plan Template 2008 Deanna Woodsory K. Brown is reprinted with the express permission of the authors.  No portion of the Safety Plan Template may be reproduced " without the express, written permission.  You can contact the authors at [email or web address] or [email or web address].

## 2019-05-30 NOTE — PROGRESS NOTES
Initial Individual Treatment Plan      Patient: Morgan Bosch        MRN: [id number]  : 1985  Age: 33 year old  Sex: male     Diagnostic Assessment Date / Date of Initial Individual Treatment Plan: 18            Immediate Health Concerns:  No          Immediate Safety Concerns:  Yes. Identify safety concern and plan to address: report any increase in symptoms to the treatment team     Identify the issues to be addressed in treatment:  Symptom Management, Personal Safety, Community Resources/Discharge Planning, Abstinence/Relapse Prevention and Develop Socialization / Interpersonal Relationship Skills     Client Initial Individualized Goals for Treatment:   Continue to attend AA for support and maintain sobriety  Report any increase in symptoms to the team  Consider getting an individual therapist to talk about difficult emotions  Reduce anxiety in social situations  Manage panic attacks  Find ways to combat the depressed mindset, build self-compassion skills and gratitude  Manage grief and loss from the past  Consider a Psychiatry consultation      Initial Treatment suggestions for the client during the time between Diagnostic Assessment and completion of the Individualized Treatment Plan:  Follow Safety Plan  Abstain from Substance Use   Ask for more information, support and/or assistance as needed.  Follow up with providers/community supports as needed:   Report increases or changes in symptoms to staff.  Report any personal safety concerns to staff.   Take medications as prescribed.  Report medication changes and/or side effects to staff.  Attend and participate in groups as scheduled or notify staff if unable to do so.  Report any use of substances to staff as this may impact your symptoms and/or            personal safety.  Notify staff if you have any other issues that need to be addressed. This may include    any current abuse / neglect / exploitation or other vulnerability.  Follow  recommendations of your treatment team and discuss concerns if not in  agreement.     Treatment Team Responsible: Day Treatment (DT)       Therapeutic Interventions/Treatment Strategies may include:  Support, Redirection, Feedback, Limit/Boundaries, Safety Assessments, Structured Activity, Problem Solving, Clarification, Education, Motivational Enhancement and Relapse Prevention as needed.     Juvenal Benton., D,  L.P.

## 2019-06-03 ENCOUNTER — HOSPITAL ENCOUNTER (OUTPATIENT)
Dept: BEHAVIORAL HEALTH | Facility: CLINIC | Age: 34
End: 2019-06-03
Attending: PSYCHIATRY & NEUROLOGY
Payer: COMMERCIAL

## 2019-06-03 PROBLEM — F25.0 SCHIZOAFFECTIVE DISORDER, BIPOLAR TYPE (H): Status: ACTIVE | Noted: 2019-06-03

## 2019-06-03 PROCEDURE — H2012 BEHAV HLTH DAY TREAT, PER HR: HCPCS

## 2019-06-03 ASSESSMENT — ANXIETY QUESTIONNAIRES
1. FEELING NERVOUS, ANXIOUS, OR ON EDGE: MORE THAN HALF THE DAYS
GAD7 TOTAL SCORE: 5
7. FEELING AFRAID AS IF SOMETHING AWFUL MIGHT HAPPEN: NOT AT ALL
3. WORRYING TOO MUCH ABOUT DIFFERENT THINGS: SEVERAL DAYS
5. BEING SO RESTLESS THAT IT IS HARD TO SIT STILL: NOT AT ALL
IF YOU CHECKED OFF ANY PROBLEMS ON THIS QUESTIONNAIRE, HOW DIFFICULT HAVE THESE PROBLEMS MADE IT FOR YOU TO DO YOUR WORK, TAKE CARE OF THINGS AT HOME, OR GET ALONG WITH OTHER PEOPLE: SOMEWHAT DIFFICULT
2. NOT BEING ABLE TO STOP OR CONTROL WORRYING: SEVERAL DAYS
6. BECOMING EASILY ANNOYED OR IRRITABLE: SEVERAL DAYS

## 2019-06-03 ASSESSMENT — PATIENT HEALTH QUESTIONNAIRE - PHQ9
SUM OF ALL RESPONSES TO PHQ QUESTIONS 1-9: 8
5. POOR APPETITE OR OVEREATING: NOT AT ALL

## 2019-06-03 NOTE — PROGRESS NOTES
Admission Date: 6/3/2019    The Initial Individual Treatment Plan was reviewed with Morgan Bosch upon admission.      Morgan reported his last use of substances as: marijuana and meth and a xanax    Identify any current concerns with potential to impact admission:     withdrawal/intoxication: reports not feeling any withdrawal or intoxication symptoms     medication/medical concerns: reports no concerns     immediate safety concerns:reports no safety concerns     Other: na    DIMENSION  ADMIT RATING   1 Acute Intoxication / Withdrawal Potential 1   2 Biomedical Conditions & Complications 0   3 Emotional / Behavioral / Cognitive Conditions & Complications 1   4 Treatment Acceptance / Resistance: 2   5 Continued Use / Relapse Prevention 3   6 Recovery Environment 1         Completed by: ANNA Chang

## 2019-06-03 NOTE — PROGRESS NOTES
"Adult Mental Health Outpatient Group Therapy Progress Note     Client Initial Individualized Goals for Treatment:  \"increase structure, get a therapist, continue to attend AA, continue to look for jobs\".       See Initial Treatment suggestions for the client during the time between Diagnostic Assessment and completion of the Master Individualized Treatment Plan.    Treatment Goals:     see above     Area of Treatment Focus:  Symptom Management, Community Resources/Discharge Planning, Abstinence/Relapse Prevention and Develop / Improve Independent Living Skills    Therapeutic Interventions/Treatment Strategies:  Support, Feedback, Structured Activity, Problem Solving, Clarification, Education and Motivational Enhancement Therapy    Response to Treatment Strategies:  Accepted Feedback, Listened, Focused on Goals, Attentive and Accepted Support     Name of Group: Life Skills   Date/Time: 6/3/19 / 1200 to 1250    Number of Group Participants: 7     Description and Outcome:  Morgan attended and participated in an experiential Psychoeducation group where rehabilitative intervention focuses on learning, developing through practice, and generalizing independent living skills. The purpose of this group is to stabilize and manage mental health symptoms, reduce/eliminate substance use, and to improve participation and function in valued roles, routines, relationships. He noted his previous attendance recently in Day Treatment and how his aftercare plan did not include a group therapy process that might have been helpful for him. He was open to complete a self assessment of daily living skills and noted several areas to work on. He was open to practice an unstructured creative relaxation technique. Client verbalized understanding of session content by noting benefits of practice of skill building. Will continue to assess.  Client would benefit from additional opportunities to practice and implement content from this " session.    Is this a Weekly Review of the Progress on the Treatment Plan?  No

## 2019-06-03 NOTE — PROGRESS NOTES
"Adult Dual Disorder Progress Note / Treatment Plan Review       Patient: Morgan Bosch   MRN: [id number]  : 1985  Age: 34 year old  Sex: male    Week of: 6/3/2019-2019     Client Initial Individualized Goals for Treatment: \"increase structure, get a therapist, continue to attend AA, continue to look for jobs\".     See Initial Treatment suggestions for the client during the time between Diagnostic Assessment and completion of the Master Individualized Treatment Plan.    Treatment Goals:     Follow Safety Plan  Abstain from Substance Use   Ask for more information, support and/or assistance as needed.  Follow up with providers/community supports as needed:   Report increases or changes in symptoms to staff.  Report any personal safety concerns to staff.   Take medications as prescribed.  Report medication changes and/or side effects to staff.  Attend and participate in groups as scheduled or notify staff if unable to do so.  Report any use of substances to staff as this may impact your symptoms and/or            personal safety.  Notify staff if you have any other issues that need to be addressed. This may include    any current abuse / neglect / exploitation or other vulnerability.  Follow recommendations of your treatment team and discuss concerns if not in  agreement.    Active Psychiatric Symptoms Impairing:   Thought and Mood    Area of Treatment Focus:  Symptom Management, Personal Safety, Community Resources/Discharge Planning, Abstinence/Relapse Prevention, Develop / Improve Independent Living Skills, Develop Socialization / Interpersonal Relationship Skills, Cultural / Spirituality and Physical Health     Treatment Strategies:   Support, Redirection, Feedback, Limit/Boundaries, Safety Assessments, Structured Activity, Problem Solving, Clarification, Education, Motivational Enhancement Therapy and Cognitive Behavioral Therapy    Patient Response:  Accepted Feedback, Gave Feedback, Listened, " Focused on Goals, Attentive, Accepted Support and Alert      Dimension Risk Description and Outcome:    Dimension One: Acute Intoxication/Withdrawal Potential   Daily Note:6/3/2019 Group Therapy 9146-6171 mauricio reports his last use was yesterday (LE)    Dimension Two: Biomedical Conditions and Complications  Daily Note:6/3/2019 Group Therapy [id number] mauricio reports no medical concerns (LE)    Dimension Three: Emotional/Behavioral / Cognitive Conditions & Complications  Daily Note:6/3/2019 Group Therapy 5318-1124 mauricio checked in and reports that he is feeling hopeful and cautious today, he is glad that he started this program and hopes that it will help him, he reports he has been having negative thoughts about being successful in this program, Pt denied SI.   Pt accepted support and feedback and gave support and feedback to group members (LE)  6/6/2019 Group Therapy 6756-8689 Mauricio checked in and reports feeling shame and discouraged and apathetic, he reports relapse and struggling with negative thoughts, Pt reports passive ideation with no plan and intent.   Pt accepted support and feedback and gave support and feedback to group members (LE)  6/7/2019 Group Therapy 7873-9479 Mauricio checked in and reports that he is feeling content and thankful, he is preparing to see family today and is bracing himself for copeing with his family, Pt denied SI.   Pt accepted support and feedback and gave support and feedback to group members (LE)    Dimension Four: Treatment Acceptance / Resistance  Daily Note:6/6/2019 Group Therapy [id number]  Mental health professional facilitated discussion with the group about interpersonal effectiveness, discussed how to evaluate intensity of desire to say no or ask for help, used handout  interpersonal effectiveness handout 8 .  Pt engaged in discussion and shared perspectives for themselves and situations where it is difficult to say no or ask for help.  Pt would benefit from additional  "opportunities to practice and implement content from this session. (LE)    Dimension Five: Continued Use/Relapse Prevention  Daily Note:6/3/2019 Group Therapy 8671-9987 mauricio reports that he attend AA, he reports his last use is 6/2/2019, moderate risk of relapse (LE)  6/6/2019 Group Therapy 0214-2109 mauricio reports he relapsed 2 days ago on meth and cannabis, he declined to speak further about his relapse, he chose to speak privately about his relapse with this writer (LE)  6/7/2019 ([id number]) Pt reports last use as three days ago. Pt reports he was isolating too much and that led to use as did stress over his S/O having brain cancer. Pt reports he needs to learn how to separate himself from her illness. Pt was given the assignment to write a \"good-bye letter\" to alcohol and was agreeable to complete the assignment ([facility]).     Dimension Six: Recovery Environment  Daily Note:6/3/2019 Group Therapy 0825-6259 mauricio reports no change and no concerns at this time (LE)    Weekly Progress / Treatment Plan Review      Are there additional progress notes for this week? Yes (see additional progress notes)    Are Treatment Plan Goals being addressed? Yes, continue treatment goals and No, treatment goals revised    Are treatment plan strategies to address goals effective? No, treatment strategies revised    Are there any current contracts in place? No    Client: Agrees with treatment plan changes     Client: Received copy of revised treatment plan    Was client case reviewed with Javed Sam MD and/or Candace Kendall MD and the treatment team this week? yes    Psychotherapists contributing to this note:    Group: Psychotherapy Date/Time: 6/3/2019 / 1100 to 1150; Number of Participants: 8  Facilitated by: ANNA Chang (MIRNA)    Group: Psychotherapy Date/Time: 6/6/2019 / 1000 to 1050; Number of Participants: 6  Facilitated by: ANNA Chang (MIRNA)    Group: Psychotherapy Date/Time: 6/6/2019 / 1100 to 1150; Number of " Participants: 6  Facilitated by: ANNA Chang (MIRNA)    Group: Psychotherapy Date/Time: 6/7/2019 / 1200 to 1250; Number of Participants: 5  Facilitated by: ANNA Chang (MIRNA)    Group: Psychotherapy Date/Time: 6/7/2019 / 1000 to 1050; Number of Participants: 6    Facilitated by: Dilshad West MA, LP [facility] ([facility])

## 2019-06-04 ASSESSMENT — ANXIETY QUESTIONNAIRES: GAD7 TOTAL SCORE: 5

## 2019-06-04 NOTE — PROGRESS NOTES
"Health Outpatient Group Therapy Progress Note           Client Initial Individualized Goals for Treatment:   \" get help with returning to work, helping narrow my career path, feel better about social situations, and increase self-esteem\".      See Initial Treatment suggestions for the client during the time between Diagnostic Assessment and completion of the Master Individualized Treatment Plan.      Treatment Goals:  1. Client will notify staff when needing assistance to develop or implement a coping plan to manage suicidal or self injurious urges.  2. When in life skills groups will report on how he builds supports  3. Group Therapy :  Learn and practice 1-2 coping skills to help reduce anxiety, depression, grief and loss, and any psychosis symptoms, maintain sobriety.  4. Health/Wellness / Discharge :   Get an individual therapist and a psychiatrist     Area of Treatment Focus:  Symptom Management      Therapeutic Interventions/Treatment Strategies:  Support, Feedback, Safety Assessments, Structured Activity and Education      Area of Treatment Focus:  Symptom Management, Personal Safety, Community Resources/Discharge Planning, Abstinence/Relapse Prevention, Develop/Improve Independent Living/Socialization Skills/Interpersonal Relationships and Cultural/Racial/Health and Spiritual      Therapeutic Interventions/Treatment Strategies:  Support, Redirection, Feedback, Limit/Boundaries, Safety Assessments, Structuresd Activity, Problem Solving, Clarification, Education, Motivational Enhancement Therapy, Cognitive Behavioral Therapy and Structured Activity      Response to Treatment Strategies:  Accepted Feedback, Gave Feedback, Listened, Focused on Goals, Attentive, Accepted Support, Alert, Demonstrates Behavior Change     Therapist:  Sree Benton, D,  L.P.       Name of Group:    Group Psychotherapy    2:00 - 2:50 pm     Date:    2/4/2019   Group Participants: 5      Description and Outcome:     Group " Physical Therapy Discharge Summary Addendum:  Date: 7/2/2019  Total Number of Visits: 18  Referred by: Mando Peterson MD  Medical Diagnosis (from order):   Diagnosis Information      Diagnosis    V43.64 (ICD-9-CM) - Z96.641 (ICD-10-CM) - S/P hip replacement, right                Please see below daily treatment note for last known status.  Status of goals: per status in last daily treatment note    Discharge Measures:   Treatment Category: Total Hip Replacement - Date Of Surgery: 3/11/2019  Outcome Measure:   Lower Extremity Functional Scale: Initial Score: 26; Discharge Outcome Score: 46  Primary Clinician: Kiera Guerra        Physical Therapy Progress Note    Referred by: Mando Peterson MD  Medical Diagnosis (from order): Z96.641 S/P hip replacement, right  Diagnosis Information      Diagnosis    V43.64 (ICD-9-CM) - Z96.641 (ICD-10-CM) - S/P hip replacement, right              Current Functional Limitations: walking longer distances.  Standing tolerance is limited to about 2 hours, then begins to feels some hip fatigue/discomfort.  Sitting for extended periods of time, has a hard time initially getting up.       OBJECTIVE   remeasurements as noted in attached daily treatment note    Outcome Measures: (Outcome Scoring)  Lower Extremity Functional Scale: 46 (0=extreme difficulty; 80=no difficulty)     ASSESSMENT   Overall patient has made great improvement, meeting 5/9 goals.  Has shown improvement with hip range of motion, strength and lower extremity functional scale.  He is independent with HEP as this time and feels ready to transition to self management. To date the patient has made gains as expected as reported. Patient continues to have impairments and functional deficits as noted. Patient will continue to benefit from skilled care as outlined.      PLAN    Updates to plan of care: hold chart open for 30 days.  Transition to HEP.    Goals    To be obtained by end of this plan of care:  1. Patient  Therapy               uJice reported being safe today.   He reported that he went to the  for his grandma on the past weekend and that he felt calm and relieved after the .  He stated that he talked with his mom and many other relatives after the .   He talked with the group about the grief and loss issue and how he tried to stay focused on the present, and talked about his level of sadness and depression. He stated that he talked to his girlfriend and she is ok.   He reported that he has problems with anxiety and panic disorders and talked to the group about panic disorder symptoms.  He reported that his thoughts are normal, and he notices depression thoughts, but that the anxiety is the main problem.  He stated that he does deep breathing.        He stated that he does not have any psychosis, lately.  He stated that he did errands and chores around the house, and that his mood is better.  He reported that he wants to see a doctor to get some Gabapentin, but is reluctant to make an appointment.   he was given a list of clinics with their locations, but did not make an appointment, yet.   He reported that he enjoyed playing guitar, drawing, painting, doing photography, listening to music, and he has been making art work lately.           He participated in a mindfulness activity of 10 minutes while practicing deep breathing.      Client would benefit from additional opportunities to practice and implement content from this session in everyday life, relationships and mental health recovery.          Is this a Weekly Review of the Progress on the Treatment Plan?  Yes.        Are Treatment Plan Goals being addressed?  Yes, continue treatment goals          Are Treatment Plan Strategies to Address Goals Effective?  Yes, continue treatment strategies          Are there any current contracts in place?  No         will be independent with progressed and modified home exercise program met  2. Decrease pain/symptoms to 0/10 partially met   3. Improve involved strength to 5/5 partially met   4. Improve involved range of motion to flexion 95 degrees  met  through improvements listed above patient will:  5. Be able to ambulate for 30 minutes without pain/difficulty met  6. Be able to ascend and descend 1 flight of stairs using reciprocal pattern without pain/difficulty  met  7. Be able to complete independent transfers without pain/difficulty Met  8. Lower Extremity Functional Scale: Patient will complete form to reflect an improved score from initial score of 26 to greater than or equal to 65 to indicate patient reported improvement in function/disability/impairment (minimal detectable change: 9 points).  Improved but unmet    6/4/19: fully met 5/9 goals     Physical Therapy Daily Treatment    Visit Count: 18   Plan of Care: 4/1/2019 Through: 5/13/2019  Insurance Information:   [employer]  ID#: [id number]     No Authorization Required.  No Co-Pay  60 Visit limit per calendar year  Reference [facility] website  Referred by: Mando Peterson MD; Next provider visit (if known/scheduled): 6/6/19  Medical Diagnosis (from order): Z96.641 S/P hip replacement, right     Date of Surgery: 3/11/2019 Surgery Performed: right total hip arthroplasty - Right  Physician Guidelines/Precautions: Weight Bearing As Tolerated, posterior total hip arthroplasty precautions   Chart reviewed at time of initial evaluation (relevant co-morbidities, allergies, tests and medications listed): anxiety, bipolar disorder, depression, diabetes, diastasis recti, hypertension, resting tremor, coronary artery disease, malignant neoplasm, migraine, history of left total hip replacement 6/2018    SUBJECTIVE     Current Pain (0-10 scale): 2/10 lateral right hip.  Has not had to take anything for pain.  Is still careful with twisting to the right and  this causes some discomfort to the side.    Functional Change:  Has been walking up to 35-40 minutes a day.  He feels sore when he is done but is pleased that he can increase his time.  Is able to drive, sleep, completed stairs reciprocally and complete ADL's without assist.  Overall feels 70-80% improvement.      OBJECTIVE      Range of Motion (degrees)    Left Right Right Right right   Date Initial Initial 4/22/19 5/6/19 6/4/19   Hip Flexion (110-120) 98 65 82 93 104   Hip Extension (10-15)            Hip Abduction (30-50)            Hip Adduction (30)            Hip Internal Rotation (30-40)         28   Hip External Rotation (40-60)         28   Reported in degrees, active range of motion recorded unless noted as AA=active assistive or P=passive; standard testing positions unless otherwise noted, norms included in ( ); *=pain   Only those motions that were assessed are noted.       Strength (out of 5)    Left Right Right  Right right   Date Initial Initial 4/22/19 5/6/19 6/4/19   Knee Flexors 5 4 4+ 4+ 5   Knee Extensors 5 4 5 5 5   Ankle Dorsiflexors 5 5 5 5 5   Ankle Plantar flexors 5 5 5 5 5   standard testing positions unless otherwise noted, key: * denotes pain  Only muscle strength that was assessed are noted.     Strength (out of 5)    Left Right Right  Right right     Initial Initial 4/22/19 5/6/19 6/4/19   Hip Flexion 5 4 4+ 4+ 5-   Hip Extension            Hip Abduction            Hip Adduction         4+   Hip Internal Rotation 5     4- 4+   Hip External Rotation 5     4- 4+   standard testing positions unless otherwise noted,* denotes pain  Only muscle strength that was assessed are noted.      Outcome Measures:   Lower Extremity Functional Scale:   6/4/19:46/80  5/6/19:41  4/22/19: 29   Eval: 26   (0=extreme difficulty; 80=no difficulty)    Treatment       Therapeutic Exercise: 30 minutes + reassessment   Exercise Repetitions Sets Position/Cues   Nustep 6 minutes 1 Work load 5.0   Heel slides -   Cues to move slowly   Quad sets -     Glute sets -   5 sec hold    Short arc quad  -  2# weight    Straight leg raise  -     Sidelying: hip abduction  15 1 right   Sidelying: clamshells 15 2 right; cues to decrease range of motion  green theraband    Bridging 15 1 Gluteal set; 5 second hold   Seated: hamstring stretch 30 sec hold 1 Cues for leg position and form    Seated: hamstring curls vs red theraband -     Long arc quad  -  2#, 5 sec hold    Standing double limb heel raises -     Standing hip abduction - 1 Bilateral   Mini squats -  Cues for form   Standing hip extension  - 1 Bilateral    6 inch step up -  No UE   6 inch anterior lowering -  Light touch for balance   Standing: marching -  alternating   Supine: hamstring stretch  30 sec hold  2    Supine: single knee to chest stretch 30 sec hold 2    Supine: knee toward opposite shoulder (modified piriformis stretch)  30 sec hold 2    Supine: modified murali stretch/hip flexor stretch -     sidestepping -  Stand by assist   Standing: lat stretch at // bars -  Center, left, right, center    Monster walks -  Stand by assist   Dynamic marching -  Stand by assist cues to decrease pace   Comments: All exercises completed on the right unless otherwise noted.   - means not completed this session    Therapeutic activity: 10 minutes  Push/pull sled 20# 40 feet x 2, 40# x 80 feet   Lifting floor to waist height 12# x 5, 22# x 5, 32# x 5- Self-selects good form with lifting without cues.   Carrying  22#   80 feet x 4      Neuromuscular Reeducation:  Exercise: Reps: Sets: Position/Cues:   airex eyes closed -  Feet apart   airex marching -  Cues for increased hip flexion to 90 degrees, patient tends to do a hamstring curl   airex feet together eyes open/closed -     Tandem stance 60 sec each     Stride stance 60 seconds each  1    Stride stance with front foot on 4 inch step - 1    Large square fitter med/lateral -     Single limb stance 30 sec hold 1 Intermittent UE support    Comments: close stand by assist for safety      Home Program:  * above=instructed home program    Exercise: Date issued Date DC Comments   Heel slides, quad sets, Short arc quad, ankle pumps, glute sets, sidelying clamshells Home health   Patient declines needing new handouts of these exercises.    Clamshells and bridging 4/3/19       Supine knee to chest, knee to opposite shoulder and hip flexor stretch 4/25/19       Walking program, weaning into steel toe boot, squats, heel raises, seated long arc quads                       Skilled input: verbal instruction/cues, tactile instruction/cues, posture correction, education/instruction on hip precautions      Writer verbally educated the patient and received verbal consent from the patient on hand placement, positioning of patient, and techniques to be performed today including hand placement and palpation for techniques as described above and how they are pertinent to the patient's plan of care.      Suggestions for next session as indicated: hold chart open for 30 days    ASSESSMENT     Good form with functional lifting/pushing/pulling.  Patient has begun to wear his steel toe boots at home when he is going for his daily walks.  Has a good understanding of HEP and anticipate will do well to return to work.  Recommend active warm up and icing after work as needed.       Pain after treatment (patient reported, 0-10 scale): 5, in right hip fatigue  Result of above outlined education: Verbalizes understanding, Demonstrates understanding and Needs reinforcement    THERAPY DAILY BILLING   Insurance: [employer] 2. N/A    Evaluation Procedures:  No evaluation codes were used on this date of service    Timed Procedures:  Therapeutic Activity, 10 minutes  Therapeutic Exercise, 30 minutes    Untimed Procedures:  No untimed codes were used on this date of service    Total Treatment Time: 45 minutes

## 2019-06-05 ENCOUNTER — TELEPHONE (OUTPATIENT)
Dept: BEHAVIORAL HEALTH | Facility: CLINIC | Age: 34
End: 2019-06-05

## 2019-06-06 ENCOUNTER — HOSPITAL ENCOUNTER (OUTPATIENT)
Dept: BEHAVIORAL HEALTH | Facility: CLINIC | Age: 34
End: 2019-06-06
Attending: PSYCHIATRY & NEUROLOGY
Payer: COMMERCIAL

## 2019-06-06 LAB
AMPHETAMINES UR QL SCN: POSITIVE
BARBITURATES UR QL: NEGATIVE
BENZODIAZ UR QL: POSITIVE
CANNABINOIDS UR QL SCN: POSITIVE
COCAINE UR QL: NEGATIVE
ETHANOL UR QL SCN: NEGATIVE
OPIATES UR QL SCN: NEGATIVE

## 2019-06-06 PROCEDURE — 80320 DRUG SCREEN QUANTALCOHOLS: CPT | Performed by: PSYCHIATRY & NEUROLOGY

## 2019-06-06 PROCEDURE — H2012 BEHAV HLTH DAY TREAT, PER HR: HCPCS

## 2019-06-06 PROCEDURE — 82570 ASSAY OF URINE CREATININE: CPT | Performed by: PSYCHIATRY & NEUROLOGY

## 2019-06-06 PROCEDURE — 80349 CANNABINOIDS NATURAL: CPT | Performed by: PSYCHIATRY & NEUROLOGY

## 2019-06-06 PROCEDURE — 80307 DRUG TEST PRSMV CHEM ANLYZR: CPT | Performed by: PSYCHIATRY & NEUROLOGY

## 2019-06-06 NOTE — PROGRESS NOTES
"Adult Mental Health Outpatient Group Therapy Progress Note     Client Initial Individualized Goals for Treatment:  \"increase structure, get a therapist, continue to attend AA, continue to look for jobs\".       See Initial Treatment suggestions for the client during the time between Diagnostic Assessment and completion of the Master Individualized Treatment Plan.    Treatment Goals:     see above     Area of Treatment Focus:  Symptom Management, Community Resources/Discharge Planning, Abstinence/Relapse Prevention and Develop / Improve Independent Living Skills    Therapeutic Interventions/Treatment Strategies:  Support, Feedback, Structured Activity, Problem Solving, Clarification, Education and Motivational Enhancement Therapy    Response to Treatment Strategies:  Accepted Feedback, Listened, Focused on Goals, Attentive and Accepted Support     Name of Group: Life Skills   Date/Time: 6/6/19 / 1200 to 1250    Number of Group Participants: 6    Description and Outcome:  Morgan attended and participated in an experiential Psychoeducation group where rehabilitative intervention focuses on learning, developing through practice, and generalizing independent living skills. The purpose of this group is to stabilize and manage mental health symptoms, reduce/eliminate substance use, and to improve participation and function in valued roles, routines, relationships. Topic for the group was learning about non-medication techniques that can activate the prefrontal cortex of the brain for improvement with concentration and other executive functioning skills. He was late to group due to meeting with other staff, but joined in listening to the discussion. He did add to the discussion with some of his own challenges and some ideas of types of techniques to help manage. Client verbalized understanding of session content by relating topic to his own challenges and asking some clarifying questions.  Client would benefit from " additional opportunities to practice and implement content from this session.    Is this a Weekly Review of the Progress on the Treatment Plan?  No

## 2019-06-07 ENCOUNTER — HOSPITAL ENCOUNTER (OUTPATIENT)
Dept: BEHAVIORAL HEALTH | Facility: CLINIC | Age: 34
End: 2019-06-07
Attending: PSYCHIATRY & NEUROLOGY
Payer: COMMERCIAL

## 2019-06-07 PROCEDURE — H2012 BEHAV HLTH DAY TREAT, PER HR: HCPCS

## 2019-06-07 NOTE — PROGRESS NOTES
"  Adult Dual Disorder Progress Note / Treatment Plan Review       Patient: Morgan Bosch   MRN: [id number]  : 1985  Age: 34 year old  Sex: male    Week of: 6/10/2019-2019     Client Initial Individualized Goals for Treatment: \"increase structure, get a therapist, continue to attend AA, continue to look for jobs\".     See Initial Treatment suggestions for the client during the time between Diagnostic Assessment and completion of the Master Individualized Treatment Plan.    Treatment Goals:      1.  Juice is currently on the waitlist for [facility]  2.  Juice will obtain a psychiatrist and establish care  3.  Juice will obtain a therapist and establish care  4.  Juice will obtain an [facility] worker to get additional skill building at home  1.  Juice will remain sober by continuing attendance to AA 2x per week  2.  Juice will remain sober by exercising daily, biking at least 2 miles to improve mood and decrease likely bryan of relapse  3.  Juice will identify triggers of what precedes use and work on managing triggers by using coping skills and not using.  1.  Juice will manage symptoms of depression by riding his bike everyday, he reports cycling modulates his mood better than drugs  2.  Juice will read spiritual materials to improve optimism and mood  3.  Juice will learn and practice skills to manage symptoms of anxiety and depression, he will use the skills and report to group weekly on what works      Active Psychiatric Symptoms Impairing:   Thought and Mood    Area of Treatment Focus:  Symptom Management, Community Resources/Discharge Planning and Abstinence/Relapse Prevention    Treatment Strategies:   Support, Redirection, Feedback, Limit/Boundaries, Safety Assessments, Structured Activity, Problem Solving, Clarification, Education, Motivational Enhancement Therapy and Cognitive Behavioral Therapy    Patient Response:  Accepted Feedback, Gave Feedback, Listened, Focused on Goals, Attentive, Accepted " Support and Alert      Dimension Risk Description and Outcome:    Dimension One: Acute Intoxication/Withdrawal Potential   Daily Note:6/10/2019 Group Therapy [id number] mauricio reports 6 days sobriety (LE)    Dimension Two: Biomedical Conditions and Complications  Daily Note:6/10/2019 Group Therapy [id number] mauricio reports no concerns (LE)    Dimension Three: Emotional/Behavioral / Cognitive Conditions & Complications  Daily Note:6/10/2019 Group Therapy [id number] mauricio checked in and reports feeling thoughtful today, he processed how he spent time with his parents this weekend and that it was helpful to just be around family, he has plans to see family again this weekend since his sister is graduating, Pt denied SI.   Pt accepted support and feedback and gave support and feedback to group members (LE)    Dimension Four: Treatment Acceptance / Resistance  Daily Note: Pt discharged on 6/12/2019.    Dimension Five: Continued Use/Relapse Prevention  Daily Note:6/10/2019 Group Therapy [id number] mauricio reports 6 days sobriety, moderate risk of relapse (LE)    Dimension Six: Recovery Environment  Daily Note:6/10/2019 Group Therapy [id number] mauricio reports no change (LE)    Weekly Progress / Treatment Plan Review      Are there additional progress notes for this week? Yes (see additional progress notes)    Are Treatment Plan Goals being addressed? Client discharged    Are treatment plan strategies to address goals effective? Client discharged    Are there any current contracts in place? No    Client: na     Client: na    Was client case reviewed with Javed Sam MD and/or Candace Kendall MD and the treatment team this week? yes    Psychotherapists contributing to this note:    Group: Psychotherapy Date/Time: 6/10/2019 / 1100 to 1150; Number of Participants: 4  Facilitated by: ANNA Chang (LE)

## 2019-06-07 NOTE — PROGRESS NOTES
Adult Dual Disorder Program:  Individualized Treatment Plan     Date of Plan: 2019    Name: Morgan Bosch MRN: [id number]    : 1985    Programs:  MI/CD Treatment (MICD)     Clinical Track (if applicable):  Group 3    DSM5 Diagnosis  295.70 (F33.2)Schizoaffective Disorder, Bipolar type  300.01 (F41.0) Panic Disorder  303.90 (F10.20) Alcohol use disorder, severe  304.40 (F15.250) Stimulant use disorder, Amphetamine-type substance, severe    Adult Dual Disorder Program Multidisciplinary Team Members: Candace Kendall MD and/or Javed Sam MD; Faina Russell, [facility], [facility]; Dilshad West MA, , [facility]; Frances Mata LMFT; Karli Harris, OTR/L; Sheryl Jara RN, PHN    Morgan Bosch will participate in the Adult Dual Disorder Program  5 days per week, 3 hours per day. Anticipated duration/discharge: 6-8 weeks    Program Start Date: 6/3/2019  Anticipated Discharge Date: 2019 (pending authorization/clinical changes)    NOTE: Complete CGI     Review Date: Does Morgan Bosch continue to meet criteria to participate in the Adult Dual Disorder Program, 5 days per week; 3 hours per day?   7/3/2019 no                       Client Strengths:  caring, creative, good listener, has a previous history of therapy, insightful, open to learning, open to suggestions / feedback, wants to learn, willing to ask questions and willing to relate to others    Client Participation in Plan:  Contributed to goals and plan   Attended individual treatment plan meeting on 2019  Agrees with plan   Received copy of treatment plan     Areas of Vulnerability:  Poor impulse control   Psychotic symptoms/behavior   Depressive symptoms     Long-Term Goals:  Knowledge about illness and management of symptoms   Maintenance of sobriety   Effective management of impulsivity     Abuse Prevention Plan:  Safe, therapeutic environment   Safety coping plan as needed   Education regarding illness and skill development   Coordination  with care providers   Impluse control education and intervention   Medication adjustment/management (MI/CD)   Monitor for use of substances    Discharge Criteria:  Satisfactory progress toward treatment goals   Improvement re: identified problems and symptoms   Ability to continue recovery at next level of service   Has a discharge plan in place   Has safety/coping plan in place   Ability to maintain sobriety  Share autobiography   Complete goodbye letter assignment   Complete coping cards   Regular attendance as scheduled     Areas of Treatment Focus        Area of Treatment Focus:   Symptom Stabilization and Management  Start Date:    6/7/2019    Dimension:   III. Emotional / Behavioral Condition    Description:  1.  Juice will manage symptoms of depression by riding his bike everyday, he reports cycling modulates his mood better than drugs  2.  Juice will read spiritual materials to improve optimism and mood  3.  Jucie will learn and practice skills to manage symptoms of anxiety and depression, he will use the skills and report to group weekly on what works      Goal:  Target Date: 7/3/2019 Status: Stopped  1.  Juice will manage symptoms of depression by riding his bike everyday, he reports cycling modulates his mood better than drugs  2.  Juice will read spiritual materials to improve optimism and mood  3.  Juice will learn and practice skills to manage symptoms of anxiety and depression, he will use the skills and report to group weekly on what works      Progress: 6/12/2019 pt discharged due to multiple relapses, [place] [facility].       Treatment Strategies:   Assess / reassess for appropriate therapy program involvement, encourage participation in therapies  Facilitate increased self awareness  Teach adaptive coping skills and communication skills  Use reality based supportive approach      Area of Treatment Focus:   Abstinence / Relapse Prevention  Start Date:    6/7/2019     Dimension:   V. Relapse and VI. Recovery Environment    Description:  1.  Juice will remain sober by continuing attendance to AA 2x per week  2.  Juice will remain sober by exercising daily, biking at least 2 miles to improve mood and decrease likely bryan of relapse  3.  Juice will identify triggers of what precedes use and work on managing triggers by using coping skills and not using.    Goal:  Target Date: 7/3/2019 Status: Stopped  1.  Juice will remain sober by continuing attendance to AA 2x per week  2.  Juice will remain sober by exercising daily, biking at least 2 miles to improve mood and decrease likely bryan of relapse  3.  Juice will identify triggers of what precedes use and work on managing triggers by using coping skills and not using.      Progress:6/12/2019 pt discharged due to multiple relapses, [place] [facility].       Treatment Strategies:   Assist clients in establishing / strengthening support network  Assess / reassess for appropriate therapy program involvement, encourage participation in therapies  Engage in safety planning when indicated  Facilitate increased self awareness  Provide feedback about social skills  Teach adaptive coping skills and communication skills  Use reality based supportive approach  Utilize and reinforce no-use contract for substance use / abuse        Area of Treatment Focus:   Community Resources / Support and Discharge Planning  Start Date:    6/7/2019    Dimension:   IV. Treatment Acceptance / Resistance and VI. Recovery Environment    Description:   1.  Juice is currently on the [place]list for [facility]  2.  Juice will obtain a psychiatrist and establish care  3.  Juice will obtain a therapist and establish care  4.  Juice will obtain an [facility] worker to get additional skill building at home    Goal:  Target Date: 7/3/2019 Status: Completed   1.  Juice is currently on the [place]list for [facility]  2.  Juice will obtain a psychiatrist and  "establish care  3.  Juice will obtain a therapist and establish care  4.  Juice will obtain an [facility] worker to get additional skill building at home      Progress:6/12/2019 pt discharged due to multiple relapses, [place] [facility] [place] [facility].       Treatment Strategies:   Assist clients in establishing / strengthening support network  Assist with discharge planning  Facilitate increased self awareness  Use reality based supportive approach       Frances Mata, LMFT      NOTE: Required signatures are completed manually and scanned into the electronic medical record. See \"Media\" tab in epic.    "

## 2019-06-07 NOTE — PROGRESS NOTES
Adult Dual Disorder Program:   Acknowledgement of Current Treatment Plan       I have reviewed my treatment plan with my therapist / counselor on 6/7/2019.   I agree with the plan as it is written in the electronic health record.    Name:      Signature:  Morgan Sam MD  Psychiatrist    Faina Russell, [facility]  Psychotherapist    Frances Mata, LMFT  Psychotherapist    Sheryl Jara RN, PHN  Nurse Liaison    Karli Harris OTR/L  Occupational Therapist    Dilshad West MA,   Psychotherapist    Candace Kendall MD  Psychiatrist/Medical Director

## 2019-06-10 ENCOUNTER — HOSPITAL ENCOUNTER (OUTPATIENT)
Dept: BEHAVIORAL HEALTH | Facility: CLINIC | Age: 34
End: 2019-06-10
Attending: PSYCHIATRY & NEUROLOGY
Payer: COMMERCIAL

## 2019-06-10 LAB — CREAT UR-MCNC: 283 MG/DL

## 2019-06-10 PROCEDURE — H2012 BEHAV HLTH DAY TREAT, PER HR: HCPCS

## 2019-06-10 NOTE — PROGRESS NOTES
"Adult Mental Health Outpatient Group Therapy Progress Note     Client Initial Individualized Goals for Treatment:  \"increase structure, get a therapist, continue to attend AA, continue to look for jobs\".       See Initial Treatment suggestions for the client during the time between Diagnostic Assessment and completion of the Master Individualized Treatment Plan.    Treatment Goals:     see above     Area of Treatment Focus:  Symptom Management, Community Resources/Discharge Planning, Abstinence/Relapse Prevention and Develop / Improve Independent Living Skills    Therapeutic Interventions/Treatment Strategies:  Support, Feedback, Structured Activity, Problem Solving, Clarification, Education and Motivational Enhancement Therapy    Response to Treatment Strategies:  Accepted Feedback, Listened, Focused on Goals, Attentive and Accepted Support     Name of Group: Life Skills   Date/Time: 6/10/19 / 1200 to 1250    Number of Group Participants: 4    Description and Outcome:  Morgan attended and participated in an experiential Psychoeducation group where rehabilitative intervention focuses on learning, developing through practice, and generalizing independent living skills. The purpose of this group is to stabilize and manage mental health symptoms, reduce/eliminate substance use, and to improve participation and function in valued roles, routines, relationships. He initiated his work and was focused on it for the session. He did note some minor problems, but did accept his work. Client verbalized understanding of session content by dale focused on practice of distraction and relaxation.  Client would benefit from additional opportunities to practice and implement content from this session.    Is this a Weekly Review of the Progress on the Treatment Plan?  No  "

## 2019-06-11 ENCOUNTER — TELEPHONE (OUTPATIENT)
Dept: BEHAVIORAL HEALTH | Facility: CLINIC | Age: 34
End: 2019-06-11

## 2019-06-12 LAB
CANNABINOIDS UR CFM-MCNC: 277 NG/ML
CARBOXYTHC/CREAT UR: 98 NG/MG{CREAT}

## 2019-06-12 NOTE — DISCHARGE SUMMARY
Adult Dual Disorder Program Discharge Summary / Instructions     Patient: Morgan Bosch MRN: [id number]  : 1985 Age:  34 year old Sex:  male    Admission Date: 6/3/2019  Discharge Date: 2019    Reason for Discharge:       Continued Substance Use         Prognosis: Guarded      Client Progress Toward AchievingTreatment Plan Goals / Dimensions Risk Scale       Morgan attended 12 of 21 scheduled MICD groups. Absences were due to relapse and withdrawal symptoms.      Diagnosis:   295.70 (F33.2)Schizoaffective Disorder, Bipolar type  300.01 (F41.0) Panic Disorder  303.90 (F10.20) Alcohol use disorder, severe  304.40 (F15.250) Stimulant use disorder, Amphetamine-type substance, severe    Individualized Treatment Plan Goals/Progress:       Area of Treatment Focus:   Symptom Stabilization and Management  Start Date:    2019    Dimension:   III. Emotional / Behavioral Condition    Description:  1.  Juice will manage symptoms of depression by riding his bike everyday, he reports cycling modulates his mood better than drugs  2.  Juice will read spiritual materials to improve optimism and mood  3.  Juice will learn and practice skills to manage symptoms of anxiety and depression, he will use the skills and report to group weekly on what works       Goal:                                                                          Target Date: 7/3/2019                                          Status: Stopped  1.  Juice will manage symptoms of depression by riding his bike everyday, he reports cycling modulates his mood better than drugs  2.  Juice will read spiritual materials to improve optimism and mood  3.  Juice will learn and practice skills to manage symptoms of anxiety and depression, he will use the skills and report to group weekly on what works       Progress: 2019 pt discharged due to multiple relapses, [place] [facility] [place] [facility].       Treatment Strategies:   Assess / reassess for  appropriate therapy program involvement, encourage participation in therapies  Facilitate increased self awareness  Teach adaptive coping skills and communication skills  Use reality based supportive approach       Area of Treatment Focus:   Abstinence / Relapse Prevention  Start Date:    6/7/2019    Dimension:   V. Relapse and VI. Recovery Environment    Description:  1.  Juice will remain sober by continuing attendance to AA 2x per week  2.  Juice will remain sober by exercising daily, biking at least 2 miles to improve mood and decrease likely bryan of relapse  3.  Juice will identify triggers of what precedes use and work on managing triggers by using coping skills and not using.    Goal:                                                                          Target Date: 7/3/2019                                          Status: Stopped  1.  Juice will remain sober by continuing attendance to AA 2x per week  2.  Juice will remain sober by exercising daily, biking at least 2 miles to improve mood and decrease likely bryan of relapse  3.  Juice will identify triggers of what precedes use and work on managing triggers by using coping skills and not using.       Progress:6/12/2019 pt discharged due to multiple relapses, [place] [facility] [place] [facility].       Treatment Strategies:   Assist clients in establishing / strengthening support network  Assess / reassess for appropriate therapy program involvement, encourage participation in therapies  Engage in safety planning when indicated  Facilitate increased self awareness  Provide feedback about social skills  Teach adaptive coping skills and communication skills  Use reality based supportive approach  Utilize and reinforce no-use contract for substance use / abuse          Area of Treatment Focus:   Community Resources / Support and Discharge Planning  Start Date:    6/7/2019    Dimension:   IV. Treatment Acceptance / Resistance and VI. Recovery Environment     Description:   1.  Juice is currently [place] [facility]  2.  Juice will obtain a psychiatrist and establish care  3.  Juice will obtain a therapist and establish care  4.  Juice will obtain an [facility] worker to get additional skill building at home    Goal:                                                                          Target Date: 7/3/2019                                          Status: Completed   1.  Juice is currently on the [place] for [facility]  2.  Juice will obtain a psychiatrist and establish care  3.  Juice will obtain a therapist and establish care  4.  Juice will obtain an [employer] worker to get additional skill building at home       Progress:6/12/2019 pt discharged due to multiple relapses, [place] [facility] [place] [facility].       Treatment Strategies:   Assist clients in establishing / strengthening support network  Assist with discharge planning  Facilitate increased self awareness  Use reality based supportive approach               Admit Discharge   PHQ-9 8 na   TIM-7 5 na   CGI 5 4       DIMENSION  ADMIT RATING DISCHARGE RATING   1 Acute Intoxication / Withdrawal Potential 1 1   2 Biomedical Conditions & Complications 0 0   3 Emotional / Behavioral / Cognitive Conditions & Complications 1 1   4 Treatment Acceptance / Resistance: 2 2   5 Continued Use / Relapse Prevention 3 3   6 Recovery Environment 1 1       Additional Comments: Juice is currently on the [place]list for [facility] and the anticipated start date for him is 6/24/2019    Completed By: ANNA Chang

## 2019-06-25 NOTE — PROGRESS NOTES
"Adult Mental Health Outpatient Group Therapy Progress Note           Client Initial Individualized Goals for Treatment:   \" get help with returning to work, helping narrow my career path, feel better about social situations, and increase self-esteem\".      See Initial Treatment suggestions for the client during the time between Diagnostic Assessment and completion of the Master Individualized Treatment Plan.      Treatment Goals:  1. Client will notify staff when needing assistance to develop or implement a coping plan to manage suicidal or self injurious urges.  2. When in life skills groups will report on how he builds supports  3. Group Therapy :  Learn and practice 1-2 coping skills to help reduce anxiety, depression, grief and loss, and any psychosis symptoms, maintain sobriety.  4. Health/Wellness / Discharge :   Get an individual therapist and a psychiatrist     Area of Treatment Focus:  Symptom Management      Therapeutic Interventions/Treatment Strategies:  Support, Feedback, Safety Assessments, Structured Activity and Education      Area of Treatment Focus:  Symptom Management, Personal Safety, Community Resources/Discharge Planning, Abstinence/Relapse Prevention, Develop/Improve Independent Living/Socialization Skills/Interpersonal Relationships and Cultural/Racial/Health and Spiritual      Therapeutic Interventions/Treatment Strategies:  Support, Redirection, Feedback, Limit/Boundaries, Safety Assessments, Structuresd Activity, Problem Solving, Clarification, Education, Motivational Enhancement Therapy, Cognitive Behavioral Therapy and Structured Activity      Response to Treatment Strategies:  Accepted Feedback, Gave Feedback, Listened, Focused on Goals, Attentive, Accepted Support, Alert, Demonstrates Behavior Change     Therapist:  Sree Benton, D,  L.P.       Name of Group:    Mental Health Management 1:00 - 1:50 pm  Group Psychotherapy    2:00 - 2:50 pm  Date:    2/12/2019   Group " Participants:  7      Description and Outcome:     Mental Health Management:                   The group participated in a structured activity that involved reading a worksheet about barriers to change, feelings, thought distortions, and the process of self-compassion.  The group discussed how they applied to themselves and shared their experiences with others. The group discussed how they worked through the different skills and talked about grounding. The group discussed examples of how they did grounding skills. The group talked about 3 good things and gratitude.     Group Therapy               Juice reported being safe today.   He reported that his thoughts are normal, and he notices depression thoughts, but that the anxiety is the main problem.  He stated that he does deep breathing. He stated that he is interested in applying for jobs and talked with another member about the [employer] agency and their welding program. He stated that he was interested in the program training that starts in March. He reported that he is applying for jobs and feels better about himself.  He stated that he talked to his mom, who didn't believe that he was in this program, but did after she received a phone call from staff confirming that he was participating. He stated that he was frustrated with that situation.  He reported that he has had some arguments with his girlfriend, and they are still talking.  He stated that he may move into another apartment.       He stated that he started Prozac last night and will see how this medication helps his mood.  He reported feeling up and down, some fatigue, and some increased hopefulness.  He talked with the group about jobs and about getting a loan, buying  a car and becoming an [employer] .  He stated that he feels anxious about the future.                    He reported that he hears noises upstairs and that he thinks people deliberately make noise to bother him, and talked to the group about  paranoia.  He lives in a building with many other people.                                 He stated that he made and went to a doctor appointment at a  clinic. He stated that he does not have any psychosis, lately.  He stated that he did errands and chores around the house.    He reported that he enjoyed playing guitar, drawing, painting, doing photography, listening to music, and he has been making art work lately.        He participated in a mindfulness activity of 10 minutes while practicing deep breathing.      Client would benefit from additional opportunities to practice and implement content from this session in everyday life, relationships and mental health recovery.          Is this a Weekly Review of the Progress on the Treatment Plan?  Yes.        Are Treatment Plan Goals being addressed?  Yes, continue treatment goals          Are Treatment Plan Strategies to Address Goals Effective?  Yes, continue treatment strategies          Are there any current contracts in place?  No         What Type Of Note Output Would You Prefer (Optional)?: Bullet Format How Severe Are Your Spot(S)?: mild Have Your Spot(S) Been Treated In The Past?: has not been treated Hpi Title: Evaluation of a Skin Lesion

## 2019-07-11 ENCOUNTER — MYC MEDICAL ADVICE (OUTPATIENT)
Dept: FAMILY MEDICINE | Facility: CLINIC | Age: 34
End: 2019-07-11

## 2019-07-11 DIAGNOSIS — F41.9 ANXIETY: ICD-10-CM

## 2019-07-12 RX ORDER — GABAPENTIN 300 MG/1
300 CAPSULE ORAL 3 TIMES DAILY
Qty: 90 CAPSULE | Refills: 1 | Status: SHIPPED | OUTPATIENT
Start: 2019-07-12 | End: 2020-06-09

## 2019-10-06 ENCOUNTER — HOSPITAL ENCOUNTER (EMERGENCY)
Facility: CLINIC | Age: 34
Discharge: HOME | End: 2019-10-06
Attending: EMERGENCY MEDICINE | Admitting: EMERGENCY MEDICINE
Payer: COMMERCIAL

## 2019-10-06 ENCOUNTER — APPOINTMENT (OUTPATIENT)
Dept: ULTRASOUND IMAGING | Facility: CLINIC | Age: 34
End: 2019-10-06
Attending: EMERGENCY MEDICINE
Payer: COMMERCIAL

## 2019-10-06 ENCOUNTER — APPOINTMENT (OUTPATIENT)
Dept: GENERAL RADIOLOGY | Facility: CLINIC | Age: 34
End: 2019-10-06
Attending: EMERGENCY MEDICINE
Payer: COMMERCIAL

## 2019-10-06 VITALS
RESPIRATION RATE: 18 BRPM | DIASTOLIC BLOOD PRESSURE: 87 MMHG | OXYGEN SATURATION: 98 % | HEART RATE: 111 BPM | SYSTOLIC BLOOD PRESSURE: 121 MMHG | TEMPERATURE: 97.7 F

## 2019-10-06 DIAGNOSIS — M79.662 PAIN OF LEFT CALF: ICD-10-CM

## 2019-10-06 PROCEDURE — 99284 EMERGENCY DEPT VISIT MOD MDM: CPT | Mod: 25 | Performed by: EMERGENCY MEDICINE

## 2019-10-06 PROCEDURE — 93971 EXTREMITY STUDY: CPT | Mod: LT

## 2019-10-06 PROCEDURE — 25000132 ZZH RX MED GY IP 250 OP 250 PS 637: Performed by: EMERGENCY MEDICINE

## 2019-10-06 PROCEDURE — 99284 EMERGENCY DEPT VISIT MOD MDM: CPT | Mod: Z6 | Performed by: EMERGENCY MEDICINE

## 2019-10-06 PROCEDURE — 73590 X-RAY EXAM OF LOWER LEG: CPT | Mod: LT

## 2019-10-06 RX ORDER — NAPROXEN 500 MG/1
500 TABLET ORAL 2 TIMES DAILY WITH MEALS
Qty: 24 TABLET | Refills: 0 | Status: SHIPPED | OUTPATIENT
Start: 2019-10-06 | End: 2020-09-28

## 2019-10-06 RX ORDER — OXYCODONE AND ACETAMINOPHEN 5; 325 MG/1; MG/1
2 TABLET ORAL ONCE
Status: COMPLETED | OUTPATIENT
Start: 2019-10-06 | End: 2019-10-06

## 2019-10-06 RX ADMIN — OXYCODONE HYDROCHLORIDE AND ACETAMINOPHEN 2 TABLET: 5; 325 TABLET ORAL at 18:48

## 2019-10-06 ASSESSMENT — ENCOUNTER SYMPTOMS
BACK PAIN: 0
NECK PAIN: 0
COLOR CHANGE: 1
FEVER: 0
NUMBNESS: 0
ABDOMINAL PAIN: 0
NAUSEA: 0
WEAKNESS: 0
COUGH: 0
VOMITING: 0
SHORTNESS OF BREATH: 0

## 2019-10-06 NOTE — ED TRIAGE NOTES
Pt states he was biking home last night up a hill and felt something pop in his left calf. Pt states since that time has been having left calf pain and is unable to bear wt.

## 2019-10-06 NOTE — ED AVS SNAPSHOT
[facility], [place], Emergency Department  [address]  [facility] [address]  Phone:  [phone]  Fax:  [phone]                                    Morgan Bosch   MRN: [id number]    Department:  [facility], Emergency Department   Date of Visit:  10/6/2019           After Visit Summary Signature Page    I have received my discharge instructions, and my questions have been answered. I have discussed any challenges I see with this plan with the nurse or doctor.    ..........................................................................................................................................  Patient/Patient Representative Signature      ..........................................................................................................................................  Patient Representative Print Name and Relationship to Patient    ..................................................               ................................................  Date                                   Time    ..........................................................................................................................................  Reviewed by Signature/Title    ...................................................              ..............................................  Date                                               Time          22EPIC Rev 08/18
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
## 2019-10-07 NOTE — DISCHARGE INSTRUCTIONS
Please make an appointment to follow up with Orthopedics (phone: [phone]) in 7-14 days for further treatment and evaluation.
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
## 2019-11-08 ENCOUNTER — HEALTH MAINTENANCE LETTER (OUTPATIENT)
Age: 34
End: 2019-11-08

## 2020-02-10 ENCOUNTER — MYC MEDICAL ADVICE (OUTPATIENT)
Dept: FAMILY MEDICINE | Facility: CLINIC | Age: 35
End: 2020-02-10

## 2020-02-16 ENCOUNTER — MYC REFILL (OUTPATIENT)
Dept: FAMILY MEDICINE | Facility: CLINIC | Age: 35
End: 2020-02-16

## 2020-02-16 DIAGNOSIS — F41.9 ANXIETY: ICD-10-CM

## 2020-02-16 RX ORDER — GABAPENTIN 300 MG/1
CAPSULE ORAL
Qty: 90 CAPSULE | Refills: 0 | Status: CANCELLED | OUTPATIENT
Start: 2020-02-16

## 2020-02-17 NOTE — TELEPHONE ENCOUNTER
Sent MyChart to pt earlier today in appt request MyChart encounter informing him Rx sent Friday  Sneha HOOD RN    Last Written Prescription Date:  2/14/2020  Last Fill Quantity: 90,  # refills: 0   Last office visit: 2/11/2019 with prescribing provider:     Future Office Visit:   Next 5 appointments (look out 90 days)    Feb 25, 2020  3:20 PM CST  PHYSICAL with Nnamdi Tolbert PA-C  [facility] ([facility] [address] [facility] [id number] [phone]         Requested Prescriptions   Pending Prescriptions Disp Refills     gabapentin (NEURONTIN) 300 MG capsule 90 capsule 0       There is no refill protocol information for this order

## 2020-03-15 DIAGNOSIS — F41.9 ANXIETY: ICD-10-CM

## 2020-03-16 RX ORDER — GABAPENTIN 300 MG/1
CAPSULE ORAL
Qty: 90 CAPSULE | Refills: 0 | Status: SHIPPED | OUTPATIENT
Start: 2020-03-16 | End: 2020-04-20

## 2020-03-16 NOTE — TELEPHONE ENCOUNTER
Refill sent, would schedule a follow up (does not need to be ASAP, but something penciled in) as it has been over 1 year since last visit.    Dilshad Tolbert PA-C

## 2020-03-16 NOTE — TELEPHONE ENCOUNTER
Last Written Prescription Date:  2/14/2020  Last Fill Quantity: 90,  # refills: 0   Last office visit: 2/11/2019 with prescribing provider:     Future Office Visit:    Requested Prescriptions   Pending Prescriptions Disp Refills     gabapentin (NEURONTIN) 300 MG capsule [Pharmacy Med Name: GABAPENTIN 300 MG CAPSULE] 90 capsule 0     Sig: TAKE 1 CAPSULE BY MOUTH THREE TIMES A DAY       There is no refill protocol information for this order

## 2020-06-09 ENCOUNTER — MYC MEDICAL ADVICE (OUTPATIENT)
Dept: FAMILY MEDICINE | Facility: CLINIC | Age: 35
End: 2020-06-09

## 2020-06-11 ENCOUNTER — VIRTUAL VISIT (OUTPATIENT)
Dept: FAMILY MEDICINE | Facility: CLINIC | Age: 35
End: 2020-06-11
Payer: COMMERCIAL

## 2020-06-11 ENCOUNTER — TELEPHONE (OUTPATIENT)
Dept: FAMILY MEDICINE | Facility: CLINIC | Age: 35
End: 2020-06-11

## 2020-06-11 DIAGNOSIS — F41.9 ANXIETY: ICD-10-CM

## 2020-06-11 DIAGNOSIS — F19.20 CHEMICAL DEPENDENCY (H): Primary | ICD-10-CM

## 2020-06-11 PROCEDURE — 99213 OFFICE O/P EST LOW 20 MIN: CPT | Mod: 95 | Performed by: PHYSICIAN ASSISTANT

## 2020-06-11 RX ORDER — GABAPENTIN 300 MG/1
CAPSULE ORAL
Qty: 90 CAPSULE | Refills: 5 | Status: ON HOLD | OUTPATIENT
Start: 2020-06-11 | End: 2020-10-26

## 2020-06-11 NOTE — PROGRESS NOTES
"Morgan Bosch is a 35 year old male who is being evaluated via a billable video visit.      The patient has been notified of following:     \"This video visit will be conducted via a call between you and your physician/provider. We have found that certain health care needs can be provided without the need for an in-person physical exam.  This service lets us provide the care you need with a video conversation.  If a prescription is necessary we can send it directly to your pharmacy.  If lab work is needed we can place an order for that and you can then stop by our lab to have the test done at a later time.    Video visits are billed at different rates depending on your insurance coverage.  Please reach out to your insurance provider with any questions.    If during the course of the call the physician/provider feels a video visit is not appropriate, you will not be charged for this service.\"    Patient has given verbal consent for Video visit? Yes    Will anyone else be joining your video visit? No    Subjective     Morgan Bosch is a 35 year old male who presents today via video visit for the following health issues:    HPI  Medication Followup of Gabapentin    Taking Medication as prescribed: yes    Side Effects:  None    Medication Helping Symptoms:  yes        Had to switch to tele as [facility] did not have audio.    Anxiety Follow-Up    How are you doing with your anxiety since your last visit? Improved     Are you having other symptoms that might be associated with anxiety? No    Have you had a significant life event? No     Are you feeling depressed? No    Do you have any concerns with your use of alcohol or other drugs? No been sober for over 2 years.    Social History     Tobacco Use     Smoking status: Former Smoker     Packs/day: 0.00     Smokeless tobacco: Current User     Tobacco comment: vape pen, nicotene in a veg oil, 1/4 pack/day   Substance Use Topics     Alcohol use: Yes     Comment: july, 2018, " "none since     Drug use: Yes     Types: Marijuana     Comment: former user, last 2.5 months     TIM-7 SCORE 2/1/2019 2/22/2019 6/3/2019   Total Score 3 15 5     PHQ 2/22/2019 4/16/2019 6/3/2019   PHQ-9 Total Score 13 4 8   Q9: Thoughts of better off dead/self-harm past 2 weeks More than half the days Not at all Not at all           BP Readings from Last 3 Encounters:   10/06/19 121/87   02/19/19 112/84   02/11/19 123/75    Wt Readings from Last 3 Encounters:   02/11/19 74.4 kg (164 lb)   10/29/17 82.6 kg (182 lb)   09/10/17 83.5 kg (184 lb)                    Reviewed and updated as needed this visit by Provider         Review of Systems   Constitutional, HEENT, cardiovascular, pulmonary, gi and gu systems are negative, except as otherwise noted.      Objective    There were no vitals taken for this visit.  Estimated body mass index is 24.22 kg/m  as calculated from the following:    Height as of 2/11/19: 1.753 m (5' 9\").    Weight as of 2/11/19: 74.4 kg (164 lb).  Physical Exam     GENERAL: alert and no distress  EYES: Eyes grossly normal to inspection.  No discharge or erythema, or obvious scleral/conjunctival abnormalities.  RESP: No audible wheeze, cough, or visible cyanosis.  No visible retractions or increased work of breathing.    SKIN: Visible skin clear. No significant rash, abnormal pigmentation or lesions.  NEURO: Cranial nerves grossly intact.  Mentation and speech appropriate for age.  PSYCH: Mentation appears normal, affect normal/bright, judgement and insight intact, normal speech and appearance well-groomed.      Diagnostic Test Results:  Labs reviewed in Epic        Assessment & Plan     1. Anxiety  Doing well, stable on meds.  Tolerates, and really feels it has been the only med he tolerates and helps with symptoms.  Wishes to continue  - gabapentin (NEURONTIN) 300 MG capsule; TAKE 1 CAPSULE BY MOUTH THREE TIMES A DAY  Dispense: 90 capsule; Refill: 5    2. Chemical dependency (H)  Doing well, sober " over 2 years             No follow-ups on file.    Nnamdi Tolbert PA-C  [facility]    Time of telephone visit was 8 minutes    No follow-ups on file.       Nnamdi Tolbert PA-C

## 2020-06-11 NOTE — NURSING NOTE
"Chief Complaint   Patient presents with     Recheck Medication     gabapentin     initial There were no vitals taken for this visit. Estimated body mass index is 24.22 kg/m  as calculated from the following:    Height as of 2/11/19: 1.753 m (5' 9\").    Weight as of 2/11/19: 74.4 kg (164 lb).  BP completed using cuff size: NA (Not Taken).  L  R arm      Health Maintenance that is potentially due pending provider review:      Cesar Montes ma  "

## 2020-07-06 ENCOUNTER — APPOINTMENT (OUTPATIENT)
Dept: CT IMAGING | Facility: CLINIC | Age: 35
End: 2020-07-06
Attending: EMERGENCY MEDICINE
Payer: COMMERCIAL

## 2020-07-06 ENCOUNTER — HOSPITAL ENCOUNTER (EMERGENCY)
Facility: CLINIC | Age: 35
Discharge: HOME | End: 2020-07-06
Attending: EMERGENCY MEDICINE | Admitting: EMERGENCY MEDICINE
Payer: COMMERCIAL

## 2020-07-06 ENCOUNTER — APPOINTMENT (OUTPATIENT)
Dept: GENERAL RADIOLOGY | Facility: CLINIC | Age: 35
End: 2020-07-06
Attending: EMERGENCY MEDICINE
Payer: COMMERCIAL

## 2020-07-06 VITALS
OXYGEN SATURATION: 98 % | SYSTOLIC BLOOD PRESSURE: 113 MMHG | RESPIRATION RATE: 18 BRPM | HEART RATE: 82 BPM | DIASTOLIC BLOOD PRESSURE: 76 MMHG | TEMPERATURE: 97.6 F

## 2020-07-06 DIAGNOSIS — M25.512 ACUTE PAIN OF LEFT SHOULDER: ICD-10-CM

## 2020-07-06 LAB
ALBUMIN SERPL-MCNC: 3.5 G/DL (ref 3.4–5)
ALP SERPL-CCNC: 106 U/L (ref 40–150)
ALT SERPL W P-5'-P-CCNC: 27 U/L (ref 0–70)
ANION GAP SERPL CALCULATED.3IONS-SCNC: 1 MMOL/L (ref 3–14)
AST SERPL W P-5'-P-CCNC: 22 U/L (ref 0–45)
BASOPHILS # BLD AUTO: 0.1 10E9/L (ref 0–0.2)
BASOPHILS NFR BLD AUTO: 1.1 %
BILIRUB SERPL-MCNC: 0.9 MG/DL (ref 0.2–1.3)
BUN SERPL-MCNC: 14 MG/DL (ref 7–30)
CALCIUM SERPL-MCNC: 8.7 MG/DL (ref 8.5–10.1)
CHLORIDE SERPL-SCNC: 108 MMOL/L (ref 94–109)
CO2 SERPL-SCNC: 31 MMOL/L (ref 20–32)
CREAT SERPL-MCNC: 1.12 MG/DL (ref 0.66–1.25)
DIFFERENTIAL METHOD BLD: ABNORMAL
EOSINOPHIL # BLD AUTO: 0.4 10E9/L (ref 0–0.7)
EOSINOPHIL NFR BLD AUTO: 4.6 %
ERYTHROCYTE [DISTWIDTH] IN BLOOD BY AUTOMATED COUNT: 12.3 % (ref 10–15)
ETHANOL SERPL-MCNC: <0.01 G/DL
GFR SERPL CREATININE-BSD FRML MDRD: 85 ML/MIN/{1.73_M2}
GLUCOSE SERPL-MCNC: 91 MG/DL (ref 70–99)
HCT VFR BLD AUTO: 40 % (ref 40–53)
HGB BLD-MCNC: 13 G/DL (ref 13.3–17.7)
IMM GRANULOCYTES # BLD: 0 10E9/L (ref 0–0.4)
IMM GRANULOCYTES NFR BLD: 0.1 %
LYMPHOCYTES # BLD AUTO: 1.9 10E9/L (ref 0.8–5.3)
LYMPHOCYTES NFR BLD AUTO: 22.1 %
MCH RBC QN AUTO: 31 PG (ref 26.5–33)
MCHC RBC AUTO-ENTMCNC: 32.5 G/DL (ref 31.5–36.5)
MCV RBC AUTO: 95 FL (ref 78–100)
MONOCYTES # BLD AUTO: 1.1 10E9/L (ref 0–1.3)
MONOCYTES NFR BLD AUTO: 12.5 %
NEUTROPHILS # BLD AUTO: 5.1 10E9/L (ref 1.6–8.3)
NEUTROPHILS NFR BLD AUTO: 59.6 %
NRBC # BLD AUTO: 0 10*3/UL
NRBC BLD AUTO-RTO: 0 /100
PLATELET # BLD AUTO: 309 10E9/L (ref 150–450)
POTASSIUM SERPL-SCNC: 4.2 MMOL/L (ref 3.4–5.3)
PROT SERPL-MCNC: 6.7 G/DL (ref 6.8–8.8)
RBC # BLD AUTO: 4.2 10E12/L (ref 4.4–5.9)
SODIUM SERPL-SCNC: 140 MMOL/L (ref 133–144)
WBC # BLD AUTO: 8.5 10E9/L (ref 4–11)

## 2020-07-06 PROCEDURE — 70450 CT HEAD/BRAIN W/O DYE: CPT

## 2020-07-06 PROCEDURE — 73030 X-RAY EXAM OF SHOULDER: CPT | Mod: LT

## 2020-07-06 PROCEDURE — 85025 COMPLETE CBC W/AUTO DIFF WBC: CPT | Performed by: EMERGENCY MEDICINE

## 2020-07-06 PROCEDURE — 99285 EMERGENCY DEPT VISIT HI MDM: CPT | Mod: Z6 | Performed by: EMERGENCY MEDICINE

## 2020-07-06 PROCEDURE — 73060 X-RAY EXAM OF HUMERUS: CPT | Mod: LT

## 2020-07-06 PROCEDURE — 25000132 ZZH RX MED GY IP 250 OP 250 PS 637: Performed by: FAMILY MEDICINE

## 2020-07-06 PROCEDURE — 96376 TX/PRO/DX INJ SAME DRUG ADON: CPT | Performed by: EMERGENCY MEDICINE

## 2020-07-06 PROCEDURE — 99285 EMERGENCY DEPT VISIT HI MDM: CPT | Mod: 25 | Performed by: EMERGENCY MEDICINE

## 2020-07-06 PROCEDURE — 90715 TDAP VACCINE 7 YRS/> IM: CPT | Performed by: EMERGENCY MEDICINE

## 2020-07-06 PROCEDURE — 73090 X-RAY EXAM OF FOREARM: CPT | Mod: LT

## 2020-07-06 PROCEDURE — 80053 COMPREHEN METABOLIC PANEL: CPT | Performed by: EMERGENCY MEDICINE

## 2020-07-06 PROCEDURE — 90471 IMMUNIZATION ADMIN: CPT | Performed by: EMERGENCY MEDICINE

## 2020-07-06 PROCEDURE — 71250 CT THORAX DX C-: CPT

## 2020-07-06 PROCEDURE — 25000128 H RX IP 250 OP 636: Performed by: EMERGENCY MEDICINE

## 2020-07-06 PROCEDURE — 80320 DRUG SCREEN QUANTALCOHOLS: CPT | Performed by: EMERGENCY MEDICINE

## 2020-07-06 PROCEDURE — 96374 THER/PROPH/DIAG INJ IV PUSH: CPT | Performed by: EMERGENCY MEDICINE

## 2020-07-06 PROCEDURE — 72125 CT NECK SPINE W/O DYE: CPT

## 2020-07-06 RX ORDER — IBUPROFEN 600 MG/1
600 TABLET, FILM COATED ORAL ONCE
Status: COMPLETED | OUTPATIENT
Start: 2020-07-06 | End: 2020-07-06

## 2020-07-06 RX ORDER — HYDROMORPHONE HYDROCHLORIDE 1 MG/ML
0.5 INJECTION, SOLUTION INTRAMUSCULAR; INTRAVENOUS; SUBCUTANEOUS
Status: DISCONTINUED | OUTPATIENT
Start: 2020-07-06 | End: 2020-07-06 | Stop reason: HOSPADM

## 2020-07-06 RX ORDER — ACETAMINOPHEN 325 MG/1
975 TABLET ORAL ONCE
Status: COMPLETED | OUTPATIENT
Start: 2020-07-06 | End: 2020-07-06

## 2020-07-06 RX ADMIN — IBUPROFEN 600 MG: 600 TABLET ORAL at 06:56

## 2020-07-06 RX ADMIN — CLOSTRIDIUM TETANI TOXOID ANTIGEN (FORMALDEHYDE INACTIVATED), CORYNEBACTERIUM DIPHTHERIAE TOXOID ANTIGEN (FORMALDEHYDE INACTIVATED), BORDETELLA PERTUSSIS TOXOID ANTIGEN (GLUTARALDEHYDE INACTIVATED), BORDETELLA PERTUSSIS FILAMENTOUS HEMAGGLUTININ ANTIGEN (FORMALDEHYDE INACTIVATED), BORDETELLA PERTUSSIS PERTACTIN ANTIGEN, AND BORDETELLA PERTUSSIS FIMBRIAE 2/3 ANTIGEN 0.5 ML: 5; 2; 2.5; 5; 3; 5 INJECTION, SUSPENSION INTRAMUSCULAR at 10:11

## 2020-07-06 RX ADMIN — ACETAMINOPHEN 975 MG: 325 TABLET, FILM COATED ORAL at 06:56

## 2020-07-06 RX ADMIN — HYDROMORPHONE HYDROCHLORIDE 0.5 MG: 1 INJECTION, SOLUTION INTRAMUSCULAR; INTRAVENOUS; SUBCUTANEOUS at 07:53

## 2020-07-06 RX ADMIN — HYDROMORPHONE HYDROCHLORIDE 0.5 MG: 1 INJECTION, SOLUTION INTRAMUSCULAR; INTRAVENOUS; SUBCUTANEOUS at 08:55

## 2020-07-06 NOTE — DISCHARGE INSTRUCTIONS
Please make an appointment to follow up with Your Primary Care Provider and Orthopedics Clinic (phone: [phone]) as soon as possible. Follow up with the orthopedic clinic if you are not improved over the next couple of weeks.    Can use the sling to help with pain control however we do want you to try to move the left shoulder as much as possible to avoid frozen shoulder.  Can use ibuprofen or Tylenol for pain control.  You can ice the area.    Return to the emergency department if you develop worsening pain, numbness, tingling, weakness, confusion, loss of consciousness, neck pain, back pain, chest pain, shortness of breath or any new or worsening concerns.
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
## 2020-07-06 NOTE — ED AVS SNAPSHOT
[facility], [place], Emergency Department  [address]  [facility] [address]  Phone:  [phone]  Fax:  [phone]                                    Morgan Bosch   MRN: [id number]    Department:  [facility], Emergency Department   Date of Visit:  7/6/2020           After Visit Summary Signature Page    I have received my discharge instructions, and my questions have been answered. I have discussed any challenges I see with this plan with the nurse or doctor.    ..........................................................................................................................................  Patient/Patient Representative Signature      ..........................................................................................................................................  Patient Representative Print Name and Relationship to Patient    ..................................................               ................................................  Date                                   Time    ..........................................................................................................................................  Reviewed by Signature/Title    ...................................................              ..............................................  Date                                               Time          22EPIC Rev 08/18

## 2020-07-06 NOTE — CONSULTS
[facility], Orthopaedic Surgery Consultation    Morgan Bosch MRN# [id number]   Age: 35 year old YOB: 1985     Date of Admission:  7/6/2020    Reason for consult: Injury to left shoulder       Requesting physician: Dr. Foreign MD         Assessment and Plan:   Assessment:  Patient is a 35-year-old male with no significant past medical history who presents after a fall from a skateboard onto the left lateral shoulder complains of pain primarily along the posterior axilla and over the left scapula.  Imaging, including x-ray of the left forearm, humerus, and shoulder, as well as a CT of the cervical spine and chest all unremarkable with no osseous abnormalities.  Injury most likely muscular in nature.    Plan:  - The orthopedic team does not recommend any further advanced imaging  - Patient can wear sling for comfort  - Activity as tolerated  - Can follow-up with his primary care physician in 1 month if pain still present          History of Present Illness:   Patient is a 35-year-old male with no significant past medical history who presents after a fall from a skateboard onto the left lateral shoulder complains of pain primarily along the posterior axilla and over the left scapula.  Injury occurred around 9 PM last night.  He felt immediate pain following the injury.  Patient did hit his head but reports no loss of consciousness.  States that he is unable to move shoulder primarily due to pain, however he does report some mild weakness.  Denies numbness or tingling in extremity.  Also denies loss of sensation in his left upper extremity.  No pain anywhere else.  Patient states that the pain feels similar to muscular soreness.    Patient was seen and examined by me. History, PMH, Meds, SH, complete ROS (10 organ systems) and PE reviewed with patient and prior medical records.            Past Medical History:     Past Medical History:   Diagnosis Date     Anxiety      Depressive disorder       MDD (major depressive disorder)      Migraines      Psychosis (H)     substance induced     Substance abuse (H)     methamphetamine             Past Surgical History:     Past Surgical History:   Procedure Laterality Date     NO HISTORY OF SURGERY               Social History:     Social History     Socioeconomic History     Marital status: Single     Spouse name: None     Number of children: None     Years of education: None     Highest education level: None   Occupational History     None   Social Needs     Financial resource strain: None     Food insecurity     Worry: None     Inability: None     Transportation needs     Medical: None     Non-medical: None   Tobacco Use     Smoking status: Current Every Day Smoker     Packs/day: 0.00     Types: Vaping Device     Smokeless tobacco: Current User     Tobacco comment: vape pen, nicotene in a veg oil, 1/4 pack/day   Substance and Sexual Activity     Alcohol use: Yes     Comment: last evening      Drug use: Yes     Types: Marijuana     Comment: former user, last 2.5 months     Sexual activity: Yes     Partners: Female   Lifestyle     Physical activity     Days per week: None     Minutes per session: None     Stress: None   Relationships     Social connections     Talks on phone: None     Gets together: None     Attends Spiritism service: None     Active member of club or organization: None     Attends meetings of clubs or organizations: None     Relationship status: None     Intimate partner violence     Fear of current or ex partner: None     Emotionally abused: None     Physically abused: None     Forced sexual activity: None   Other Topics Concern     Parent/sibling w/ CABG, MI or angioplasty before 65F 55M? No   Social History Narrative     None             Family History:     Family History   Problem Relation Age of Onset     Anxiety Disorder Sister      Depression Father      Coronary Artery Disease Maternal Grandfather      Diabetes Paternal Grandfather       Depression Mother      Substance Abuse Mother      Bipolar Disorder Maternal Aunt      Depression Maternal Aunt      Depression Maternal Aunt      Substance Abuse Maternal Uncle      Substance Abuse Maternal Cousin      Suicide Maternal Cousin      Substance Abuse Maternal Cousin               Medications:     Current Facility-Administered Medications   Medication     HYDROmorphone (PF) (DILAUDID) injection 0.5 mg     Current Outpatient Medications   Medication Sig     gabapentin (NEURONTIN) 300 MG capsule TAKE 1 CAPSULE BY MOUTH THREE TIMES A DAY     multivitamin w/minerals (MULTI-VITAMIN) tablet Take 1 tablet by mouth daily     naproxen (NAPROSYN) 500 MG tablet Take 1 tablet (500 mg) by mouth 2 times daily (with meals)             Allergies:      Allergies   Allergen Reactions     Adhesive Tape Itching     Blisters (band aids)     Prozac [Fluoxetine]      Increased anxiety and shakiness            Review of Systems:   A comprehensive 10 point review of systems (constitutional, ENT, cardiac, peripheral vascular, respiratory, GI, , Musculoskeletal, skin, Neurological) was performed and found to be negative except as described in this note.     CONSTITUTIONAL:  negative for  fevers, chills, sweats, fatigue, malaise and weight loss  HEENT:  negative for  tinnitus, earaches, nasal congestion, epistaxis, snoring, sore throat and voice change  RESPIRATORY:  negative for  dyspnea, wheezing, hemoptysis, chest pain and cough  CARDIOVASCULAR:  negative for  chest pain, palpitations, orthopnea, edema, syncope  GASTROINTESTINAL:  negative for nausea, vomiting, diarrhea, constipation, abdominal pain, dysphagia, reflux, hematemesis and hemtochezia  GENITOURINARY:  negative for frequency, dysuria, nocturia, urinary incontinence and hematuria  INTEGUMENT/BREAST:  negative for rash and skin lesion(s)  HEMATOLOGIC/LYMPHATIC:  negative for easy bruising, bleeding, lymphadenopathy and swelling/edema  ALLERGIC/IMMUNOLOGIC:  negative  for recurrent infections and drug reactions  ENDOCRINE:  negative for heat intolerance, cold intolerance and diabetic symptoms including polyuria and polydipsia  MUSCULOSKELETAL: See above HPI  NEUROLOGICAL:  negative for headaches, dizziness, seizures, gait problems, tremor, weakness, numbness and tingling            Physical Exam:   COMPLETE EXAMINATION:   VITAL SIGNS: /75   Pulse 100   Temp 95.6  F (35.3  C) (Oral)   Resp 18   SpO2 98%   RESP: Non labored breathing  ABD: Benign  SKIN: Grossly normal   LYMPHATIC:  Grossly normal  NEURO:  Grossly normal   VASCULAR: Satisfactory perfusion of all extremities  MUSCULOSKELETAL:   Patient in c-collar resting comfortably in bed.  Superficial 2 cm x 2 cm wound over the acromion.  No obvious deformity noted.  Pain to palpation over the posterior axilla as well as over the left scapula.  Sens intact Ax/Musc/Med/Rad/Uln nerves. Motor intact EPL, FPL, and Intrinsics.  Fires triceps biceps, and deltoid.  Shoulder range of motion limited due to pain.  Radial pulse 2+ bilaterally.  Cap refill less than 2 seconds.          Data:   All pertinent laboratory data reviewed  All imaging studies reviewed by me.    Recent Labs   Lab Test 07/06/20  0745 02/19/19  1438 10/29/17  2037   HGB 13.0* 13.7 13.9   SED  --  9  --    CRP  --  16.3*  --    WBC 8.5 11.3* 8.1     No results for input(s): FTYP, FNEU, FOTH, FCOL, FAPR, FWBC in the last 03477 hours.    X-rays of the left forearm, humerus, and shoulder as well as CT scan of cervical spine and chest reviewed by me as well as with the senior resident confirming imaging unremarkable with no osseous abnormalities noted.      Signed:    This consultation will be discussed with Dr. Galaviz, Attending Physician.    Bryn Rios MD  Orthopedic Surgery, PGY-1  Pager: [phone]  11:28 AM  July 6, 2020

## 2020-07-22 NOTE — ED PROVIDER NOTES
ED Provider Note  [facility]      History     Chief Complaint   Patient presents with     Shoulder Pain     fell on left shoulder skateboarding this morning      Neck Pain     HPI  Morgan Bosch is a 35 year old male who With history of substance abuse, schizoaffective disorder, malingering, anxiety who presents after a fall from his skateboard onto his left shoulder last night around 9 PM.  Patient states that he did drink some alcohol at the time but denies any recent illicit drug use.  Denies any recent IV drug use.  States he has been sober from drug use for 2 years.  He states that he was skateboarding when he slipped on the skateboard and fell onto his left shoulder.  He states he felt pain immediately in the left shoulder.  He reports since that time the pain is continued.  He states that his shoulder does feel somewhat weak and is unsure if this is because of pain.  He states that primarily it is hard for him to move the shoulder because of pain.  He states he did hit his head and was not wearing a helmet but denies any loss of consciousness.  He denies any specific neck pain and states the pain in his shoulder radiates up towards his neck but not into it.  No back pain or other extremity pain.  He denies any numbness or tingling.  He denies any chest pain, shortness of breath, abdominal pain, nausea, vomiting, diarrhea, recent fever, cough, dysuria, hematuria, or other complaints.  He states that the pain feels like a soreness but when he tries to move he gets more sharp.  He describes it diffusely in the left shoulder but worse in the posterior shoulder near his scapula as well as in the axilla area.  He has a superficial abrasion.  He is uncertain of his last tetanus.  Denies any facial trauma or jaw malocclusion.  Denies any intraoral trauma.  He denies being on any anticoagulation.  He denies any prior injury to the shoulder or prior surgery in this area.  States he takes  gabapentin for anxiety.  Denies any suicidal or homicidal ideation.    Past Medical History  Past Medical History:   Diagnosis Date     Anxiety      Depressive disorder      MDD (major depressive disorder)      Migraines      Psychosis (H)     substance induced     Substance abuse (H)     methamphetamine     Past Surgical History:   Procedure Laterality Date     NO HISTORY OF SURGERY       gabapentin (NEURONTIN) 300 MG capsule  multivitamin w/minerals (MULTI-VITAMIN) tablet  naproxen (NAPROSYN) 500 MG tablet      Allergies   Allergen Reactions     Adhesive Tape Itching     Blisters (band aids)     Prozac [Fluoxetine]      Increased anxiety and shakiness     Past medical history, past surgical history, medications, and allergies were reviewed with the patient. Additional pertinent items: None    Family History  Family History   Problem Relation Age of Onset     Anxiety Disorder Sister      Depression Father      Coronary Artery Disease Maternal Grandfather      Diabetes Paternal Grandfather      Depression Mother      Substance Abuse Mother      Bipolar Disorder Maternal Aunt      Depression Maternal Aunt      Depression Maternal Aunt      Substance Abuse Maternal Uncle      Substance Abuse Maternal Cousin      Suicide Maternal Cousin      Substance Abuse Maternal Cousin      Family history was reviewed with the patient. Additional pertinent items: None    Social History  Social History     Tobacco Use     Smoking status: Current Every Day Smoker     Packs/day: 0.00     Types: Vaping Device     Smokeless tobacco: Current User     Tobacco comment: vape pen, nicotene in a veg oil, 1/4 pack/day   Substance Use Topics     Alcohol use: Yes     Comment: last evening      Drug use: Yes     Types: Marijuana     Comment: former user, last 2.5 months      Social history was reviewed with the patient. Additional pertinent items: None    Review of Systems  A complete review of systems was performed with pertinent positives and  negatives noted in the HPI, and all other systems negative.    Physical Exam   BP: 111/75  Pulse: 100  Temp: 95.6  F (35.3  C)  Resp: 18  SpO2: 98 %  Physical Exam  Vitals signs reviewed.   Constitutional:       General: He is not in acute distress.     Appearance: He is well-developed.      Comments: Appears painful   HENT:      Head: Normocephalic and atraumatic.      Comments: No scalp hematoma. No septal hematoma. No hemotympanum bilaterally. No otorrhea or rhinorrhea. No arango sign or racoon eyes. No facial trauma. No facial bone tenderness. No jaw malocclusion. No intraoral trauma.      Right Ear: Tympanic membrane normal.      Left Ear: Tympanic membrane normal.      Nose: Nose normal.      Mouth/Throat:      Mouth: Mucous membranes are moist.      Pharynx: No oropharyngeal exudate.   Eyes:      Extraocular Movements: Extraocular movements intact.      Conjunctiva/sclera: Conjunctivae normal.      Pupils: Pupils are equal, round, and reactive to light.   Neck:      Musculoskeletal: Normal range of motion and neck supple.      Comments: Mild left paraspinous muscle tenderness. No specific midline cervical spine tenderness.   Cardiovascular:      Rate and Rhythm: Normal rate and regular rhythm.      Pulses: Normal pulses.      Heart sounds: Normal heart sounds. No murmur.   Pulmonary:      Effort: Pulmonary effort is normal. No respiratory distress.      Breath sounds: Normal breath sounds. No stridor. No wheezing or rales.      Comments: No chest wall tenderness or crepitus. No tenderness along the clavicles bilaterally.   Chest:      Chest wall: No tenderness.   Abdominal:      General: Bowel sounds are normal. There is no distension.      Palpations: Abdomen is soft. There is no mass.      Tenderness: There is no abdominal tenderness. There is no right CVA tenderness, left CVA tenderness, guarding or rebound.      Comments: No ecchymosis noted in the abdomen or flank   Musculoskeletal:      Comments: No  midline thoracic or lumbar spine tenderness.  No obvious deformity of the extremities noted. Pain to palpation over the left posterior axilla as well as over the left scapula and somewhat diffusely over the left shoulder area.  Sensation intact to Axillary, Musculocutaneous, Median, Radial, ulnar nerves bilaterally. Able to give a thumbs up and a okay sign on the left. Left shoulder range of motion limited due to pain.  Radial pulse 2+ bilaterally.  Cap refill less than 2 seconds. No tenderness to palpation of the right upper extremity or bilateral lower extremities and otherwise normal range of motion of the extremities. No obvious swelling noted. Compartments are soft and compressible. No bony tenderness of the left elbow, left hand, or left anatomic snuffbox. Mild muscular tenderness of the left forearm but no bony tenderness noted.       Skin:     General: Skin is warm and dry.      Capillary Refill: Capillary refill takes less than 2 seconds.      Comments: Superficial 2 cm x 2 cm abrasion over the acromion.   Neurological:      Mental Status: He is alert and oriented to person, place, and time.      GCS: GCS eye subscore is 4. GCS verbal subscore is 5. GCS motor subscore is 6.      Cranial Nerves: Cranial nerves are intact. No cranial nerve deficit, dysarthria or facial asymmetry.      Sensory: Sensation is intact. No sensory deficit.      Motor: No abnormal muscle tone.      Comments: Has 5/5  strength bilaterally. Has 5/5 strength in the bilateral lower extremities and right upper extremity. Strength testing of the left upper extremity difficult due to pain. See orthopedic detailed exam. Sensation intact to light touch in all 4 extremities bilaterally.    Psychiatric:         Mood and Affect: Mood normal.         ED Course      Procedures                         Results for orders placed or performed during the hospital encounter of 07/06/20   CT Head w/o Contrast     Status: None    Narrative    CT SCAN  OF THE HEAD WITHOUT CONTRAST   7/6/2020 8:17 AM     HISTORY: Fall of skateboard, hit head, evaluate for bleed.    TECHNIQUE:  Axial images of the head and coronal reformations without  IV contrast material. Radiation dose for this scan was reduced using  automated exposure control, adjustment of the mA and/or kV according  to patient size, or iterative reconstruction technique.    COMPARISON: CT head and facial bones 6/17/2008.    FINDINGS: There is no evidence of intracranial hemorrhage, mass, acute  infarct or anomaly. The ventricles are normal in size, shape and  configuration. The brain parenchyma and subarachnoid spaces are  normal.     The visualized portions of the sinuses and mastoids appear normal.    Chronic-appearing fracture through the inferior left orbital wall that  was also present on CT 6/17/2008.      Impression    IMPRESSION:   No evidence of acute intracranial hemorrhage, mass, or  herniation.    NABOR MORGAN MD   Humerus XR,  G/E 2 views, left     Status: None    Narrative    XR HUMERUS LT G/E 2 VW 7/6/2020 8:28 AM     HISTORY: Pain following recent fall.    COMPARISON: None.      Impression    IMPRESSION: No fractures are evident. The soft tissues are  unremarkable.    VICENTE YBARRA MD   Radius/Ulna XR,  PA &LAT, left     Status: None    Narrative    XR FOREARM LT 2 VW 7/6/2020 8:29 AM     HISTORY: Pain following recent fall.    COMPARISON: None.      Impression    IMPRESSION: No fractures are evident. The soft tissues are  unremarkable.     VICENTE YBARRA MD   Cervical spine CT w/o contrast     Status: None    Narrative    CT CERVICAL SPINE WITHOUT CONTRAST   7/6/2020 8:17 AM     HISTORY: Fall off skateboard, neck pain, evaluate for injury.     TECHNIQUE: Axial images of the cervical spine were obtained without  intravenous contrast. Multiplanar reformations were performed.   Radiation dose for this scan was reduced using automated exposure  control, adjustment of the mA and/or kV  according to patient size, or  iterative reconstruction technique.    COMPARISON: Cervical spine x-ray 6/17/2008.    FINDINGS: No evidence of fracture. No prevertebral soft tissue  swelling. Alignment is normal. Vertebral body height is maintained. No  destructive osseous lesions. No significant loss of intervertebral  disc space. No disc herniation appreciated. No spinal canal or neural  foraminal narrowing at any level.    Visualized paraspinous tissues: Unremarkable.      Impression    IMPRESSION: No evidence of acute fracture or subluxation in the  cervical spine.    NABOR MORGAN MD   XR Shoulder Left G/E 3 Views     Status: None    Narrative    XR SHOULDER LT G/E 3 VW 7/6/2020 8:27 AM     HISTORY: Pain following recent injury.    COMPARISON: None.      Impression    IMPRESSION: No fractures are evident. Normal glenohumeral alignment.  The acromioclavicular joint is unremarkable.     VICENTE YBARRA MD   Chest CT w/o contrast     Status: None    Narrative    CT CHEST W/O CONTRAST 7/6/2020 9:28 AM    CLINICAL HISTORY: left scapula and shoulder pain after fall off  skateboard, unremarkable xrays, also some pain under left armpit, eval  for rib fracture, scapula fracture, etc    TECHNIQUE: CT chest without IV contrast. Multiplanar reformats were  obtained. Dose reduction techniques were used.  CONTRAST: None.    COMPARISON: None.    FINDINGS:   LUNGS AND PLEURA: No pleural effusion, pneumothorax or focal  consolidation. Subcentimeter linear noncalcified perifissural nodule  in the right upper lobe consistent with benign intrapulmonic lymph  node.    MEDIASTINUM/AXILLAE: No lymphadenopathy. No thoracic aortic aneurysm.    UPPER ABDOMEN: No significant finding.    MUSCULOSKELETAL: Unremarkable.      Impression    IMPRESSION:   1.  No acute thoracic injury, with attention to the left scapula,  shoulders and ribs.    HIEN OLVERA MD   CBC with platelets differential     Status: Abnormal   Result Value Ref Range     WBC 8.5 4.0 - 11.0 10e9/L    RBC Count 4.20 (L) 4.4 - 5.9 10e12/L    Hemoglobin 13.0 (L) 13.3 - 17.7 g/dL    Hematocrit 40.0 40.0 - 53.0 %    MCV 95 78 - 100 fl    MCH 31.0 26.5 - 33.0 pg    MCHC 32.5 31.5 - 36.5 g/dL    RDW 12.3 10.0 - 15.0 %    Platelet Count 309 150 - 450 10e9/L    Diff Method Automated Method     % Neutrophils 59.6 %    % Lymphocytes 22.1 %    % Monocytes 12.5 %    % Eosinophils 4.6 %    % Basophils 1.1 %    % Immature Granulocytes 0.1 %    Nucleated RBCs 0 0 /100    Absolute Neutrophil 5.1 1.6 - 8.3 10e9/L    Absolute Lymphocytes 1.9 0.8 - 5.3 10e9/L    Absolute Monocytes 1.1 0.0 - 1.3 10e9/L    Absolute Eosinophils 0.4 0.0 - 0.7 10e9/L    Absolute Basophils 0.1 0.0 - 0.2 10e9/L    Abs Immature Granulocytes 0.0 0 - 0.4 10e9/L    Absolute Nucleated RBC 0.0    Comprehensive metabolic panel     Status: Abnormal   Result Value Ref Range    Sodium 140 133 - 144 mmol/L    Potassium 4.2 3.4 - 5.3 mmol/L    Chloride 108 94 - 109 mmol/L    Carbon Dioxide 31 20 - 32 mmol/L    Anion Gap 1 (L) 3 - 14 mmol/L    Glucose 91 70 - 99 mg/dL    Urea Nitrogen 14 7 - 30 mg/dL    Creatinine 1.12 0.66 - 1.25 mg/dL    GFR Estimate 85 >60 mL/min/[1.73_m2]    GFR Estimate If Black >90 >60 mL/min/[1.73_m2]    Calcium 8.7 8.5 - 10.1 mg/dL    Bilirubin Total 0.9 0.2 - 1.3 mg/dL    Albumin 3.5 3.4 - 5.0 g/dL    Protein Total 6.7 (L) 6.8 - 8.8 g/dL    Alkaline Phosphatase 106 40 - 150 U/L    ALT 27 0 - 70 U/L    AST 22 0 - 45 U/L   Alcohol ethyl     Status: None   Result Value Ref Range    Ethanol g/dL <0.01 <0.01 g/dL     Medications   ibuprofen (ADVIL/MOTRIN) tablet 600 mg (600 mg Oral Given 7/6/20 0656)   acetaminophen (TYLENOL) tablet 975 mg (975 mg Oral Given 7/6/20 0656)   Tdap (tetanus-diphtheria-acell pertussis) (ADACEL) injection 0.5 mL (0.5 mLs Intramuscular Given 7/6/20 1011)        Assessments & Plan (with Medical Decision Making)   Patient presents after a fall off his skateboard last night onto his left  shoulder.  He complains initially of diffuse left shoulder pain that radiates towards his neck but not into his neck and then classifies this more as in his posterior axillary area and left scapular area later on.  He has decreased range of motion of the left shoulder due to significant pain and thus it is difficult to get true strength testing of the left shoulder area.  He does have 5 out of 5  strength bilaterally.  He also had some mild muscular tenderness of the left forearm but no specific bony tenderness of the left elbow or left hand.  No specific chest wall tenderness but does state that near his posterior axilla it is somewhat tender.  He did strike his head but denies any loss of consciousness.  Here he has a GCS of 15 and no focal neurologic deficits with the exception of limitation of being able to test the strength of the left shoulder due to pain.  He subjectively feels like maybe his left shoulder is slightly weak.  There is no obvious deformity of the left shoulder.  Differential diagnosis includes but is not limited to fracture, dislocation, brachial plexus injury, potential cervical spine injury however we felt this was less likely as he did not have any specific neck pain or midline cervical spine tenderness, scapular fracture, rib fracture.  As he struck his head we did obtain a CT of the head without contrast.  We also obtained a CT of the cervical spine.  We obtained x-rays of the left forearm, left humerus, and left shoulder.  He was given Tylenol and ibuprofen as well as Dilaudid for pain control.  He did improve with this.  We did obtain laboratory studies.  He does have an abrasion with some road rash to the left shoulder area and was thus updated on his tetanus status.  Laboratory studies were largely unremarkable as above.  Hemoglobin is 13.    CT of the head did not reveal any evidence of acute pathology.  CT of the cervical spine did not reveal any evidence of acute fracture or  subluxation of the cervical spine.  There is no evidence of disc herniation or spinal canal or neural foraminal narrowing at any level.  There was no prevertebral soft tissue swelling.  X-rays of the left forearm, humerus, and shoulder were all unrevealing without any acute pathology.  No evidence of dislocation or fracture.  The acromioclavicular joint was also unremarkable in the shoulder.  This was somewhat puzzling as the patient on reevaluation still was feeling as though it was too painful for him to move the shoulder.  I however was able to passively range the shoulder without significant pain.  He stated when he tried to move the shoulder caused a sharp pulling sensation near his armpit and scapula area.  I discussed the case with orthopedics and radiology.  I was concerned for the potential of a missed scapular fracture and radiology recommended getting a CT of the chest without contrast to evaluate for this.  In discussion with orthopedics I was questioning whether he would require any emergent MRI for possible brachial plexus injury or potential MRI of the cervical spine however we felt this was less likely to be cervical spine injury with his unremarkable CT of the neck and the fact that he really does not have any actual neck pain itself.  The orthopedic team came to evaluate the patient.  Please see their note for details.  They were able to elicit that the patient could Fire the triceps, biceps, and deltoid muscles and they felt that this was not true weakness and more of a limitation due to pain and on their evaluation the patient was better able to do active range of motion of the right shoulder.  They agreed with a CT scan of the chest.  CT of the chest did not reveal any acute thoracic injury and there was no evidence of any fracture of the left scapula, shoulders, or ribs.  Orthopedics did not feel that he warranted any further emergent advanced imaging such as MRI of the neck or shoulder and  felt that this was most likely muscular pain from a contusion to the area from the fall.  They stated that he could use a sling for comfort but we did discuss with the patient that he should try to continue to perform active range of motion of the shoulder so that he does not develop frozen shoulder.  They advised that he could follow-up with his primary care provider and be referred to orthopedics as needed.  I did provide an orthopedic number in case he was not improving.    On reevaluation the patient was feeling improved and we did remove the cervical collar and he did not have any midline cervical spine tenderness and had full range of motion of the neck and no pain with this or with axial loading.  He was better able to range the shoulder on this reevaluation for me as well and did not appear to have any focal neurologic deficit.  He was advised to ice the area and use ibuprofen or Tylenol for pain control.  He does have history of polysubstance abuse and wanted to avoid further narcotics in this case.  He was given strict return precautions to the emergency department.  He voiced understanding was comfortable with this plan.  He was discharged home in stable condition.              I have reviewed the nursing notes. I have reviewed the findings, diagnosis, plan and need for follow up with the patient.    Discharge Medication List as of 7/6/2020 11:11 AM          Final diagnoses:   Acute pain of left shoulder       --  Belgica Carrasquillo MD   Emergency Medicine   [facility], [place], EMERGENCY DEPARTMENT  7/6/2020     Belgica Carrasquillo MD  07/22/20 1019

## 2020-08-21 ENCOUNTER — TRANSFERRED RECORDS (OUTPATIENT)
Dept: HEALTH INFORMATION MANAGEMENT | Facility: CLINIC | Age: 35
End: 2020-08-21

## 2020-08-25 ENCOUNTER — MYC MEDICAL ADVICE (OUTPATIENT)
Dept: FAMILY MEDICINE | Facility: CLINIC | Age: 35
End: 2020-08-25

## 2020-08-26 ENCOUNTER — TELEPHONE (OUTPATIENT)
Dept: FAMILY MEDICINE | Facility: CLINIC | Age: 35
End: 2020-08-26

## 2020-08-26 NOTE — TELEPHONE ENCOUNTER
Reason for Call:  Form, our goal is to have forms completed with 72 hours, however, some forms may require a visit or additional information.    Type of letter, form or note:  medical    Who is the form from?: Patient    Where did the form come from: Patient or family brought in       What clinic location was the form placed at?: [facility]    Where the form was placed: Dilshad Tolbert's Box/Folder    What number is listed as a contact on the form?: [phone]       Additional comments: Pt needs form by 8/27/20    Call taken on 8/26/2020 at 1:25 PM by Stephanie Puckett

## 2020-08-27 NOTE — TELEPHONE ENCOUNTER
Received form(s) from patient for [facility] Medical Opinion forms.  Placed form(s) in/on JS's desk.  Forms need to be filled out and signed and faxed to number listed on form- and also call the patient.    Call pt to verify form was sent: Yes  Copy needs to be sent for scanning after completion: Yes    Loli Harrison MA  Medical Assistant  [facility]

## 2020-08-28 NOTE — TELEPHONE ENCOUNTER
Form(s) have been faxed.  Faxed or mailed to: number listed on form.  Was a copy sent to scanning?   yes sent to scanning on [place] side    Loli Harrison MA  Medical Assistant  [facility]

## 2020-09-28 ENCOUNTER — APPOINTMENT (OUTPATIENT)
Dept: GENERAL RADIOLOGY | Facility: CLINIC | Age: 35
End: 2020-09-28
Attending: EMERGENCY MEDICINE
Payer: COMMERCIAL

## 2020-09-28 ENCOUNTER — HOSPITAL ENCOUNTER (EMERGENCY)
Facility: CLINIC | Age: 35
Discharge: HOME | End: 2020-09-28
Attending: EMERGENCY MEDICINE | Admitting: EMERGENCY MEDICINE
Payer: COMMERCIAL

## 2020-09-28 VITALS
HEART RATE: 104 BPM | TEMPERATURE: 98 F | OXYGEN SATURATION: 100 % | DIASTOLIC BLOOD PRESSURE: 72 MMHG | SYSTOLIC BLOOD PRESSURE: 112 MMHG

## 2020-09-28 DIAGNOSIS — M25.511 ACUTE PAIN OF RIGHT SHOULDER: ICD-10-CM

## 2020-09-28 DIAGNOSIS — S42.021A CLOSED DISPLACED FRACTURE OF SHAFT OF RIGHT CLAVICLE, INITIAL ENCOUNTER: ICD-10-CM

## 2020-09-28 PROCEDURE — 73030 X-RAY EXAM OF SHOULDER: CPT | Mod: RT

## 2020-09-28 PROCEDURE — 73000 X-RAY EXAM OF COLLAR BONE: CPT | Mod: RT

## 2020-09-28 PROCEDURE — 99284 EMERGENCY DEPT VISIT MOD MDM: CPT | Mod: 25 | Performed by: EMERGENCY MEDICINE

## 2020-09-28 PROCEDURE — 99284 EMERGENCY DEPT VISIT MOD MDM: CPT | Mod: Z6 | Performed by: EMERGENCY MEDICINE

## 2020-09-28 PROCEDURE — 25000128 H RX IP 250 OP 636: Performed by: EMERGENCY MEDICINE

## 2020-09-28 PROCEDURE — 96372 THER/PROPH/DIAG INJ SC/IM: CPT | Performed by: EMERGENCY MEDICINE

## 2020-09-28 RX ORDER — KETOROLAC TROMETHAMINE 30 MG/ML
30 INJECTION, SOLUTION INTRAMUSCULAR; INTRAVENOUS ONCE
Status: COMPLETED | OUTPATIENT
Start: 2020-09-28 | End: 2020-09-28

## 2020-09-28 RX ORDER — CYCLOBENZAPRINE HCL 10 MG
10 TABLET ORAL 3 TIMES DAILY PRN
Qty: 20 TABLET | Refills: 0 | Status: SHIPPED | OUTPATIENT
Start: 2020-09-28 | End: 2020-10-04

## 2020-09-28 RX ORDER — NAPROXEN 500 MG/1
500 TABLET ORAL 2 TIMES DAILY WITH MEALS
Qty: 30 TABLET | Refills: 0 | Status: SHIPPED | OUTPATIENT
Start: 2020-09-28 | End: 2020-10-07

## 2020-09-28 RX ADMIN — KETOROLAC TROMETHAMINE 30 MG: 30 INJECTION, SOLUTION INTRAMUSCULAR; INTRAVENOUS at 18:00

## 2020-09-28 NOTE — ED AVS SNAPSHOT
[facility], [place], Emergency Department  [address]  [facility] [address]  Phone:  [phone]  Fax:  [phone]                                    Morgan Bosch   MRN: [id number]    Department:  [facility], Emergency Department   Date of Visit:  9/28/2020           After Visit Summary Signature Page    I have received my discharge instructions, and my questions have been answered. I have discussed any challenges I see with this plan with the nurse or doctor.    ..........................................................................................................................................  Patient/Patient Representative Signature      ..........................................................................................................................................  Patient Representative Print Name and Relationship to Patient    ..................................................               ................................................  Date                                   Time    ..........................................................................................................................................  Reviewed by Signature/Title    ...................................................              ..............................................  Date                                               Time          22EPIC Rev 08/18

## 2020-09-28 NOTE — DISCHARGE INSTRUCTIONS
TODAY'S VISIT:  You were seen today for shoulder pain, clavicle fracture  -   - If you had any labs or imaging/radiology tests performed today, you should also discuss these tests with your usual provider.     FOLLOW-UP:  Please make an appointment to follow up with:  - Your Primary Care Provider. If you do not have a PCP, please call the Primary Care Center (phone: [phone] for an appointment  - Orthopedics Clinic (phone: [phone]) within 1 week    - Have your provider review the results from today's visit with you again to make sure no further follow-up or additional testing is needed based on those results.     PRESCRIPTIONS / MEDICATIONS:  - Ibuprofen as needed for pain     OTHER INSTRUCTIONS:  - apply ice to the affected area several times daily    RETURN TO THE EMERGENCY DEPARTMENT  Return to the Emergency Department at any time for any new or worsening symptoms or any concerns.

## 2020-09-28 NOTE — ED PROVIDER NOTES
"ED Provider Note  [facility]      History     Chief Complaint   Patient presents with     Shoulder Pain     Pt fell off skateboard saturday night, right shoulder and collarbone pain and swelling      HPI  Morgan Bosch is a 35 year old male who presented with right shoulder pain that started last Saturday after patient fell from his skateboard. He iced it and treated with aspirin and ibuprofen. Yesterday evening he was helping a friend move and while he was lifting a box he \"heard a popping sound\". Since then the pain is much worse.  Pain worsens with movement of the right arm.  He had some associated tingling in the affected extremity, denies any numbness and weakness currently. He denies head trauma, shortness of breath and fever.  No loss of consciousness.  Denies radiation of pain. Denies facial trauma. Patient denies hematuria.  Patient is not on any blood thinners.    Patient had another skateboarding accident last month and injured his left shoulder on 7/6/2020. Was treated medically and received tetanus vaccine 7/6/2020.     Review of Systems   General: No fevers or chills  Skin: No rash or diaphoresis, positive for bruising  Eyes: No eye redness or discharge  Ears/Nose/Throat: No rhinorrhea or nasal congestion  Respiratory: No cough or SOB  Cardiovascular: No chest pain or palpitations  Gastrointestinal: No nausea, vomiting, or diarrhea  Genitourinary: No urinary frequency, hematuria, or dysuria  Musculoskeletal: See HPI  Neurologic: No numbness or weakness  Psychiatric: No depression or SI  Hematologic/Lymphatic/Immunologic: No leg swelling, no easy bruising/bleeding  Endocrine: No polyuria/polydypsia      A complete review of systems was performed with pertinent positives and negatives noted in the HPI, and all other systems negative.    Physical Exam   BP: 112/72  Pulse: 104  Temp: 98  F (36.7  C)  SpO2: 100 %  Physical Exam  General: Well nourished, well developed, " NAD  HEENT: EOMI, anicteric. NCAT, MMM  Neck: no jugular venous distension, supple, nl ROM  Cardiac: Regular rate and rhythm. No murmurs, rubs, or gallops. Normal S1, S2.  Intact peripheral pulses  Pulm: CTAB, no stridor, wheezes, rales, rhonchi  Abd: Soft, nontender, nondistended.  No masses palpated.    Skin: Warm and dry to the touch.  No rash.  Bruising overlying right clavicle and right lower flank (no significant tenderness over flank bruise)  Extremities: No LE edema, no cyanosis, w/w/p, range of motion of right upper extremity mildly limited due to pain, tenderness to palpation over right clavicle with associated deformity over midshaft, no skin tenting, distally neurovascularly intact  Neuro: A&Ox3, no gross focal deficits        ED Course     ED Course as of Sep 28 1939   Mon Sep 28, 2020   1758 Clavicle XR, right     Procedures         Clavicle Xray showed mid clavicle fracture.  Narrative & Impression      XR SHOULDER RT G/E 3 VW  9/28/2020 5:50 PM      HISTORY: shoulder pain after fall     COMPARISON: None                                                                      IMPRESSION: Acute transversely oriented fracture of the mid right  clavicle with one shaft width inferior and anterior displacement of  the distal fracture fragment. No intra-articular extension. There is  normal joint spacing and alignment.     GIRMA MANCINI MD            Results for orders placed or performed during the hospital encounter of 09/28/20   Clavicle XR, right     Status: None    Narrative    CLAVICLE RIGHT TWO VIEWS  9/28/2020 5:50 PM     HISTORY: fall, pain    COMPARISON: None.      Impression    IMPRESSION: Acute transversely oriented fracture of the mid right  clavicle with one shaft width inferior displacement of the distal  fracture fragment. No intra-articular extension. There is normal joint  spacing and alignment.    GIRMA MANCINI MD   XR Shoulder Right G/E 3 Views     Status: None    Narrative    XR  SHOULDER RT G/E 3 VW  9/28/2020 5:50 PM     HISTORY: shoulder pain after fall    COMPARISON: None      Impression    IMPRESSION: Acute transversely oriented fracture of the mid right  clavicle with one shaft width inferior and anterior displacement of  the distal fracture fragment. No intra-articular extension. There is  normal joint spacing and alignment.    GIRMA MANCINI MD     Medications   ketorolac (TORADOL) injection 30 mg (30 mg Intramuscular Given 9/28/20 1800)        Assessments & Plan (with Medical Decision Making)   Patient presents after a fall of his skateboard on 9/26 on his right shoulder. He initially treated with ice and ibuprofen. Yesterday he was helping a friend move. He heard a popping sound when he was lifting a box. Since then pain is worse and it is hard to find a comfortable position.  Denies head trauma, shortness of breath. Pain radiates down his right arm following triceps trajectory. On exam, patient has limited voluntary movement due to pain. No chest wall tenderness. Bony tenderness and yellow-green color change on right collar bone.  Differential diagnosis includes clavicular fracture, humerus fracture, shoulder dislocation, shoulder sprain, soft tissue contusion. We obtained xrays of right shoulder and collar bone for further evaluation of the patient.  Toradol was given for pain relief in the emergency department.    Xray showed: Acute transversely oriented fracture of the mid right clavicle with one shaft width inferior and anterior displacement of the distal fracture fragment. No intra-articular extension. There is normal joint spacing and alignment.    Patient is discharged with sling, recommended rest and ice.  To follow-up with orthopedic surgery within 1 week.  Prescription of naproxen and Flexeril given for pain control at home. To return to ER immediately with any new/worsening symptoms. Plan of care discussed with patient who expresses understanding and agrees with plan  of care.      I have reviewed the nursing notes. I have reviewed the findings, diagnosis, plan and need for follow up with the patient.    Discharge Medication List as of 9/28/2020  6:23 PM      START taking these medications    Details   cyclobenzaprine (FLEXERIL) 10 MG tablet Take 1 tablet (10 mg) by mouth 3 times daily as needed for muscle spasms, Disp-20 tablet,R-0, Local Print             Final diagnoses:   Acute pain of right shoulder   Closed displaced fracture of shaft of right clavicle, initial encounter     Sandra Ortega MD    --  Lizabeth Rolle MD  [facility], [place], EMERGENCY DEPARTMENT  9/28/2020    ED Staff Attestation:    I have seen the patient with the resident and I agree with the documentation.  I have assessed the patient independently of the resident and the above note reflects our jointly agreed upon assessment and plan.         Lizabeth Rolle MD  09/28/20 1942

## 2020-09-28 NOTE — ED NOTES
Patient reports that he was skateboarding on 9/26 when he flew forward onto the concrete and landed on his collar bone and shoulder on his right side. Reports the pain has gotten worse since then with limited mobility. Denies nay other symptoms.

## 2020-09-29 NOTE — TELEPHONE ENCOUNTER
RECORDS RECEIVED FROM: X-ray Imaging in Epic (R) Clavicle Fracture/ [facility] ER 9/28/ Desiree Appt Ok'd by Yesica   DATE RECEIVED: Oct 2, 2020     NOTES STATUS DETAILS   OFFICE NOTE from referring provider Internal    OFFICE NOTE from other specialist N/A    DISCHARGE SUMMARY from hospital N/A    DISCHARGE REPORT from the ER Internal    OPERATIVE REPORT N/A    MEDICATION LIST Internal    IMPLANT RECORD/STICKER N/A    LABS     CBC/DIFF N/A    CULTURES N/A    INJECTIONS DONE IN RADIOLOGY N/A    MRI N/A    CT SCAN N/A    XRAYS (IMAGES & REPORTS) Internal    TUMOR     PATHOLOGY  Slides & report N/A    09/29/20   4:17 PM   Complete  Lorena Guadalupe, CMA

## 2020-10-01 DIAGNOSIS — S42.001A CLOSED DISPLACED FRACTURE OF RIGHT CLAVICLE, UNSPECIFIED PART OF CLAVICLE, INITIAL ENCOUNTER: Primary | ICD-10-CM

## 2020-10-02 ENCOUNTER — ANCILLARY PROCEDURE (OUTPATIENT)
Dept: GENERAL RADIOLOGY | Facility: CLINIC | Age: 35
End: 2020-10-02
Attending: ORTHOPAEDIC SURGERY
Payer: COMMERCIAL

## 2020-10-02 ENCOUNTER — TRANSFERRED RECORDS (OUTPATIENT)
Dept: HEALTH INFORMATION MANAGEMENT | Facility: CLINIC | Age: 35
End: 2020-10-02

## 2020-10-02 ENCOUNTER — OFFICE VISIT (OUTPATIENT)
Dept: ORTHOPEDICS | Facility: CLINIC | Age: 35
End: 2020-10-02
Payer: COMMERCIAL

## 2020-10-02 ENCOUNTER — PRE VISIT (OUTPATIENT)
Dept: ORTHOPEDICS | Facility: CLINIC | Age: 35
End: 2020-10-02

## 2020-10-02 VITALS — WEIGHT: 176.9 LBS | BODY MASS INDEX: 24.77 KG/M2 | HEIGHT: 71 IN

## 2020-10-02 DIAGNOSIS — Z11.59 ENCOUNTER FOR SCREENING FOR OTHER VIRAL DISEASES: Primary | ICD-10-CM

## 2020-10-02 DIAGNOSIS — S42.001A CLOSED DISPLACED FRACTURE OF RIGHT CLAVICLE, UNSPECIFIED PART OF CLAVICLE, INITIAL ENCOUNTER: ICD-10-CM

## 2020-10-02 DIAGNOSIS — S42.021A CLOSED DISPLACED FRACTURE OF SHAFT OF RIGHT CLAVICLE, INITIAL ENCOUNTER: Primary | ICD-10-CM

## 2020-10-02 PROCEDURE — 73000 X-RAY EXAM OF COLLAR BONE: CPT | Mod: RT | Performed by: RADIOLOGY

## 2020-10-02 PROCEDURE — 99204 OFFICE O/P NEW MOD 45 MIN: CPT | Performed by: ORTHOPAEDIC SURGERY

## 2020-10-02 RX ORDER — CEFAZOLIN SODIUM 1 G/50ML
1 INJECTION, SOLUTION INTRAVENOUS SEE ADMIN INSTRUCTIONS
Status: CANCELLED | OUTPATIENT
Start: 2020-10-02

## 2020-10-02 RX ORDER — GABAPENTIN 300 MG/1
600 CAPSULE ORAL ONCE
Status: CANCELLED | OUTPATIENT
Start: 2020-10-02

## 2020-10-02 RX ORDER — IBUPROFEN 200 MG
400 TABLET ORAL
Status: CANCELLED | OUTPATIENT
Start: 2020-10-02

## 2020-10-02 RX ORDER — ACETAMINOPHEN 325 MG/1
975 TABLET ORAL ONCE
Status: CANCELLED | OUTPATIENT
Start: 2020-10-02 | End: 2020-10-02

## 2020-10-02 RX ORDER — CEFAZOLIN SODIUM 2 G/50ML
2 SOLUTION INTRAVENOUS
Status: CANCELLED | OUTPATIENT
Start: 2020-10-02

## 2020-10-02 ASSESSMENT — MIFFLIN-ST. JEOR: SCORE: 1756.79

## 2020-10-02 ASSESSMENT — PATIENT HEALTH QUESTIONNAIRE - PHQ9: SUM OF ALL RESPONSES TO PHQ QUESTIONS 1-9: 7

## 2020-10-02 NOTE — LETTER
10/2/2020         RE: Morgan Bosch  [address]  [facility] [address]        Dear Colleague,    Thank you for referring your patient, Morgan Bosch, to the [facility] [place]. Please see a copy of my visit note below.    CHIEF COMPLAINT: Right clavicle pain and injury    DIAGNOSIS: Right shoulder displaced midshaft clavicle fracture, closed    DATE OF INJURY: 9/26/2020, skateboarding accident     OCCUPATION/SPORT:  / [employer]    HPI:   Morgan Bosch is a very pleasant 35 year old right-hand dominant male who presents for evaluation of right clavicle pain and injury. Symptoms started on Saturday (6 days prior to his visit) after he fell directly onto his shoulder while skateboarding.  The pain is located to the right superior shoulder and clavicle area. Worst pain is rated a 5 of 10, and current pain is rated at 3 of 10. Symptoms are worsened by any shoulder movement, reaching, or weightbearing. Symptoms are improved with rest and activity modification. Patient has tried naproxen without significant relief. No associated symptoms. No other concerns or complaints at this time.     PAST MEDICAL HISTORY:  1. Anxiety   2. Bilateral ankle fractures managed non-operatively    CURRENT MEDICATIONS:  1. Gabapentin    ALLERGIES:   Prozac    PAST SURGICAL HISTORY:  1. Facial fracture surgery     FAMILY HISTORY: No known family history of bleeding, clotting, or anesthesia related complications.    SOCIAL HISTORY: Patient is single and is here today alone, he works as a , and he exercises regularly.    TOXIC HABITS:  I spoke with Morgan today regarding tobacco use and they informed me that they currently are using tobacco products; specifically a nicotine vape pen that he desires to quit.    REVIEW OF SYSTEMS:  General: Denies fevers, chills, or night sweats.  Skin: Denies rashes or lesions.  Head: Denies headache or dizziness.  Eyes: Denies vision  "changes or eye pain.  Ears: Denies ear pain or decreased hearing.  Nose: Denies nose bleeding or sinus pain.  Mouth & Throat: Denies bleeding gums or sore throat.  Neck: Denies neck pain or stiffness.  Respiratory: Denies cough, wheezing, sob, or hemoptysis.  Cardiovascular: Denies chest pain, chest pressure, or palpitations.   Gastrointestinal: Denies abdominal pain, nausea, vomiting, diarrhea, or constipation.  Genitourinary: Denies difficulty or pain with urination.   Musculoskeletal: As noted above in the HPI, otherwise normal.  Neuro: Denies paralysis, weakness, paresthesias, or speech difficulty.  Lymphatics: Denies lymphadenopathy.  Psych: Denies anxiety, sadness, or irritability.    PHYSICAL EXAM:  Patient is 5' 10.512\" and weighs 176 lbs 14.4 oz. Ht 1.791 m (5' 10.51\")   Wt 80.2 kg (176 lb 14.4 oz)   BMI 25.02 kg/m    Constitutional: Well-developed, well-nourished, healthy appearing male.  Skin: Warm, dry, and without rashes.   HEENT: Normocephalic, atraumatic. Extraocular movements intact. Moist mucous membranes.  Cardiac: Well perfused extremities, strong 2+ peripheral pulses. No edema.   Pulmonary: Non-labored respirations on room air without audible wheezes.   Abdomen: Soft, nontender.  Musculoskeletal: Patient ambulates with a slow, symmetric, and steady gait.  There is an area of bruising and ecchymosis about the right clavicle with a visible step-off at the midshaft clavicle.  This corresponds with an area of tenderness and crepitus to palpation.  There is no tenderness at the AC or SC joints.  No tenderness about the posterior glenohumeral joint line, or bicipital groove.  Neurovascular exam of the right upper extremity is intact with a 2+ radial pulse, sensation intact light touch throughout, and normal motor function to the deltoid, biceps, triceps, wrist flexors, wrist extensors, EPL, FPL, IO.  Normal cervical spine exam.  No generalized ligamentous laxity.    IMAGING:   All imaging was " personally reviewed by me.  Bilateral clavicle AP radiographic view, and dedicated right clavicle AP and 15 degree cephalic tilt view demonstrates a displaced right midshaft clavicle fracture with comminution and 1 cm of shortening.  There is a maximum of 1.5-2 cm of overall displacement, >100% displaced.     IMPRESSION: 35 year old-year-old right-hand dominant male with a right displaced midshaft clavicle fracture, closed.    PLAN:   I discussed the treatment options with Morgan includin. Living with the symptoms.  2. Continued non-operative management.  3. Surgical intervention.     We discussed the pros and cons of nonoperative management versus surgical intervention.  The main reasons to proceed with operative management are to decrease the risk of nonunion, improve function in the short term, and decrease pain symptoms in the short-term.  Specifically, this comminuted displaced fracture of midshaft clavicle and someone who uses nicotine products has approximately a 15 to 20% risk of nonunion.  That risk is lowered to less than 1% with open reduction internal fixation.  Through shared decision making, Morgan strongly prefers to proceed with operative fixation. The risks, benefits, and alternatives of a right clavicle open reduction and internal fixation and related procedures were discussed in great detail, including but not limited to the following:  Complications of anesthesia, including death, infection, nerve, tendon, or vessel injury, DVT or PE, potential of shoulder stiffness, potential of remi-incisional numbness, potential of delayed union, malunion, or nonunion, potential of the procedure to not alleviate the condition, potential need for further surgery in the future including the need for possible elective hardware removal. Morgan understood these risks, benefits, and alternatives. All questions were answered satisfactorily and he completed all required paperwork to proceed and will work with  our surgical schedulers to find a date for surgery.     I will see Morgan back on his date of surgery.    At the conclusion of the office visit, Morgan verbally acknowledged that I answered all of his questions satisfactorily.       Sincerely,        Edil Ruth MD

## 2020-10-02 NOTE — PROGRESS NOTES
CHIEF COMPLAINT: Right clavicle pain and injury    DIAGNOSIS: Right shoulder displaced midshaft clavicle fracture, closed    DATE OF INJURY: 9/26/2020, skateboarding accident     OCCUPATION/SPORT:  / [employer]    HPI:   Morgan Bosch is a very pleasant 35 year old right-hand dominant male who presents for evaluation of right clavicle pain and injury. Symptoms started on Saturday (6 days prior to his visit) after he fell directly onto his shoulder while skateboarding.  The pain is located to the right superior shoulder and clavicle area. Worst pain is rated a 5 of 10, and current pain is rated at 3 of 10. Symptoms are worsened by any shoulder movement, reaching, or weightbearing. Symptoms are improved with rest and activity modification. Patient has tried naproxen without significant relief. No associated symptoms. No other concerns or complaints at this time.     PAST MEDICAL HISTORY:  1. Anxiety   2. Bilateral ankle fractures managed non-operatively    CURRENT MEDICATIONS:  1. Gabapentin    ALLERGIES:   Prozac    PAST SURGICAL HISTORY:  1. Facial fracture surgery     FAMILY HISTORY: No known family history of bleeding, clotting, or anesthesia related complications.    SOCIAL HISTORY: Patient is single and is here today alone, he works as a , and he exercises regularly.    TOXIC HABITS:  I spoke with Morgan today regarding tobacco use and they informed me that they currently are using tobacco products; specifically a nicotine vape pen that he desires to quit.    REVIEW OF SYSTEMS:  General: Denies fevers, chills, or night sweats.  Skin: Denies rashes or lesions.  Head: Denies headache or dizziness.  Eyes: Denies vision changes or eye pain.  Ears: Denies ear pain or decreased hearing.  Nose: Denies nose bleeding or sinus pain.  Mouth & Throat: Denies bleeding gums or sore throat.  Neck: Denies neck pain or stiffness.  Respiratory: Denies cough, wheezing, sob, or  "hemoptysis.  Cardiovascular: Denies chest pain, chest pressure, or palpitations.   Gastrointestinal: Denies abdominal pain, nausea, vomiting, diarrhea, or constipation.  Genitourinary: Denies difficulty or pain with urination.   Musculoskeletal: As noted above in the HPI, otherwise normal.  Neuro: Denies paralysis, weakness, paresthesias, or speech difficulty.  Lymphatics: Denies lymphadenopathy.  Psych: Denies anxiety, sadness, or irritability.    PHYSICAL EXAM:  Patient is 5' 10.512\" and weighs 176 lbs 14.4 oz. Ht 1.791 m (5' 10.51\")   Wt 80.2 kg (176 lb 14.4 oz)   BMI 25.02 kg/m    Constitutional: Well-developed, well-nourished, healthy appearing male.  Skin: Warm, dry, and without rashes.   HEENT: Normocephalic, atraumatic. Extraocular movements intact. Moist mucous membranes.  Cardiac: Well perfused extremities, strong 2+ peripheral pulses. No edema.   Pulmonary: Non-labored respirations on room air without audible wheezes.   Abdomen: Soft, nontender.  Musculoskeletal: Patient ambulates with a slow, symmetric, and steady gait.  There is an area of bruising and ecchymosis about the right clavicle with a visible step-off at the midshaft clavicle.  This corresponds with an area of tenderness and crepitus to palpation.  There is no tenderness at the AC or SC joints.  No tenderness about the posterior glenohumeral joint line, or bicipital groove.  Neurovascular exam of the right upper extremity is intact with a 2+ radial pulse, sensation intact light touch throughout, and normal motor function to the deltoid, biceps, triceps, wrist flexors, wrist extensors, EPL, FPL, IO.  Normal cervical spine exam.  No generalized ligamentous laxity.    IMAGING:   All imaging was personally reviewed by me.  Bilateral clavicle AP radiographic view, and dedicated right clavicle AP and 15 degree cephalic tilt view demonstrates a displaced right midshaft clavicle fracture with comminution and 1 cm of shortening.  There is a maximum " of 1.5-2 cm of overall displacement, >100% displaced.     IMPRESSION: 35 year old-year-old right-hand dominant male with a right displaced midshaft clavicle fracture, closed.    PLAN:   I discussed the treatment options with Morgan includin. Living with the symptoms.  2. Continued non-operative management.  3. Surgical intervention.     We discussed the pros and cons of nonoperative management versus surgical intervention.  The main reasons to proceed with operative management are to decrease the risk of nonunion, improve function in the short term, and decrease pain symptoms in the short-term.  Specifically, this comminuted displaced fracture of midshaft clavicle and someone who uses nicotine products has approximately a 15 to 20% risk of nonunion.  That risk is lowered to less than 1% with open reduction internal fixation.  Through shared decision making, Morgan strongly prefers to proceed with operative fixation. The risks, benefits, and alternatives of a right clavicle open reduction and internal fixation and related procedures were discussed in great detail, including but not limited to the following:  Complications of anesthesia, including death, infection, nerve, tendon, or vessel injury, DVT or PE, potential of shoulder stiffness, potential of remi-incisional numbness, potential of delayed union, malunion, or nonunion, potential of the procedure to not alleviate the condition, potential need for further surgery in the future including the need for possible elective hardware removal. Morgan understood these risks, benefits, and alternatives. All questions were answered satisfactorily and he completed all required paperwork to proceed and will work with our surgical schedulers to find a date for surgery.     I will see Morgan back on his date of surgery.    At the conclusion of the office visit, Morgan verbally acknowledged that I answered all of his questions satisfactorily.

## 2020-10-06 ENCOUNTER — TELEPHONE (OUTPATIENT)
Dept: ORTHOPEDICS | Facility: CLINIC | Age: 35
End: 2020-10-06

## 2020-10-06 ENCOUNTER — ANESTHESIA EVENT (OUTPATIENT)
Dept: SURGERY | Facility: AMBULATORY SURGERY CENTER | Age: 35
End: 2020-10-06

## 2020-10-06 DIAGNOSIS — Z11.59 ENCOUNTER FOR SCREENING FOR OTHER VIRAL DISEASES: ICD-10-CM

## 2020-10-06 LAB
LABORATORY COMMENT REPORT: NORMAL
SARS-COV-2 RNA SPEC QL NAA+PROBE: NEGATIVE
SARS-COV-2 RNA SPEC QL NAA+PROBE: NORMAL
SPECIMEN SOURCE: NORMAL
SPECIMEN SOURCE: NORMAL

## 2020-10-06 PROCEDURE — U0003 INFECTIOUS AGENT DETECTION BY NUCLEIC ACID (DNA OR RNA); SEVERE ACUTE RESPIRATORY SYNDROME CORONAVIRUS 2 (SARS-COV-2) (CORONAVIRUS DISEASE [COVID-19]), AMPLIFIED PROBE TECHNIQUE, MAKING USE OF HIGH THROUGHPUT TECHNOLOGIES AS DESCRIBED BY CMS-2020-01-R: HCPCS | Mod: 90 | Performed by: PATHOLOGY

## 2020-10-06 PROCEDURE — 99000 SPECIMEN HANDLING OFFICE-LAB: CPT | Performed by: PATHOLOGY

## 2020-10-06 RX ORDER — FENTANYL CITRATE 50 UG/ML
25-50 INJECTION, SOLUTION INTRAMUSCULAR; INTRAVENOUS
Status: CANCELLED | OUTPATIENT
Start: 2020-10-06

## 2020-10-06 NOTE — TELEPHONE ENCOUNTER
Patient states he was unable to get a pre-op physical with his PMD.  He reports he received a call and was told the ER H&P is adequate.  COVID test was done today.

## 2020-10-06 NOTE — TELEPHONE ENCOUNTER
Patient is scheduled for surgery with Dr Ruth 10/07/20.  Message was left on his voicemail to call the clinic concerning his pre-op physical and COVID test.

## 2020-10-07 ENCOUNTER — HOSPITAL ENCOUNTER (OUTPATIENT)
Facility: AMBULATORY SURGERY CENTER | Age: 35
Discharge: HOME | End: 2020-10-07
Attending: ORTHOPAEDIC SURGERY | Admitting: ORTHOPAEDIC SURGERY
Payer: COMMERCIAL

## 2020-10-07 ENCOUNTER — ANCILLARY PROCEDURE (OUTPATIENT)
Dept: RADIOLOGY | Facility: AMBULATORY SURGERY CENTER | Age: 35
End: 2020-10-07
Attending: ORTHOPAEDIC SURGERY
Payer: COMMERCIAL

## 2020-10-07 ENCOUNTER — ANESTHESIA (OUTPATIENT)
Dept: SURGERY | Facility: AMBULATORY SURGERY CENTER | Age: 35
End: 2020-10-07

## 2020-10-07 VITALS
WEIGHT: 170 LBS | SYSTOLIC BLOOD PRESSURE: 94 MMHG | BODY MASS INDEX: 24.34 KG/M2 | OXYGEN SATURATION: 97 % | DIASTOLIC BLOOD PRESSURE: 61 MMHG | RESPIRATION RATE: 14 BRPM | HEIGHT: 70 IN | HEART RATE: 103 BPM | TEMPERATURE: 97 F

## 2020-10-07 DIAGNOSIS — Z98.890 S/P MUSCULOSKELETAL SYSTEM SURGERY: Primary | ICD-10-CM

## 2020-10-07 DIAGNOSIS — S42.021A CLOSED DISPLACED FRACTURE OF SHAFT OF RIGHT CLAVICLE, INITIAL ENCOUNTER: ICD-10-CM

## 2020-10-07 DIAGNOSIS — S42.021A CLOSED DISPLACED FRACTURE OF SHAFT OF RIGHT CLAVICLE: ICD-10-CM

## 2020-10-07 PROCEDURE — 23515 OPTX CLAVICULAR FX W/INT FIX: CPT | Mod: RT | Performed by: ORTHOPAEDIC SURGERY

## 2020-10-07 DEVICE — IMPLANTABLE DEVICE: Type: IMPLANTABLE DEVICE | Site: SHOULDER | Status: FUNCTIONAL

## 2020-10-07 RX ORDER — GLYCOPYRROLATE 0.2 MG/ML
INJECTION, SOLUTION INTRAMUSCULAR; INTRAVENOUS PRN
Status: DISCONTINUED | OUTPATIENT
Start: 2020-10-07 | End: 2020-10-07

## 2020-10-07 RX ORDER — GABAPENTIN 300 MG/1
300 CAPSULE ORAL 3 TIMES DAILY
Qty: 9 CAPSULE | Refills: 0 | Status: SHIPPED | OUTPATIENT
Start: 2020-10-07 | End: 2020-10-21

## 2020-10-07 RX ORDER — CEFAZOLIN SODIUM 2 G/50ML
2 SOLUTION INTRAVENOUS
Status: COMPLETED | OUTPATIENT
Start: 2020-10-07 | End: 2020-10-07

## 2020-10-07 RX ORDER — SODIUM CHLORIDE, SODIUM LACTATE, POTASSIUM CHLORIDE, CALCIUM CHLORIDE 600; 310; 30; 20 MG/100ML; MG/100ML; MG/100ML; MG/100ML
INJECTION, SOLUTION INTRAVENOUS CONTINUOUS
Status: DISCONTINUED | OUTPATIENT
Start: 2020-10-07 | End: 2020-10-07 | Stop reason: HOSPADM

## 2020-10-07 RX ORDER — ACETAMINOPHEN 325 MG/1
975 TABLET ORAL ONCE
Status: COMPLETED | OUTPATIENT
Start: 2020-10-07 | End: 2020-10-07

## 2020-10-07 RX ORDER — HYDROXYZINE HYDROCHLORIDE 25 MG/1
25 TABLET, FILM COATED ORAL
Status: DISCONTINUED | OUTPATIENT
Start: 2020-10-07 | End: 2020-10-08 | Stop reason: HOSPADM

## 2020-10-07 RX ORDER — ACETAMINOPHEN 325 MG/1
975 TABLET ORAL ONCE
Status: DISCONTINUED | OUTPATIENT
Start: 2020-10-07 | End: 2020-10-08 | Stop reason: HOSPADM

## 2020-10-07 RX ORDER — OXYCODONE HYDROCHLORIDE 5 MG/1
5 TABLET ORAL EVERY 4 HOURS PRN
Status: DISCONTINUED | OUTPATIENT
Start: 2020-10-07 | End: 2020-10-08 | Stop reason: HOSPADM

## 2020-10-07 RX ORDER — OMEPRAZOLE 40 MG/1
40 CAPSULE, DELAYED RELEASE ORAL DAILY
Qty: 7 CAPSULE | Refills: 0 | Status: SHIPPED | OUTPATIENT
Start: 2020-10-07 | End: 2020-10-21

## 2020-10-07 RX ORDER — ASPIRIN 81 MG
100 TABLET, DELAYED RELEASE (ENTERIC COATED) ORAL 2 TIMES DAILY PRN
Qty: 30 TABLET | Refills: 0 | Status: SHIPPED | OUTPATIENT
Start: 2020-10-07 | End: 2021-11-04

## 2020-10-07 RX ORDER — ONDANSETRON 4 MG/1
4 TABLET, ORALLY DISINTEGRATING ORAL EVERY 30 MIN PRN
Status: DISCONTINUED | OUTPATIENT
Start: 2020-10-07 | End: 2020-10-08 | Stop reason: HOSPADM

## 2020-10-07 RX ORDER — GABAPENTIN 300 MG/1
600 CAPSULE ORAL ONCE
Status: COMPLETED | OUTPATIENT
Start: 2020-10-07 | End: 2020-10-07

## 2020-10-07 RX ORDER — KETAMINE HYDROCHLORIDE 10 MG/ML
INJECTION INTRAMUSCULAR; INTRAVENOUS PRN
Status: DISCONTINUED | OUTPATIENT
Start: 2020-10-07 | End: 2020-10-07

## 2020-10-07 RX ORDER — FENTANYL CITRATE 50 UG/ML
25-50 INJECTION, SOLUTION INTRAMUSCULAR; INTRAVENOUS
Status: DISCONTINUED | OUTPATIENT
Start: 2020-10-07 | End: 2020-10-08 | Stop reason: HOSPADM

## 2020-10-07 RX ORDER — ONDANSETRON 2 MG/ML
INJECTION INTRAMUSCULAR; INTRAVENOUS PRN
Status: DISCONTINUED | OUTPATIENT
Start: 2020-10-07 | End: 2020-10-07

## 2020-10-07 RX ORDER — BUPIVACAINE HYDROCHLORIDE 5 MG/ML
INJECTION, SOLUTION EPIDURAL; INTRACAUDAL PRN
Status: DISCONTINUED | OUTPATIENT
Start: 2020-10-07 | End: 2020-10-07

## 2020-10-07 RX ORDER — HYDROMORPHONE HYDROCHLORIDE 1 MG/ML
.3-.5 INJECTION, SOLUTION INTRAMUSCULAR; INTRAVENOUS; SUBCUTANEOUS EVERY 10 MIN PRN
Status: DISCONTINUED | OUTPATIENT
Start: 2020-10-07 | End: 2020-10-08 | Stop reason: HOSPADM

## 2020-10-07 RX ORDER — ALBUTEROL SULFATE 0.83 MG/ML
2.5 SOLUTION RESPIRATORY (INHALATION) EVERY 4 HOURS PRN
Status: DISCONTINUED | OUTPATIENT
Start: 2020-10-07 | End: 2020-10-08 | Stop reason: HOSPADM

## 2020-10-07 RX ORDER — ACETAMINOPHEN 325 MG/1
975 TABLET ORAL ONCE
Status: DISCONTINUED | OUTPATIENT
Start: 2020-10-07 | End: 2020-10-07 | Stop reason: HOSPADM

## 2020-10-07 RX ORDER — CEFAZOLIN SODIUM 1 G/50ML
1 SOLUTION INTRAVENOUS SEE ADMIN INSTRUCTIONS
Status: DISCONTINUED | OUTPATIENT
Start: 2020-10-07 | End: 2020-10-07 | Stop reason: HOSPADM

## 2020-10-07 RX ORDER — ONDANSETRON 2 MG/ML
4 INJECTION INTRAMUSCULAR; INTRAVENOUS EVERY 30 MIN PRN
Status: DISCONTINUED | OUTPATIENT
Start: 2020-10-07 | End: 2020-10-08 | Stop reason: HOSPADM

## 2020-10-07 RX ORDER — SENNOSIDES 8.6 MG
2 TABLET ORAL 2 TIMES DAILY PRN
Qty: 30 TABLET | Refills: 0 | Status: SHIPPED | OUTPATIENT
Start: 2020-10-07 | End: 2021-11-04

## 2020-10-07 RX ORDER — SENNOSIDES 8.6 MG
CAPSULE ORAL
Qty: 100 TABLET | Refills: 0 | Status: SHIPPED | OUTPATIENT
Start: 2020-10-07 | End: 2020-11-04

## 2020-10-07 RX ORDER — SODIUM CHLORIDE, SODIUM LACTATE, POTASSIUM CHLORIDE, CALCIUM CHLORIDE 600; 310; 30; 20 MG/100ML; MG/100ML; MG/100ML; MG/100ML
INJECTION, SOLUTION INTRAVENOUS CONTINUOUS
Status: DISCONTINUED | OUTPATIENT
Start: 2020-10-07 | End: 2020-10-08 | Stop reason: HOSPADM

## 2020-10-07 RX ORDER — DEXAMETHASONE SODIUM PHOSPHATE 4 MG/ML
INJECTION, SOLUTION INTRA-ARTICULAR; INTRALESIONAL; INTRAMUSCULAR; INTRAVENOUS; SOFT TISSUE PRN
Status: DISCONTINUED | OUTPATIENT
Start: 2020-10-07 | End: 2020-10-07

## 2020-10-07 RX ORDER — OXYCODONE HYDROCHLORIDE 5 MG/1
5-10 TABLET ORAL EVERY 4 HOURS PRN
Qty: 10 TABLET | Refills: 0 | Status: SHIPPED | OUTPATIENT
Start: 2020-10-07 | End: 2020-10-21

## 2020-10-07 RX ORDER — IBUPROFEN 200 MG
400 TABLET ORAL
Status: COMPLETED | OUTPATIENT
Start: 2020-10-07 | End: 2020-10-07

## 2020-10-07 RX ORDER — LIDOCAINE 40 MG/G
CREAM TOPICAL
Status: DISCONTINUED | OUTPATIENT
Start: 2020-10-07 | End: 2020-10-07 | Stop reason: HOSPADM

## 2020-10-07 RX ORDER — KETOROLAC TROMETHAMINE 30 MG/ML
INJECTION, SOLUTION INTRAMUSCULAR; INTRAVENOUS PRN
Status: DISCONTINUED | OUTPATIENT
Start: 2020-10-07 | End: 2020-10-07

## 2020-10-07 RX ORDER — ACETAMINOPHEN 325 MG/1
650 TABLET ORAL
Status: DISCONTINUED | OUTPATIENT
Start: 2020-10-07 | End: 2020-10-08 | Stop reason: HOSPADM

## 2020-10-07 RX ORDER — OXYCODONE HYDROCHLORIDE 5 MG/1
5 TABLET ORAL
Status: DISCONTINUED | OUTPATIENT
Start: 2020-10-07 | End: 2020-10-08 | Stop reason: HOSPADM

## 2020-10-07 RX ORDER — HYDRALAZINE HYDROCHLORIDE 20 MG/ML
2.5-5 INJECTION INTRAMUSCULAR; INTRAVENOUS EVERY 10 MIN PRN
Status: DISCONTINUED | OUTPATIENT
Start: 2020-10-07 | End: 2020-10-08 | Stop reason: HOSPADM

## 2020-10-07 RX ORDER — KETOROLAC TROMETHAMINE 10 MG/1
TABLET, FILM COATED ORAL
Qty: 13 TABLET | Refills: 0 | Status: SHIPPED | OUTPATIENT
Start: 2020-10-07 | End: 2020-10-12

## 2020-10-07 RX ORDER — PROPOFOL 10 MG/ML
INJECTION, EMULSION INTRAVENOUS PRN
Status: DISCONTINUED | OUTPATIENT
Start: 2020-10-07 | End: 2020-10-07

## 2020-10-07 RX ORDER — LABETALOL 20 MG/4 ML (5 MG/ML) INTRAVENOUS SYRINGE
10
Status: DISCONTINUED | OUTPATIENT
Start: 2020-10-07 | End: 2020-10-08 | Stop reason: HOSPADM

## 2020-10-07 RX ORDER — NALOXONE HYDROCHLORIDE 0.4 MG/ML
.1-.4 INJECTION, SOLUTION INTRAMUSCULAR; INTRAVENOUS; SUBCUTANEOUS
Status: DISCONTINUED | OUTPATIENT
Start: 2020-10-07 | End: 2020-10-08 | Stop reason: HOSPADM

## 2020-10-07 RX ORDER — METOCLOPRAMIDE 5 MG/1
5 TABLET ORAL EVERY 8 HOURS PRN
Qty: 10 TABLET | Refills: 0 | Status: SHIPPED | OUTPATIENT
Start: 2020-10-07 | End: 2020-10-21

## 2020-10-07 RX ORDER — MEPERIDINE HYDROCHLORIDE 25 MG/ML
12.5 INJECTION INTRAMUSCULAR; INTRAVENOUS; SUBCUTANEOUS
Status: DISCONTINUED | OUTPATIENT
Start: 2020-10-07 | End: 2020-10-08 | Stop reason: HOSPADM

## 2020-10-07 RX ORDER — PROPOFOL 10 MG/ML
INJECTION, EMULSION INTRAVENOUS CONTINUOUS PRN
Status: DISCONTINUED | OUTPATIENT
Start: 2020-10-07 | End: 2020-10-07

## 2020-10-07 RX ORDER — BUPIVACAINE HYDROCHLORIDE AND EPINEPHRINE 2.5; 5 MG/ML; UG/ML
INJECTION, SOLUTION INFILTRATION; PERINEURAL PRN
Status: DISCONTINUED | OUTPATIENT
Start: 2020-10-07 | End: 2020-10-07 | Stop reason: HOSPADM

## 2020-10-07 RX ORDER — ONDANSETRON 4 MG/1
4 TABLET, ORALLY DISINTEGRATING ORAL
Status: DISCONTINUED | OUTPATIENT
Start: 2020-10-07 | End: 2020-10-08 | Stop reason: HOSPADM

## 2020-10-07 RX ADMIN — ACETAMINOPHEN 975 MG: 325 TABLET ORAL at 12:01

## 2020-10-07 RX ADMIN — Medication 400 MG: at 12:01

## 2020-10-07 RX ADMIN — DEXAMETHASONE SODIUM PHOSPHATE 4 MG: 4 INJECTION, SOLUTION INTRA-ARTICULAR; INTRALESIONAL; INTRAMUSCULAR; INTRAVENOUS; SOFT TISSUE at 13:03

## 2020-10-07 RX ADMIN — BUPIVACAINE HYDROCHLORIDE 10 ML: 5 INJECTION, SOLUTION EPIDURAL; INTRACAUDAL at 12:32

## 2020-10-07 RX ADMIN — ONDANSETRON 4 MG: 2 INJECTION INTRAMUSCULAR; INTRAVENOUS at 13:03

## 2020-10-07 RX ADMIN — KETAMINE HYDROCHLORIDE 30 MG: 10 INJECTION INTRAMUSCULAR; INTRAVENOUS at 13:03

## 2020-10-07 RX ADMIN — GLYCOPYRROLATE 0.2 MG: 0.2 INJECTION, SOLUTION INTRAMUSCULAR; INTRAVENOUS at 12:54

## 2020-10-07 RX ADMIN — KETOROLAC TROMETHAMINE 30 MG: 30 INJECTION, SOLUTION INTRAMUSCULAR; INTRAVENOUS at 14:20

## 2020-10-07 RX ADMIN — PROPOFOL 200 MG: 10 INJECTION, EMULSION INTRAVENOUS at 13:02

## 2020-10-07 RX ADMIN — PROPOFOL 150 MCG/KG/MIN: 10 INJECTION, EMULSION INTRAVENOUS at 13:06

## 2020-10-07 RX ADMIN — GABAPENTIN 300 MG: 300 CAPSULE ORAL at 12:01

## 2020-10-07 RX ADMIN — SODIUM CHLORIDE, SODIUM LACTATE, POTASSIUM CHLORIDE, CALCIUM CHLORIDE: 600; 310; 30; 20 INJECTION, SOLUTION INTRAVENOUS at 12:15

## 2020-10-07 RX ADMIN — CEFAZOLIN SODIUM 2 G: 2 SOLUTION INTRAVENOUS at 13:17

## 2020-10-07 RX ADMIN — PROPOFOL 120 MCG/KG/MIN: 10 INJECTION, EMULSION INTRAVENOUS at 13:46

## 2020-10-07 ASSESSMENT — MIFFLIN-ST. JEOR: SCORE: 1717.36

## 2020-10-07 NOTE — BRIEF OP NOTE
[facility] Brief Operative Note    Pre-operative diagnosis: Closed displaced fracture of shaft of right clavicle, initial encounter [S42.021A]   Post-operative diagnosis Closed displaced fracture of shaft of right clavicle, initial encounter [S42.021A]   Procedure: Procedure(s):  RIGHT OPEN REDUCTION INTERNAL FIXATION, FRACTURE, CLAVICLE   Surgeon(s): Surgeon(s) and Role:     * Edil Ruth MD - Primary     * Kamla Moseley MD - Resident - Assisting   Estimated blood loss: 10 mL    Specimens: None   Findings: Displaced midshaft clavicle fracture with minimal comminution. See dictated operative report for further details.

## 2020-10-07 NOTE — ANESTHESIA POSTPROCEDURE EVALUATION
Anesthesia POST Procedure Evaluation    Patient: Morgan Bosch   MRN:     [id number] Gender:   male   Age:    35 year old :      1985        Preoperative Diagnosis: Closed displaced fracture of shaft of right clavicle, initial encounter [S42.021A]   Procedure(s):  RIGHT OPEN REDUCTION INTERNAL FIXATION, FRACTURE, CLAVICLE   Postop Comments: No value filed.     Anesthesia Type: General, Peripheral Nerve Block, For Post-op pain in coordination with surgeon       Disposition: Outpatient   Postop Pain Control: Uneventful            Sign Out: Well controlled pain   PONV: No   Neuro/Psych: Uneventful            Sign Out: Acceptable/Baseline neuro status   Airway/Respiratory: Uneventful            Sign Out: Acceptable/Baseline resp. status   CV/Hemodynamics: Uneventful            Sign Out: Acceptable CV status   Other NRE: NONE   DID A NON-ROUTINE EVENT OCCUR? No         Last Anesthesia Record Vitals:  CRNA VITALS  10/7/2020 1425 - 10/7/2020 1525      10/7/2020             Pulse:  94    SpO2:  95 %    Resp Rate (observed):  13          Last PACU Vitals:  Vitals Value Taken Time   BP 98/64 10/07/20 1530   Temp 36.2  C (97.2  F) 10/07/20 1459   Pulse 99 10/07/20 1540   Resp 16 10/07/20 1540   SpO2 87 % 10/07/20 1539   Temp src     NIBP     Pulse     SpO2     Resp     Temp     Ht Rate     Temp 2     Vitals shown include unvalidated device data.      Electronically Signed By: Isaac Brasher MD, MD, 2020, 3:42 PM

## 2020-10-07 NOTE — ANESTHESIA CARE TRANSFER NOTE
Patient: Morgan Bosch    Procedure(s):  RIGHT OPEN REDUCTION INTERNAL FIXATION, FRACTURE, CLAVICLE    Diagnosis: Closed displaced fracture of shaft of right clavicle, initial encounter [S42.021A]  Diagnosis Additional Information: No value filed.    Anesthesia Type:   General, Peripheral Nerve Block, For Post-op pain in coordination with surgeon     Note:  Airway :Face Mask and Oral Airway  Patient transferred to:PACU  Comments: VSS and WNL, comfortable, no PONV, report to Glenny POSADAHandoff Report: Identifed the Patient, Identified the Reponsible Provider, Reviewed the pertinent medical history, Discussed the surgical course, Reviewed Intra-OP anesthesia mangement and issues during anesthesia, Set expectations for post-procedure period and Allowed opportunity for questions and acknowledgement of understanding      Vitals: (Last set prior to Anesthesia Care Transfer)    CRNA VITALS  10/7/2020 1425 - 10/7/2020 1459      10/7/2020             Pulse:  94    SpO2:  95 %    Resp Rate (observed):  13                Electronically Signed By: VERONIKA No CRNA  October 7, 2020  2:59 PM

## 2020-10-07 NOTE — ANESTHESIA PROCEDURE NOTES
Peripheral Nerve Block Procedure Note      Staff -   Anesthesiologist:  Isaac Brasher MD  Performed By: anesthesiologist  Location: Pre-op  Procedure Start/Stop TImes:      10/7/2020 12:31 PM     10/7/2020 12:37 PM    patient identified, IV checked, site marked, risks and benefits discussed, informed consent, monitors and equipment checked, pre-op evaluation, at physician/surgeon's request and post-op pain management      Correct Patient: Yes      Correct Position: Yes      Correct Site: Yes      Correct Procedure: Yes      Correct Laterality:  Yes    Site Marked:  Yes  Procedure details:     Procedure:  Interscalene    ASA:  2    Diagnosis:  Postoperative pain relief    Laterality:  Right    Position:  Supine    Sterile Prep: chloraprep, mask and sterile gloves      Local skin infiltration:  None    Needle:  Insulated    Needle gauge:  21    Needle length (mm):  110    Ultrasound: Yes      Ultrasound used to identify targeted nerve, plexus, or vascular structure and placed a needle adjacent to it      Permanent Image entered into patiient's record      Abnormal pain on injection: No      Blood Aspirated: No      Paresthesias:  No    Bleeding at site: No      Bolus via:  Needle    Infusion Method:  Single Shot    Complications:  None  Assessment/Narrative:     Injection made incrementally with aspirations every (mL):  5     133 mg exparel given via interscalene

## 2020-10-07 NOTE — OP NOTE
DATE: 10/7/2020    PREOPERATIVE DIAGNOSIS:  1. Right shoulder displaced midshaft clavicle fracture, closed    POSTOPERATIVE DIAGNOSIS:    1. Right shoulder displaced midshaft clavicle fracture, closed      PROCEDURES PERFORMED:  1. Right clavicle open reduction internal fixation    SURGEON: Edil Ruth MD    ASSISTANT:   1. Kamla Moseley MD - PGY-2 Orthopedic Surgery Resident  2. Alan Meyer PA-C - The assistance of Alan Meyer PA-C was necessitated by the orthopedic complexity of the case, the need to position the arm in space and to assist and hold retractors.  No other level of surgical assistant would have provided adequate support for the patient's surgical best interest.     ANESTHESIA: Regional with interscalene nerve block and General    COMPLICATIONS: None apparent    ESTIMATED BLOOD LOSS: 10 cc    TOURNIQUET TIME: None    MEDICATIONS: 2 g of IV Cefazolin    DVT PROPHYLAXIS: SCDs to the bilateral lower extremities    IMPLANTS:   1. Ana VariAx 2 clavicle plate with 6 x 3.5 mm cortical screws and 1 x 2.7 mm cortical lag screw outside of the plate.    FINDINGS:  Examination under anesthesia revealed pre-operative right shoulder passive forward elevation to 170, external rotation in abduction to 90, external rotation in adduction to 70, and internal rotation in abduction to 70. 2+ anterior load and shift of the shoulder in 90 degrees abduction and 1+ posterior load and shift. 1+ sulcus that reduces with external rotation. Midshaft clavicle fracture with minimal small fragments of comminution and complete displacement with interposed soft tissues of the trapezius muscle.    INDICATIONS:   Morgan is a 35 year old male who sustained a right shoulder injury on 9/26/2020 when he fell off of his skateboard and fell forcefully onto his right shoulder. Morgan presented to me for evaluation on 10/2/2020. History, physical exam, and imaging studies were consistent with the above diagnoses. I discussed  the treatment options with the patient and his family including: living with the symptoms, continued non-operative management, and surgical intervention. After discussing all options in detail, Morgan very much desired to proceed with the above listed surgical procedure. The risks, benefits and alternatives of a right clavicle open reduction and internal fixation and related procedures were discussed in great detail including, but not limited to the following: Complications of anesthesia, including death, infection, nerve, tendon, or vessel injury, DVT or PE, potential of shoulder stiffness, potential of remi-incisional numbness, potential of delayed union, malunion, or nonunion, potential of the procedure to not alleviate the condition, potential need for further surgery in the future including the need for possible elective hardware removal. Morgan expressed good understanding of the risks, potential benefits, and the alternatives to surgery. All of his questions were answered satisfactorily and signed informed consent was obtained. Important to note, the patient was cleared for surgery and tested negative for COVID-19 prior to the date of surgery.      PROCEDURE:   The patient was met in the pre-operative holding area. The operative extremity was confirmed and marked with the patient's participation and after confirming with the radiographs. The operative plan, risks, and informed consent were reviewed in detail. The patient's questions were answered in detail to their satisfaction. Regional anesthesia was established with an interscalene nerve block by the anesthesiologist. The patient was then brought to the operating room by the anesthesia providers. General anesthesia was smoothly established. The patient was then positioned into the beachchair position with the neck held in a neutral position and all bony prominences well padded. Sequential compression devices were applied to the bilateral lower  extremities for and the patient was secured to the table with a safety strap. The right upper extremity was prepped and draped in a standard sterile surgical fashion. Time-out was performed according to hospital protocol confirming the correct patient, correct side and site, and correct operative procedure. All parties were in agreement. Examination under anesthesia revealed the above-noted findings.      An 8 cm skin incision was made anterior to and parallel to the clavicle. Skin flaps were raised and meticulous hemostasis was obtained with bovie electrocautery. Careful dissection down to the palpable medial fragment was performed and subperiosteal dissection of the soft tissue envelope was done.          The lateral fragment was likewise exposed and circumferential control of both fragments allowed an anatomic reduction to easily be performed.          A pointed reduction clamp was then used to hold the reduction. I then drilled for and placed a 2.7 mm fully threaded cortical lag screw to compress the fractures edges in near anatomic position. A 7-hole pre-contoured Vichy VariAx 2 locking 3.5mm plate was then applied and the plate was systematically fixed to the clavicle with a series of 3.5mm non-locking and locking screws. The small fragments of comminution were placed anteriorly and held in place with the soft tissue envelope closure. The clavicle was stable through a full range of glenohumeral motion.           The wound was then vigorously irrigated and the deep fascia was closed with interrupted 0 Vicryl suture. The skin wound was then closed in layered fashion with interrupted 3-0 Vicryl and a running subcuticular 4-0 Monocryl suture. Dermabond adhesive skin glue followed by a sterile Tegaderm dressing was applied. A sling was applied.    The patient had a warm and well perfused hand with 2+ radial pulse and brisk capillary refill at the end of the operation. The patient was awakened from anesthesia and  taken to the recovery room in stable condition with no apparent complications. All sponge and needle counts were correct.    POST-OPERATIVE PLAN:  The patient will be allowed to discharge to home today once he meets all same day surgery criteria. The patient will be discharged home on a multimodal analgesia regimen to minimize the number of narcotic pain medications needed. The patient will utilize cryotherapy in the post-operative period. The sling will remain on and secured at all times for the first four weeks post-op except for hygiene, therapy, and home exercises.     The patient will follow my Clavicle ORIF protocol as follows:  1. No weightbearing to the right upper extremity for 6 weeks, sling for 4 weeks.   2. Ok for pendulum's and elbow/wrist/hand/finger range of motion exercises immediately post-op.  3. Supervised and structured physical therapy will begin at 1 week post-op.  4. Will initiate gentle AROM and AAROM at 4 weeks post-op.  5. Advanced progressive strengthening sport specific retraining will begin at 12 weeks.  6. If full functional return of motion, symmetric strength, and no pain and tenderness, will aim to return to unrestricted sport at 4 months post-op.   7. Ok to return to light duty work once off of all pain medications if can perform job without the use of the operative extremity.   8. Patient will be seen by me in clinic at day 10-14 post-op with repeat right clavicle radiographs, 6 weeks with repeat radiographs, and 3 months post-op with repeat radiographs. Further appointments and imaging to be determined based on progress with therapies and sooner if issues arise.     This is Edil Ruth MD and I was present and performed all aspects of this case.

## 2020-10-07 NOTE — ANESTHESIA PREPROCEDURE EVALUATION
Anesthesia Pre-Procedure Evaluation    Patient: Morgan Bosch   MRN:     [id number] Gender:   male   Age:    35 year old :      1985        Preoperative Diagnosis: Closed displaced fracture of shaft of right clavicle, initial encounter [S42.021A]   Procedure(s):  RIGHT OPEN REDUCTION INTERNAL FIXATION, FRACTURE, CLAVICLE     LABS:  CBC:   Lab Results   Component Value Date    WBC 8.5 2020    WBC 11.3 (H) 2019    HGB 13.0 (L) 2020    HGB 13.7 2019    HCT 40.0 2020    HCT 41.7 2019     2020     2019     BMP:   Lab Results   Component Value Date     2020     2019    POTASSIUM 4.2 2020    POTASSIUM 4.1 2019    CHLORIDE 108 2020    CHLORIDE 102 2019    CO2 31 2020    CO2 27 2019    BUN 14 2020    BUN 19 2019    CR 1.12 2020    CR 1.23 2019    GLC 91 2020    GLC 95 2019     COAGS:   Lab Results   Component Value Date    PTT 29 2010    INR 1.01 2019    FIBR 320 2010     POC: No results found for: BGM, HCG, HCGS  OTHER:   Lab Results   Component Value Date    PH 7.45 2010    LACT 1.2 10/29/2017    JULIA 8.7 2020    PHOS 3.3 2010    MAG 2.0 2008    ALBUMIN 3.5 2020    PROTTOTAL 6.7 (L) 2020    ALT 27 2020    AST 22 2020    GGT 15 2011    ALKPHOS 106 2020    BILITOTAL 0.9 2020    LIPASE 182 10/29/2017    AMYLASE 70 2016    TSH 0.18 (L) 2017    T4 1.21 2017    T3 94 2010    CRP 16.3 (H) 2019    SED 9 2019        Preop Vitals    BP Readings from Last 3 Encounters:   10/07/20 109/74   20 112/72   20 113/76    Pulse Readings from Last 3 Encounters:   10/07/20 107   20 104   20 82      Resp Readings from Last 3 Encounters:   10/07/20 18   20 18   10/06/19 18    SpO2 Readings from Last 3 Encounters:   10/07/20 97%  "  09/28/20 100%   07/06/20 98%      Temp Readings from Last 1 Encounters:   10/07/20 36.7  C (98  F) (Oral)    Ht Readings from Last 1 Encounters:   10/07/20 1.778 m (5' 10\")      Wt Readings from Last 1 Encounters:   10/07/20 77.1 kg (170 lb)    Estimated body mass index is 24.39 kg/m  as calculated from the following:    Height as of this encounter: 1.778 m (5' 10\").    Weight as of this encounter: 77.1 kg (170 lb).     LDA:        Past Medical History:   Diagnosis Date     Anxiety      Depressive disorder      MDD (major depressive disorder)      Migraines      Psychosis (H)     substance induced     Substance abuse (H)     methamphetamine      Past Surgical History:   Procedure Laterality Date     NO HISTORY OF SURGERY        Allergies   Allergen Reactions     Adhesive Tape Itching     Blisters (band aids)     Prozac [Fluoxetine]      Increased anxiety and shakiness        Anesthesia Evaluation     . Pt has had prior anesthetic. Type: General    No history of anesthetic complications          ROS/MED HX    ENT/Pulmonary:  - neg pulmonary ROS     Neurologic:  - neg neurologic ROS     Cardiovascular:  - neg cardiovascular ROS       METS/Exercise Tolerance:  >4 METS   Hematologic:  - neg hematologic  ROS       Musculoskeletal:  - neg musculoskeletal ROS       GI/Hepatic:  - neg GI/hepatic ROS       Renal/Genitourinary:  - ROS Renal section negative       Endo:  - neg endo ROS       Psychiatric: Comment: Drug abuse history    (+) psychiatric history       Infectious Disease:  - neg infectious disease ROS       Malignancy:      - no malignancy   Other:                         PHYSICAL EXAM:   Mental Status/Neuro: A/A/O   Airway: Facies: Feasible  Mallampati: I  Mouth/Opening: Full  TM distance: > 6 cm  Neck ROM: Full   Respiratory: Auscultation: CTAB     Resp. Rate: Normal     Resp. Effort: Normal      CV: Rhythm: Regular  Rate: Age appropriate  Heart: Normal Sounds  Edema: None   Comments:      Dental: Normal " Dentition                Assessment:   ASA SCORE: 3    H&P: History and physical reviewed and following examination; no interval change.     Smoking Status:  Active Smoker       - patient did not smoke on day of surgery       - instructed to abstain from smoking on day of procedure   NPO Status: NPO Appropriate     Plan:   Anes. Type:  General; Peripheral Nerve Block; For Post-op pain in coordination with surgeon     Block Details: Interscalene; Single Shot; Exparel   Pre-Medication: None   Induction:  IV (Standard)   Airway: LMA   Access/Monitoring: PIV   Maintenance: Balanced     Postop Plan:   Postop Pain: Opioids  Postop Sedation/Airway: Not planned  Disposition: Outpatient     PONV Management:   Adult Risk Factors:, Postop Opioids   Prevention: Ondansetron, Dexamethasone     CONSENT: Direct conversation   Plan and risks discussed with: Patient          Comments for Plan/Consent:  Patient was informed of my disclosures, the potential for being prescribed a medication for a company that I consult with and earn income from, and that this complies with [facility] policies.                  Isaac Brasher MD, MD

## 2020-10-07 NOTE — DISCHARGE INSTRUCTIONS
"[facility]  Home Care Following Anesthesia  For 24 hours after surgery:  1. Get plenty of rest.  A responsible adult must stay with you for at least 24 hours after you leave the surgery center.  2. Do not drive or use heavy equipment.  If you have weakness or tingling, don't drive or use heavy equipment until this feeling goes away.   3. Do not drink alcohol.   4. Avoid strenuous or risky activities.  Ask for help when climbing stairs.  5. You may feel lightheaded.  IF so, sit for a few minutes before standing.  Have someone help you get up.   6. If you have nausea (feel sick to your stomach): Drink only clear liquids such as apple juice, ginger ale, broth or 7-Up.  Rest may also help.  Be sure to drink enough fluids.  Move to a regular diet as you feel able.   7. You may have a slight fever.  Call the doctor if your fever is over 100 F (37.7 C) (taken under the tongue) or lasts longer than 24 hours.  8. You may have a dry mouth, a sore throat, muscle aches or trouble sleeping. These should go away after 24 hours.  9. Do not make important or legal decisions.        Today you received an Exparel block to numb the nerves near your surgery site.  This is a block using local anesthetic or \"numbing\" medication injected around the nerves to anesthetize or \"numb\" the area supplied by those nerves.  This block is injected into the muscle layer near your surgical site.  This medication may numb the location where you had surgery up to 72 hours.  If your surgical site is an arm or leg you should be careful with your affected limb, since it is possible to injure your limb without being aware of it due to the numbing.  Until full feeling returns, you should guard against bumping or hitting your limb, and avoid extreme hot or cold temperatures on the skin.  As the block wears off, the feeling will return as a tingling or prickly sensation near your surgical site.  You will experince more " discomfort from your incision as the feeling returns.  You may want to take a pain pill (a narcotic or Tylenol if this was prescribed by your surgeon) when you start to experience mild pain before the pain beomes more severe.  If your pain medications do not control your pain, you should notify your surgeon.    Tips for taking pain medications  To get the best pain relief possible, remember these points:    Take pain medications as directed, before pain becomes severe.    Pain medication can upset your stomach: taking it with food may help.    Constipation is a common side effect of pain medication. Drink plenty of  fluids.    Eat foods high in fiber. Take a stool softener if recommended by your doctor or pharmacist.    Do not drink alcohol, drive or operate machinery while taking pain medications.    Ask about other ways to control pain, such as with heat, ice or relaxation.    Tylenol/Acetaminophen Consumption  To help encourage the safe use of acetaminophen, the makers of TYLENOL  have lowered the maximum daily dose for single-ingredient Extra Strength TYLENOL  (acetaminophen) products sold in the [place] from 8 pills per day (4,000 mg) to 6 pills per day (3,000 mg). The dosing interval has also changed from 2 pills every 4-6 hours to 2 pills every 6 hours.    If you feel your pain relief is insufficient, you may take Tylenol/Acetaminophen in addition to your narcotic pain medication.     Be careful not to exceed 3,000 mg of Tylenol/Acetaminophen in a 24 hour period from all sources.    If you are taking extra strength Tylenol/acetaminophen (500 mg), the maximum dose is 6 tablets in 24 hours.    If you are taking regular strength acetaminophen (325 mg), the maximum dose is 9 tablets in 24 hours.    Call a doctor for any of the followin. Signs of infection (fever, growing tenderness at the surgery site, a large amount of drainage or bleeding, severe pain, foul-smelling drainage, redness, swelling).  2. It has  "been over 8 to 10 hours since surgery and you are still not able to urinate (pass water).  3. Headache for over 24 hours.  4. Numbness, tingling or weakness the day after surgery (if you had spinal anesthesia).  5. Signs of Covid-19 infection (temperature over 100 degrees, shortness of breath, cough, loss of taste/smell, generalized body aches, persistent headache, chills, sore throat, nausea/vomiting/diarrhea)  Your doctor is:  Dr. Edil uRth, Orthopaedics: [phone]                    Or dial [phone] and ask for the resident on call for:  Orthopaedics  For emergency care, call the:  [facility] Emergency Department: [phone] (TTY for hearing impaired: [phone])    Information about liposomal bupivacaine (Exparel)    What is Liposomal Bupivacaine?    Liposomal Bupivacaine is a numbing medication that can help you manage your pain after surgery.  This medication is similar to \"novacaine,\" which is often used by the dentist.  Liposomal bupivacaine is released slowly and can help control pain for up to 72 hours.    What is the purpose of Liposomal Bupivacaine?    To manage your pain after surgery    To help you sleep better, take deep breaths, walk more comfortable, and feel up to visiting with others    How is the procedure done?    Liposomal bupivacaine is a medication given by an injection.    It is usually given right before your surgery.  If this is the case, you will be awake or sedated, but you should experience minimal pain during the procedure.    For some people, the injection may be given at the very end of your surgery.  It all depends on the type of surgery and your situation.    The procedure usually takes about 5-15 minutes.  An ultrasound machine will help the anesthesiologist insert it in the right place or the surgeon will inject it under direct vision.     A needle is used to place the numbing medication under your skin.  It provides pain relief by numbing the tissue in the area " where your surgeon will make the incision.    What can I expect?    You may experience numbness, tingling, or a feeling of heaviness around the area that was injected.    If you experience any of the follow symptoms IMMEDIATELY CALL THE REGIONAL ANESTHESIA PAIN SERVICE:    Numbness or tingling occurs in areas other than around the injection site    Blurry vision    Ringing in your ears    A metallic taste in your mouth    PAGE: Dial [phone].  When prompted, enter the following 4-digit ID number:  [id number].  You will be prompted to enter your phone number; and then enter the # sign.  The clinician on call will call you back.    OR    CALL: Dial [phone].  Let the hospital  know that you are having a problem with a nerve block and that you would like to speak to the regional anesthesia pain service right away.    You should not receive any other type of numbing medication within 4 days after receiving liposomal bupivacaine unless your anesthesiologist approves.      Post Operative Instructions: Regional Anesthetic for Upper Extremity with Liposomal Bupivacaine  General Information:   Regional anesthesia is when local anesthetic or  numbing  medication is injected around the nerves to anesthetize or  numb  the area supplied by that set of nerves. It is a type of analgesia used to control pain and decreases the need for narcotics following surgery.    Types of Regional Blocks:  Interscalene: A block injected into the neck on the operative shoulder/arm of a patient having shoulder surgery  Supraclavicular: A block injected near the clavicle on the operative shoulder of a patient having elbow, forearm, or hand surgery    Procedure:  The type of anesthesia your doctor used to numb your shoulder or arm will usually not start to wear off for 24-48 hours, but may last as long as 72 hours. You should be careful during that period, since it is possible to injure your arm without being aware of the injury. While  your arm is numb, you should:    Avoid striking or bumping your arm    Avoid extreme hot or cold    Diet:  There are no restrictions on your diet. You should drink plenty of fluids.     Discomfort:  You will have a tingling and prickly sensation in your arm as the feeling begins to return. You can also expect some discomfort. The amount of discomfort is unpredictable, but if you have more pain than can be controlled with pain medication you should notify your physician.     Pain Medications:  Begin taking your oral pain pills before bedtime and during the night to avoid a sudden onset of pain as part of the block wears off.  Do not engage in drinking, driving, or hazardous occupations while taking pain medication.     Stitches:   You may have stitches or special skin closures. You doctor will inform you when to return to the office to have them removed.     Activity:  On the day of surgery you should try to stay in bed with your hand elevated on pillows. You may resume your normal activity after that, wearing a sling for comfort. Contact your physician if you have any of the problems:     Continued numbness or tingling in the arm or hand after 72 hours    Swelling of the fingers or fingers that are cold to the touch    Excessive bleeding or drainage    Severe pain

## 2020-10-08 ENCOUNTER — TELEPHONE (OUTPATIENT)
Dept: ORTHOPEDICS | Facility: CLINIC | Age: 35
End: 2020-10-08

## 2020-10-08 DIAGNOSIS — S42.021A CLOSED DISPLACED FRACTURE OF SHAFT OF RIGHT CLAVICLE, INITIAL ENCOUNTER: Primary | ICD-10-CM

## 2020-10-08 NOTE — TELEPHONE ENCOUNTER
SURGEON POST-OP TELEPHONE CALL    DATE OF SURGERY: 10/7/2020    OPERATION PERFORMED: Right clavicle open reduction internal fixation    I called Morgan this morning to check in on him. Morgan states that he is doing well with no issues. Block is wearing off and pain is controlled. Morgan denies any fevers, chills, chest pain, shortness of breath, nausea or vomiting. No questions or concerns regarding any of the post-operative instructions at this time.    At the conclusion of the phone call, Morgan verbally acknowledged that I answered all of his questions satisfactorily.

## 2020-10-16 ENCOUNTER — TELEPHONE (OUTPATIENT)
Dept: ORTHOPEDICS | Facility: CLINIC | Age: 35
End: 2020-10-16

## 2020-10-16 NOTE — TELEPHONE ENCOUNTER
Patient was called to see if he was on his way for his appointment today with Dr. Ruth.  He stated that he didn't know about the appointment and must have lost his paperwork.  Dr. Ruth would like him to see his PA Alan Meyer in [facility] next week as he is out for the next week.  A message was sent to Dr. Ruth's staff in [facility] to reach out to the patient.  He was agreeable with this plan.

## 2020-10-19 NOTE — TELEPHONE ENCOUNTER
Left voicemail for patient requesting return call to [facility].   Patient to schedule post-op appointment with Alan Meyer PA-C in Dr. Ruth's absence this week.     Provided call back number to [facility].     Connie Yanez ATC

## 2020-10-20 ENCOUNTER — MYC MEDICAL ADVICE (OUTPATIENT)
Dept: ORTHOPEDICS | Facility: CLINIC | Age: 35
End: 2020-10-20

## 2020-10-20 NOTE — TELEPHONE ENCOUNTER
No consent to communicate on file. Left 2nd message for patient asking that he return call. Call goes directly to voicemail.     JOSE ANGEL Damico RN

## 2020-10-21 ENCOUNTER — HOSPITAL ENCOUNTER (INPATIENT)
Facility: CLINIC | Age: 35
LOS: 5 days | Discharge: HOME | End: 2020-10-26
Attending: FAMILY MEDICINE | Admitting: PSYCHIATRY & NEUROLOGY
Payer: COMMERCIAL

## 2020-10-21 ENCOUNTER — TELEPHONE (OUTPATIENT)
Dept: BEHAVIORAL HEALTH | Facility: CLINIC | Age: 35
End: 2020-10-21

## 2020-10-21 DIAGNOSIS — F10.239 ALCOHOL DEPENDENCE WITH WITHDRAWAL WITH COMPLICATION (H): ICD-10-CM

## 2020-10-21 DIAGNOSIS — Z20.828 EXPOSURE TO SARS-ASSOCIATED CORONAVIRUS: ICD-10-CM

## 2020-10-21 DIAGNOSIS — F13.20 BENZODIAZEPINE DEPENDENCE (H): ICD-10-CM

## 2020-10-21 DIAGNOSIS — F15.10 METHAMPHETAMINE ABUSE (H): ICD-10-CM

## 2020-10-21 DIAGNOSIS — Z98.890 S/P MUSCULOSKELETAL SYSTEM SURGERY: ICD-10-CM

## 2020-10-21 LAB
ALBUMIN SERPL-MCNC: 4.1 G/DL (ref 3.4–5)
ALCOHOL BREATH TEST: 0 (ref 0–0.01)
ALP SERPL-CCNC: 141 U/L (ref 40–150)
ALT SERPL W P-5'-P-CCNC: 26 U/L (ref 0–70)
AMPHETAMINES UR QL SCN: POSITIVE
ANION GAP SERPL CALCULATED.3IONS-SCNC: 7 MMOL/L (ref 3–14)
AST SERPL W P-5'-P-CCNC: 24 U/L (ref 0–45)
BARBITURATES UR QL: NEGATIVE
BASOPHILS # BLD AUTO: 0.1 10E9/L (ref 0–0.2)
BASOPHILS NFR BLD AUTO: 1.2 %
BENZODIAZ UR QL: POSITIVE
BILIRUB SERPL-MCNC: 0.8 MG/DL (ref 0.2–1.3)
BUN SERPL-MCNC: 16 MG/DL (ref 7–30)
CALCIUM SERPL-MCNC: 8.7 MG/DL (ref 8.5–10.1)
CANNABINOIDS UR QL SCN: NEGATIVE
CHLORIDE SERPL-SCNC: 106 MMOL/L (ref 94–109)
CHOLEST SERPL-MCNC: 137 MG/DL
CO2 SERPL-SCNC: 27 MMOL/L (ref 20–32)
COCAINE UR QL: NEGATIVE
CREAT SERPL-MCNC: 1 MG/DL (ref 0.66–1.25)
DIFFERENTIAL METHOD BLD: NORMAL
EOSINOPHIL # BLD AUTO: 0.3 10E9/L (ref 0–0.7)
EOSINOPHIL NFR BLD AUTO: 2.7 %
ERYTHROCYTE [DISTWIDTH] IN BLOOD BY AUTOMATED COUNT: 12.3 % (ref 10–15)
ETHANOL UR QL SCN: NEGATIVE
GFR SERPL CREATININE-BSD FRML MDRD: >90 ML/MIN/{1.73_M2}
GGT SERPL-CCNC: 20 U/L (ref 0–75)
GLUCOSE SERPL-MCNC: 87 MG/DL (ref 70–99)
HCT VFR BLD AUTO: 43.1 % (ref 40–53)
HDLC SERPL-MCNC: 61 MG/DL
HGB BLD-MCNC: 14.1 G/DL (ref 13.3–17.7)
IMM GRANULOCYTES # BLD: 0 10E9/L (ref 0–0.4)
IMM GRANULOCYTES NFR BLD: 0.3 %
LABORATORY COMMENT REPORT: NORMAL
LDLC SERPL CALC-MCNC: 41 MG/DL
LYMPHOCYTES # BLD AUTO: 1.7 10E9/L (ref 0.8–5.3)
LYMPHOCYTES NFR BLD AUTO: 18.1 %
MCH RBC QN AUTO: 31 PG (ref 26.5–33)
MCHC RBC AUTO-ENTMCNC: 32.7 G/DL (ref 31.5–36.5)
MCV RBC AUTO: 95 FL (ref 78–100)
MONOCYTES # BLD AUTO: 0.7 10E9/L (ref 0–1.3)
MONOCYTES NFR BLD AUTO: 7.8 %
NEUTROPHILS # BLD AUTO: 6.4 10E9/L (ref 1.6–8.3)
NEUTROPHILS NFR BLD AUTO: 69.9 %
NONHDLC SERPL-MCNC: 76 MG/DL
NRBC # BLD AUTO: 0 10*3/UL
NRBC BLD AUTO-RTO: 0 /100
OPIATES UR QL SCN: NEGATIVE
PLATELET # BLD AUTO: 386 10E9/L (ref 150–450)
POTASSIUM SERPL-SCNC: 4 MMOL/L (ref 3.4–5.3)
PROT SERPL-MCNC: 7.4 G/DL (ref 6.8–8.8)
RBC # BLD AUTO: 4.55 10E12/L (ref 4.4–5.9)
SARS-COV-2 RNA SPEC QL NAA+PROBE: NEGATIVE
SARS-COV-2 RNA SPEC QL NAA+PROBE: NORMAL
SODIUM SERPL-SCNC: 140 MMOL/L (ref 133–144)
SPECIMEN SOURCE: NORMAL
SPECIMEN SOURCE: NORMAL
TRIGL SERPL-MCNC: 176 MG/DL
TSH SERPL DL<=0.005 MIU/L-ACNC: 0.79 MU/L (ref 0.4–4)
VIT B12 SERPL-MCNC: 537 PG/ML (ref 193–986)
WBC # BLD AUTO: 9.1 10E9/L (ref 4–11)

## 2020-10-21 PROCEDURE — 85025 COMPLETE CBC W/AUTO DIFF WBC: CPT | Performed by: FAMILY MEDICINE

## 2020-10-21 PROCEDURE — 99285 EMERGENCY DEPT VISIT HI MDM: CPT | Performed by: FAMILY MEDICINE

## 2020-10-21 PROCEDURE — 82075 ASSAY OF BREATH ETHANOL: CPT | Performed by: FAMILY MEDICINE

## 2020-10-21 PROCEDURE — 80307 DRUG TEST PRSMV CHEM ANLYZR: CPT | Performed by: FAMILY MEDICINE

## 2020-10-21 PROCEDURE — 250N000013 HC RX MED GY IP 250 OP 250 PS 637: Performed by: PSYCHIATRY & NEUROLOGY

## 2020-10-21 PROCEDURE — 80320 DRUG SCREEN QUANTALCOHOLS: CPT | Performed by: FAMILY MEDICINE

## 2020-10-21 PROCEDURE — 84443 ASSAY THYROID STIM HORMONE: CPT | Performed by: FAMILY MEDICINE

## 2020-10-21 PROCEDURE — C9803 HOPD COVID-19 SPEC COLLECT: HCPCS | Performed by: FAMILY MEDICINE

## 2020-10-21 PROCEDURE — 82607 VITAMIN B-12: CPT | Performed by: FAMILY MEDICINE

## 2020-10-21 PROCEDURE — 128N000004 HC R&B CD ADULT

## 2020-10-21 PROCEDURE — U0003 INFECTIOUS AGENT DETECTION BY NUCLEIC ACID (DNA OR RNA); SEVERE ACUTE RESPIRATORY SYNDROME CORONAVIRUS 2 (SARS-COV-2) (CORONAVIRUS DISEASE [COVID-19]), AMPLIFIED PROBE TECHNIQUE, MAKING USE OF HIGH THROUGHPUT TECHNOLOGIES AS DESCRIBED BY CMS-2020-01-R: HCPCS | Performed by: FAMILY MEDICINE

## 2020-10-21 PROCEDURE — 80053 COMPREHEN METABOLIC PANEL: CPT | Performed by: FAMILY MEDICINE

## 2020-10-21 PROCEDURE — 82977 ASSAY OF GGT: CPT | Performed by: FAMILY MEDICINE

## 2020-10-21 PROCEDURE — 80061 LIPID PANEL: CPT | Performed by: FAMILY MEDICINE

## 2020-10-21 PROCEDURE — 250N000013 HC RX MED GY IP 250 OP 250 PS 637: Performed by: FAMILY MEDICINE

## 2020-10-21 RX ORDER — LORAZEPAM 1 MG/1
2 TABLET ORAL ONCE
Status: COMPLETED | OUTPATIENT
Start: 2020-10-21 | End: 2020-10-21

## 2020-10-21 RX ORDER — TRAZODONE HYDROCHLORIDE 50 MG/1
50 TABLET, FILM COATED ORAL
Status: DISCONTINUED | OUTPATIENT
Start: 2020-10-21 | End: 2020-10-26 | Stop reason: HOSPADM

## 2020-10-21 RX ORDER — DIAZEPAM 5 MG
5-20 TABLET ORAL EVERY 30 MIN PRN
Status: DISCONTINUED | OUTPATIENT
Start: 2020-10-21 | End: 2020-10-26 | Stop reason: HOSPADM

## 2020-10-21 RX ORDER — BISACODYL 10 MG
10 SUPPOSITORY, RECTAL RECTAL DAILY PRN
Status: DISCONTINUED | OUTPATIENT
Start: 2020-10-21 | End: 2020-10-26 | Stop reason: HOSPADM

## 2020-10-21 RX ORDER — LOPERAMIDE HCL 2 MG
2 CAPSULE ORAL 4 TIMES DAILY PRN
Status: DISCONTINUED | OUTPATIENT
Start: 2020-10-21 | End: 2020-10-26 | Stop reason: HOSPADM

## 2020-10-21 RX ORDER — HYDROXYZINE HYDROCHLORIDE 25 MG/1
25 TABLET, FILM COATED ORAL EVERY 4 HOURS PRN
Status: DISCONTINUED | OUTPATIENT
Start: 2020-10-21 | End: 2020-10-26 | Stop reason: HOSPADM

## 2020-10-21 RX ORDER — PHENOBARBITAL 64.8 MG/1
64.8 TABLET ORAL 2 TIMES DAILY
Status: DISCONTINUED | OUTPATIENT
Start: 2020-10-21 | End: 2020-10-21

## 2020-10-21 RX ORDER — ACETAMINOPHEN 325 MG/1
650 TABLET ORAL EVERY 4 HOURS PRN
Status: DISCONTINUED | OUTPATIENT
Start: 2020-10-21 | End: 2020-10-22

## 2020-10-21 RX ORDER — PHENOBARBITAL 64.8 MG/1
64.8 TABLET ORAL 2 TIMES DAILY
Status: DISCONTINUED | OUTPATIENT
Start: 2020-10-21 | End: 2020-10-23

## 2020-10-21 RX ORDER — ONDANSETRON 4 MG/1
4 TABLET, ORALLY DISINTEGRATING ORAL EVERY 6 HOURS PRN
Status: DISCONTINUED | OUTPATIENT
Start: 2020-10-21 | End: 2020-10-26 | Stop reason: HOSPADM

## 2020-10-21 RX ORDER — FOLIC ACID 1 MG/1
1 TABLET ORAL DAILY
Status: DISCONTINUED | OUTPATIENT
Start: 2020-10-21 | End: 2020-10-26 | Stop reason: HOSPADM

## 2020-10-21 RX ORDER — GABAPENTIN 300 MG/1
300 CAPSULE ORAL 3 TIMES DAILY
Status: DISCONTINUED | OUTPATIENT
Start: 2020-10-21 | End: 2020-10-26 | Stop reason: HOSPADM

## 2020-10-21 RX ORDER — MULTIPLE VITAMINS W/ MINERALS TAB 9MG-400MCG
1 TAB ORAL DAILY
Status: DISCONTINUED | OUTPATIENT
Start: 2020-10-21 | End: 2020-10-26 | Stop reason: HOSPADM

## 2020-10-21 RX ORDER — ALUMINA, MAGNESIA, AND SIMETHICONE 2400; 2400; 240 MG/30ML; MG/30ML; MG/30ML
30 SUSPENSION ORAL EVERY 4 HOURS PRN
Status: DISCONTINUED | OUTPATIENT
Start: 2020-10-21 | End: 2020-10-26 | Stop reason: HOSPADM

## 2020-10-21 RX ORDER — LANOLIN ALCOHOL/MO/W.PET/CERES
100 CREAM (GRAM) TOPICAL DAILY
Status: DISCONTINUED | OUTPATIENT
Start: 2020-10-21 | End: 2020-10-26 | Stop reason: HOSPADM

## 2020-10-21 RX ORDER — ATENOLOL 50 MG/1
50 TABLET ORAL DAILY PRN
Status: DISCONTINUED | OUTPATIENT
Start: 2020-10-21 | End: 2020-10-26 | Stop reason: HOSPADM

## 2020-10-21 RX ADMIN — ASPIRIN 325 MG: 325 TABLET, COATED ORAL at 20:40

## 2020-10-21 RX ADMIN — MULTIPLE VITAMINS W/ MINERALS TAB 1 TABLET: TAB at 17:24

## 2020-10-21 RX ADMIN — DIAZEPAM 10 MG: 5 TABLET ORAL at 17:25

## 2020-10-21 RX ADMIN — FOLIC ACID 1 MG: 1 TABLET ORAL at 17:24

## 2020-10-21 RX ADMIN — PHENOBARBITAL 64.8 MG: 64.8 TABLET ORAL at 17:43

## 2020-10-21 RX ADMIN — NICOTINE POLACRILEX 8 MG: 4 LOZENGE ORAL at 17:43

## 2020-10-21 RX ADMIN — LORAZEPAM 2 MG: 1 TABLET ORAL at 08:20

## 2020-10-21 RX ADMIN — NICOTINE POLACRILEX 8 MG: 4 LOZENGE ORAL at 20:41

## 2020-10-21 RX ADMIN — THIAMINE HCL TAB 100 MG 100 MG: 100 TAB at 17:25

## 2020-10-21 RX ADMIN — GABAPENTIN 300 MG: 300 CAPSULE ORAL at 20:40

## 2020-10-21 ASSESSMENT — ACTIVITIES OF DAILY LIVING (ADL)
DRESS: INDEPENDENT
HYGIENE/GROOMING: INDEPENDENT
ORAL_HYGIENE: INDEPENDENT

## 2020-10-21 ASSESSMENT — MIFFLIN-ST. JEOR
SCORE: 1730.97
SCORE: 1717.36

## 2020-10-21 NOTE — ED NOTES
"ED to Behavioral Floor Handoff    SITUATION  Morgan Bosch is a 35 year old male who speaks English and lives in a home alone The patient arrived in the ED by public transportation from home with a complaint of Addiction Problem (here for detox from alcohol and benzo, using 2 mg of xanax daily- last used 2 days ago, drinks 1 L of vodka every 2 days- last used last night around 9 pm. occasionally snorts meth.)  .The patient's current symptoms started/worsened 2 year(s) ago and during this time the symptoms have increased.   In the ED, pt was diagnosed with   Final diagnoses:   Alcohol dependence with withdrawal with complication (H)   Benzodiazepine dependence (H)   Methamphetamine abuse (H)        Initial vitals were: BP: 120/83  Pulse: 100  Temp: 96.5  F (35.8  C)  Resp: 16  Height: 177.8 cm (5' 10\")  Weight: 78.5 kg (173 lb)  SpO2: 96 %   --------  Is the patient diabetic? No   If yes, last blood glucose? --     If yes, was this treated in the ED? --  --------  Is the patient inebriated (ETOH) No or Impaired on other substances? No  MSSA done? Yes  Last MSSA score: 8   Were withdrawal symptoms treated? Yes  Does the patient have a seizure history? Yes. If yes, date of most recent seizure 3 months ago  --------  Is the patient patient experiencing suicidal ideation? denies current or recent suicidal ideation     Homicidal ideation? denies current or recent homicidal ideation or behaviors.    Self-injurious behavior/urges? denies current or recent self injurious behavior or ideation.  ------  Was pt aggressive in the ED No  Was a code called No  Is the pt now cooperative? Yes  -------  Meds given in ED:   Medications   LORazepam (ATIVAN) tablet 2 mg (2 mg Oral Given 10/21/20 0820)      Family present during ED course? No  Family currently present? No    BACKGROUND  Does the patient have a cognitive impairment or developmental disability? No  Allergies:   Allergies   Allergen Reactions     Adhesive Tape Itching    "  Blisters (band aids)     Prozac [Fluoxetine]      Increased anxiety and shakiness   .   Social demographics are   Social History     Socioeconomic History     Marital status: Single     Spouse name: None     Number of children: None     Years of education: None     Highest education level: None   Occupational History     None   Social Needs     Financial resource strain: None     Food insecurity     Worry: None     Inability: None     Transportation needs     Medical: None     Non-medical: None   Tobacco Use     Smoking status: Current Every Day Smoker     Packs/day: 0.00     Types: Vaping Device     Smokeless tobacco: Never Used     Tobacco comment: vape pen, nicotene in a veg oil, 1/4 pack/day   Substance and Sexual Activity     Alcohol use: Yes     Alcohol/week: 20.0 standard drinks     Types: 20 Shots of liquor per week     Comment: 1L of vodka every 2 days     Drug use: Yes     Types: Marijuana, Methamphetamines, Benzodiazepines     Comment: 1x/ month for smithajusantiago, last meth use 3 months ago     Sexual activity: Not Currently     Partners: Female   Lifestyle     Physical activity     Days per week: None     Minutes per session: None     Stress: None   Relationships     Social connections     Talks on phone: None     Gets together: None     Attends Tenriism service: None     Active member of club or organization: None     Attends meetings of clubs or organizations: None     Relationship status: None     Intimate partner violence     Fear of current or ex partner: None     Emotionally abused: None     Physically abused: None     Forced sexual activity: None   Other Topics Concern     Parent/sibling w/ CABG, MI or angioplasty before 65F 55M? No   Social History Narrative     None        ASSESSMENT  Labs results   Labs Ordered and Resulted from Time of ED Arrival Up to the Time of Departure from the ED   DRUG ABUSE SCREEN 6 CHEM DEP URINE ([facility]) - Abnormal; Notable for the following components:       Result  "Value    Amphetamine Qual Urine Positive (*)     Benzodiazepine Qual Urine Positive (*)     All other components within normal limits   ALCOHOL BREATH TEST POCT - Normal   CBC WITH PLATELETS DIFFERENTIAL   COMPREHENSIVE METABOLIC PANEL   COVID-19 VIRUS (CORONAVIRUS) BY PCR   SARS-COV-2 (COVID-19) VIRUS RT-PCR   MSSA SCORE AND VS      Imaging Studies: No results found for this or any previous visit (from the past 24 hour(s)).   Most recent vital signs /83   Pulse 100   Temp 96.5  F (35.8  C) (Oral)   Resp 16   Ht 1.778 m (5' 10\")   Wt 78.5 kg (173 lb)   SpO2 96%   BMI 24.82 kg/m     Abnormal labs/tests/findings requiring intervention:---   Pain control: pt had none  Nausea control: good    RECOMMENDATION  Are any infection precautions needed (MRSA, VRE, etc.)? No If yes, what infection? --  ---  Does the patient have mobility issues? independently. If yes, what device does the pt use? ---  ---  Is patient on 72 hour hold or commitment? No If on 72 hour hold, have hold and rights been given to patient? N/A  Are admitting orders written if after 10 p.m. ?N/A  Tasks needing to be completed:---     Batsheva Hinson, RN   ascom-- vocera   [phone] [facility] ED   [phone] [facility] ED  "

## 2020-10-21 NOTE — PHARMACY-ADMISSION MEDICATION HISTORY
Admission medication history interview status for the 10/21/2020 admission is complete. See Epic admission navigator for allergy information, pharmacy, prior to admission medications and immunization status.     Medication history interview sources:  Patient interview, [facility] fill history    Changes made to PTA medication list (reason)  Added: None  Deleted: Metoclopramide, oxycodone, omeprazole  Changed: None    Additional medication history information (including reliability of information, actions taken by pharmacist):  -Patient reports he hasn't taken his medications for a few days w/ the exception of his gabapentin.    Prior to Admission medications    Medication Sig Last Dose Taking? Auth Provider   acetaminophen (TYLENOL) 650 MG CR tablet Take 1 tablet (650 mg) by mouth every 6 hours for 3 days, THEN 1 tablet (650 mg) every 6 hours as needed for fever or pain. Past Week Yes Edil Ruth MD   aspirin (ASA) 325 MG EC tablet Take 1 tablet (325 mg) by mouth 2 times daily Do not start before October 13, 2020. BEGIN TAKING ASPIRIN ONCE TORADOL (KETOROLAC) IS COMPLETED Past Week Yes Edil Ruth MD   docusate sodium (COLACE) 100 MG tablet Take 1 tablet (100 mg) by mouth 2 times daily as needed for constipation  at PRN Yes Edil Ruth MD   gabapentin (NEURONTIN) 300 MG capsule TAKE 1 CAPSULE BY MOUTH THREE TIMES A DAY 10/20/2020 Yes Nnamdi Tolbert PA-C   multivitamin w/minerals (MULTI-VITAMIN) tablet Take 1 tablet by mouth daily Past Week Yes Reported, Patient   sennosides (SENOKOT) 8.6 MG tablet Take 2 tablets by mouth 2 times daily as needed for constipation  at PRN Yes Edil Ruth MD     Medication history completed by:   Sultana Gallo, PharmD, [facility]

## 2020-10-21 NOTE — ED PROVIDER NOTES
ED Provider Note  [facility]      History     Chief Complaint   Patient presents with     Addiction Problem     here for detox from alcohol and benzo, using 2 mg of xanax daily- last used 2 days ago, drinks 1 L of vodka every 2 days- last used last night around 9 pm. occasionally snorts meth.     HPI  Morgan Bosch is a 35 year old male who is presenting seeking detox from alcohol and benzodiazepines.  Reports a long history of alcohol and benzodiazepine abuse, the last 3 months using up to a half a liter per day of hard alcohol along with 2 mg of Xanax per day.  His last use of Xanax was 36 hours ago.  His last use of alcohol was sometime last night.  When he does not drink he experiences sweats, tremor, anxiety, nausea.  Similar symptoms when he does not have Xanax.  He reports he attempted to detox himself at his sister's house 3 weeks ago and may have had an alcohol withdrawal seizure.  He also states he has a history of seizures and DTs from many years ago.  Currently admits to depression and anxiety but no symptoms of psychosis and no suicidal thinking.  He has no active medical problems.  He did have an ORIF of the right clavicle after he fell from a skateboard 2 weeks ago, the incision appears to be healing normally.  Had a negative COVID-19 test at that time and has no symptoms at this time.  He also admits to sporadic use of methamphetamine, last use last night.  This is used by inhalation.    Past Medical History  Past Medical History:   Diagnosis Date     Anxiety      Depressive disorder      MDD (major depressive disorder)      Migraines      Psychosis (H)     substance induced     Substance abuse (H)     methamphetamine     Past Surgical History:   Procedure Laterality Date     NO HISTORY OF SURGERY       OPEN REDUCTION INTERNAL FIXATION CLAVICLE Right 10/7/2020    Procedure: RIGHT OPEN REDUCTION INTERNAL FIXATION, FRACTURE, CLAVICLE;  Surgeon: Edil Ruth MD;   Location: [facility] OR          aspirin (ASA) 325 MG EC tablet       gabapentin (NEURONTIN) 300 MG capsule       multivitamin w/minerals (MULTI-VITAMIN) tablet       acetaminophen (TYLENOL) 650 MG CR tablet       docusate sodium (COLACE) 100 MG tablet       gabapentin (NEURONTIN) 300 MG capsule       metoclopramide (REGLAN) 5 MG tablet       omeprazole (PRILOSEC) 40 MG DR capsule       oxyCODONE (ROXICODONE) 5 MG tablet       sennosides (SENOKOT) 8.6 MG tablet      Allergies   Allergen Reactions     Adhesive Tape Itching     Blisters (band aids)     Prozac [Fluoxetine]      Increased anxiety and shakiness     Family History  Family History   Problem Relation Age of Onset     Anxiety Disorder Sister      Depression Father      Coronary Artery Disease Maternal Grandfather      Diabetes Paternal Grandfather      Depression Mother      Substance Abuse Mother      Bipolar Disorder Maternal Aunt      Depression Maternal Aunt      Depression Maternal Aunt      Substance Abuse Maternal Uncle      Substance Abuse Maternal Cousin      Suicide Maternal Cousin      Substance Abuse Maternal Cousin      Social History   Social History     Tobacco Use     Smoking status: Current Every Day Smoker     Packs/day: 0.00     Types: Vaping Device     Smokeless tobacco: Never Used     Tobacco comment: vape pen, nicotene in a veg oil, 1/4 pack/day   Substance Use Topics     Alcohol use: Yes     Alcohol/week: 20.0 standard drinks     Types: 20 Shots of liquor per week     Comment: 1L of vodka every 2 days     Drug use: Yes     Types: Marijuana, Methamphetamines, Benzodiazepines     Comment: 1x/ month for marijuanna, last meth use 3 months ago      Past medical history, past surgical history, medications, allergies, family history, and social history were reviewed with the patient. No additional pertinent items.       Review of Systems  A complete review of systems was performed with pertinent positives and negatives noted in the HPI, and all  "other systems negative.    Physical Exam   BP: 120/83  Pulse: 100  Temp: 96.5  F (35.8  C)  Resp: 16  Height: 177.8 cm (5' 10\")  Weight: 78.5 kg (173 lb)  SpO2: 96 %  Physical Exam  Vitals signs and nursing note reviewed.   Constitutional:       General: He is not in acute distress.     Appearance: He is not diaphoretic.      Comments: Patient appears anxious, withdrawn and tremulous   HENT:      Head: Atraumatic.      Mouth/Throat:      Mouth: Mucous membranes are dry.      Comments: Tongue tremors present  Eyes:      General: No scleral icterus.     Pupils: Pupils are equal, round, and reactive to light.   Neck:      Musculoskeletal: Normal range of motion and neck supple.   Cardiovascular:      Rate and Rhythm: Regular rhythm.      Heart sounds: Normal heart sounds.   Pulmonary:      Effort: No respiratory distress.      Breath sounds: Normal breath sounds.   Abdominal:      General: Bowel sounds are normal.      Palpations: Abdomen is soft.      Tenderness: There is no abdominal tenderness.   Musculoskeletal:         General: No tenderness.   Skin:     General: Skin is warm.      Findings: No rash.   Neurological:      General: No focal deficit present.      Mental Status: He is oriented to person, place, and time.   Psychiatric:         Attention and Perception: Attention normal.         Mood and Affect: Mood is anxious and depressed.         Speech: Speech normal.         Behavior: Behavior normal.         Thought Content: Thought content normal.         Cognition and Memory: Cognition normal.         Judgment: Judgment normal.         ED Course      Procedures                         Results for orders placed or performed during the hospital encounter of 10/21/20   CBC with platelets differential     Status: None   Result Value Ref Range    WBC 9.1 4.0 - 11.0 10e9/L    RBC Count 4.55 4.4 - 5.9 10e12/L    Hemoglobin 14.1 13.3 - 17.7 g/dL    Hematocrit 43.1 40.0 - 53.0 %    MCV 95 78 - 100 fl    MCH 31.0 26.5 - " 33.0 pg    MCHC 32.7 31.5 - 36.5 g/dL    RDW 12.3 10.0 - 15.0 %    Platelet Count 386 150 - 450 10e9/L    Diff Method Automated Method     % Neutrophils 69.9 %    % Lymphocytes 18.1 %    % Monocytes 7.8 %    % Eosinophils 2.7 %    % Basophils 1.2 %    % Immature Granulocytes 0.3 %    Nucleated RBCs 0 0 /100    Absolute Neutrophil 6.4 1.6 - 8.3 10e9/L    Absolute Lymphocytes 1.7 0.8 - 5.3 10e9/L    Absolute Monocytes 0.7 0.0 - 1.3 10e9/L    Absolute Eosinophils 0.3 0.0 - 0.7 10e9/L    Absolute Basophils 0.1 0.0 - 0.2 10e9/L    Abs Immature Granulocytes 0.0 0 - 0.4 10e9/L    Absolute Nucleated RBC 0.0    Comprehensive metabolic panel     Status: None   Result Value Ref Range    Sodium 140 133 - 144 mmol/L    Potassium 4.0 3.4 - 5.3 mmol/L    Chloride 106 94 - 109 mmol/L    Carbon Dioxide 27 20 - 32 mmol/L    Anion Gap 7 3 - 14 mmol/L    Glucose 87 70 - 99 mg/dL    Urea Nitrogen 16 7 - 30 mg/dL    Creatinine 1.00 0.66 - 1.25 mg/dL    GFR Estimate >90 >60 mL/min/[1.73_m2]    GFR Estimate If Black >90 >60 mL/min/[1.73_m2]    Calcium 8.7 8.5 - 10.1 mg/dL    Bilirubin Total 0.8 0.2 - 1.3 mg/dL    Albumin 4.1 3.4 - 5.0 g/dL    Protein Total 7.4 6.8 - 8.8 g/dL    Alkaline Phosphatase 141 40 - 150 U/L    ALT 26 0 - 70 U/L    AST 24 0 - 45 U/L   Alcohol breath test POCT     Status: Normal   Result Value Ref Range    Alcohol Breath Test 0.000 0.00 - 0.01     Medications   LORazepam (ATIVAN) tablet 2 mg (2 mg Oral Given 10/21/20 0820)        Assessments & Plan (with Medical Decision Making)   Patient with a history of polysubstance abuse, primarily alcohol and benzodiazepines but also including methamphetamine.  He presents seeking detox.  He has a history of both psychological and physical withdrawal symptoms which have precluded him from discontinuation of use on an outpatient basis, including a failure of recent outpatient treatment.  He appears to be an appropriate candidate for voluntary admission to our detox unit and  would likely benefit.  His labs are unremarkable.  He was given a dose of Ativan for withdrawal symptoms while in the ED and will be monitored with [facility] protocol.  He appears medically stable for psychiatric admission.    I have reviewed the nursing notes. I have reviewed the findings, diagnosis, plan and need for follow up with the patient.    New Prescriptions    No medications on file       Final diagnoses:   Alcohol dependence with withdrawal with complication (H)   Benzodiazepine dependence (H)   Methamphetamine abuse (H)       --  Suhas Sheehan MD  [facility] EMERGENCY DEPARTMENT  10/21/2020     Suhas Sheehan MD  10/21/20 1012

## 2020-10-21 NOTE — PROGRESS NOTES
10/21/20 1545   Patient Belongings   Did you bring any home meds/supplements to the hospital?  No   Patient Belongings other (see comments)   Patient Belongings Put in Hospital Secure Location (Security or Locker, etc.) other (see comments)   Belongings Search Yes   Clothing Search Yes   Second Staff wilfrid     STORAGE BIN:   Coat, cap, belt, wallet, keys, e cig    MED ROOM BIN:   Cell phone    SECURITY:   Id, ebt, 2 MC  A             Admission:  I am responsible for any personal items that are not sent to the safe or pharmacy.  [facility] is not responsible for loss, theft or damage of any property in my possession.    Signature:  _________________________________ Date: _______  Time: _____                                              Staff Signature:  ____________________________ Date: ________  Time: _____      2nd Staff person, if patient is unable/unwilling to sign:    Signature: ________________________________ Date: ________  Time: _____   Discharge:  [facility] has returned all of my personal belongings:    Signature: _________________________________ Date: ________  Time: _____                                          Staff Signature:  ____________________________ Date: ________  Time: _____

## 2020-10-21 NOTE — TELEPHONE ENCOUNTER
S: [facility] ED MD calling with a 35 yr old male seeking detox from alcohol     B: pt is a 35 yr old male seeking detox. Pt has been using 1/2 L of hard liquor per day, 2 mg of Xanax and meth for the last 3 weeks. Pt attempted to detox self at sister's home 3 weeks ago and had a withdrawal seizure. Pt MARY upon arrival is 0.00. pt is receiving Ativan in the ED for shakiness. Pt last used meth last night (smoked it). Pt denies MH concerns, although has a hx of schizoaffective disorder. Pt denies SI/HI. Pt denies DTs.   Pt is medically cleared and ambulating   Labs ordered   COVID asymptomatic ordered    A: vol    R:  Patient cleared and ready for behavioral bed placement: Yes     Labs within normal limits   Pt utox to be collected   Called ED @ 9:59 am for verification of medical clearance   Medically cleared @ 10:05 am    Paged provider @ 10:08 am    3A/[facility]  utox needs to be collected   Notified unit @ 10:15 am   Unit will call back  Paged ED @ 10:58 am

## 2020-10-21 NOTE — ED TRIAGE NOTES
Patient has a hx if withdrawal seizure , last seizure a couple months ago. Patient denies any SI or HI.

## 2020-10-22 LAB — FOLATE SERPL-MCNC: 28.2 NG/ML

## 2020-10-22 PROCEDURE — 99207 PR CONSULT E&M CHANGED TO INITIAL LEVEL: CPT | Performed by: PHYSICIAN ASSISTANT

## 2020-10-22 PROCEDURE — 250N000013 HC RX MED GY IP 250 OP 250 PS 637: Performed by: PSYCHIATRY & NEUROLOGY

## 2020-10-22 PROCEDURE — 99221 1ST HOSP IP/OBS SF/LOW 40: CPT | Performed by: PHYSICIAN ASSISTANT

## 2020-10-22 PROCEDURE — 250N000013 HC RX MED GY IP 250 OP 250 PS 637: Performed by: PHYSICIAN ASSISTANT

## 2020-10-22 PROCEDURE — 999N000147 HC STATISTIC PT IP EVAL DEFER

## 2020-10-22 PROCEDURE — 36415 COLL VENOUS BLD VENIPUNCTURE: CPT | Performed by: PSYCHIATRY & NEUROLOGY

## 2020-10-22 PROCEDURE — 99223 1ST HOSP IP/OBS HIGH 75: CPT | Mod: 95 | Performed by: PSYCHIATRY & NEUROLOGY

## 2020-10-22 PROCEDURE — HZ2ZZZZ DETOXIFICATION SERVICES FOR SUBSTANCE ABUSE TREATMENT: ICD-10-PCS | Performed by: PSYCHIATRY & NEUROLOGY

## 2020-10-22 PROCEDURE — 128N000004 HC R&B CD ADULT

## 2020-10-22 PROCEDURE — 82746 ASSAY OF FOLIC ACID SERUM: CPT | Performed by: PSYCHIATRY & NEUROLOGY

## 2020-10-22 RX ORDER — IBUPROFEN 600 MG/1
600 TABLET, FILM COATED ORAL EVERY 6 HOURS PRN
Status: DISCONTINUED | OUTPATIENT
Start: 2020-10-22 | End: 2020-10-26 | Stop reason: HOSPADM

## 2020-10-22 RX ORDER — ACETAMINOPHEN 325 MG/1
975 TABLET ORAL EVERY 6 HOURS PRN
Status: DISCONTINUED | OUTPATIENT
Start: 2020-10-22 | End: 2020-10-26 | Stop reason: HOSPADM

## 2020-10-22 RX ADMIN — NICOTINE POLACRILEX 8 MG: 4 LOZENGE ORAL at 09:37

## 2020-10-22 RX ADMIN — THIAMINE HCL TAB 100 MG 100 MG: 100 TAB at 09:36

## 2020-10-22 RX ADMIN — ACETAMINOPHEN 650 MG: 325 TABLET, FILM COATED ORAL at 09:36

## 2020-10-22 RX ADMIN — NICOTINE POLACRILEX 8 MG: 4 LOZENGE ORAL at 20:37

## 2020-10-22 RX ADMIN — PHENOBARBITAL 64.8 MG: 64.8 TABLET ORAL at 20:37

## 2020-10-22 RX ADMIN — DIAZEPAM 10 MG: 5 TABLET ORAL at 12:37

## 2020-10-22 RX ADMIN — GABAPENTIN 300 MG: 300 CAPSULE ORAL at 09:37

## 2020-10-22 RX ADMIN — GABAPENTIN 300 MG: 300 CAPSULE ORAL at 20:37

## 2020-10-22 RX ADMIN — PHENOBARBITAL 64.8 MG: 64.8 TABLET ORAL at 09:37

## 2020-10-22 RX ADMIN — NICOTINE POLACRILEX 8 MG: 4 LOZENGE ORAL at 14:14

## 2020-10-22 RX ADMIN — MULTIPLE VITAMINS W/ MINERALS TAB 1 TABLET: TAB at 09:37

## 2020-10-22 RX ADMIN — ASPIRIN 325 MG: 325 TABLET, COATED ORAL at 09:37

## 2020-10-22 RX ADMIN — DIAZEPAM 10 MG: 5 TABLET ORAL at 20:37

## 2020-10-22 RX ADMIN — IBUPROFEN 600 MG: 600 TABLET, FILM COATED ORAL at 12:37

## 2020-10-22 RX ADMIN — NICOTINE POLACRILEX 8 MG: 4 LOZENGE ORAL at 18:13

## 2020-10-22 RX ADMIN — NICOTINE POLACRILEX 8 MG: 4 LOZENGE ORAL at 12:37

## 2020-10-22 RX ADMIN — FOLIC ACID 1 MG: 1 TABLET ORAL at 09:37

## 2020-10-22 RX ADMIN — GABAPENTIN 300 MG: 300 CAPSULE ORAL at 14:13

## 2020-10-22 ASSESSMENT — ACTIVITIES OF DAILY LIVING (ADL)
HYGIENE/GROOMING: INDEPENDENT
DRESS: INDEPENDENT
ORAL_HYGIENE: INDEPENDENT
LAUNDRY: WITH SUPERVISION

## 2020-10-22 NOTE — TELEPHONE ENCOUNTER
Patient currently admitted.   Will continue to monitor and follow up when appropriate.     Connie Yanez, ATC

## 2020-10-22 NOTE — PLAN OF CARE
PT order received and chart reviewed. Patient with fracture to clavicle. Spoke with nurse and anticipate short LOS. At this time recommend OP PT follow up for formal evaluation and subsequent treatment. Will complete orders at this time but remain open to re-consult. If patient does stay longer and this is needed to be addressed please place OT order.

## 2020-10-22 NOTE — PROGRESS NOTES
Pt's tegaderm dressing on right clavicle area removed by patient. Area cleaned with soap and water. Incision line is clean and intact.

## 2020-10-22 NOTE — PLAN OF CARE
"35 yr male admitted to station 3A for alcohol and benzodiazapine withdrawal under Dr. Lott.    Pt reports drinking 0.5L of alcohol daily and has been taking 2mg of xanax daily over the past couple years. Pt also reports smoking \"1 bowl of meth\" daily. Utox positive for amphetamine and benzodiazepine. Last use: alcohol 10/20, meth 10/20, xanax 10/19.  Hx of withdrawal seizures. Pt states he has had \"a couple seizures.\" Most recent seizure was 3 months ago.   Hx of suicide attempt 5-7 years ago. Pt denies SI at this time. Pt denies SIB. Pt reports hx of depression and anxiety. Depression is at a 5/10.  Pt broke his right collar bone 2 weeks ago skateboarding. Pt had surgery on collar bone. Surgical incision appears to be healing normally. Well approximated, with no sign of infection. Tegaderm dressing looks worn.    Pt was cooperative during the admission process. Pt presents with a blunted/flat affect. Pt MSSA at 1630 was 10. Pt received 10mg of valium. Vital signs stable upon admission.     Patient placed on MSSA, withdrawal and seizure precautions. Continue to monitor and provide support.  "
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
## 2020-10-22 NOTE — PLAN OF CARE
Behavioral Team Discussion: (10/22/2020)    Continued Stay Criteria/Rationale: Patient admitted for alcohol and benzodiazepine withdrawal, complicated by hx of withdrawal seizure.  Plan: The following services will be provided to the patient; psychiatric assessment, medication management, therapeutic milieu, individual and group support, and skills groups.   Participants: 3A Provider: Dr. Lon Lott MD; 3A RN's: Gayathri Crenshaw RN; 3A CM's: rBiana Henriquez [facility]  Summary/Recommendation: Providers will assess today for treatment recommendations, discharge planning, and aftercare plans. CM will meet with pt for discharge planning.   Medical/Physical: Internal medicine consult to be completed 10/22/2020.  Precautions:   Behavioral Orders   Procedures     Code 1 - Restrict to Unit     Routine Programming     As clinically indicated     Seizure precautions     Status 15     Every 15 minutes.     Withdrawal precautions     Rationale for change in precautions or plan: N/A  Progress: No Change.

## 2020-10-22 NOTE — CONSULTS
[facility]   Consult Note - Hospitalist Service     Date of Admission:  10/21/2020  Consult Requested by: Lon Lott MD  Reason for Consult: General medical evaluation. To evaluate the effects of chemical dependency. Please observe R clavicle incision, determine if okay to change dressing.     Assessment & Plan   Morgan Bosch is a 35 year old male with PMHx of polysubstance abuse with alcohol abuse/dependence, methamphetamines and benzodiazepine abuse admitted on 10/21/2020 to inpatient psychiatry for alcohol and benzodiazepine detox.     #Alcohol abuse  #Alcohol dependence  #Benzodiazepine dependence  #Methamphetamine use  -Agree with phenobarbital protocol for benzo/alcohol withdrawal. Management per psychiatry   -Agree with thiamine, folic acid and MVI     #Depression  #Anxiety  Patient takes gabapentin 300 mg TID for anxiety.   -Management per psychiatry.     #Recent ORIF of R clavicle   Surgical repair on 10/7/2020 by Dr. Edil Ruth. Patient has not been following up as recommended (was supposed to follow up at 10-14 days post op for xrays).  Taking  mg BID for pain. No signs of infection on exam. Discussed with orthopedics who recommended getting Xray of clavicle and will have orthopedics review it. Okay to remove tegaderm.   -No weightbearing to the right upper extremity for 6 weeks, sling for 4 weeks.   -Ok for pendulum's and elbow/wrist/hand/finger range of motion exercises immediately post-op.  -Supervised and structured physical therapy will begin at 1 week post-op. (PT consulted to assist)   -Patient not to start active ROM until 4 weeks post-op, and will advance to progressive strengthening at 12 weeks  -Obtain clavicle xray, will have ortho review  -Stop ASA, use motrin 600 mg Q8H PRN pain and tylenol 975 mg Q6H PRN pain  -Follow up with orthopedics in 1 week     Medicine will follow up xrays.  No further recommendations at this time.  Please page the on-call UBALDO for any intercurrent medical issues which arise.     Della Manzo PA-C   Hospitalist Service  [phone]  [facility]     ______________________________________________________________________    Chief Complaint   Alcohol and benzo detox    History is obtained from the patient    History of Present Illness   Morgan Bosch is a 35 year old male with PMHx of polysubstance abuse with alcohol abuse/dependence, methamphetamines and benzodiazepine abuse admitted on 10/21/2020 to inpatient psychiatry for alcohol and benzodiazepine detox.     Patient was drinking approximately 0.5L of alcohol daily and taking 2 mg of xanax daily. Smokes methamphetamine daily. Endorses a hx of alcohol withdrawal seizures and DT.     Patient denies any complaints. Denies any F/C, CP, SOB, N/V/D/C, abdominal pain, urinary symptoms, or rashes.     Patient states he broke his clavicle skateboarding, s/p surgical repair on 10/7/2020 at [facility]. Patient states he has not yet followed up with orthopedic surgery. Per chart review, he was to follow up on 10/16, but was not aware. Was to follow up this week, but now admitted for detox. States he has been taking  mg 1-2 x a day for pain relief. Denies any other complaints with the surgical site. Has not changed the Tegaderm dressing since 10/7.     Review of Systems   The 10 point Review of Systems is negative other than noted in the HPI or here.     Past Medical History    I have reviewed this patient's medical history and updated it with pertinent information if needed.   Past Medical History:   Diagnosis Date     Anxiety      Depressive disorder      MDD (major depressive disorder)      Migraines      Psychosis (H)     substance induced     Substance abuse (H)     methamphetamine       Past Surgical History   I have reviewed this patient's surgical history and updated it with pertinent information if needed.  Past  Surgical History:   Procedure Laterality Date     NO HISTORY OF SURGERY       OPEN REDUCTION INTERNAL FIXATION CLAVICLE Right 10/7/2020    Procedure: RIGHT OPEN REDUCTION INTERNAL FIXATION, FRACTURE, CLAVICLE;  Surgeon: Edil Ruth MD;  [place] [facility] [place]       Social History   I have reviewed this patient's social history and updated it with pertinent information if needed.  Social History     Tobacco Use     Smoking status: Current Every Day Smoker     Packs/day: 0.00     Types: Vaping Device     Smokeless tobacco: Never Used     Tobacco comment: vape pen, nicotene in a veg oil, 1/4 pack/day   Substance Use Topics     Alcohol use: Yes     Alcohol/week: 20.0 standard drinks     Types: 20 Shots of liquor per week     Comment: 1L of vodka every 2 days     Drug use: Yes     Types: Marijuana, Methamphetamines, Benzodiazepines     Comment: 1x/ month for marijuanna, last meth use 3 months ago       Family History   I have reviewed this patient's family history and updated it with pertinent information if needed.   Family History   Problem Relation Age of Onset     Anxiety Disorder Sister      Depression Father      Coronary Artery Disease Maternal Grandfather      Diabetes Paternal Grandfather      Depression Mother      Substance Abuse Mother      Bipolar Disorder Maternal Aunt      Depression Maternal Aunt      Depression Maternal Aunt      Substance Abuse Maternal Uncle      Substance Abuse Maternal Cousin      Suicide Maternal Cousin      Substance Abuse Maternal Cousin        Medications   I have reviewed this patient's current medications  Current Facility-Administered Medications   Medication     acetaminophen (TYLENOL) tablet 650 mg     alum & mag hydroxide-simethicone (MAALOX  ES) suspension 30 mL     aspirin (ASA) EC tablet 325 mg     atenolol (TENORMIN) tablet 50 mg     bisacodyl (DULCOLAX) Suppository 10 mg     diazepam (VALIUM) tablet 5-20 mg     folic acid (FOLVITE) tablet 1 mg     gabapentin  (NEURONTIN) capsule 300 mg     hydrOXYzine (ATARAX) tablet 25 mg     loperamide (IMODIUM) capsule 2 mg     magnesium hydroxide (MILK OF MAGNESIA) suspension 30 mL     multivitamin w/minerals (THERA-VIT-M) tablet 1 tablet     nicotine (NICORETTE) lozenge 4-8 mg     ondansetron (ZOFRAN-ODT) ODT tab 4 mg     PHENobarbital (LUMINAL) tablet 64.8 mg     thiamine (B-1) tablet 100 mg     traZODone (DESYREL) tablet 50 mg       Allergies   Allergies   Allergen Reactions     Adhesive Tape Itching     Blisters (band aids)     Prozac [Fluoxetine]      Increased anxiety and shakiness       Physical Exam   Vital Signs: Temp: 98.1  F (36.7  C) Temp src: Temporal BP: 111/79 Pulse: 92   Resp: 15 SpO2: 100 % O2 Device: None (Room air)    Weight: 170 lbs 0 oz   GENERAL: Alert and oriented x 3. NAD. Cooperative.   HEENT: NC/AT. Mucous membranes moist   NECK: Trachea midline.   CARDIOVASCULAR: RRR. S1, S2. No murmurs, rubs, or gallops.   RESPIRATORY: Effort normal on RA. Clear to auscultation bilaterally, no rales, rhonchi or wheezes, respirations unlabored   GI: Abdomen soft, non-tender abdomen without rebound or guarding, normoactive bowel sounds present  MUSCULOSKELETAL: well healed incision on right clavicle covered in worn Tegaderm. No edema or erythema or tenderness.   EXTREMITIES: No peripheral edema. No calf asymmetry, erythema, or tenderness.   NEUROLOGICAL: Moving all extremities symmetrically.   SKIN: Intact. Warm and dry. No rashes or lesions.  No jaundice.     Data   No results found for this or any previous visit (from the past 24 hour(s)).

## 2020-10-23 ENCOUNTER — APPOINTMENT (OUTPATIENT)
Dept: GENERAL RADIOLOGY | Facility: CLINIC | Age: 35
End: 2020-10-23
Attending: PHYSICIAN ASSISTANT
Payer: COMMERCIAL

## 2020-10-23 PROCEDURE — 99232 SBSQ HOSP IP/OBS MODERATE 35: CPT | Mod: 95 | Performed by: PSYCHIATRY & NEUROLOGY

## 2020-10-23 PROCEDURE — 250N000013 HC RX MED GY IP 250 OP 250 PS 637: Performed by: PHYSICIAN ASSISTANT

## 2020-10-23 PROCEDURE — 128N000004 HC R&B CD ADULT

## 2020-10-23 PROCEDURE — 73000 X-RAY EXAM OF COLLAR BONE: CPT | Mod: 26 | Performed by: RADIOLOGY

## 2020-10-23 PROCEDURE — 250N000011 HC RX IP 250 OP 636: Performed by: PSYCHIATRY & NEUROLOGY

## 2020-10-23 PROCEDURE — 73000 X-RAY EXAM OF COLLAR BONE: CPT | Mod: RT

## 2020-10-23 PROCEDURE — 250N000013 HC RX MED GY IP 250 OP 250 PS 637: Performed by: PSYCHIATRY & NEUROLOGY

## 2020-10-23 RX ORDER — PHENOBARBITAL 64.8 MG/1
64.8 TABLET ORAL AT BEDTIME
Status: COMPLETED | OUTPATIENT
Start: 2020-10-23 | End: 2020-10-25

## 2020-10-23 RX ORDER — PHENOBARBITAL 32.4 MG/1
32.4 TABLET ORAL EVERY MORNING
Status: COMPLETED | OUTPATIENT
Start: 2020-10-24 | End: 2020-10-24

## 2020-10-23 RX ORDER — PHENOBARBITAL 32.4 MG/1
32.4 TABLET ORAL 2 TIMES DAILY
Status: DISCONTINUED | OUTPATIENT
Start: 2020-10-24 | End: 2020-10-23

## 2020-10-23 RX ORDER — PHENOBARBITAL 32.4 MG/1
32.4 TABLET ORAL AT BEDTIME
Status: DISCONTINUED | OUTPATIENT
Start: 2020-10-26 | End: 2020-10-26

## 2020-10-23 RX ORDER — PHENOBARBITAL 32.4 MG/1
32.4 TABLET ORAL AT BEDTIME
Status: DISCONTINUED | OUTPATIENT
Start: 2020-10-25 | End: 2020-10-23

## 2020-10-23 RX ADMIN — NICOTINE POLACRILEX 8 MG: 4 LOZENGE ORAL at 20:55

## 2020-10-23 RX ADMIN — GABAPENTIN 300 MG: 300 CAPSULE ORAL at 08:26

## 2020-10-23 RX ADMIN — IBUPROFEN 600 MG: 600 TABLET, FILM COATED ORAL at 05:19

## 2020-10-23 RX ADMIN — NICOTINE POLACRILEX 8 MG: 4 LOZENGE ORAL at 22:00

## 2020-10-23 RX ADMIN — MULTIPLE VITAMINS W/ MINERALS TAB 1 TABLET: TAB at 08:26

## 2020-10-23 RX ADMIN — DIAZEPAM 10 MG: 5 TABLET ORAL at 15:27

## 2020-10-23 RX ADMIN — FOLIC ACID 1 MG: 1 TABLET ORAL at 08:26

## 2020-10-23 RX ADMIN — NICOTINE POLACRILEX 8 MG: 4 LOZENGE ORAL at 05:19

## 2020-10-23 RX ADMIN — IBUPROFEN 600 MG: 600 TABLET, FILM COATED ORAL at 14:20

## 2020-10-23 RX ADMIN — DIAZEPAM 10 MG: 5 TABLET ORAL at 01:11

## 2020-10-23 RX ADMIN — PHENOBARBITAL 64.8 MG: 64.8 TABLET ORAL at 20:53

## 2020-10-23 RX ADMIN — NICOTINE POLACRILEX 8 MG: 4 LOZENGE ORAL at 14:20

## 2020-10-23 RX ADMIN — THIAMINE HCL TAB 100 MG 100 MG: 100 TAB at 08:26

## 2020-10-23 RX ADMIN — PHENOBARBITAL 64.8 MG: 64.8 TABLET ORAL at 08:26

## 2020-10-23 RX ADMIN — IBUPROFEN 600 MG: 600 TABLET, FILM COATED ORAL at 20:53

## 2020-10-23 RX ADMIN — GABAPENTIN 300 MG: 300 CAPSULE ORAL at 14:21

## 2020-10-23 RX ADMIN — NICOTINE POLACRILEX 8 MG: 4 LOZENGE ORAL at 08:29

## 2020-10-23 RX ADMIN — GABAPENTIN 300 MG: 300 CAPSULE ORAL at 20:53

## 2020-10-23 RX ADMIN — ONDANSETRON 4 MG: 4 TABLET, ORALLY DISINTEGRATING ORAL at 15:27

## 2020-10-23 RX ADMIN — NICOTINE POLACRILEX 8 MG: 4 LOZENGE ORAL at 18:25

## 2020-10-23 ASSESSMENT — ACTIVITIES OF DAILY LIVING (ADL)
ORAL_HYGIENE: INDEPENDENT
LAUNDRY: WITH SUPERVISION
HYGIENE/GROOMING: INDEPENDENT
DRESS: INDEPENDENT

## 2020-10-23 NOTE — PLAN OF CARE
Problem: Substance Withdrawal  Intervention: Substance Withdrawal  Recent Flowsheet Documentation  Taken 10/22/2020 1500 by Gayathri Crenshaw RN  Substance Withdrawal Interventions:   interventions implemented as appropriate   monitor substance withdrawal process   seizure precautions   encourage nutrition and hydration   assist patient in completing CD Evaluation / Rule 25 Evaluation   assess patient response to medication  Alcohol Withdrawl Interventions:   monitor need for prn medication   provide emotional support   establish therapeutic relationship   assist with developing & utilizing healthy coping strategies    Pt continues to be monitored for benzodiazepine and alcohol withdrawal.     Pt medicated x 2 today with valium 10 mg. Pt has received a total of 30 mg of valium and 2 mg of ativan since treatment started.     Pt is on phenobarbital 64.8 mg daily for benzodiazepine withdrawal.     Pt in bed. States he is just tired.     Pt was to go for a x ray of his right clavicle area but did not want to go down for x ray today.     See PT consult comments.     Soft care mattress given.     Motrin x 1 for should discomfort.     Pt reports his anxiety level a 5 and depression a 3 on a 0-10 severe scale.     Good oral intake.     Pt states he is currently in a outpatient program.     Discharge plans pending.

## 2020-10-23 NOTE — PROGRESS NOTES
"Pt. had a scheduled post-op x-ray for clavicle fracture. He refused to go for it. Stated he was \"completely healed\". X-ray department notified and the order canceled. The primary psychiatrist notified. Will continue to monitor.   "

## 2020-10-23 NOTE — PROGRESS NOTES
[facility], [place]   Psychiatric Progress Note  TTelemedicine Visit: The patient's condition can be safely assessed and treated via synchronous audio and visual telemedicine encounter.   Start Time: 9.20  Stop Time: 9.27  Reason for Telemedicine Visit: Covid-19   Originating Site (Patient Location): [facility]  Distant Site (Provider Location): Provider Remote Setting   Consent: The patient/guardian has verbally consented to: the potential risks and benefits of telemedicine (video visit) versus in person care; bill my insurance or make self-payment for services provided; and responsibility for payment of non-covered services.   Mode of Communication: Video Conference via polycom  As the provider I attest to compliance with applicable laws and regulations related to telemedicine.       The patient/guardian has been notified of the following:   This telemedicine visit is conducted live between you and your clinician. We have found that certain health care needs can be provided without the need for a physical exam. This service lets us provide the care you need with a telemedicine conversation.        Interim history   This is a 35 year old male withAlcohol use disorder severe alcohol withdrawal severe benzodiazepine use disorder benzodiazepine withdrawal  Methamphetamine use disorder severe  Major depressive disorder recurrent severe without psychotic features  History of schizoaffective disorder.Pt seen in rounds.   The patient's care was discussed with the treatment team during the daily team meeting and/or staff's chart notes were reviewed.  Staff report patient has been visible in the milieu,  no acute eventsovernight.     Patient's mood is low  Sleeping a lot   Energy Level:LOW    Appetite:fair motivation interest low   Denied any Suicidal/homicidal ideation/plan intent.  Denied psychosis  No prior suicde attempts  No access to gun  Pt is in alcohol withdrawal still being monitered  "every 4 hrs for it,   Pt mssa score are monitered  Tolerating meds and has no side effects.              Medications:     Current Facility-Administered Medications   Medication     acetaminophen (TYLENOL) tablet 975 mg     alum & mag hydroxide-simethicone (MAALOX  ES) suspension 30 mL     atenolol (TENORMIN) tablet 50 mg     bisacodyl (DULCOLAX) Suppository 10 mg     diazepam (VALIUM) tablet 5-20 mg     folic acid (FOLVITE) tablet 1 mg     gabapentin (NEURONTIN) capsule 300 mg     hydrOXYzine (ATARAX) tablet 25 mg     ibuprofen (ADVIL/MOTRIN) tablet 600 mg     loperamide (IMODIUM) capsule 2 mg     magnesium hydroxide (MILK OF MAGNESIA) suspension 30 mL     multivitamin w/minerals (THERA-VIT-M) tablet 1 tablet     nicotine (NICORETTE) lozenge 4-8 mg     ondansetron (ZOFRAN-ODT) ODT tab 4 mg     PHENobarbital (LUMINAL) tablet 64.8 mg     thiamine (B-1) tablet 100 mg     traZODone (DESYREL) tablet 50 mg             Allergies:     Allergies   Allergen Reactions     Adhesive Tape Itching     Blisters (band aids)     Prozac [Fluoxetine]      Increased anxiety and shakiness            Psychiatric Examination:   Blood pressure 125/86, pulse 96, temperature 97.7  F (36.5  C), temperature source Temporal, resp. rate 16, height 1.778 m (5' 10\"), weight 77.1 kg (170 lb), SpO2 99 %.  Weight is 170 lbs 0 oz  Body mass index is 24.39 kg/m .    Appearance:  awake, alert and adequately groomed  Attitude:  cooperative  Eye Contact:  good  Mood:  depressed  Affect:  appropriate and in normal range and mood congruent  Speech:  clear, coherent rate /rhythm are good  Psychomotor Behavior:  no evidence of tardive dyskinesia, dystonia, or tics and intact station, gait and muscle tone  Throught Process:  logical  Associations:  no loose associations  Thought Content:  no evidence of suicidal ideation or homicidal ideation, no evidence of psychotic thought, no auditory hallucinations present and no visual hallucinations present  Insight:  " limited  Judgement:  limited  Oriented to:  time, person, and place  Attention Span and Concentration:  intact  Recent and Remote Memory:  intact  Language fund of knowledge are adequate         Labs:          Lab Results   Component Value Date     10/21/2020    Lab Results   Component Value Date    CHLORIDE 106 10/21/2020    Lab Results   Component Value Date    BUN 16 10/21/2020      Lab Results   Component Value Date    POTASSIUM 4.0 10/21/2020    Lab Results   Component Value Date    CO2 27 10/21/2020    Lab Results   Component Value Date    CR 1.00 10/21/2020        Lab Results   Component Value Date    WBC 9.1 10/21/2020    HGB 14.1 10/21/2020    HCT 43.1 10/21/2020    MCV 95 10/21/2020     10/21/2020     Lab Results   Component Value Date    TSH 0.79 10/21/2020         DX   Alcohol use disorder severe alcohol withdrawal severe benzodiazepine use disorder benzodiazepine withdrawal  Methamphetamine use disorder severe  Major depressive disorder recurrent severe without psychotic features  History of schizoaffective disorder   PLAN    Patient is out of alcohol detox he continues on phenobarbital    Today he will have 120 mg  Tomorrow he will have 30 mg in the morning and 60 mg at bedtime  Sunday he will have 60 mg at bedtime  Monday he will have 30 mg at bedtime  MSSA    Eating Disturbances: ate and enjoyed all of it or not applicable  Tremor: 1 - not visibly apparent but can be felt by the examiner placing his fingertip slightly against the patient's fingertips  Sleep Disturbance: slept through the night or not applicable  Clouding of Sensorium: no evidence  Hallucinations: 0 - none  Quality of Contact: 0 - awareness of examiner and people around him/her  Agitation: 1 - somewhat more than normal activity  Paroxysmal Sweats: 1 - barely perceptible sweating  Temperature: 99.5 or below  Pulse: 3 - 90 to 99  Total MSSA Score: 7    Patient refused vitals at 4 PM yesterday  He had a recent ORIF of right  clavicle fracture  Medicine wanted him to have an x-ray which he refused  Laboratory/Imaging: reviewed with patient   Consults: internal medicine consult reviewed  Patient will be treated in therapeutic milieu with appropriate individual and group therapies as described.  PDMP CHECKED     Supportive psychotheraoy provided, nicolas talked about recovery enviroment, relapse prevention, triggers to use.  Discussed with patient many issues of addiction,triggers, relapse, and establishing a solid recovery program.  Asked pt to be med complinat   Medical diagnoses to be addressed this admission:    Plan:    Morgan Bosch is a 35 year old male with PMHx of polysubstance abuse with alcohol abuse/dependence, methamphetamines and benzodiazepine abuse admitted on 10/21/2020 to inpatient psychiatry for alcohol and benzodiazepine detox.      #Alcohol abuse  #Alcohol dependence  #Benzodiazepine dependence  #Methamphetamine use  -Agree with phenobarbital protocol for benzo/alcohol withdrawal. Management per psychiatry   -Agree with thiamine, folic acid and MVI      #Depression  #Anxiety  Patient takes gabapentin 300 mg TID for anxiety.   -Management per psychiatry.      #Recent ORIF of R clavicle   Surgical repair on 10/7/2020 by Dr. Edil Ruth. Patient has not been following up as recommended (was supposed to follow up at 10-14 days post op for xrays).  Taking  mg BID for pain. No signs of infection on exam. Discussed with orthopedics who recommended getting Xray of clavicle and will have orthopedics review it. Okay to remove tegaderm.   -No weightbearing to the right upper extremity for 6 weeks, sling for 4 weeks.   -Ok for pendulum's and elbow/wrist/hand/finger range of motion exercises immediately post-op.  -Supervised and structured physical therapy will begin at 1 week post-op. (PT consulted to assist)   -Patient not to start active ROM until 4 weeks post-op, and will advance to progressive strengthening at 12  weeks  -Obtain clavicle xray, will have ortho review  -Stop ASA, use motrin 600 mg Q8H PRN pain and tylenol 975 mg Q6H PRN pain  -Follow up with orthopedics in 1 week      Medicine will follow up xrays.  No further recommendations at this time. Please page the on-call UBALDO for any intercurrent medical issues which arise.   Legal Status: voluntary    Safety Assessment:   Checks:  15 min  Precautions: withdrawal precautions  Pt has not required locked seclusion or restraints in the past 24 hours to maintain safety, please refer to RN documentation for further details.  Discussed with patient many issues of addiction,triggers, relapse, and establishing a solid recovery program.  Able to give informed consent:  YES   Discussed Risks/Benefits/Side Effects/Alternatives: YES    After discussion of the indications, risks, benefits, alternatives and consequences of no treatment, the patient elects to work at hand-injection not interested in treatment

## 2020-10-23 NOTE — PROGRESS NOTES
"Pt.refused 0400 vital signs and MSSA checks. Stated \"I'm Ok\". Did not endorse any concerns. Did not appear to be in any acute distress. He stepped out for snacks. Affect very blunted. Will continue to monitor.   "

## 2020-10-23 NOTE — PROGRESS NOTES
"Pt.was isolative/withdrawan to his room most of the shift. He only came out to the lounge for breakfast and lunch. Did not have any social interaction with staff or peers. Affect sad and blunted. Did not make eye contacts with writer the few times approached for a chat. Described his mood as \"low\". Stated he wanted to discharge home even though he was still being monitored for alcohol withdrawal/detox. Denied SI/SIB/hallucinations. Will continue to monitor.   "

## 2020-10-24 PROCEDURE — 128N000004 HC R&B CD ADULT

## 2020-10-24 PROCEDURE — 250N000013 HC RX MED GY IP 250 OP 250 PS 637: Performed by: PSYCHIATRY & NEUROLOGY

## 2020-10-24 PROCEDURE — 250N000013 HC RX MED GY IP 250 OP 250 PS 637: Performed by: PHYSICIAN ASSISTANT

## 2020-10-24 RX ADMIN — NICOTINE POLACRILEX 8 MG: 4 LOZENGE ORAL at 18:01

## 2020-10-24 RX ADMIN — PHENOBARBITAL 64.8 MG: 64.8 TABLET ORAL at 20:20

## 2020-10-24 RX ADMIN — NICOTINE POLACRILEX 8 MG: 4 LOZENGE ORAL at 15:46

## 2020-10-24 RX ADMIN — GABAPENTIN 300 MG: 300 CAPSULE ORAL at 20:20

## 2020-10-24 RX ADMIN — NICOTINE POLACRILEX 8 MG: 4 LOZENGE ORAL at 13:46

## 2020-10-24 RX ADMIN — GABAPENTIN 300 MG: 300 CAPSULE ORAL at 09:52

## 2020-10-24 RX ADMIN — PHENOBARBITAL 32.4 MG: 32.4 TABLET ORAL at 09:51

## 2020-10-24 RX ADMIN — MULTIPLE VITAMINS W/ MINERALS TAB 1 TABLET: TAB at 09:53

## 2020-10-24 RX ADMIN — NICOTINE POLACRILEX 8 MG: 4 LOZENGE ORAL at 12:13

## 2020-10-24 RX ADMIN — FOLIC ACID 1 MG: 1 TABLET ORAL at 09:51

## 2020-10-24 RX ADMIN — NICOTINE POLACRILEX 8 MG: 4 LOZENGE ORAL at 09:56

## 2020-10-24 RX ADMIN — THIAMINE HCL TAB 100 MG 100 MG: 100 TAB at 09:53

## 2020-10-24 RX ADMIN — GABAPENTIN 300 MG: 300 CAPSULE ORAL at 13:46

## 2020-10-24 RX ADMIN — DIAZEPAM 5 MG: 5 TABLET ORAL at 13:47

## 2020-10-24 RX ADMIN — IBUPROFEN 600 MG: 600 TABLET, FILM COATED ORAL at 20:20

## 2020-10-24 RX ADMIN — NICOTINE POLACRILEX 8 MG: 4 LOZENGE ORAL at 20:20

## 2020-10-24 ASSESSMENT — ACTIVITIES OF DAILY LIVING (ADL)
ORAL_HYGIENE: INDEPENDENT
HYGIENE/GROOMING: INDEPENDENT
DRESS: INDEPENDENT

## 2020-10-24 NOTE — PROGRESS NOTES
Brief Medicine Progress Note    Internal Medicine is following for recent ORIF of right clavicle.  Please see Della Manzo's note from 10/22. Follow up x-ray results as below:     Interval ORIF of the mid clavicular shaft fracture with cranial sided plate and screws. Alignment anatomic. Acromioclavicular joint and glenohumeral joints appear congruent.    Per orthopedics, these are the following post operative instructions     1) No weightbearing to the right upper extremity for 6 weeks, sling for 4 weeks. (Until 11/4/20)   2) Ok for pendulum's and elbow/wrist/hand/finger range of motion exercises immediately post-op.  3) Supervised and structured physical therapy will begin at 1 week post-op. (PT consulted to assist)   4) Patient not to start active ROM until 4 weeks post-op, and will advance to progressive strengthening at 12 weeks      Please page internal medicine 24 hours prior to discharge so that outpatient referrals to orthopedics and physical therapy may be placed.     Carmen Ferreira PA-C  Hospitalist Service  [facility], [place]  Pager: [phone]

## 2020-10-24 NOTE — PLAN OF CARE
Continues in detox status on alcohol withdrawal protocol. MSSA scores 6,7 and 8. Medicated x1 with Valium 5 mg.Low energy. Appetite fair. Fluids encouraged. Spent most of the day resting in bed.Irritable. Denies suicide ideation and thoughts of self harm.

## 2020-10-25 PROCEDURE — 250N000013 HC RX MED GY IP 250 OP 250 PS 637: Performed by: PHYSICIAN ASSISTANT

## 2020-10-25 PROCEDURE — 250N000013 HC RX MED GY IP 250 OP 250 PS 637: Performed by: PSYCHIATRY & NEUROLOGY

## 2020-10-25 PROCEDURE — 128N000004 HC R&B CD ADULT

## 2020-10-25 RX ADMIN — NICOTINE POLACRILEX 8 MG: 4 LOZENGE ORAL at 12:50

## 2020-10-25 RX ADMIN — GABAPENTIN 300 MG: 300 CAPSULE ORAL at 09:29

## 2020-10-25 RX ADMIN — PHENOBARBITAL 64.8 MG: 64.8 TABLET ORAL at 20:43

## 2020-10-25 RX ADMIN — GABAPENTIN 300 MG: 300 CAPSULE ORAL at 20:43

## 2020-10-25 RX ADMIN — ATENOLOL 50 MG: 50 TABLET ORAL at 04:09

## 2020-10-25 RX ADMIN — THIAMINE HCL TAB 100 MG 100 MG: 100 TAB at 09:28

## 2020-10-25 RX ADMIN — MULTIPLE VITAMINS W/ MINERALS TAB 1 TABLET: TAB at 09:28

## 2020-10-25 RX ADMIN — GABAPENTIN 300 MG: 300 CAPSULE ORAL at 13:52

## 2020-10-25 RX ADMIN — NICOTINE POLACRILEX 8 MG: 4 LOZENGE ORAL at 22:40

## 2020-10-25 RX ADMIN — IBUPROFEN 600 MG: 600 TABLET, FILM COATED ORAL at 20:43

## 2020-10-25 RX ADMIN — NICOTINE POLACRILEX 8 MG: 4 LOZENGE ORAL at 09:28

## 2020-10-25 RX ADMIN — NICOTINE POLACRILEX 8 MG: 4 LOZENGE ORAL at 20:43

## 2020-10-25 RX ADMIN — FOLIC ACID 1 MG: 1 TABLET ORAL at 09:32

## 2020-10-25 ASSESSMENT — ACTIVITIES OF DAILY LIVING (ADL)
HYGIENE/GROOMING: INDEPENDENT
ORAL_HYGIENE: INDEPENDENT
DRESS: INDEPENDENT

## 2020-10-25 NOTE — PLAN OF CARE
Continues in detox status on alcohol and benzodiazepine withdrawal.MSSA scores 3 and 3.Appetite good. Fluids taken well. Irritable. Isolates in room. Minimal interaction with peers and staff.Denies suicide ideation and thoughts of self harm.

## 2020-10-26 VITALS
TEMPERATURE: 98.1 F | HEART RATE: 80 BPM | SYSTOLIC BLOOD PRESSURE: 95 MMHG | HEIGHT: 70 IN | RESPIRATION RATE: 16 BRPM | BODY MASS INDEX: 24.34 KG/M2 | WEIGHT: 170 LBS | DIASTOLIC BLOOD PRESSURE: 59 MMHG | OXYGEN SATURATION: 100 %

## 2020-10-26 PROCEDURE — 99239 HOSP IP/OBS DSCHRG MGMT >30: CPT | Performed by: PSYCHIATRY & NEUROLOGY

## 2020-10-26 PROCEDURE — 250N000013 HC RX MED GY IP 250 OP 250 PS 637: Performed by: PSYCHIATRY & NEUROLOGY

## 2020-10-26 RX ORDER — GABAPENTIN 300 MG/1
300 CAPSULE ORAL 3 TIMES DAILY
Qty: 90 CAPSULE | Refills: 0 | Status: SHIPPED | OUTPATIENT
Start: 2020-10-26 | End: 2020-12-22

## 2020-10-26 RX ORDER — TRAZODONE HYDROCHLORIDE 50 MG/1
50 TABLET, FILM COATED ORAL
Qty: 30 TABLET | Refills: 0 | Status: SHIPPED | OUTPATIENT
Start: 2020-10-26 | End: 2021-11-04

## 2020-10-26 RX ORDER — MULTIPLE VITAMINS W/ MINERALS TAB 9MG-400MCG
1 TAB ORAL DAILY
Qty: 30 TABLET | Refills: 0 | Status: SHIPPED | OUTPATIENT
Start: 2020-10-26 | End: 2021-11-04

## 2020-10-26 RX ORDER — LANOLIN ALCOHOL/MO/W.PET/CERES
100 CREAM (GRAM) TOPICAL DAILY
Qty: 30 TABLET | Refills: 0 | Status: SHIPPED | OUTPATIENT
Start: 2020-10-26

## 2020-10-26 RX ORDER — PHENOBARBITAL 32.4 MG/1
32.4 TABLET ORAL EVERY MORNING
Status: DISCONTINUED | OUTPATIENT
Start: 2020-10-26 | End: 2020-10-26 | Stop reason: HOSPADM

## 2020-10-26 RX ADMIN — NICOTINE POLACRILEX 8 MG: 4 LOZENGE ORAL at 08:59

## 2020-10-26 RX ADMIN — PHENOBARBITAL 32.4 MG: 32.4 TABLET ORAL at 08:59

## 2020-10-26 RX ADMIN — MULTIPLE VITAMINS W/ MINERALS TAB 1 TABLET: TAB at 08:59

## 2020-10-26 RX ADMIN — FOLIC ACID 1 MG: 1 TABLET ORAL at 08:59

## 2020-10-26 RX ADMIN — THIAMINE HCL TAB 100 MG 100 MG: 100 TAB at 08:59

## 2020-10-26 RX ADMIN — GABAPENTIN 300 MG: 300 CAPSULE ORAL at 08:59

## 2020-10-26 NOTE — PLAN OF CARE
Patient discharged to home with plan to follow up outpatient with [facility]. AVS and labs reviewed with patient. Pt discharged with all medications and belongings, gabapentin and ASA were too soon to fill per pharmacy and pt states he has a supply at home. Pt denies SI or thoughts of self harm. Pt escorted off of unit by STEPHON Fischer.

## 2020-10-26 NOTE — DISCHARGE INSTRUCTIONS
Behavioral Discharge Planning and Instructions  THANK YOU FOR CHOOSING [facility]  3A  [phone]    Summary: You were admitted to [facility] on 10/21/2020 for detoxification from alcohol, benzodiazepines.  A medical exam was performed that included lab work. You have met with a  and opted to return to [facility].  Please take care and make your recovery a daily priority, Morgan! It was a pleasure working with you and the entire treatment team here wishes you the very best in your recovery!     Recommendation:  Return to your outpatient providers @ [facility] and follow all recommendations.     Main Diagnoses:  Per Dr. Oren MD: psychiatry   Alcohol use disorder severe alcohol withdrawal severe benzodiazepine use disorder benzodiazepine withdrawal  Methamphetamine use disorder severe  Major depressive disorder recurrent severe without psychotic features  History of schizoaffective disorder    Major Treatments, Procedures and Findings:  You alcohol withdrawal was treated with valium using the Modified Selective Severity Assessment (MSSA) protocol      Your benzodiazepine (xanax) withdrawal was treated with phenobarbital.        You did not complete a chemical dependency assessment. You had labs drawn and those results were reviewed with you. Please take a copy of your lab work with you to your next primary care physician appointment.    Symptoms to Report:  If you experience more anxiety, confusion, sleeplessness, deep sadness or thoughts of suicide, notify your treatment team or notify your primary care physician. IF ANY OF THE SYMPTOMS YOU ARE EXPERIENCING ARE A MEDICAL EMERGENCY CALL 911 IMMEDIATELY.     Lifestyle Adjustment: Adjust your lifestyle to get enough sleep, relaxation, exercise and good nutrition. Continue to develop healthy coping skills to decrease stress and promote a sober living environment. Do not use mood altering substances including alcohol, illegal drugs or addictive medications  other than what is currently prescribed.     Disposition: Return to your outpatient providers @ [facility] and follow all recommendations    Facts about COVID19 at www.cdc.gov/COVID19 and www.[place].gov/covid19    Keeping hands clean is one of the most important steps we can take to avoid getting sick and spreading germs to others.  Please wash your hands frequently and lather with soap for at least 20 seconds!    Follow-up Appointment:   [facility]   Appointment Date/Time: 10/28/20 2 pm  Psychiatrist/Primary Care Giver: Nnamdi Tolbert   Address: [address] [place] [address] [address], [place] [address]    Phone Number: [phone]    Recovery apps for your phone to locate current in person and zoom recovery meetings  [place] [place] - meeting keith  AA  - meeting keith  Meeting guide - meeting keith  Quick NA meeting - meeting keith  Geri- has various apps    Resources:  *due to covid-19 most AA/NA meetings are being held online*  AA meetings can be found online; search for them at: http://aa-intergroup.org/directory.php  AA meetings via ZOOM for [place] area can be found online at: https://aaConversation Medianea3seventy.org/find-a-meeting/holiday-closings/  NA meetings via [place]OM for [place] area can be found online at: https://sites.google.com/view/mnregionofnarcoticsanonymous/home?authuser=2  AA/NA and Sponsors are excellent resources for support and you can find one at any support group meeting.   Alcoholics Anonymous (www.alcoholics-anonymous.org): for local information 24 hours/day  AA Intergroup service office in [place] (http://www.aastpaul.org/) [phone]  AA Intergroup service office in [place]: [phone]. (http://www.aamin[place].org/)  Narcotics Anonymous (www.naminn[place].org) (316) 483-8190  https://aafairviewriverside.org/meetings  SMART Recovery - self management for addiction recovery:  www.smartrecovery.org  Pathways ~ A Health Crisis Resource & Support Center:   845.951.1156.  https://prescribetoprevent.org/patient-education/videos/  http://www.harmreduction.org  [facility] 841-142-1696  Support Group:  AA/NA and Sponsor/support.  National [place] on Mental Illness (www.[place].mark.org): 809.193.9961 or 589-584-9330.  Alcoholics Anonymous (www.alcoholics-anonymous.org): Check your phone book for your local chapter.  Suicide Awareness Voices of Education (SAVE) (www.save.org): 382-695-IFQM (8634)  National Suicide Prevention Line (www.mentalhealthmn.org): 205-401-WTLL (5274)  Mental Health Consumer/Survivor Network of [place] (www.mhcsn.net): 832.823.1709 or 219-415-3252  Mental Health Association of [place] (www.mentalhealth.org): 503.528.4449 or 162-678-6766   Substance Abuse and Mental Health Services (www.samhsa.gov)  [place] Opioid Prevention Coalition: www.opioidcoalition.org    [place] Recovery Connection ([facility])  [facility] connects people seeking recovery to resources that help foster and sustain long-term recovery.  Whether you are seeking resources for treatment, transportation, housing, job training, education, health care or other pathways to recovery, [facility] is a great place to start.  136.687.4353.  www.[place]EchoFirsty.Clickpass    Great Pod casts for nutrition and wellness  Listen on Apple Podcasts  Dishing Up Nutrition   [employer]   Nutrition       Understand the connection between what you eat and how you feel. Hosted by licensed nutritionists and dietitians from [employer] we share practical, real-life solutions for healthier living through nutrition.     General Medication Instructions:   See your medication sheet(s) for instructions.   Take all medications as prescribed.  Make no changes unless your doctor suggests them.   Go to all your doctor visits.  Be sure to have all your required lab tests. This way, your medicines can be refilled on time.  Do not use any forms of alcohol.    Please Note:  If you have any  questions at anytime after you are discharged please call [facility] detox unit 3AW at [phone].  [facility], Behavioral Intake [phone]  Medical Records call [phone]  Outpatient Behavioral Intake call [phone]  LP+ Wait List/Bed Availability call [phone]    Please remember to take all of your behavioral discharge planning and lab paperwork to any follow up appointments, it contains your lab results, diagnosis, medication list and discharge recommendations.      THANK YOU FOR CHOOSING [facility]

## 2020-10-26 NOTE — DISCHARGE SUMMARY
"Admit Date:     10/21/2020   Discharge Date:           More than 35 minutes spent on discharge summary, doing the discharge instructions, discharge medications, discharge mental status examination.      DISCHARGE DIAGNOSES:   Axis I:   1.  Alcohol use disorder, severe.   2.  Alcohol withdrawal, completed.     3.  Benzo use disorder, severe.   4.  Benzo withdrawal, completed.   5.  Methamphetamine use disorder, severe.   6.  Major depressive disorder, recurrent, severe, without psychotic features.      Please see detailed admission note by Dr. Lott on 10/22/2020.      HOSPITAL COURSE:  During the hospitalization, the patient was detoxed off alcohol using MSSA protocol.  He was detoxed off benzos using phenobarbital.  He did not want to take any medications.  He is interested mostly in a harm reduction methods.  He was seen by STEPHON Eduardo, on 10/22/2020.    \"Assessment & Plan     Morgan Bosch is a 35 year old male with PMHx of polysubstance abuse with alcohol abuse/dependence, methamphetamines and benzodiazepine abuse admitted on 10/21/2020 to inpatient psychiatry for alcohol and benzodiazepine detox.      #Alcohol abuse  #Alcohol dependence  #Benzodiazepine dependence  #Methamphetamine use  -Agree with phenobarbital protocol for benzo/alcohol withdrawal. Management per psychiatry   -Agree with thiamine, folic acid and MVI      #Depression  #Anxiety  Patient takes gabapentin 300 mg TID for anxiety.   -Management per psychiatry.      #Recent ORIF of R clavicle   Surgical repair on 10/7/2020 by Dr. Edil Ruth. Patient has not been following up as recommended (was supposed to follow up at 10-14 days post op for xrays).  Taking  mg BID for pain. No signs of infection on exam. Discussed with orthopedics who recommended getting Xray of clavicle and will have orthopedics review it. Okay to remove tegaderm.   -No weightbearing to the right upper extremity for 6 weeks, sling for 4 weeks.   -Ok for " "pendulum's and elbow/wrist/hand/finger range of motion exercises immediately post-op.  -Supervised and structured physical therapy will begin at 1 week post-op. (PT consulted to assist)   -Patient not to start active ROM until 4 weeks post-op, and will advance to progressive strengthening at 12 weeks  -Obtain clavicle xray, will have ortho review  -Stop ASA, use motrin 600 mg Q8H PRN pain and tylenol 975 mg Q6H PRN pain  -Follow up with orthopedics in 1 week      Medicine will follow up xrays.  No further recommendations at this time. Please page the on-call UBALDO for any intercurrent medical issues which arise.      Della Manzo PA-C   Hospitalist Service  [phone]  [facility] \"     He had surgical repair of his right clavicle.  A repeat x-ray was ordered, but patient declined.  He signed a 72-hour intent to leave.  He had completed his phenobarbital detox.  His energy, motivation, sleep and interests improved.  He completed detox.  He signed a 72-hour intent to leave.  He will be discharged.  His COVID is negative.  He did get the x-ray of his clavicle done, which shows clavicular fracture with ORIF in anatomic alignment.      DISCHARGE DISPOSITION:  The patient is going home.      DISCHARGE MENTAL STATUS EXAMINATION:  The patient is alert, oriented x3.  Recent memory, language, fund of knowledge good.  No loose associations.  The patient does not have any active suicidal ideation, plan or intent.        DISCHARGE MEDICATIONS:  The patient will continue on aspirin, gabapentin, multivitamins, nicotine, thiamine.        DISCHARGE FOLLOWUP:  The patient was asked to follow-up with Orthopedics and he is only interested in a harm reduction model.             Recent Results (from the past 240 hour(s))   Drug abuse screen 6 urine (chem dep)    Collection Time: 10/21/20 10:31 AM   Result Value Ref Range    Amphetamine Qual Urine Positive (A) NEG^Negative    Barbiturates " Qual Urine Negative NEG^Negative    Benzodiazepine Qual Urine Positive (A) NEG^Negative    Cannabinoids Qual Urine Negative NEG^Negative    Cocaine Qual Urine Negative NEG^Negative    Ethanol Qual Urine Negative NEG^Negative    Opiates Qualitative Urine Negative NEG^Negative   Folate    Collection Time: 10/22/20  8:45 AM   Result Value Ref Range    Folate 28.2 >5.4 ng/mL                 Labs:   No results found for this or any previous visit (from the past 48 hour(s)).  Because this patient meets criteria for an Alcohol Use Disorder, I performed the following brief intervention on the date of this note:              1) Expressed concern that the patient is drinking at unhealthy levels known to increase their risk of alcohol related problems              2) Gave feedback linking alcohol use and health, including personalized feedback explaining how alcohol use can interact with their medical and/or psychiatric problems, and with prescribed medications.              3) Advised patient to abstain.      DISCHARGE MENTAL STATUS EXAMINATION:  The patient is alert, oriented x3.  Good fund of knowledge.  Good use of language.  Recent and remote memory, language, fund of knowledge are all adequate.  Euthymic mood congruent affect  Speech normal rate/rhythm linear tp no loose asso,The patient does not have any active suicidal or homicidal ideation.  Does not have any auditory or visual hallucination.  Fair insight/judgment At this time, the patient was stable to be discharged.        Pt was not determined to not be a danger to himself or others. At the current time of discharge, the patient does not meet criteria for involuntary hospitalization. On the day of discharge, the patient reports that they do not have suicidal or homicidal ideation and would never hurt themselves or others. Steps taken to minimize risk include: assessing patient s behavior and thought process daily during hospital stay, discharging patient with  adequate plan for follow up for mental and physical health and discussing safety plan of returning to the hospital should the patient ever have thoughts of harming themselves or others. Therefore, based on all available evidence including the factors cited above, the patient does not appear to be at imminent risk for self-harm, and is appropriate for outpatient level of care.     Educated about side effects/risk vs benefits /alternative including non treatment.Pt consented to be on medication.     .Total time spent on discharge summary more than 35 min  More than  20 min  planning, coordination of care, medication reconciliation and performance of physical exam on day of discharge.Care was coordinated with unit RN and unit therapist    .   Morgan Bosch   Home Medication Instructions DAGMAR:[id number]    Printed on:10/31/20 8184   Medication Information                      acetaminophen (TYLENOL) 650 MG CR tablet  Take 1 tablet (650 mg) by mouth every 6 hours for 3 days, THEN 1 tablet (650 mg) every 6 hours as needed for fever or pain.             aspirin (ASA) 325 MG EC tablet  Take 1 tablet (325 mg) by mouth 2 times daily BEGIN TAKING ASPIRIN ONCE TORADOL (KETOROLAC) IS COMPLETED             docusate sodium (COLACE) 100 MG tablet  Take 1 tablet (100 mg) by mouth 2 times daily as needed for constipation             gabapentin (NEURONTIN) 300 MG capsule  Take 1 capsule (300 mg) by mouth 3 times daily             multivitamin w/minerals (MULTI-VITAMIN) tablet  Take 1 tablet by mouth daily             nicotine (NICORETTE) 4 MG lozenge  Place 1-2 lozenges (4-8 mg) inside cheek every hour as needed for other (nicotine withdrawal symptoms)             sennosides (SENOKOT) 8.6 MG tablet  Take 2 tablets by mouth 2 times daily as needed for constipation             thiamine (B-1) 100 MG tablet  Take 1 tablet (100 mg) by mouth daily             traZODone (DESYREL) 50 MG tablet  Take 1 tablet (50 mg) by mouth nightly as  needed for sleep             outpatient providers @ [facility] and follow all recommendations.     Follow-up Appointment:   [facility]   Appointment Date/Time: 10/28/20 2 pm  Psychiatrist/Primary Care Giver: Nnamdi Tolbert   Address: [address] [place], [place] [address] [address]    Phone Number: [phone]    Pt going home  RADHA LIRA MD             D: 10/26/2020   T: 10/26/2020   MT: DIPTI      Name:     NAVJOT HAINES   MRN:      [id number]        Account:        [id number]   :      1985           Admit Date:     10/21/2020                                  Discharge Date:       Document: [id number]       cc: Nnamdi Tolbert PA-C

## 2020-10-26 NOTE — PROGRESS NOTES
Patient has not scored greater than an 8 on MSSA for alcohol withdrawal and has not required Valium for over 24 hours. Patient is removed from acute alcohol withdrawal and detox monitoring. Remains on MSSA monitoring for benzodiazepine withdrawal monitoring.

## 2020-10-27 ENCOUNTER — TELEPHONE (OUTPATIENT)
Dept: ORTHOPEDICS | Facility: CLINIC | Age: 35
End: 2020-10-27

## 2020-10-27 DIAGNOSIS — S42.021A CLOSED DISPLACED FRACTURE OF SHAFT OF RIGHT CLAVICLE, INITIAL ENCOUNTER: Primary | ICD-10-CM

## 2020-10-27 NOTE — TELEPHONE ENCOUNTER
Closing Encounter.   Care team at [facility] reached out to patient with follow up plan, and appointment slot held for 11/20/20.   Documentation of plan can be reviewed in Telephone Encounter dated 10/26/20 by Priscilla Meyer RN.     Connie Yanez ATC

## 2020-10-27 NOTE — TELEPHONE ENCOUNTER
Message from Morgan, regarding reschedule a post op appointment for clavicle surgery 2.5 weeks ago.  Please call back.

## 2020-10-27 NOTE — TELEPHONE ENCOUNTER
Right clavicle x-rays which were done in the ER 10/23/20 were reviewed by Dr Ruth.  He stated x-rays look fine.  Recommendation is for the patient to follow-up in clinic 11/20/20.  Message with the x-ray result and appointment date and time was left on the patient's voicemail.  He was instructed to call back if the date needs to be changed.

## 2020-10-27 NOTE — TELEPHONE ENCOUNTER
No consent to communicate on file.   Left voicemail asking patient to return call. Asked that if he gets voicemail to let us know a good time to reach him and if we may leave a more detailed message in the future. He has not checked previous XIFIN message.     Dr. Ruth has some appointments this pm if patient is available.     JOSE ANGEL Damico RN

## 2020-11-12 ENCOUNTER — TELEPHONE (OUTPATIENT)
Dept: FAMILY MEDICINE | Facility: CLINIC | Age: 35
End: 2020-11-12

## 2020-11-12 NOTE — TELEPHONE ENCOUNTER
Received form(s) from [facility] for OT Re-Certification of Care Notice.  Placed form(s) in/on JS's box.  Forms need to be signed and faxed to [phone]..    Call pt to verify form was sent: No  Copy needs to be sent for scanning after completion: Yes

## 2020-11-16 ENCOUNTER — TRANSFERRED RECORDS (OUTPATIENT)
Dept: HEALTH INFORMATION MANAGEMENT | Facility: CLINIC | Age: 35
End: 2020-11-16

## 2020-11-20 ENCOUNTER — TELEPHONE (OUTPATIENT)
Dept: ORTHOPEDICS | Facility: CLINIC | Age: 35
End: 2020-11-20

## 2020-11-20 NOTE — TELEPHONE ENCOUNTER
Patient was a no show for his appointment with Dr Ruth.  Message was left on his voicemail to call the clinic to reschedule the appointment.  His mother also was contacted.  She reports he is recovering well.  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  She was told the patient can schedule a post-op appointment at the [facility] or the [facility].

## 2020-11-23 ENCOUNTER — TRANSFERRED RECORDS (OUTPATIENT)
Dept: HEALTH INFORMATION MANAGEMENT | Facility: CLINIC | Age: 35
End: 2020-11-23

## 2020-12-01 ENCOUNTER — TELEPHONE (OUTPATIENT)
Dept: FAMILY MEDICINE | Facility: CLINIC | Age: 35
End: 2020-12-01

## 2020-12-01 NOTE — TELEPHONE ENCOUNTER
Forms completed and signed placed in tower.  Faxed and sent for scanning  C[facility] Unit Coordinator

## 2020-12-01 NOTE — TELEPHONE ENCOUNTER
Received form(s) from [facility] for OT Discharge Notes.  Placed form(s) in/on JS's desk.  Forms need to be filled out and signed and faxed to number listed on form..    Call pt to verify form was sent: No  Copy needs to be sent for scanning after completion: Yes     Loli Harrison MA  Medical Assistant  [facility]

## 2020-12-06 ENCOUNTER — HEALTH MAINTENANCE LETTER (OUTPATIENT)
Age: 35
End: 2020-12-06

## 2020-12-20 DIAGNOSIS — F15.10 METHAMPHETAMINE ABUSE (H): ICD-10-CM

## 2020-12-20 RX ORDER — GABAPENTIN 300 MG/1
CAPSULE ORAL
Qty: 90 CAPSULE | Refills: 5 | Status: CANCELLED | OUTPATIENT
Start: 2020-12-20

## 2020-12-22 ENCOUNTER — MYC MEDICAL ADVICE (OUTPATIENT)
Dept: FAMILY MEDICINE | Facility: CLINIC | Age: 35
End: 2020-12-22

## 2020-12-22 RX ORDER — GABAPENTIN 300 MG/1
CAPSULE ORAL
Qty: 90 CAPSULE | Refills: 1 | Status: SHIPPED | OUTPATIENT
Start: 2020-12-22 | End: 2021-03-10

## 2020-12-22 NOTE — TELEPHONE ENCOUNTER
VIOLETTE    Controlled Substance Refill Request for gabapentin    Last refill: 11/11/20 #90    Last clinic visit: 6/11/20     Clinic visit frequency required: Not documented  Next appt:     Controlled substance agreement on file: No.    Documentation in problem list reviewed:  No    Processing:  Staff will hand deliver Rx to on-site pharmacy    [place]:   [place]:  [email or web address]    Please approve if appropriate  Stacia Hill RN      rx was refilled by PCP on 12/22/20

## 2021-01-13 DIAGNOSIS — Z78.9 RUBELLA IMMUNE STATUS NOT KNOWN: ICD-10-CM

## 2021-01-13 PROCEDURE — 86765 RUBEOLA ANTIBODY: CPT | Performed by: PHYSICIAN ASSISTANT

## 2021-01-13 PROCEDURE — 36415 COLL VENOUS BLD VENIPUNCTURE: CPT | Performed by: PHYSICIAN ASSISTANT

## 2021-01-13 PROCEDURE — 99000 SPECIMEN HANDLING OFFICE-LAB: CPT | Performed by: PHYSICIAN ASSISTANT

## 2021-01-13 PROCEDURE — 86735 MUMPS ANTIBODY: CPT | Mod: 90 | Performed by: PHYSICIAN ASSISTANT

## 2021-01-13 PROCEDURE — 86787 VARICELLA-ZOSTER ANTIBODY: CPT | Performed by: PHYSICIAN ASSISTANT

## 2021-01-13 PROCEDURE — 86762 RUBELLA ANTIBODY: CPT | Performed by: PHYSICIAN ASSISTANT

## 2021-01-14 LAB
MEV IGG SER QL IA: 5 AI (ref 0–0.8)
MUV IGG SER QL IA: 1.4 AI (ref 0–0.8)
RUBV IGG SERPL IA-ACNC: 41 IU/ML
VZV IGG SER QL IA: 3.5 AI (ref 0–0.8)

## 2021-01-14 NOTE — RESULT ENCOUNTER NOTE
Dear Morgan    Your test results are attached, feel free to contact me via [facility]    Here are your titers showing immunity.    Dilshad Tolbert PA-C

## 2021-03-10 DIAGNOSIS — F15.10 METHAMPHETAMINE ABUSE (H): ICD-10-CM

## 2021-03-10 RX ORDER — GABAPENTIN 300 MG/1
CAPSULE ORAL
Qty: 90 CAPSULE | Refills: 1 | Status: SHIPPED | OUTPATIENT
Start: 2021-03-10 | End: 2021-05-13

## 2021-03-10 NOTE — TELEPHONE ENCOUNTER
JS,    Patient called.  States he is out of Gabapentin and [facility] sent request few days ago.    No documentation found in chart that [facility] faxed/called for request.    Virtual visit 6/11/2020.    Thanks,  Elena King RN      Triage note:  Patient would like call back when Rx sent in                         [phone]

## 2021-03-18 NOTE — PROGRESS NOTES
Case Management Note  10/22/2020    Attempted to meet with pt this AM. Pt reported he had been bothered by too many people and had not received medications, requested to meet later. Will attempt to talk with pt later today.     Addendum: Checked in with pt in afternoon. Pt reports he is in OP @ [facility] in [place]. Pt reports he plans to return there. 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 Pt reports he lives alone in an apartment in [place], reports this is a mostly safe living situation. Pt reports he may stay with his mom for the weekend when he discharges, reports his mom is his biggest support. Pt interested in alternative/harm reduction-type recovery meetings, provided information on AA alternative meetings. Declined the need for any further resources at this time. AVS updated.     Briana Henriquez LPC, LADC           [Change in Activity] : no change in activity [Rash] : no rash [Nasal Stuffiness] : no nasal congestion [Wheezing] : no wheezing [Cough] : no cough [Asthma] : no asthma [Vomiting] : no vomiting [Diarrhea] : no diarrhea [Limping] : no limping [Joint Pains] : no arthralgias [Joint Swelling] : no joint swelling [Muscle Aches] : no muscle aches

## 2021-04-30 ENCOUNTER — APPOINTMENT (OUTPATIENT)
Dept: CT IMAGING | Facility: CLINIC | Age: 36
End: 2021-04-30
Attending: EMERGENCY MEDICINE
Payer: COMMERCIAL

## 2021-04-30 ENCOUNTER — HOSPITAL ENCOUNTER (EMERGENCY)
Facility: CLINIC | Age: 36
Discharge: HOME | End: 2021-04-30
Attending: EMERGENCY MEDICINE | Admitting: EMERGENCY MEDICINE
Payer: COMMERCIAL

## 2021-04-30 VITALS
WEIGHT: 168.5 LBS | OXYGEN SATURATION: 100 % | BODY MASS INDEX: 24.18 KG/M2 | HEART RATE: 99 BPM | DIASTOLIC BLOOD PRESSURE: 83 MMHG | SYSTOLIC BLOOD PRESSURE: 142 MMHG | TEMPERATURE: 98.6 F

## 2021-04-30 DIAGNOSIS — J03.90 ACUTE TONSILLITIS, UNSPECIFIED ETIOLOGY: ICD-10-CM

## 2021-04-30 LAB
ABO + RH BLD: NORMAL
ABO + RH BLD: NORMAL
ANION GAP SERPL CALCULATED.3IONS-SCNC: 7 MMOL/L (ref 3–14)
APTT PPP: 32 SEC (ref 22–37)
BASOPHILS # BLD AUTO: 0.1 10E9/L (ref 0–0.2)
BASOPHILS NFR BLD AUTO: 0.5 %
BLD GP AB SCN SERPL QL: NORMAL
BLOOD BANK CMNT PATIENT-IMP: NORMAL
BUN SERPL-MCNC: 13 MG/DL (ref 7–30)
CALCIUM SERPL-MCNC: 8.9 MG/DL (ref 8.5–10.1)
CHLORIDE SERPL-SCNC: 104 MMOL/L (ref 94–109)
CO2 SERPL-SCNC: 28 MMOL/L (ref 20–32)
CREAT SERPL-MCNC: 1.02 MG/DL (ref 0.66–1.25)
DIFFERENTIAL METHOD BLD: ABNORMAL
EOSINOPHIL # BLD AUTO: 0.4 10E9/L (ref 0–0.7)
EOSINOPHIL NFR BLD AUTO: 2.5 %
ERYTHROCYTE [DISTWIDTH] IN BLOOD BY AUTOMATED COUNT: 12.5 % (ref 10–15)
GFR SERPL CREATININE-BSD FRML MDRD: >90 ML/MIN/{1.73_M2}
GLUCOSE SERPL-MCNC: 94 MG/DL (ref 70–99)
HCT VFR BLD AUTO: 42.2 % (ref 40–53)
HETEROPH AB SER QL: NEGATIVE
HGB BLD-MCNC: 13.9 G/DL (ref 13.3–17.7)
IMM GRANULOCYTES # BLD: 0.1 10E9/L (ref 0–0.4)
IMM GRANULOCYTES NFR BLD: 0.4 %
INR PPP: 1 (ref 0.86–1.14)
LACTATE BLD-SCNC: 1.3 MMOL/L (ref 0.7–2)
LYMPHOCYTES # BLD AUTO: 0.9 10E9/L (ref 0.8–5.3)
LYMPHOCYTES NFR BLD AUTO: 6.6 %
MCH RBC QN AUTO: 31.1 PG (ref 26.5–33)
MCHC RBC AUTO-ENTMCNC: 32.9 G/DL (ref 31.5–36.5)
MCV RBC AUTO: 94 FL (ref 78–100)
MONOCYTES # BLD AUTO: 1.1 10E9/L (ref 0–1.3)
MONOCYTES NFR BLD AUTO: 7.4 %
NEUTROPHILS # BLD AUTO: 11.7 10E9/L (ref 1.6–8.3)
NEUTROPHILS NFR BLD AUTO: 82.6 %
NRBC # BLD AUTO: 0 10*3/UL
NRBC BLD AUTO-RTO: 0 /100
PLATELET # BLD AUTO: 288 10E9/L (ref 150–450)
POTASSIUM SERPL-SCNC: 3.7 MMOL/L (ref 3.4–5.3)
RBC # BLD AUTO: 4.47 10E12/L (ref 4.4–5.9)
SODIUM SERPL-SCNC: 139 MMOL/L (ref 133–144)
SPECIMEN EXP DATE BLD: NORMAL
WBC # BLD AUTO: 14.1 10E9/L (ref 4–11)

## 2021-04-30 PROCEDURE — 87070 CULTURE OTHR SPECIMN AEROBIC: CPT | Performed by: EMERGENCY MEDICINE

## 2021-04-30 PROCEDURE — 83605 ASSAY OF LACTIC ACID: CPT | Performed by: EMERGENCY MEDICINE

## 2021-04-30 PROCEDURE — 86900 BLOOD TYPING SEROLOGIC ABO: CPT | Performed by: EMERGENCY MEDICINE

## 2021-04-30 PROCEDURE — 250N000009 HC RX 250: Performed by: EMERGENCY MEDICINE

## 2021-04-30 PROCEDURE — 86901 BLOOD TYPING SEROLOGIC RH(D): CPT | Performed by: EMERGENCY MEDICINE

## 2021-04-30 PROCEDURE — 85610 PROTHROMBIN TIME: CPT | Performed by: EMERGENCY MEDICINE

## 2021-04-30 PROCEDURE — 85025 COMPLETE CBC W/AUTO DIFF WBC: CPT | Performed by: EMERGENCY MEDICINE

## 2021-04-30 PROCEDURE — 80048 BASIC METABOLIC PNL TOTAL CA: CPT | Performed by: EMERGENCY MEDICINE

## 2021-04-30 PROCEDURE — 86308 HETEROPHILE ANTIBODY SCREEN: CPT | Performed by: EMERGENCY MEDICINE

## 2021-04-30 PROCEDURE — 86850 RBC ANTIBODY SCREEN: CPT | Performed by: EMERGENCY MEDICINE

## 2021-04-30 PROCEDURE — 85730 THROMBOPLASTIN TIME PARTIAL: CPT | Performed by: EMERGENCY MEDICINE

## 2021-04-30 PROCEDURE — 99284 EMERGENCY DEPT VISIT MOD MDM: CPT | Performed by: EMERGENCY MEDICINE

## 2021-04-30 PROCEDURE — 96375 TX/PRO/DX INJ NEW DRUG ADDON: CPT | Performed by: EMERGENCY MEDICINE

## 2021-04-30 PROCEDURE — 70491 CT SOFT TISSUE NECK W/DYE: CPT

## 2021-04-30 PROCEDURE — 99285 EMERGENCY DEPT VISIT HI MDM: CPT | Mod: 25 | Performed by: EMERGENCY MEDICINE

## 2021-04-30 PROCEDURE — 250N000011 HC RX IP 250 OP 636: Performed by: EMERGENCY MEDICINE

## 2021-04-30 PROCEDURE — 96365 THER/PROPH/DIAG IV INF INIT: CPT | Mod: 59 | Performed by: EMERGENCY MEDICINE

## 2021-04-30 RX ORDER — IOPAMIDOL 755 MG/ML
100 INJECTION, SOLUTION INTRAVASCULAR ONCE
Status: COMPLETED | OUTPATIENT
Start: 2021-04-30 | End: 2021-04-30

## 2021-04-30 RX ORDER — ACETAMINOPHEN 500 MG
1000 TABLET ORAL 3 TIMES DAILY
Qty: 42 TABLET | Refills: 0 | Status: SHIPPED | OUTPATIENT
Start: 2021-04-30 | End: 2021-05-07

## 2021-04-30 RX ORDER — DEXAMETHASONE SODIUM PHOSPHATE 10 MG/ML
10 INJECTION, SOLUTION INTRAMUSCULAR; INTRAVENOUS ONCE
Status: COMPLETED | OUTPATIENT
Start: 2021-04-30 | End: 2021-04-30

## 2021-04-30 RX ORDER — AMPICILLIN AND SULBACTAM 2; 1 G/1; G/1
3 INJECTION, POWDER, FOR SOLUTION INTRAMUSCULAR; INTRAVENOUS ONCE
Status: COMPLETED | OUTPATIENT
Start: 2021-04-30 | End: 2021-04-30

## 2021-04-30 RX ORDER — NAPROXEN 500 MG/1
500 TABLET ORAL 2 TIMES DAILY WITH MEALS
Qty: 10 TABLET | Refills: 0 | Status: SHIPPED | OUTPATIENT
Start: 2021-04-30 | End: 2021-05-05

## 2021-04-30 RX ORDER — KETOROLAC TROMETHAMINE 15 MG/ML
15 INJECTION, SOLUTION INTRAMUSCULAR; INTRAVENOUS ONCE
Status: COMPLETED | OUTPATIENT
Start: 2021-04-30 | End: 2021-04-30

## 2021-04-30 RX ADMIN — IOPAMIDOL 80 ML: 755 INJECTION, SOLUTION INTRAVENOUS at 17:35

## 2021-04-30 RX ADMIN — DEXAMETHASONE SODIUM PHOSPHATE 10 MG: 10 INJECTION, SOLUTION INTRAMUSCULAR; INTRAVENOUS at 19:38

## 2021-04-30 RX ADMIN — SODIUM CHLORIDE 50 ML: 9 INJECTION, SOLUTION INTRAVENOUS at 17:34

## 2021-04-30 RX ADMIN — AMPICILLIN SODIUM AND SULBACTAM SODIUM 3 G: 2; 1 INJECTION, POWDER, FOR SOLUTION INTRAMUSCULAR; INTRAVENOUS at 19:38

## 2021-04-30 RX ADMIN — KETOROLAC TROMETHAMINE 15 MG: 15 INJECTION, SOLUTION INTRAMUSCULAR; INTRAVENOUS at 19:38

## 2021-04-30 ASSESSMENT — ENCOUNTER SYMPTOMS
VOICE CHANGE: 1
FEVER: 0
TROUBLE SWALLOWING: 1
COUGH: 0
SHORTNESS OF BREATH: 0
SORE THROAT: 1

## 2021-04-30 NOTE — ED PROVIDER NOTES
[facility] EMERGENCY DEPARTMENT ([facility])  4/30/21  History   No chief complaint on file.    The history is provided by the patient and medical records.     Morgan Bosch is a 36 year old male with a history of polysubstance abuse, depression and TIM who presents to the ED for evaluation of throat pain and swelling.  3 days ago the patient developed swelling on the right side of his neck and throat.  He subsequently felt febrile and achy which resolved by the next day.  Since last night, the patient's swelling has increased to the point where he notes difficulty swallowing water.  Patient also endorses pain with swallowing but is able to swallow his saliva.  He feels the right side of his throat is more swollen than his left.  He denies difficulty opening his mouth.  No recent cough or shortness of breath. Patient notes the daily use of ibuprofen, Tylenol and Aleve which he alternates between 400 mg or 1000 mg respectively due to the pain he experiences while skateboarding.  Patient has not received a COVID-19 vaccine yet.    I have reviewed the Medications, Allergies, Past Medical and Surgical History, and Social History in the [employer] system.  PAST MEDICAL HISTORY:   Past Medical History:   Diagnosis Date     Anxiety      Depressive disorder      MDD (major depressive disorder)      Migraines      Psychosis (H)     substance induced     Substance abuse (H)     methamphetamine       PAST SURGICAL HISTORY:   Past Surgical History:   Procedure Laterality Date     NO HISTORY OF SURGERY       OPEN REDUCTION INTERNAL FIXATION CLAVICLE Right 10/7/2020    Procedure: RIGHT OPEN REDUCTION INTERNAL FIXATION, FRACTURE, CLAVICLE;  Surgeon: Edil Ruth MD;  Location: [facility] OR       Past medical history, past surgical history, medications, and allergies were reviewed with the patient. Additional pertinent items: None    FAMILY HISTORY:   Family History   Problem Relation Age of Onset     Anxiety Disorder  Sister      Depression Father      Coronary Artery Disease Maternal Grandfather      Diabetes Paternal Grandfather      Depression Mother      Substance Abuse Mother      Bipolar Disorder Maternal Aunt      Depression Maternal Aunt      Depression Maternal Aunt      Substance Abuse Maternal Uncle      Substance Abuse Maternal Cousin      Suicide Maternal Cousin      Substance Abuse Maternal Cousin        SOCIAL HISTORY:   Social History     Tobacco Use     Smoking status: Current Every Day Smoker     Packs/day: 0.00     Types: Vaping Device     Smokeless tobacco: Never Used     Tobacco comment: vape pen, nicotene in a veg oil, 1/4 pack/day   Substance Use Topics     Alcohol use: Yes     Alcohol/week: 20.0 standard drinks     Types: 20 Shots of liquor per week     Comment: 1L of vodka every 2 days     Social history was reviewed with the patient. Additional pertinent items: None      Patient's Medications   New Prescriptions    No medications on file   Previous Medications    ASPIRIN (ASA) 325 MG EC TABLET    Take 1 tablet (325 mg) by mouth 2 times daily BEGIN TAKING ASPIRIN ONCE TORADOL (KETOROLAC) IS COMPLETED    DOCUSATE SODIUM (COLACE) 100 MG TABLET    Take 1 tablet (100 mg) by mouth 2 times daily as needed for constipation    GABAPENTIN (NEURONTIN) 300 MG CAPSULE    TAKE 1 CAPSULE BY MOUTH THREE TIMES A DAY    MULTIVITAMIN W/MINERALS (MULTI-VITAMIN) TABLET    Take 1 tablet by mouth daily    NICOTINE (NICORETTE) 4 MG LOZENGE    Place 1-2 lozenges (4-8 mg) inside cheek every hour as needed for other (nicotine withdrawal symptoms)    SENNOSIDES (SENOKOT) 8.6 MG TABLET    Take 2 tablets by mouth 2 times daily as needed for constipation    THIAMINE (B-1) 100 MG TABLET    Take 1 tablet (100 mg) by mouth daily    TRAZODONE (DESYREL) 50 MG TABLET    Take 1 tablet (50 mg) by mouth nightly as needed for sleep   Modified Medications    No medications on file   Discontinued Medications    No medications on file           Allergies   Allergen Reactions     Adhesive Tape Itching     Blisters (band aids)     Prozac [Fluoxetine]      Increased anxiety and shakiness        Review of Systems   Constitutional: Negative for fever.   HENT: Positive for sore throat, trouble swallowing and voice change.    Respiratory: Negative for cough and shortness of breath.      A complete review of systems was performed with pertinent positives and negatives noted in the HPI, and all other systems negative.    Physical Exam          Physical Exam  Vitals signs and nursing note reviewed.   Constitutional:       General: He is not in acute distress.     Appearance: Normal appearance. He is not diaphoretic.   HENT:      Head: Atraumatic.      Mouth/Throat:      Mouth: Mucous membranes are moist.      Pharynx: Oropharyngeal exudate and posterior oropharyngeal erythema present.      Comments: Right tonsillar swelling greater than left  Eyes:      General: No scleral icterus.     Pupils: Pupils are equal, round, and reactive to light.   Cardiovascular:      Rate and Rhythm: Normal rate and regular rhythm.      Heart sounds: Normal heart sounds.   Pulmonary:      Effort: No respiratory distress.      Breath sounds: Normal breath sounds.   Abdominal:      General: Bowel sounds are normal.      Palpations: Abdomen is soft.      Tenderness: There is no abdominal tenderness.   Musculoskeletal:         General: No tenderness.   Skin:     General: Skin is warm.      Findings: No rash.   Neurological:      General: No focal deficit present.      Mental Status: He is alert and oriented to person, place, and time.         ED Course   3:28 PM  The patient was seen and examined by Dr. Yonny Delacruz in Room ED 03.      Procedures                           No results found for this or any previous visit (from the past 24 hour(s)).  Medications - No data to display          Assessments & Plan (with Medical Decision Making)     36 year old male with a history of  polysubstance abuse, depression and TIM who presents to the ED for evaluation of throat pain and swelling.  Patient presentation concerning for possible strep pharyngitis, mononucleosis, peritonsillar abscess.  Labs drawn sent reviewed document epic markable for leukocytosis of 14.1 but otherwise normal CBC and electrolytes, mononucleosis screen negative, lactic acid normal 1.3, throat culture obtained and sent and pending at the time of this dictation.  Patient was given dexamethasone 10 mg IV x1 along with Toradol 15 mg IV x1 and Unasyn 3 g IV.  Patient sent to CT for imaging of soft tissue neck which revealed prominent Palatino and lingular tonsil swelling suggestive of tonsillitis however no abscess is appreciated.  On repeat assessment patient's symptoms had significantly improved and he is tolerating p.o. without any difficulty.  Plan for outpatient follow-up with primary care provider within 1 week for further evaluation and care.  Patient discharged home with prescription for Augmentin and analgesics.    I have reviewed the nursing notes.    I have reviewed the findings, diagnosis, plan and need for follow up with the patient.    New Prescriptions    No medications on file       Final diagnoses:   Acute tonsillitis, unspecified etiology     IUli, am serving as a trained medical scribe to document services personally performed by Mike Mota MD, based on the provider's statements to me.      Mike KERNS MD, was physically present and have reviewed and verified the accuracy of this note documented by Uli Biggs.     4/30/2021   [facility] EMERGENCY DEPARTMENT     Mike Mota MD  05/01/21 [id number]

## 2021-05-02 LAB
BACTERIA SPEC CULT: NORMAL
Lab: NORMAL
SPECIMEN SOURCE: NORMAL

## 2021-05-13 DIAGNOSIS — F15.10 METHAMPHETAMINE ABUSE (H): ICD-10-CM

## 2021-05-13 RX ORDER — GABAPENTIN 300 MG/1
CAPSULE ORAL
Qty: 90 CAPSULE | Refills: 1 | Status: SHIPPED | OUTPATIENT
Start: 2021-05-13 | End: 2021-07-29

## 2021-05-13 NOTE — TELEPHONE ENCOUNTER
JS,    Routing refill request to provider for review/approval because:  Drug not on the [facility] refill protocol   Virtual visit 6/11/2020    Requested Prescriptions   Pending Prescriptions Disp Refills     gabapentin (NEURONTIN) 300 MG capsule [Pharmacy Med Name: GABAPENTIN 300 MG CAPSULE] 90 capsule 1     Sig: TAKE 1 CAPSULE BY MOUTH THREE TIMES A DAY       There is no refill protocol information for this order        Thanks,  Elena King RN

## 2021-07-28 DIAGNOSIS — F15.10 METHAMPHETAMINE ABUSE (H): ICD-10-CM

## 2021-07-29 ENCOUNTER — MYC MEDICAL ADVICE (OUTPATIENT)
Dept: FAMILY MEDICINE | Facility: CLINIC | Age: 36
End: 2021-07-29

## 2021-07-29 RX ORDER — GABAPENTIN 300 MG/1
CAPSULE ORAL
Qty: 90 CAPSULE | Refills: 1 | Status: SHIPPED | OUTPATIENT
Start: 2021-07-29 | End: 2021-10-07

## 2021-07-29 NOTE — TELEPHONE ENCOUNTER
Routing refill request to provider for review/approval because:  Drug not on the [facility] refill protocol   Please authorize if appropriate.  Thanks,  Marbella Corbett RN

## 2021-09-25 ENCOUNTER — HEALTH MAINTENANCE LETTER (OUTPATIENT)
Age: 36
End: 2021-09-25

## 2021-09-28 ENCOUNTER — TELEPHONE (OUTPATIENT)
Dept: FAMILY MEDICINE | Facility: CLINIC | Age: 36
End: 2021-09-28

## 2021-09-28 NOTE — TELEPHONE ENCOUNTER
Reason for Call:  Form, our goal is to have forms completed with 72 hours, however, some forms may require a visit or additional information.    Type of letter, form or note:  disability    Who is the form from?: [facility] (if other please explain)    Where did the form come from: form was faxed in    What clinic location was the form placed at?: [facility]    Where the form was placed: JS box    What number is listed as a contact on the form?: fax # [phone]        Additional comments: NA    Call taken on 9/28/2021 at 10:18 AM by Merced Quintero

## 2021-09-30 NOTE — TELEPHONE ENCOUNTER
Forms were faxed to [facility] fax # [phone] and copy sent to stat scan.     Thanks!  Merced BLUE

## 2021-10-05 DIAGNOSIS — F15.10 METHAMPHETAMINE ABUSE (H): ICD-10-CM

## 2021-10-08 RX ORDER — GABAPENTIN 300 MG/1
CAPSULE ORAL
Qty: 90 CAPSULE | Refills: 1 | Status: SHIPPED | OUTPATIENT
Start: 2021-10-08 | End: 2021-12-07

## 2021-10-08 NOTE — TELEPHONE ENCOUNTER
Routing refill request to provider for review/approval because:  Drug not on the [facility] refill protocol   Sneha HOOD RN

## 2021-11-04 ENCOUNTER — MYC MEDICAL ADVICE (OUTPATIENT)
Dept: FAMILY MEDICINE | Facility: CLINIC | Age: 36
End: 2021-11-04

## 2021-11-04 ENCOUNTER — VIRTUAL VISIT (OUTPATIENT)
Dept: FAMILY MEDICINE | Facility: CLINIC | Age: 36
End: 2021-11-04
Payer: COMMERCIAL

## 2021-11-04 DIAGNOSIS — F19.10 POLYSUBSTANCE ABUSE (H): ICD-10-CM

## 2021-11-04 DIAGNOSIS — F33.1 MAJOR DEPRESSIVE DISORDER, RECURRENT EPISODE, MODERATE (H): Primary | ICD-10-CM

## 2021-11-04 PROCEDURE — 99213 OFFICE O/P EST LOW 20 MIN: CPT | Mod: 95 | Performed by: PHYSICIAN ASSISTANT

## 2021-11-04 PROCEDURE — 96127 BRIEF EMOTIONAL/BEHAV ASSMT: CPT | Mod: 95 | Performed by: PHYSICIAN ASSISTANT

## 2021-11-04 RX ORDER — MULTIPLE VITAMINS W/ MINERALS TAB 9MG-400MCG
1 TAB ORAL DAILY
Qty: 30 TABLET | Refills: 0 | Status: SHIPPED | OUTPATIENT
Start: 2021-11-04 | End: 2021-12-07

## 2021-11-04 ASSESSMENT — PATIENT HEALTH QUESTIONNAIRE - PHQ9
SUM OF ALL RESPONSES TO PHQ QUESTIONS 1-9: 3
10. IF YOU CHECKED OFF ANY PROBLEMS, HOW DIFFICULT HAVE THESE PROBLEMS MADE IT FOR YOU TO DO YOUR WORK, TAKE CARE OF THINGS AT HOME, OR GET ALONG WITH OTHER PEOPLE: NOT DIFFICULT AT ALL
SUM OF ALL RESPONSES TO PHQ QUESTIONS 1-9: 3

## 2021-11-04 NOTE — PROGRESS NOTES
Juice is a 36 year old who is being evaluated via a billable telephone visit.      What phone number would you like to be contacted at? [phone]  How would you like to obtain your AVS? MyChart    Assessment & Plan     Major depressive disorder, recurrent episode, moderate (H)  Will update his form  - multivitamin w/minerals (MULTI-VITAMIN) tablet; Take 1 tablet by mouth daily    Polysubstance abuse (H)  Has been doing well, congratulated on success.               Tobacco Cessation:   reports that he has been smoking other. He started smoking about 7 weeks ago. He has a 2.00 pack-year smoking history. He has never used smokeless tobacco.          No follow-ups on file.    Nnamdi Tolbert PA-C  [facility]   Juice is a 36 year old who presents for the following health issues     HPI     Answers for HPI/ROS submitted by the patient on 11/4/2021  If you checked off any problems, how difficult have these problems made it for you to do your work, take care of things at home, or get along with other people?: Not difficult at all  PHQ9 TOTAL SCORE: 3        Patient would like to have provider fill out [facility] paperwork      Depression Followup    How are you doing with your depression since your last visit? No change    Are you having other symptoms that might be associated with depression? No    Have you had a significant life event?  No     Are you feeling anxious or having panic attacks?   No    Do you have any concerns with your use of alcohol or other drugs? Yes:  sober recently    Social History     Tobacco Use     Smoking status: Light Tobacco Smoker     Packs/day: 0.20     Years: 10.00     Pack years: 2.00     Types: Other     Start date: 10/1/2021     Smokeless tobacco: Never Used     Tobacco comment: I have no problems vaping   Substance Use Topics     Alcohol use: Not Currently     Alcohol/week: 20.0 standard drinks     Comment: 1L of vodka every 2 days     Drug  use: Yes     Types: Marijuana     Comment: 1x/ month for marcello, last meth use 3 months ago     PHQ 6/3/2019 10/2/2020 11/4/2021   PHQ-9 Total Score 8 7 3   Q9: Thoughts of better off dead/self-harm past 2 weeks Not at all Not at all Not at all     TIM-7 SCORE 2/1/2019 2/22/2019 6/3/2019   Total Score 3 15 5         Review of Systems   Constitutional, HEENT, cardiovascular, pulmonary, gi and gu systems are negative, except as otherwise noted.      Objective           Vitals:  No vitals were obtained today due to virtual visit.    Physical Exam   alert and no distress  PSYCH: Alert and oriented times 3; coherent speech, normal   rate and volume, able to articulate logical thoughts, able   to abstract reason, no tangential thoughts, no hallucinations   or delusions  His affect is normal  RESP: No cough, no audible wheezing, able to talk in full sentences  Remainder of exam unable to be completed due to telephone visits                Phone call duration: 7 minutes

## 2021-12-06 DIAGNOSIS — F33.1 MAJOR DEPRESSIVE DISORDER, RECURRENT EPISODE, MODERATE (H): ICD-10-CM

## 2021-12-06 DIAGNOSIS — F15.10 METHAMPHETAMINE ABUSE (H): ICD-10-CM

## 2021-12-07 RX ORDER — MULTIVIT,CALC,MINS/IRON/FOLIC 9MG-400MCG
TABLET ORAL
Qty: 90 TABLET | Refills: 1 | Status: SHIPPED | OUTPATIENT
Start: 2021-12-07

## 2021-12-07 RX ORDER — GABAPENTIN 300 MG/1
CAPSULE ORAL
Qty: 90 CAPSULE | Refills: 1 | Status: SHIPPED | OUTPATIENT
Start: 2021-12-07 | End: 2022-02-23

## 2021-12-07 NOTE — TELEPHONE ENCOUNTER
Multivitamin:  Prescription approved per [facility] Refill Protocol.    Gabapentin:  Routing refill request to provider for review/approval because:  Drug not on the [facility] refill protocol     Sneha HOOD RN

## 2022-01-15 ENCOUNTER — HEALTH MAINTENANCE LETTER (OUTPATIENT)
Age: 37
End: 2022-01-15

## 2022-02-22 DIAGNOSIS — F15.10 METHAMPHETAMINE ABUSE (H): ICD-10-CM

## 2022-02-23 RX ORDER — GABAPENTIN 300 MG/1
CAPSULE ORAL
Qty: 90 CAPSULE | Refills: 1 | Status: SHIPPED | OUTPATIENT
Start: 2022-02-23 | End: 2022-05-02

## 2022-03-11 NOTE — PROGRESS NOTES
Initial Individual Treatment Plan      Patient: Morgan Bosch        MRN: [id number]  : 1985  Age: 33 year old  Sex: male     Diagnostic Assessment Date / Date of Initial Individual Treatment Plan: 18            Immediate Health Concerns:  No          Immediate Safety Concerns:  Yes. Identify safety concern and plan to address: report any increase in symptoms to the treatment team     Identify the issues to be addressed in treatment:  Symptom Management, Personal Safety, Community Resources/Discharge Planning, Abstinence/Relapse Prevention and Develop Socialization / Interpersonal Relationship Skills     Client Initial Individualized Goals for Treatment:   Continue to attend AA for support and maintain sobriety  Report any increase in symptoms to the team  Consider getting an individual therapist to talk about difficult emotions  Reduce anxiety in social situations  Manage panic attacks  Find ways to combat the depressed mindset, build self-compassion skills and gratitude  Manage grief and loss from the past  Consider a Psychiatry consultation      Initial Treatment suggestions for the client during the time between Diagnostic Assessment and completion of the Individualized Treatment Plan:  Follow Safety Plan  Abstain from Substance Use   Ask for more information, support and/or assistance as needed.  Follow up with providers/community supports as needed:   Report increases or changes in symptoms to staff.  Report any personal safety concerns to staff.   Take medications as prescribed.  Report medication changes and/or side effects to staff.  Attend and participate in groups as scheduled or notify staff if unable to do so.  Report any use of substances to staff as this may impact your symptoms and/or            personal safety.  Notify staff if you have any other issues that need to be addressed. This may include    any current abuse / neglect / exploitation or other vulnerability.  Follow  recommendations of your treatment team and discuss concerns if not in  agreement.                 Treatment Team Responsible: Day Treatment (DT)       Therapeutic Interventions/Treatment Strategies may include:  Support, Redirection, Feedback, Limit/Boundaries, Safety Assessments, Structured Activity, Problem Solving, Clarification, Education, Motivational Enhancement and Relapse Prevention as needed.     Juvenal Benton., D,  L.P.                          No

## 2022-05-02 DIAGNOSIS — F15.10 METHAMPHETAMINE ABUSE (H): ICD-10-CM

## 2022-05-02 RX ORDER — GABAPENTIN 300 MG/1
CAPSULE ORAL
Qty: 90 CAPSULE | Refills: 1 | Status: SHIPPED | OUTPATIENT
Start: 2022-05-02 | End: 2022-08-03

## 2022-08-03 DIAGNOSIS — F15.10 METHAMPHETAMINE ABUSE (H): ICD-10-CM

## 2022-08-03 NOTE — TELEPHONE ENCOUNTER
VV 11/4/21 -first and only visit with JS    Due for physical.  [employer] message sent to patient asking him to schedule appointment.    Elena King RN

## 2022-08-05 ENCOUNTER — NURSE TRIAGE (OUTPATIENT)
Dept: NURSING | Facility: CLINIC | Age: 37
End: 2022-08-05

## 2022-08-06 NOTE — TELEPHONE ENCOUNTER
Patient needs refills for gabapentin- he has been out for 3 days.    Patient is wondering if he can get a refill.    Patient advised that refill in unable to be filled until his provider returns next week. Patient is understanding of this and agrees with the plan.    Refill encounter forwarded to provider.    Aminta Jenkins RN on 8/5/2022 at 7:14 PM        Reason for Disposition    Caller requesting a CONTROLLED substance prescription refill (e.g., narcotics, ADHD medicines)    Additional Information    Negative: New-onset or worsening symptoms, see that guideline (e.g., diarrhea, runny nose, sore throat)    Negative: Medicine question not related to refill or renewal    Negative: Caller (e.g., patient or pharmacist) requesting information about a new medicine    Negative: Caller requesting information unrelated to medicine    Negative: [1] Prescription refill request for ESSENTIAL medicine (i.e., likelihood of harm to patient if not taken) AND [2] triager unable to refill per department policy    Negative: [1] Prescription not at pharmacy AND [2] was prescribed by PCP recently  (Exception: triager has access to EMR and prescription is recorded there. Go to Home Care and confirm for pharmacy.)    Negative: [1] Pharmacy calling with prescription questions AND [2] triager unable to answer question    Negative: Prescription request for new medicine (not a refill)    Protocols used: MEDICATION REFILL AND RENEWAL CALL-A-

## 2022-08-06 NOTE — TELEPHONE ENCOUNTER
Patient made an appointment for September 13th.    Patient would like enough to hold him until his appointment.    Aminta Jenkins RN on 8/5/2022 at 7:13 PM

## 2022-08-08 RX ORDER — GABAPENTIN 300 MG/1
CAPSULE ORAL
Qty: 90 CAPSULE | Refills: 0 | Status: SHIPPED | OUTPATIENT
Start: 2022-08-08 | End: 2023-08-09

## 2023-03-08 ENCOUNTER — HOSPITAL ENCOUNTER (OUTPATIENT)
Dept: BEHAVIORAL HEALTH | Facility: CLINIC | Age: 38
Discharge: HOME | End: 2023-03-08
Attending: FAMILY MEDICINE | Admitting: FAMILY MEDICINE
Payer: COMMERCIAL

## 2023-03-08 VITALS — BODY MASS INDEX: 23.62 KG/M2 | HEIGHT: 70 IN | WEIGHT: 165 LBS

## 2023-03-08 PROCEDURE — H0001 ALCOHOL AND/OR DRUG ASSESS: HCPCS | Mod: GT,95

## 2023-03-08 ASSESSMENT — COLUMBIA-SUICIDE SEVERITY RATING SCALE - C-SSRS
1. HAVE YOU WISHED YOU WERE DEAD OR WISHED YOU COULD GO TO SLEEP AND NOT WAKE UP?: YES
1. IN THE PAST MONTH, HAVE YOU WISHED YOU WERE DEAD OR WISHED YOU COULD GO TO SLEEP AND NOT WAKE UP?: NO

## 2023-03-08 ASSESSMENT — PAIN SCALES - GENERAL: PAINLEVEL: NO PAIN (0)

## 2023-03-08 NOTE — PROGRESS NOTES
[facility]      PATIENT'S NAME: Morgan Bosch  PREFERRED NAME: Juice  PRONOUNS: he/his/him  MRN: [id number]  : 1985  ADDRESS: [place], [address]. NUMBER:  [id number]  DATE OF SERVICE: 3/08/23  START TIME: 1:00 pm  END TIME: 2:10 pm  PREFERRED PHONE: [phone]  May we leave a program related message: Yes  SERVICE MODALITY:  Video Visit:      Provider verified identity through the following two step process.  Patient provided:  Patient  and Patient's last 4 digits of SSN    Telemedicine Visit: The patient's condition can be safely assessed and treated via synchronous audio and visual telemedicine encounter.      Reason for Telemedicine Visit: Patient has requested telehealth visit    Originating Site (Patient Location): Patient's home    Distant Site (Provider Location): Provider Remote Setting- Home Office    Consent:  The patient/guardian has verbally consented to: the potential risks and benefits of telemedicine (video visit) versus in person care; bill my insurance or make self-payment for services provided; and responsibility for payment of non-covered services.     The pt gives verbal consent for SHOAIB to and from:      Himself, TEL: [phone], Email: [email or web address]    His Emergency contact, Radhika Bosch(mother), Tel: [phone]    Reyna Cox (), Russell Arenas, TEL: [phone]     Patient would like the video invitation sent by:  Other e-mail: [email or web address]    Mode of Communication:  Video Conference via [facility]    Distant Location (Provider):  Off-site    As the provider I attest to compliance with applicable laws and regulations related to telemedicine.    UNIVERSAL ADULT Substance Use Disorder DIAGNOSTIC ASSESSMENT    Identifying Information:  Patient is a 37 year old,   individual.  Patient was referred for an assessment by hiscase manager.  Patient attended  "the session alone.    Chief Complaint:   The reason for seeking services at this time is: \"Addiction / Mental Health  I am trying to get into a harm reduction outpatient treatment program.  I have been to [facility] and a [facility] in [place]. I completed 7 out of 9 inpatient txs and outpatient as well\".   The problem(s) began 01/01/06.  Patient has attempted to resolve these concerns in the past through treatment, support groups..  Patient does not appear to be in severe withdrawal, an imminent safety risk to self or others, or requiring immediate medical attention and may proceed with the assessment interview.    Social/Family History:  Patient reported they grew up in [place], [place],  [facility], [place], [place]. They were raised by biological parents.  Parents were always together.  \"I have one little sister\".  Patient reported that their childhood was \"good, latch key kids\"   Patient described their current relationships with family of origin as \"cordial, but don't see them.\"     The patient describes their cultural background as .  Cultural influences and impact on patient's life structure, values, norms, and healthcare: None.  Contextual influences on patient's health include: Contextual Factors: Individual Factors The pt reports a long time struggle with addiction and mental health struggles.  He reports that he would like a Harm Reduction program as expectations of abstinence right away \"sets him up for failure\".  and Family Factors Family HX of AUD with his maternal grandfather and a number of family members also have struggled with their MH and with gambling..  Patient identified their preferred language to be English. Patient reported they does not need the assistance of an  or other support involved in therapy.  Patient reports they are not involved in community of nelly activities.  They reports spirituality impacts recovery in the " "following ways:  \"I believe in Marvin and when I am right with God I am ok\".      Patient reported had no significant delays in developmental tasks.   Patient's highest education level was some college. Patient identified the following learning problems: none reported.  Patient reports they are  able to understand written materials.    Patient reported the following relationship history never .  Patient's current relationship status is has a partner or significant other for 6 months.   Patient identified their sexual orientation as heterosexual.  Patient reported having no child(waldemar).     Patient's current living/housing situation involves staying in own home/apartment.  The immediate members of family and household include the pt and his dog and they report that housing is stable. Patient identified partner; parents; pets;  as part of their support system.  Patient identified the quality of these relationships as fair.      Patient reports engaging in the following recreational/leisure activities: skateboard, make art or music, dumpster diving.\"  Patient is currently unemployed.  Patient reports their income is obtained through [employer].  Patient does identify finances as a current stressor.      Patient reports the following substance related arrests or legal issues: \"simple robbery charge from 10 years ago, a small theft charge and am in a Diversion Program..  Patient does not They are not under any current court jurisdiction. .    Patient's Strengths and Limitations:  Patient identified the following strengths or resources that will help them succeed in treatment: , nelly / spirituality, family support, intelligence, sense of humor, sober support group / recovery support , sponsor and strong social skills. Things that may interfere with the patient's success in treatment include: none identified.     Assessments:  The following assessments were completed by patient for this " visit:  PHQ2: No flowsheet data found.  PHQ9:   PHQ-9 SCORE 2/1/2019 2/22/2019 4/16/2019 6/3/2019 10/2/2020 11/4/2021 3/7/2023   PHQ-9 Total Score MyChart - - - - - 3 (Minimal depression) 6 (Mild depression)   PHQ-9 Total Score 5 13 4 8 7 3 6     GAD2:   TIM-2 3/8/2023   Feeling nervous, anxious, or on edge 2   Not being able to stop or control worrying 1   TIM-2 Total Score 3     GAD7:   TIM-7 SCORE 8/4/2017 12/17/2018 2/1/2019 2/22/2019 6/3/2019 3/7/2023   Total Score - - - - - 6 (mild anxiety)   Total Score 5 10 3 15 5 6     CAGE-AID:   CAGE-AID Total Score 3/8/2023   Total Score 4   Total Score MyChart 4 (A total score of 2 or greater is considered clinically significant)     PROMIS 10-Global Health (all questions and answers displayed):   PROMIS 10 3/8/2023   In general, would you say your health is: Good   In general, would you say your quality of life is: Good   In general, how would you rate your physical health? Good   In general, how would you rate your mental health, including your mood and your ability to think? Good   In general, how would you rate your satisfaction with your social activities and relationships? Good   In general, please rate how well you carry out your usual social activities and roles Fair   To what extent are you able to carry out your everyday physical activities such as walking, climbing stairs, carrying groceries, or moving a chair? Completely   How often have you been bothered by emotional problems such as feeling anxious, depressed or irritable? Often   How would you rate your fatigue on average? Moderate   How would you rate your pain on average?   0 = No Pain  to  10 = Worst Imaginable Pain 1   In general, would you say your health is: 3   In general, would you say your quality of life is: 3   In general, how would you rate your physical health? 3   In general, how would you rate your mental health, including your mood and your ability to think? 3   In general, how would you  rate your satisfaction with your social activities and relationships? 3   In general, please rate how well you carry out your usual social activities and roles. (This includes activities at home, at work and in your community, and responsibilities as a parent, child, spouse, employee, friend, etc.) 2   To what extent are you able to carry out your everyday physical activities such as walking, climbing stairs, carrying groceries, or moving a chair? 5   In the past 7 days, how often have you been bothered by emotional problems such as feeling anxious, depressed, or irritable? 4   In the past 7 days, how would you rate your fatigue on average? 3   In the past 7 days, how would you rate your pain on average, where 0 means no pain, and 10 means worst imaginable pain? 1   Global Mental Health Score 11   Global Physical Health Score 15   PROMIS TOTAL - SUBSCORES 26   Some recent data might be hidden     Wallace Suicide Severity Rating Scale (Lifetime/Recent)  Wallace Suicide Severity Rating (Lifetime/Recent) 10/21/2020 10/22/2020 10/23/2020 10/24/2020 10/25/2020 10/26/2020 3/8/2023   Wish to be Dead (Lifetime) - - - - - - -   Comments - - - - - - -   Non-Specific Active Suicidal Thoughts (Lifetime) - - - - - - -   Non-Specific Active Suicidal Thought Description (Lifetime) - - - - - - -   Most Severe Ideation Rating (Lifetime) - - - - - - -   Frequency (Lifetime) - - - - - - -   Duration (Lifetime) - NA NA - NA - -   Controllability (Lifetime) - - - - - - -   Protective Factors  (Lifetime) - - - - - - -   Reasons for Ideation (Lifetime) - - - - - - -   Q1 Wished to be Dead (Past Month) - - - - - - -   Q2 Suicidal Thoughts (Past Month) - - - - - - -   Q3 Suicidal Thought Method - - - - - - -   Q4 Suicidal Intent without Specific Plan - - - - - - -   Q5 Suicide Intent with Specific Plan - - - - - - -   Q6 Suicide Behavior (Lifetime) - - - - - - -   Within the Past 3 Months? - - - - - - -   Level of Risk per Screen - - - -  - - -   Do you have guns available to you? - - - - - No -   RETIRED: 1. Wish to be Dead (Recent) No No No No No No -   RETIRED: Wish to be Dead Description (Recent) - Pt denies - - - - -   RETIRED: 2. Non-Specific Active Suicidal Thoughts (Recent) No No No No No No -   Non-Specific Active Suicidal Thought Description (Recent) - Pt denies - - - - -   3. Active Suicidal Ideation with any Methods (Not Plan) Without Intent to Act (Lifetime) - - - - - - -   RETIRE: Active Suicidal Ideation with any Methods (Not Plan) Description (Lifetime) - - - - - - -   RETIRED: 3. Active Suicidal Ideation with any Methods (Not Plan) Without Intent to Act (Recent) No No No No No No -   RETIRED: Active Suicidal Ideation with any Methods (Not Plan) Description (Recent) - Pt denies - - - - -   RETIRE: 4. Active Suicidal Ideation with Some Intent to Act, Without Specific Plan (Lifetime) - - - - - - -   RETIRE: Active Suicidal Ideation with Some Intent to Act, Without Specific Plan Description (Lifetime) - - - - - - -   4. Active Suicidal Ideation with Some Intent to Act, Without Specific Plan (Recent) No No No No No No -   Active Suicidal Ideation with Some Intent to Act, Without Specific Plan Description (Recent) - Pt denies - - - - -   RETIRE: 5. Active Suicidal Ideation with Specific Plan and Intent (Lifetime) - - - - - - -   RETIRE: Active Suicidal Ideation with Specific Plan and Intent Description (Lifetime) - - - - - - -   RETIRED: 5. Active Suicidal Ideation with Specific Plan and Intent (Recent) No No No No No No -   RETIRED: Active Suicidal Ideation with Specific Plan and Intent Description (Recent) - Pt denies - - - - -   Most Severe Ideation Rating (Past Month) - - - - - - -   Most Severe Ideation Description (Past Month) - - - - - - -   Frequency (Past Month) - - - - - - -   Duration (Past Month) - - - - - - -   Controllability (Past Month) - - - - - - -   Protective Factors (Past Month) - - - - - - -   Reasons for Ideation  (Past Month) - - - - - - -   Actual Attempt (Lifetime) - - - - - - -   Actual Attempt Description (Lifetime) - - - - - - -   Total Number of Actual Attempts (Lifetime) - - - - - - -   Actual Attempt (Past 3 Months) - - - - - - -   1. Wish to be Dead (Lifetime) - - - - - - 1   Wish to be Dead Description (Lifetime) - - - - - - (No Data)   1. Wish to be Dead (Past 1 Month) - - - - - - 0   2. Non-Specific Active Suicidal Thoughts (Lifetime) - - - - - - (No Data)   Most Severe Ideation Rating (Lifetime) - - - - - - 4   Description of Most Severe Ideation (Lifetime) - - - - - - (No Data)   Actual Attempt (Lifetime) - - - - - - (No Data)   Has subject engaged in non-suicidal self-injurious behavior? (Lifetime) - - - - - - 0   Calculated C-SSRS Risk Score (Lifetime/Recent) - - - - - - No Risk Indicated     GAIN-sliding scale:  When was the last time that you had significant problems... 3/8/2023   with feeling very trapped, lonely, sad, blue, depressed or hopeless about the future? 2 to 12 months ago   with sleep trouble, such as bad dreams, sleeping restlessly, or falling asleep during the day? Past Month   with feeling very anxious, nervous, tense, scared, panicked or like something bad was going to happen? Past month   with becoming very distressed & upset when something reminded you of the past? 1+ years ago   with thinking about ending your life or committing suicide? 1+ years ago      When was the last time that you did the following things 2 or more times? 3/8/2023   Lied or conned to get things you wanted or to avoid having to do something? Past month   Had a hard time paying attention at school, work or home? Past month   Had a hard time listening to instructions at school, work or home? Past month   Were a bully or threatened other people? Never   Started physical fights with other people? Never       Personal and Family Medical History:  Patient does report a family history of mental health concerns.  Patient  "reports family history includes Anxiety Disorder in his sister; Bipolar Disorder in his maternal aunt; Coronary Artery Disease in his maternal grandfather; Depression in his father, maternal aunt, maternal aunt, and mother; Diabetes in his paternal grandfather; Substance Abuse in his maternal cousin, maternal cousin, maternal uncle, and mother; Suicide in his maternal cousin..      Patient reported the following previous mental health diagnoses: an anxiety disorder; depression.  Patient reports their primary mental health symptoms include:  \"feeling down sometimes about where I am at in life.  I have more anxiety than anything\" and these do impact his ability to function.   Patient received mental health services in the past: case management; therapy; MI / CD day treatment; psychiatry; Intensive Residential Treatment Services (IRTS)  .  Psychiatric Hospitalizations: Psychiatric Hospitalizations: [facility] when  2016,2012,Patient reports a history of civil commitment in 2015 for MH problems related to drugs .  Current mental health services/providers include: \"a great , but therapy or medication.\"    Patient has had a physical exam to rule out medical causes for current symptoms.  Date of last physical exam was within the past year. Client was encouraged to follow up with PCP if symptoms were to develop. The patient does not have a Primary Care Provider and was encouraged to establish care with a PCP.  Patient reports no current medical concerns.  Patient denies any issues with pain.   There are not significant appetite / nutritional concerns / weight changes.  Patient does not report a history of an eating disorder.  Patient does not report a history of head injury / trauma / cognitive impairment.    Patient reports current meds as:   No outpatient medications have been marked as taking for the 3/8/23 encounter (Hospital Encounter) with Debora Hinojosa LADC. " "      Medication Adherence:  Patient reports not currently prescribed    Patient is able to self-administer medications.      Patient Allergies:    Allergies   Allergen Reactions     Adhesive Tape Itching     Blisters (band aids)     Prozac [Fluoxetine]      Increased anxiety and shakiness       Medical History:    Past Medical History:   Diagnosis Date     Anxiety      Depressive disorder      MDD (major depressive disorder)      Migraines      Psychosis (H)     substance induced     Substance abuse (H)     methamphetamine       Substance Use:  Pt requested the use chart below be skipped, but was willing to give some more current information at the very least.    Patient reported the following biological family members or relatives with chemical health issues:  Maternal Grandfather abused alcohol...  Patient has received substance use disorder and/or gambling treatment in the past.  Patient reports the following dates and locations of treatment services:  [facility] [place] I completed 7 out of 9 inpatient txs and outpatient as well\".  Patient has not ever been to detox.  Patient is not currently receiving any chemical dependency treatment. Patient reports they have attended the following support groups: NA/AA in the past.        Substance Age of first use Pattern and duration of use (include amounts and frequency) Date of last use     Withdrawal potential Route of administration   has used Alcohol Age 20 Past year-\" probably a half a liter a day of Vodka with a chaser.  Daily the past 5 years was that pattern.\" 3/8/2023 Yes oral   has used Marijuana   Age 20 Current; \"a couple times a month, a couple hits.\"  Heavier when young.\" \"few days ago' No smoked     has used Amphetamines   Age 20 Meth-  Current: \"anywhere from 10-30 dollars a day.\"  HU: close to 2 grams a day at the worst for a couple years.  3/8/2023   Yes snorted   has used " "Cocaine/crack    Ge 20 Cocaine-\"never that into it, maybe an eightball over a weekend in the past.  4-5 years of more consistent use.\" Several \"months to a year ago.\" No snorted   has used Hallucinogens Age 22 Acid/Mushrooms- a few times each in my Twenties\" Age 28 No oral   has not used Inhalants        has used Heroin Age 27 Tried it twice in [place] Age 27 No smoked   has used Other Opiates Age [age] \"had a few random pills from the Doctor and not frequently'.  Twenties No oral   has used Benzodiazepine   28 Xanax age [age], \"every day back then, 8 mg\" November, 2022,  a couple pills No oral   has not used Barbiturates        has not used Over the counter meds.        has use Caffeine 6 years old. Coffee/soda, \"maybe one beverage a day\". 3/8/2023   No oral   has used Nicotine  Age 19 Current- 'vaping equivalent to a half a pack a day.\"  Past \"2 packs a day in the past, but I quit\". 3/8/2023   Yes vape   has not used other substances not listed above:  Identify:             Patient reported the following problems as a result of their substance use: academic, family problems, financial problems, legal issues, occupational / vocational problems and relationship problems.  Patient is concerned about substance use. Patient reports his girlfriend and family is concerned about their substance use.  Patient reports their recovery goals are \"to abstain eventually, but want to start with harm reduction.  I tend to self-sabotage if feeling I have to abstain\".     Patient reports experiencing the following withdrawal symptoms within the past 12 months:sweating, unable to sleep, agitation, headache, fatigue, sad/depressed feeling, muscle aches, vivid/unpleasant dreams, irritability and anxiety/worry and the following within the past 30 days: sweating, unable to sleep, agitation, headache, fatigue, sad/depressed feeling, muscle aches, vivid/unpleasant dreams, irritability and anxiety/worry.   Patients reports urges to use " "Alcohol, Methamphetamine and Nicotine / Tobacco.  Patient reports he has used more Alcohol, Methamphetamine and Nicotine / Tobacco than intended and over a longer period of time than intended. Patient reports he has had unsuccessful attempts to cut down or control use of Alcohol, Methamphetamine and Nicotine / Tobacco. Patient reports he has needed to use more Alcohol, Methamphetamine and Nicotine / Tobacco to achieve the same effect.  Patient does  report diminished effect with use of same amount of Alcohol and Methamphetamine.     Patient does  report a great deal of time is spent in activities necessary to obtain, use, or recover from Alcohol and Methamphetamine effects.  Patient does  report important social, occupational, or recreational activities are given up or reduced because of Alcohol and Methamphetamine use.  Alcohol, Methamphetamine and Nicotine / Tobacco use is continued despite knowledge of having a persistent or recurrent physical or psychological problem that is likely to have caused or exacerbated by use.  Patient reports the following problem behaviors while under the influence of substances theft.    Patient reports substance use has ever impacted their ability to function in a school setting. Patient reports substance use has ever impacted their ability to function in a work setting.  Patients demographics and history impact their recovery in the following ways:  Some family HX of AUD and gambling.  Patient reports engaging in the following recreation/leisure activities while using: .\"everything.  It is integrated to all parts of my life.  DOC is alcohol and Meth\".  Patient reports the following people are supportive of recovery: *girlfriend, , family.    Patient does not have a history of gambling concerns and/or treatment.  Patient does not have other addictive behaviors he is concerned about.        Dimension Scale Ratings:    Dimension 1 -  Acute Intoxication/Withdrawal: 2 - " "Moderate Problem The pt reports he does experience withdrawal symptoms from both Meth and alcohol and he is currently using both.  Dimension 2 - Biomedical: 0 - No Problem Patient has had a physical exam to rule out medical causes for current symptoms.  Date of last physical exam was within the past year. Client was encouraged to follow up with PCP if symptoms were to develop. The patient does not have a Primary Care Provider and was encouraged to establish care with a PCP.  Patient reports no current medical concerns.  Patient denies any issues with pain.   There are not significant appetite / nutritional concerns / weight changes.  Patient does not report a history of an eating disorder.  Patient does not report a history of head injury / trauma / cognitive impairment.  Dimension 3 - Emotional/Behavioral/Cognitive Conditions: 2 - Moderate Problem Patient reported the following previous mental health diagnoses: an anxiety disorder; depression.  Patient reports their primary mental health symptoms include:  \"feeling down sometimes about where I am at in life.  I have more anxiety than anything\" and these do impact his ability to function.   Patient received mental health services in the past: case management; therapy; MI / CD day treatment; psychiatry; Intensive Residential Treatment Services (IRTS)  .  Psychiatric Hospitalizations: Psychiatric Hospitalizations: [facility] when  2016,2012,Patient reports a history of civil commitment in 2015 for MH problems related to drugs .  Current mental health services/providers include: \"a great , but therapy or medication.\"  Dimension 4 - Readiness to Change:  2 - Moderate Problem The pt reports that he knows abstinence is best, but he feels it sets him up to fail.  he would like to try harm reduction at this time.  Dimension 5 - Relapse/Continued Use/ Continued Problem Potential: 4 - Extreme Problem The pt is at a very high " risk of relapse due to having a severe Substance Use Disorder, co-occurring MH DX and KALPANA, a family HX of MH/KALPANA and gambling concerns and a long-term habituation to abus of alcohol, Meth and other drugs in the past.    Dimension 6 - Recovery Environment:  2 - Moderate Problem The pt has housing.  He is unemployed.  he reports his girlfriend also struggles with KALPANA.  he has some family support.    Significant Losses / Trauma / Abuse / Neglect Issues:   Patient did not serve in the .  There are indications or report of significant loss, trauma, abuse or neglect issues related to: are no indications and client denies any losses, trauma, abuse, or neglect concerns.  Concerns for possible neglect are not present.     Safety Assessment:   Patient denies current homicidal ideation and behaviors.  Patient denies current self-injurious ideation and behaviors.    Patient denied risk behaviors associated with substance use.  Patient reported substance use associated with mental health symptoms.  Patient reports the following current concerns for their personal safety: None.  Patient reports there are not firearms in the house.       History of Safety Concerns:  Patient denied a history of homicidal ideation.     Patient reported a history of personal safety concerns: unsafe neighborhood: drug related.  Patient denied a history of assaultive behaviors.    Patient denied a history of sexual assault behaviors.     Patient reported a history unsafe motor vehicle operation reported a history of placing themselves in unsafe environment(s) associated with substance use.  Patient reported a history of substance use associated with mental health symptoms.  Patient reports the following protective factors: dedication to family or friends; safe and stable environment; regular sleep; purpose; sense of meaning; healthy fear of risky behaviors or pain; access to a variety of clinical interventions and pets    Risk Plan:  See  Recommendations for Safety and Risk Management Plan    Review of Symptoms per patient report:   Substance Use:  passing out, vomiting, hangovers, daily use, substance related legal problems, substance use at school, substance use at work, substance related decrease in work performance, work absence due to substance use, family relationship problems due to substance use, driving under the influence and cravings/urges to use     Collateral Contact Summary:   Collateral contacts contributing to this assessment:  None provided by the pt at this time.  He came to this assessment with the intention of getting a referral to a Harm Reduction OP [facility] Program.  He does not feel he needs his assessment sent to his /[facility] or to anyone else.    If court related records were reviewed, summarize here: NA, no collateral contacts provided by the pt.    Information in this assessment was obtained from the medical record and provided by patient who is a non-specific historian.    Patient will have open access to their mental health medical record.    Diagnostic Criteria: 1.) Alcohol/drug is often taken in larger amounts or over a longer period than was intended.  Met for Alcohol, Amphetamines and Tobacco.  2.) There is a persistent desire or unsuccessful efforts to cut down or control alcohol/drug use.  Met for Alcohol, Amphetamines and Tobacco.  3.) A great deal of time is spent in activities necessary to obtain alcohol, use alcohol, or recover from its effects.  Met for Alcohol and Amphetamines.  4.) Craving, or a strong desire or urge to use alcohol/drug.  Met for Alcohol, Amphetamines and Tobacco.  5.) Recurrent alcohol/drug use resulting in a failure to fulfill major role obligations at work, school or home.  Met for Alcohol and Amphetamines.  6.) Continued alcohol use despite having persistent or recurrent social or interpersonal problems caused or exacerbated by the effects of alcohol/drug.  Met  for Alcohol and Amphetamines.  7.) Important social, occupational, or recreational activities are given up or reduced because of alcohol/drug use.  Met for Alcohol and Amphetamines.  9.) Alcohol/drug use is continued despite knowledge of having a persistent or recurrent physical or psychological problem that is likely to have been caused or exacerbated by alcohol.  Met for Alcohol, Amphetamines and Tobacco.  10.) Tolerance, as defined by either of the following: A need for markedly increased amounts of alcohol/drug to achieve intoxication or desired effect. and A markedly diminished effect with continued use of the same amount of alcohol/drug..  Met for Alcohol and Amphetamines.  11.) Withdrawal, as manifested by either of the following: The characteristic withdrawal syndrome for alcohol/drug (refer to Criteria A and B of the criteria set for alcohol/drug withdrawal). and Alcohol/drug (or a closely related substance, such as a benzodiazepine) is taken to relieve or avoid withdrawal symptoms.. Met for Alcohol and Amphetamines.       As evidenced by self report and criteria, client meets the following DSM5 Diagnoses:   (Sustained by DSM5 Criteria Listed Above)  Alcohol Use Disorder   303.90 (F10.20) Severe current  Stimulant Use Disorder:  current, Specify current severity:  Severe  304.40 (F15.20) Severe, Amphetamine type substance  Tobacco Use Disorder.  Specify if: current, Specify current severity:  305.1 (Z72.0) Mild.    Recommendations:     1. Plan for Safety and Risk Management:  Recommended that patient call 911 or go to the local ED should there be a change in any of these risk factors..      Report to child / adult protection services was NA.     2. KAL[facility]NA Referrals:   Recommendations:      The pt meets criteria for an inpatient level of care, but has requested to be referred to a Harm Reduction [facility] Program specifically the OP [facility] with Dar[facility].  A Referral will be made.     Patient reports they are  "not willing to follow these recommendations for inpatient tx stating that he \"needs t start with harm reduction and work toward abstinence\". Patient would like the following family or other support people involved in their treatment:  NA . Patient has a HX of some opiate use, but none in the last several years.    3. Mental Health Referrals:  The pt could benefit from mental health services to include therapy, medication and support.  He reports that \"he has a great \" whom he \"talks with frequently\".  He does not appear to be interested in any other MH services.     4. Clinical Substantiation/medical necessity for the above recommendations:    The pt is at a very high risk of relapse due to having a severe Substance Use Disorder, co-occurring MH DX and KALPANA, a family HX of MH/KALPANA and gambling concerns and a long-term habituation to abuse of alcohol, Meth and other drugs in the past.  The pt meets criteria for an inpatient level of care, but has requested to be referred to a Harm Reduction [facility] Program specifically the  [facility] with Darcy.  A Referral will be made.    5. Patient's identified no special considerations needed at this time..     6. Recommendations for treatment focus:     Depressed Mood - DX per pt.  Anxiety - DX per pt.  Alcohol / Substance Use - Meth, Alcohol and Tobacco.   Diversion Program involvement and coordination if needed.  Harm Reduction per pt request.    7.  Interactive Complexity: No     TAMAR Assessment ID: [id number]    Provider Name/ Credentials:  ANNE MARIE Davenport   March 8, 2023        "

## 2023-03-14 ENCOUNTER — TELEPHONE (OUTPATIENT)
Dept: BEHAVIORAL HEALTH | Facility: CLINIC | Age: 38
End: 2023-03-14
Payer: COMMERCIAL

## 2023-03-24 ENCOUNTER — TELEPHONE (OUTPATIENT)
Dept: BEHAVIORAL HEALTH | Facility: CLINIC | Age: 38
End: 2023-03-24
Payer: COMMERCIAL

## 2023-03-24 NOTE — TELEPHONE ENCOUNTER
Spoke to client on 3/24 to schedule [facility] intake, per the client he is currently hospitalized at [facility] and will call back to schedule intake when he has been discharged

## 2023-04-11 ENCOUNTER — HOSPITAL ENCOUNTER (OUTPATIENT)
Dept: BEHAVIORAL HEALTH | Facility: CLINIC | Age: 38
Discharge: HOME | End: 2023-04-11
Attending: FAMILY MEDICINE
Payer: COMMERCIAL

## 2023-04-11 ENCOUNTER — DOCUMENTATION ONLY (OUTPATIENT)
Dept: ADDICTION MEDICINE | Facility: HOSPITAL | Age: 38
End: 2023-04-11
Payer: COMMERCIAL

## 2023-04-11 DIAGNOSIS — F11.10 OPIOID USE DISORDER, MILD, ABUSE (H): ICD-10-CM

## 2023-04-11 DIAGNOSIS — F15.21 AMPHETAMINE USE DISORDER, SEVERE, IN EARLY REMISSION (H): ICD-10-CM

## 2023-04-11 DIAGNOSIS — F10.20 ALCOHOL USE DISORDER, SEVERE, DEPENDENCE (H): Primary | ICD-10-CM

## 2023-04-11 PROCEDURE — H2035 A/D TX PROGRAM, PER HOUR: HCPCS

## 2023-04-11 ASSESSMENT — COLUMBIA-SUICIDE SEVERITY RATING SCALE - C-SSRS
ATTEMPT LIFETIME: NO
6. HAVE YOU EVER DONE ANYTHING, STARTED TO DO ANYTHING, OR PREPARED TO DO ANYTHING TO END YOUR LIFE?: YES
TOTAL  NUMBER OF INTERRUPTED ATTEMPTS PAST 3 MONTHS: NO
5. HAVE YOU STARTED TO WORK OUT OR WORKED OUT THE DETAILS OF HOW TO KILL YOURSELF? DO YOU INTEND TO CARRY OUT THIS PLAN?: YES
TOTAL  NUMBER OF INTERRUPTED ATTEMPTS LIFETIME: 1
TOTAL  NUMBER OF ABORTED OR SELF INTERRUPTED ATTEMPTS LIFETIME: 1
5. HAVE YOU STARTED TO WORK OUT OR WORKED OUT THE DETAILS OF HOW TO KILL YOURSELF? DO YOU INTEND TO CARRY OUT THIS PLAN?: NO
6. HAVE YOU EVER DONE ANYTHING, STARTED TO DO ANYTHING, OR PREPARED TO DO ANYTHING TO END YOUR LIFE?: NO
TOTAL  NUMBER OF ABORTED OR SELF INTERRUPTED ATTEMPTS LIFETIME: YES
TOTAL  NUMBER OF PREPARATORY ACTS LIFETIME: 1
4. HAVE YOU HAD THESE THOUGHTS AND HAD SOME INTENTION OF ACTING ON THEM?: YES
4. HAVE YOU HAD THESE THOUGHTS AND HAD SOME INTENTION OF ACTING ON THEM?: NO
2. HAVE YOU ACTUALLY HAD ANY THOUGHTS OF KILLING YOURSELF?: NO
REASONS FOR IDEATION LIFETIME: MOSTLY TO GET ATTENTION, REVENGE, OR A REACTION FROM OTHERS
1. IN THE PAST MONTH, HAVE YOU WISHED YOU WERE DEAD OR WISHED YOU COULD GO TO SLEEP AND NOT WAKE UP?: NO
1. HAVE YOU WISHED YOU WERE DEAD OR WISHED YOU COULD GO TO SLEEP AND NOT WAKE UP?: YES
3. HAVE YOU BEEN THINKING ABOUT HOW YOU MIGHT KILL YOURSELF?: NO
TOTAL  NUMBER OF INTERRUPTED ATTEMPTS LIFETIME: YES
6. HAVE YOU EVER DONE ANYTHING, STARTED TO DO ANYTHING, OR PREPARED TO DO ANYTHING TO END YOUR LIFE?: 1
2. HAVE YOU ACTUALLY HAD ANY THOUGHTS OF KILLING YOURSELF?: YES
TOTAL  NUMBER OF ABORTED OR SELF INTERRUPTED ATTEMPTS PAST 3 MONTHS: NO

## 2023-04-11 NOTE — PROGRESS NOTES
ABSENT NOTE:    Morgan Bosch was absent from group 4/11/2023 . This absence  Was excused. Patient completed service initiation earlier today and said staying for another 3 hours is too much.  Pt said he would start group attendance on Thursday 4/13/23. Pt also stated that he would try to attend group virtually via My Chart.       Brenda Wagner, [facility]  4/11/2023 , 5:38 PM

## 2023-04-11 NOTE — PROGRESS NOTES
Comprehensive Assessment Summary     Based on client interview, review of previous assessments and   comprehensive assessment interview the following diagnosis and recommendations are:     Patient: Morgan Bosch  MRN; [id number]   : 1985  Age: 38 year old Sex: male       Client meets criteria for:  Alcohol Use Disorder, Severe. (F10.20) (303.90)  Stimulant Use Disorder, Amphetamine Type, Severe (F15.10) (305.70)  Opioid Use Disorder, Mild (F11.10) (305.50)    Dimension One: Acute Intoxication/Withdrawal Potential     Ratin  (Consider the client's ability to cope with withdrawal symptoms and current state of intoxication)      Last use of alcohol and meth, yesterday 4/10/23. Alcohol-vodka, 1/4 to 1/2 liter a day. Meth-smoking,   Pt denies any withdrawal issues.  Pt reports he drank only 1/4 liters yesterday but he is not feeling any withdrawal symptoms during service initiation. Pt acknowledged that he used to experience withdrawal symptoms in the past only when he consumed over 1/2 liters of vodka.  Pt reports he was hospitalized about 2 weeks ago due to kidney dysfunction after abusing fentanyl but his kidney is back to functionating again.      Dimension Two: Biomedical Condition and Complications    Ratin  (Consider the degree to which any physical disorder would interfere with treatment for substance abuse, and the client's ability to tolerate any related discomfort; determine the impact of continued chemical use on the unborn child if the client is pregnant)       Pt reports he had kidney dysfunction about 2 weeks ago after he used fentanyl a friend of his gave to him.   Pt states he does not use fentanyl often but the last two times he used, he had bad experiences.  Pt reports he has no plan to use fentanyl any more.  Pt reports he overdosed last fall with fentanyl and had to recieve Narcan. Pt reports no other health issues and states his kidney is back working normally.  [place] but has not seen primary doctor for few years.        Dimension Three: Emotional/Behavioral/Cognitive Conditions & Complications  Ratin  (Determine the degree to which any condition or complications are likely to interfere with treatment for substance abuse or with functioning in significant life areas and the likelihood of risk of harm to self or others)       Pt reports mental health history of depression and anxiety.  Pt reports no current suicid ideation in the last 30 days, however he admits he had a plan (suffocation in van) when he was 26 or 27.  Pt states he did not carry out the plan after thinking about his family.  Pt reports he does not have any MH services at this time and open to start seeing program psychotherapist.    Pt reports he had tried different approaches to his mental health management including psych medications in the past but nothing worked and decided to self medicate with drugs.  Pt states stake boarding was most helpful with improving his mood.  Pt currently has case management through [facility] and they found him his current public housing.        Dimension Four: Treatment Acceptance/Resistance     Ratin  (Consider the amount of support and encouragement necessary to keep the client involved in treatment)         Pt reports he came to treatment voluntarily and not mandated.    Pt admitted that he has a minor theft charge and working with conversion program to get rid of his charge.      Dimension Five: Continued Use/Relaspe Prevention     Rating:  3  (Consider the degree to which the client's recognizes relapse issues and has the skills to prevent relapse of either substance use or mental health problems)       Pt reports he tried 5-6 treatments in the past, no detox.  Last use of alcohol and methamphetamine, a day before service initiation (4/10/2023). Pt states his trigger is anxiety, either thinking too much about the future or the past.  Pt expressed his  openness to start seeing program therapist to manage his anxiety symptoms.   Pt reports rarely uses Fentanyl but he experienced two deadly outcomes in the last 6 months: Overdose in Fall 2022 and Narcan was used; kidney dysfunction after using Fentanyl from a friend.      Dimension Six: Recovery Environment     Ratin  (Consider the degree to which key areas of the client's life are supportive of or antagonistic to treatment participation and recovery)       Pt reports he lives independently at a public housing in [place] and his girlfriend visit him frequently.  Pt states she has her own house and currently struggling with fentanyl use and trying to get into an inpatient program.  Pt reports he has no income at this time and receiving some public assistance.  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   Pt reports his sister, mom, dad, and few sober friends are supportive.  Pt has prior experience with AA.  Pt states he likes to skate board.  Pt admits he has a minor theft charge and involved in a conversion program to get rid of the charge, but this treatment is not part of the program.         I have reviewed the information on the assessment, psychosocial and medical history and checklist:        it is current    Client:  Morgan Bosch  MRN: [id number]    Comprehensive Assessment UPDATE/ISP/VAA/[place] Re-Assess   Comprehensive Assessment Update: 2023    Comprehensive assessment dated 2023 was reviewed and updates are as follows:   Reason for admission today:  Pt reports this is self referral     Dates of last use and substance(s) used:  Yesterday 4/10/2023  Patient has a history of opiate use and was give treatment options, including Medication Assisted Treatment, and information on the risks of opiod use disorder including recognizing and responding to opiod overdose.    Safety concerns:  None. Pt states he lives in his own apartment and it is safe.         Other:  None      Health Screening:  Given patient's past history, a medication, and physical condition, is there a fall risk?  No  Does the patient have any pain? NA  Is the patient on a special diet? If yes, please explain: no  Does the patient have any concerns regarding your nutritional status? If yes, please explain: no  Has the patient had any appetite changes in the last 3 months?  No  Has the patient had any weight loss or weight gain in the last 3 months? No  Has the patient have a history of an eating disorder or been over-eating, avoiding meals, or inducing vomiting?  No  Does the patient have any dental concerns? (Problems with teeth, pain, cavities, braces)?  NO  Are immunizations up to date?  Yes  Any recent exposure to TB, Hepatitis, Measles, or Strep?  No  Client's BMI is unknown.  Client informed of BMI?  Yes     Dimension Scale Ratings:    Dimension 1: 0 Client displays full functioning with good ability to tolerate and cope with withdrawal discomfort. No signs or symptoms of intoxication or withdrawal or resolving signs or symptoms.    Dimension 2: 1 Client tolerates and juany with physical discomfort and is able to get the services that the client needs.    Dimension 3: 2 Client has difficulty with impulse control and lacks coping skills. Client has thoughts of suicide or harm to others without means; however, the thoughts may interfere with participation in some treatment activities. Client has difficulty functioning in significant life areas. Client has moderate symptoms of emotional, behavioral, or cognitive problems. Client is able to participate in most treatment activities.    Dimension 4: 0 Client is cooperative, motivated, ready to change, admits problems, committed to change, and engaged in treatment as a responsible participant.    Dimension 5: 3 Client has poor recognition and understanding of relapse and recidivism issues and displays moderately high vulnerability for further substance use or  mental health problems. Client has few coping skills and rarely applies coping skills.    Dimension 6: 2 Client is engaged in structured, meaningful activity, but peers, family, significant other, and living environment are unsupportive, or there is criminal justice involvement by the client or among the client's peers, significant others, or in the client's living environment.    Initial Service Plan (ISP)    Immediate health, safety, and preliminary service needs identified and plan includes the following based on available information from clients, referral sources, and collateral information.    Safety (SI, SIB, suicide attempts, aggressive behaviors):  None in the last 30 days    Health:  Client does NOT have health issues that would impede participation in treatment    Transportation: Pt states he is able to take public transportation, however he said he would rather attend treatment virtually.       Other:  None     Patient does not have any identified barriers to participating in referred services.    Vulnerable Adult Assessment    Does the patient possess a physical or mental infirmity or other physical, mental, or emotional dysfunction?  No. Patient is not a vulnerable adult.    This patient is not a functional Vulnerable Adult according to [place] Statute 626.5572 subdivision 21.      Treatment suggestions for client for the time period until the    initial treatment planning session:  Attend treatment programing beginning Thursday 4/13/23.     [place] Re-Assessment:     Have you ever wished you were dead or that you could go to sleep and not wake up? Lifetime?  Yes.  Describe: age 26   Past Month? No    Have you actually had any thoughts of killing yourself?  Lifetime?  Yes.  Describe: thought about suffocating myself in a van but thought about my family and stopped    Past Month?  No     Have you been thinking about how you might do this? Lifetime?  No   Past Month?  Yes.  Describe: thought about it  but stopped after thinking about my family      Have you had these thoughts and had some intention of acting on them?  Lifetime?  Yes.  Describe: thought about it and had an intention but spotted after thinking about family.    Past Month?  No    Have you started to work out the details of how to kill yourself?  Lifetime?  Thought about a plan but stopped.   Past Month? No    Do you intend to carry out this plan? Not at this time    When you have the thoughts how long do they last?   N/A. Not any more.     Are there things - anyone or anything (ie Family, Temple, pain of death) that stopped you from wanting to die or acting on thoughts of suicide?   Protective factors definitely stopped you from attempting suicide      Brief Biosocial Gambling Screen:   1. During the past 12 months, have you become restless irritable or anxious when trying to stop/cut down on gambling? - No  2. During the past 12 months, have you tried to keep your family or friends from knowing how much you gambled? - No  3. During the past 12 months did you have such financial trouble as a result of your gambling that you had to get help with living expenses from family, friends or welfare? -No    Therapeutic Note:   D) This writer met with the patient for individual session/service initiation.  Chemical health assessment completed.    I) Pt expressed concerns of having anxiety.  Pt stated he does not have any current MH services and he was open to seeing program psychotherapist    A) Pt was diagnosed with alcohol use disorder, severe; Stimjulant use disorder, amphetamine type, severe; Opioid use disorder, mild.  Based on this assessment and diagnose, patient seems to be appropriate for KALPANA outpatient treatment.     P) Pt agreed to start OP harm reduction program [facility]; two 3-hour groups per week.  Pt will be provided with group therapy, psychoeducation, individual session, and psychotherapy by program therapist if determined beneficial.  Pt  will start his first group attendance adis 4/13/2023.             2008  [employer]  Used with permission by Merced Terry, PhD.      Brenda Wagner [facility]       4/11/2023     11:53 AM

## 2023-04-12 ENCOUNTER — DOCUMENTATION ONLY (OUTPATIENT)
Dept: ADDICTION MEDICINE | Facility: HOSPITAL | Age: 38
End: 2023-04-12
Payer: COMMERCIAL

## 2023-04-12 NOTE — PROGRESS NOTES
Addiction Outpatient Weekly Clinical Staffing     Morgan Bosch was staffed on 4/12/2023 . Morgan Bosch was staffed on recovery strengths, barriers and treatment progress.     Staff present: Isabelle Donnelly [facility], Brenda Wagner [facility], Romy Oneal [facility], Rahel Grant [facility], [facility], Breann Álvarez [facility] , Lila Larsen [facility] , Kylah Shay [facility] and Donald Welander, [facility]    Date: 4/12/2023 Time: 4:04 PM    Staff Signature: ANNE MARIE Curtis

## 2023-04-12 NOTE — ADDENDUM NOTE
Encounter addended by: Brenda Wagner LADC on: 4/12/2023 10:37 AM   Actions taken: Clinical Note Signed

## 2023-04-12 NOTE — PROGRESS NOTES
[facility]  Adult Substance Use Disorder Program  Treatment Plan     These services are provided by the facility for each patient/client according to the individual's treatment plan:    Individual and group counseling    Education    Transition services    Services to address any co-occurring mental illness    Service coordination    Criteria for discharge:  Patients/clients are discharged from the program following completion of the entire program including all phases of treatment or acceptance of other post-treatment referrals such as sober supportive living, or aftercare at other facilities.  Patients/clients may also be discharged for inappropriate behavior or substance use.      Favorable Discharge - Patients/clients have completed agreed upon treatment goals, understand their diagnosis and appear motivated for continued recovery.    Guarded Discharge - Patients/clients have demonstrated some understanding of their diagnosis and recovery process, and have completed some of their treatment goals.  This prognosis also includes patients/clients who have completed some treatment goals but have not made commitment to community support or follow through with referrals.    Unfavorable Discharge - Patients/clients have not completed agreed upon treatment goals due to their own choice, have limited understanding of their diagnosis, and have shown minimal or inconsistent behavior conducive to recovery.  Those patients/clients discharged due to behavioral problems will also be unfavorable discharges.    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 Alcohol-vodka, 1/4 to 1/2 liter a day. Meth-smoking,  Pt denies any withdrawal issues.  Pt reports he drank only 1/4 liters yesterday, but he is not feeling any withdrawal symptoms during service initiation. Pt acknowledged that he used to experience withdrawal symptoms in the past only when he consumed over 1/2 liters of vodka.  Pt reports he was hospitalized about 2 weeks ago due to kidney dysfunction after abusing fentanyl, but his kidney is back to functionating again.      Update: Initial  Date of last use: 4/10/2023  Patient currently receiving medication assisted therapy (MAT): No  Current or recent withdrawal symptoms: No    Focus area: potential withdrawal from abstinence  Date Assigned: 4/12/2023  Clinical Goal:  Monitor withdrawal and assist patient  Patient's Goal:  Stop using chemicals and gain abstinence   Goal needs to be completed prior to discharge? [x]    Treatment Strategies:   1) Juice will inform counselor(s) of physiolofical an/or emotional changes he may experience as a result of abstinence.   2) Juice will see a psychiatrist regarding medication assisted therapy for alcohol and opioid   3) Juice will be honest about his use and report to counselor(s)  Target Date: 10/12/2023  Date Completed:       Dimension 2: Biomedical Conditions/Complications, Risk Level: 1    Needs identified from Comprehensive Assessment Summary:   Pt reports he had kidney dysfunction about 2 weeks ago after he used fentanyl a friend of his gave to him.  Record indicate he was hospitalized on 3/19/23 at [facility].   Pt states he does not use fentanyl often but the last two times he used, he had bad experiences: overdose last fall and received Narcan.  Pt reports no other health issues and states his kidney is back working normally.  PCP,  [facility] but has not seen primary doctor for few years.      Update: Initial    Focus area:   Clinical Goal: assist patient with improving his overall health  Patient's Goal:  Stop using drugs and alcohol and improve my health   Goal needs to be completed prior to discharge? [x]    Methods/Strategies (must include amount and frequency):   1. Morgan will report any changes to physical health to counselor.   2. Morgan will schedule primary doctor appointment for physical check  3. Morgan will take medications as prescribed.   Target Date: 10/10/2023  Completion Date:       Dimension 3: Emotional/Behavioral/Cognitive, Risk Level: 2    Needs identified from Comprehensive Assessment Summary:   Pt reports mental health history of depression and anxiety.  Pt reports no current suicide ideation in the last 30 days, however he admits he had a plan (suffocation in van) when he was 26 or 27.  Pt states he did not carry out the plan after thinking about his family.  Pt reports he does not have any MH services currently and open to start seeing program psychotherapist.    Pt reports he had tried different approaches to his mental health management including psych medications in the past, but nothing worked and decided to self-medicate with drugs.  Pt states stake boarding was most helpful with improving his mood.  Pt currently has case management through [facility], and they found him his current public housing.      Update: Initial  Focus area:  Anxiety and depression  Clinical Goal: assist patient with gaining healthy coping skills for his mental health symptos  Patient's Goal:  Anxiety and depression management   Goal needs to be completed prior to discharge? [x]    Methods/Strategies (must include amount and frequency):   1. Juice will meet with program psychotherapist    2. Juice will meet with psychiatrist if program psychotherapist determines it would be beneficial  3. Juice will present his drug/alcohol  history in group and take a look at how they impacted his life negatively    Target Date: 10/10/2023  Completion Date:       Dimension 4: Readiness to Change, Risk Level: 0    Needs identified from Comprehensive Assessment Summary:     Pt reports he came to treatment voluntarily and not mandated.    Pt admitted that he has a minor theft charge and working with conversion program to get rid of his charge.    Update: Initial    Focus area: Maintain motivation for recovery  Clinical Goal: Assist patient with seeing benefits of staying sober     Patient's Goal:  Maintain motivation for recovery throughout treatment  Goal needs to be completed prior to discharge? [x]    Methods/Strategies (must include amount and frequency):   1. Morgan will attend two, 3-hour groups per week. Days/Times: Tuesdays and Thursdays, 12:30-3:30 pm.  He will attend either by in-person or Zoom.  Morgan will inform counselor of his option before each group.    2. Morgan will contact staff if unable to attend at [email or web address]    Target Date:  10/10/2023  Completion Date:       Dimension 5: Relapse/Continued Use/Continued Problem Potential, Risk Level: 3    Needs identified from Comprehensive Assessment Summary:  Pt reports he tried 5-6 treatments in the past, no detox.  Last use of alcohol and methamphetamine, a day before service initiation (4/10/2023). Pt states his trigger is anxiety, either thinking too much about the future or the past.  Pt expressed his openness to start seeing program therapist to manage his anxiety symptoms.   Pt reports rarely uses Fentanyl but he experienced two deadly outcomes in the last 6 months: Overdose in Fall of 2022 and Narcan was used; kidney dysfunction after using Fentanyl from a friend.      Update: Initial    Focus area: lack of sobriety despite several treatments in the past  Clinical Goal: Assist patient with gaining sobriety  Patient's Goal: abstinence  Goal needs to be completed  prior to discharge? [x]    Methods/Strategies (must include amount and frequency):   1. Morgan will share during each session's check-in any urges and addictive thinking to better understand their pattern of use and to prevent return to use.  2. Morgan will identify 6 triggering people/places/things and come up with relapse prevention plan to cope with each challenges  Target Date: 10/10/2023  Completion Date:       Dimension 6: Recovery Environment, Risk Level: 2    Needs identified from Comprehensive Assessment Summary:   Pt reports he lives independently at a public housing in [place] and his girlfriend visit him frequently.  Pt states she has her own house and currently struggling with fentanyl use and trying to get into an inpatient program.  Pt reports he has no income at this time and receiving some public assistance.  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   Pt reports his sister, mom, dad, and few sober friends are supportive.  Pt has prior experience with AA.  Pt states he likes to skate board.  Pt admits he has a minor theft charge and involved in a conversion program to get rid of the charge, but this treatment is not part of the program.      Update: Initial    Desire for family involvement: No    Patient would like family involved in treatment: No  Focus area: lack of sober structure and sober network  Clinical Goal: increased sober support network  Patient's Goal:  Gain sober skills and support network in the community  Goal needs to be completed prior to discharge? [x]  Methods/Strategies (must include amount and frequency):   1. Juice will resume his community support attendance.    2. Juice will participate in [facility].    3. Juice will explore ways to use his leisure time.    Target Date: 10/10/2023  Completion Date:       Resources  Resources to which the patient is being referred for problems when they are to be addressed concurrently by another provider:  [facility]: [phone], [place] Billing Department: [phone], Mental health therapy with program psychotherapist, [facility] [id number], Primary Care Provider and  [facility]; [facility]    [x] Contact insurance to ensure referrals are in network    Vulnerable Adult Review   [x] Review of the facility Abuse Prevention plan was reviewed with the patient   [x] No individual abuse plan is necessary   [] In addition to the facility Abuse Prevention plan, an Individual Abuse Plan will be put in place     Morgan attests their date of last use as 4/10/2023. Morgan attests he has participated in the creation of this treatment plan. Morgan has been provided a copy of this treatment plan and is in agreement with how this plan is written and will be stored in the electronic record.       Patient Signature: _________________________________ Date: _________    Counselor Signature: __Brenda Wagner [facility]_________ Date: __2023 1:20 pm_______

## 2023-04-13 ENCOUNTER — DOCUMENTATION ONLY (OUTPATIENT)
Dept: ADDICTION MEDICINE | Facility: HOSPITAL | Age: 38
End: 2023-04-13
Payer: COMMERCIAL

## 2023-04-13 NOTE — PROGRESS NOTES
ABSENT NOTE:    Morgan Bosch arrived to group on time but he was allowed to star group on Tuesday 4/18/23.  He was seen on Zoom video cameral but audio was not working.  Everyone could hear him but he could not hear anything.  Pt texted counselor this problem and tried to get it fixed.  Some group member suggested few things to do to improve his audio but patient could not hear anything.  Pt eventually gave up and said he would try coming in next week with a different computer and if that does not work he will come in in-person.    Patient is expected to start group on 4/18/2023    ANNE MARIE Curtis  4/13/2023 , 3:46 PM

## 2023-04-17 ENCOUNTER — DOCUMENTATION ONLY (OUTPATIENT)
Dept: ADDICTION MEDICINE | Facility: HOSPITAL | Age: 38
End: 2023-04-17
Payer: COMMERCIAL

## 2023-04-18 ENCOUNTER — DOCUMENTATION ONLY (OUTPATIENT)
Dept: ADDICTION MEDICINE | Facility: HOSPITAL | Age: 38
End: 2023-04-18
Payer: COMMERCIAL

## 2023-04-18 NOTE — PROGRESS NOTES
"ABSENT NOTE:  D)Morgan Bosch was absent from group 4/18/2023 . This absence was excused. Patient emailed counselor at 11:30 am: \" I planned to do in-person today, but after waking up a short while ago, I don't think I can make it there today.  I am feeling a little under the weather, with a pretty bad migraine.  I am very sorry that I've missed these first 2 start dates.  Would it be ok if Start on Thursday.  I promise I won't miss again. \"    I) Patient was excused from today's group  P) Patient is expected to attend group on 4/20/23    ANNE MARIE Curtis  4/18/2023 , 4:18 PM        "

## 2023-04-19 NOTE — PROGRESS NOTES
Weekly Progress Note 4/11/23 to 4/17/23  Morgan Bosch  1985  [id number]      D) Pt attended ZERO groups  this week with two absences. Patient completed service initiation in-person on 4/11 and decided to start group virtually on 4/13.  Patient struggled to turn on his microphone on 4/13 and decided to leave the group.  Pt emailed counselor on 4/17 before group stating he was too sick to attend group.    P) patient is expected to start group o 4/20.   Brenda Wagner [facility] 4/19/23  4:40pm

## 2023-04-20 ENCOUNTER — HOSPITAL ENCOUNTER (OUTPATIENT)
Dept: BEHAVIORAL HEALTH | Facility: CLINIC | Age: 38
Discharge: HOME | End: 2023-04-20
Attending: FAMILY MEDICINE
Payer: COMMERCIAL

## 2023-04-20 DIAGNOSIS — F15.20 AMPHETAMINE USE DISORDER, SEVERE (H): Primary | ICD-10-CM

## 2023-04-20 PROCEDURE — H2035 A/D TX PROGRAM, PER HOUR: HCPCS | Mod: HQ,GT,95

## 2023-04-20 NOTE — GROUP NOTE
"Group Therapy Documentation    PATIENT'S NAME: Morgan Bosch  MRN:   [id number]  :   1985  ACCT. NUMBER: [id number]  DATE OF SERVICE: 23  START TIME: 12:30 PM  END TIME:  3:20 PM  FACILITATOR(S): Brenda Wagner LADC  TOPIC: BEH Group Therapy  Number of patients attending the group:  3  Group Length:  180 min    Group Therapy Type: Addiction and Psychoeducation    Summary of Group / Topics Discussed:    Psychoeducation/Skills Goal Setting ([facility])  This topic will give a general overview of short, intermediate and long term goals including the value of goal setting. It will explore how the pursuit of instant gratification adversely affects the ability to reach goals.  This topic will assist the patients in completing their recovery care plans.    Objective(s):     Patients will identify the characteristics of short, intermediate and long term goals.    Patients will be able to list one personal goal under each category    Patients will identify at least one way instant gratification impacts their ability to reach goals    Structure (modalities, homework, worksheets, etc):     Provide psychoeducation on goal setting and overcoming obstacles to goal attainment.    Facilitate group discussion around each patient s current state of goal setting and attainment.    Complete a worksheet on personal goal setting    Expected therapeutic outcome(s):   Patient will:    Set goals and negotiate obstacles to reaching the goals     Experience a higher incidence of goal attainment    Therapeutic outcome(s) measured by:     Completion of goal setting worksheet      Group Attendance:  Attended group session, in-person, for 3 hours.      Patient's response to the group topic/interactions:  cooperative with task, expressed readiness to alter behaviors and expressed understanding of topic    Patient appeared to be Actively participating and Engaged.        Client specific details: .  D) Data: Patient arrived \"in-person\" " "at 12:30 PM and attended the SOARS group until its ending time at 3:20 PM. . Patient attended 3 hours of group today.  Pt reports 6  days sober from meth and his left leg pain is improving and he is almost done with the physical discomfort from withdrawals.  Pt reports for the past 6 days he had been sleeping and eating better to get through the withdrawals.   Pt reports MH symptoms of anxiety.  Pt states he would like to start seeing a therapist.  Program therapist was notified.  Pt also talked about his relationship issue with his girlfriend who is actively using fentanyl.  Pt states he loves her but he might have to reevaluate their relationship.  This was his very first group attendance.  He participated very well and expressed his opinion and feelings in group.  Pt went over briefly about his drug use history.  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      I) Intervention: Patient was educated on \"creating healthy habits\".  Patient was first asked to identify any bad habits.  Pt said he has a tendency to \"isolate\".  Pt participated in creating a plan.    A)Pt was able to to create his own plan.  Pt would start attending 12 Step meetings on the days groups are not offered.    P) Patient will stick to this plan for 2 weeks     Attestation:   Viral Ruiz MD - Medical Director - Provides oversight and supervision of care.    "

## 2023-04-22 ENCOUNTER — HEALTH MAINTENANCE LETTER (OUTPATIENT)
Age: 38
End: 2023-04-22

## 2023-04-24 ENCOUNTER — DOCUMENTATION ONLY (OUTPATIENT)
Dept: ADDICTION MEDICINE | Facility: HOSPITAL | Age: 38
End: 2023-04-24
Payer: COMMERCIAL

## 2023-04-24 NOTE — PROGRESS NOTES
[facility] Weekly Treatment Plan Review    Date span:  2023 to 2023    Patient had absence on  due to being sick.  He was excused from the treatment.  Pt attended his first group on .      Weekly Treatment Plan Review     Treatment Plan initiated on:2023    Dimension1: Acute Intoxication/Withdrawal Potential -   Previous Dimension Ratin  Current Dimension Ratin  Date of Last Use 2023  Any reports of withdrawal symptoms - Pt reported he was 6 days sober as of 23.  Pt did not report any specific withdrawal symptoms and said the worse of the withdrawal symptoms were done and he was tolerating well.      Narratives: Patient reported at Service Initiation date of 2023 that his last use of alcohol and meth was a day before, 4/10/23. Alcohol-vodka, 1/4 to 1/2 liter a day. Meth-smoking,   Pt denied any withdrawal issues.  Pt reports he drank only 1/4 liters on 4/10 and he was not feeling any withdrawal symptoms. Pt acknowledged that he used to experience withdrawal symptoms in the past if had consumed over 1/2 liters of vodka.  Pt reported some meth use as well.  Pt reported he was hospitalized in 2023 for kidney dysfunction. Pt reports he used fentanyl which he rarely uses but this episode impacted his health immediately.   Patient started group attendance on  and said he was 6 days sober.          Dimension 2: Biomedical Conditions & Complications -   Previous Dimension Ratin  Current Dimension Ratin  Medical Concerns:  Kidney problem in 2023, left leg pain from fentanyl use  Current Medications & Medication Changes:  Current Outpatient Medications   Medication     gabapentin (NEURONTIN) 300 MG capsule     THEREMS-M tablet     thiamine (B-1) 100 MG tablet     No current facility-administered medications for this visit.     Medication Prescriber:  Nnamdi Tolbert PA-C  Taking meds as prescribed? Yes  Medication side effects or concerns:   None   Outside medical appointments this week (list provider and reason for visit):  None    Pt reports he had kidney dysfunction about 2 weeks ago after he used fentanyl a friend of his gave to him.   Pt states he does not use fentanyl often but the last two times he used, he had bad experiences.  Pt reports he has no plan to use fentanyl any more.  Pt reports he overdosed last fall with fentanyl and had to recieve Narcan.  Pt reports his overall health is OK at this time and the only impact from the last fentanyl use is some left leg pain.  Pt reports no other health issues and states his kidney is back working normally.  PCP, [facility] but has not seen primary doctor for few years.      Dimension 3: Emotional/Behavioral Conditions & Complications -   Previous Dimension Ratin  Current Dimension Ratin  PHQ2:       2023    11:00 AM   PHQ-2 ( 1999 [employer])   Q1: Little interest or pleasure in doing things 1   Q2: Feeling down, depressed or hopeless 1   PHQ-2 Score 2      GAD2:       3/8/2023    12:19 PM 2023    11:00 AM   TIM-2   Feeling nervous, anxious, or on edge 2 1   Not being able to stop or control worrying 1 1   TIM-2 Total Score 3 2     PROMIS 10-Global Health (all questions and answers displayed):       3/8/2023    12:21 PM 2023    11:00 AM 2023    12:14 PM   PROMIS 10   In general, would you say your health is: Good  Good   In general, would you say your quality of life is: Good  Good   In general, how would you rate your physical health? Good  Good   In general, how would you rate your mental health, including your mood and your ability to think? Good  Good   In general, how would you rate your satisfaction with your social activities and relationships? Good  Fair   In general, please rate how well you carry out your usual social activities and roles Fair  Fair   To what extent are you able to carry out your everyday physical activities such as walking, climbing stairs,  carrying groceries, or moving a chair? Completely  Mostly   In the past 7 days, how often have you been bothered by emotional problems such as feeling anxious, depressed, or irritable? Often  Sometimes   In the past 7 days, how would you rate your fatigue on average? Moderate  Mild   In the past 7 days, how would you rate your pain on average, where 0 means no pain, and 10 means worst imaginable pain? 1  3   In general, would you say your health is: 3  3    3    3   In general, would you say your quality of life is: 3 3 3    3    3   In general, how would you rate your physical health? 3 3 3    3    3   In general, how would you rate your mental health, including your mood and your ability to think? 3 3 3    3    3   In general, how would you rate your satisfaction with your social activities and relationships? 3 2 2    2    2   In general, please rate how well you carry out your usual social activities and roles. (This includes activities at home, at work and in your community, and responsibilities as a parent, child, spouse, employee, friend, etc.) 2 3 2    2    2   To what extent are you able to carry out your everyday physical activities such as walking, climbing stairs, carrying groceries, or moving a chair? 5 4 4    4    4   In the past 7 days, how often have you been bothered by emotional problems such as feeling anxious, depressed, or irritable? 4 3 3    3    3   In the past 7 days, how would you rate your fatigue on average? 3 2 2    2    2   In the past 7 days, how would you rate your pain on average, where 0 means no pain, and 10 means worst imaginable pain? 1 2 3    3    3   Global Mental Health Score 11 11 11    11    11   Global Physical Health Score 15 15 15    15    15   PROMIS TOTAL - SUBSCORES 26 26 26    26    26     Mental health diagnosis: depression, anxiety  Date of last SIB:  around age 26 or 27  Date of  last SI:  over 30 days ago without any plan  Date of last HI: never  Behavioral Targets:   manage depression and anxiety  Risk factors: Pt seems to lack recovery skills   Protective factors:  positive relationships positive family connections  Current MH Assignments:  Pt was referred to program psychotherapist    Narrative:  Current Mental Health symptoms include: depression and anxiety. Active interventions to stabilize mental health symptoms this week : Patient has been referred to the program psychotherapist.      Pt reports mental health history of depression and anxiety.  Pt reports no current suicid ideation in the last 30 days, however he admits he had a plan (suffocation in van) when he was 26 or 27.  Pt states he did not carry out the plan after thinking about his family.  Pt reports he does not have any MH services at this time and open to start seeing program psychotherapist.    Pt reports he had tried different approaches to his mental health management including psych medications in the past but nothing worked and decided to self medicate with drugs.  Pt states stake boarding was most helpful with improving his mood.  Pt currently has case management through [employer] and they found him his current public housing.        Dimension 4: Treatment Acceptance / Resistance -   Previous Dimension Ratin  Current Dimension Ratin  KALPANA Diagnosis:    Alcohol Use Disorder, Severe. 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    Pt admitted that he has a minor theft charge and working with conversion program to get rid of his charge.       Dimension 5: Relapse / Continued Problem Potential -   Previous Dimension Rating:  3  Current Dimension Rating:  3  Relapses this week - Yes.  As of ,  patient said he was 6 days sober.    Urges to use - None  UA results - No results found for this or any previous visit (from the past 168 hour(s)).  Using ReSet Armen: N/A    Narrative- Pt reports he tried 5-6 treatments in the past, no detox.  Last use of alcohol and methamphetamine, a day before service initiation (4/10/2023). Pt states his trigger is anxiety, either thinking too much about the future or the past.  Pt expressed his openness to start seeing program therapist to manage his anxiety symptoms.   Pt reports rarely uses Fentanyl but he experienced two deadly outcomes in the last 6 months: Overdose in Fall of  and Narcan was used; kidney dysfunction after using Fentanyl from a friend.  Patient attended his first group on  and said he was 6 days sober.      Dimension 6: Recovery Environment -   Previous Dimension Ratin  Current Dimension Ratin  Family Involvement - no  Summarize attendance at family groups and family sessions - NA  Family supportive of treatment?  Yes    Community support group attendance - Pt reports he has some history of 12 Step meeting and he plans to start attending groups  Recreational activities - skate boarding    Narrative - Pt reports he lives independently at a public housing in [place] and his girlfriend visit him frequently.  Pt states she has her own house and currently struggling with fentanyl use and trying to get into an inpatient program.  Pt reports he has no income at this time and receiving some public assistance.  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   Pt reports his sister, mom, dad, and few sober friends are supportive.  Pt has prior experience with AA.  Pt states he likes to skate board.  Pt admits he has a minor theft charge and involved in a conversion program to get rid of the charge, but this treatment is not part of the program.      Progress made on transition planning goals: TBD    Justification for Continued Treatment  at this Level of Care:  Patient was only 1 day sober as of the service initiation date of 4/11.  Patient attended his first group on 4/18 and reported 6 days sober.      Treatment coordination activities this week:    Need for peer recovery support referral? Yes     Discharge Planning:  TBD    Has vulnerable adult status change? No    Interdisciplinary Clinical Supervision including: LADC, Mental health professional and Supervisor/manager    Are Treatment Plan goals/objectives effective? Yes  *If no, list changes to treatment plan:    Are the current goals meeting client's needs? Yes  *If no, list the changes to treatment plan.      *Client agrees with any changes to the treatment plan: N/A  *Client received copy of changes: N/A  *Client is aware of right to access a treatment plan review: Yes

## 2023-04-25 ENCOUNTER — DOCUMENTATION ONLY (OUTPATIENT)
Dept: ADDICTION MEDICINE | Facility: HOSPITAL | Age: 38
End: 2023-04-25
Payer: COMMERCIAL

## 2023-04-25 NOTE — PROGRESS NOTES
"D) Email from the patient at 12:08 pm  I) Patient wrote: \"I think I am going to exit the [facility] program.  I think I need to find a treatment closer to me, because the hour / hour and a half commute is too long, and I probably need to find something in my neighborhood.  Thank you for understanding.\"  I) Patient was excused from today's group.    P) this writer to have a clinical staff meeting on patient's situation and find out if there are any solutions for his situation.    Brenda Wagner [facility] 4/25/223  2 pm     "

## 2023-04-26 ENCOUNTER — DOCUMENTATION ONLY (OUTPATIENT)
Dept: ADDICTION MEDICINE | Facility: HOSPITAL | Age: 38
End: 2023-04-26
Payer: COMMERCIAL

## 2023-04-26 NOTE — PROGRESS NOTES
Substance Use Disorder Discharge Summary       Name:  Morgan Bosch   :  Brenda Wagner [facility]   Admit Date: 2023   Discharge Date: 2023   :  1985   Hours Completed: 3   Initial Diagnosis:  303.90 (F10.20) Alcohol Use Disorder Severe  304.00 (F11.20) Opioid Use Disorder Moderate  304.40 (F15.20) Amphetamine Use Disorder Severe   Final Diagnosis:  303.90 (F10.20) Alcohol Use Disorder Severe  304.00 (F11.20) Opioid Use Disorder Moderate  304.40 (F15.20) Amphetamine Use Disorder Severe   Discharge Address:    [address] [place] [id number]   Funding Source:    [facility]     Program:  [facility]    Discharge Type:  AWOL    Patient was receiving residential services at the time of discharge:   NO      Reasons for and circumstances of service termination:  Patient submitted a resignation letter and quit treatment          Dimension/Course of Treatment/Individualized Care:   1.  Withdrawal Potential - Intake Risk level - 0, Discharge Risk level - 0  Narrative supporting risk description:  On the pogram admission date of 2023, patient reported his last use of alcohol and meth as a day before, 4/10/23. Alcohol-vodka, 1/4 to 1/2 liter a day. Meth-smoking, no specific amount.  Pt denied any withdrawal issues.   Pt acknowledged that he used to experience withdrawal symptoms in the past only when he consumed over 1/2 liters of vodka.  Pt reports he was hospitalized about 2 weeks ago due to kidney dysfunction after abusing fentanyl but his kidney is back to functionating again.      Treatment plan goals and progress towards those goals:  There is very limited information available due to patient only attending 1 group session and deciding to quit treatment.  Pt reported that as of 23 he had 6 days of sobriety from meth and the worst part of withdrawal symptoms were done.  Pt did not report any concern for current withdrawal issues at the time  of departure. Pt cited the reason for departure as difficulty with commuting.       2.  Biomedical Conditions and Complications - Intake Risk level - 1, Discharge Risk level - 1  Narrative supporting risk description:  At program initiation, patient reported recent emergency visit from late March 2023 that was related to kidney dysfunction caused by fentanyl abuse.  Medical record indicated he was hospitalized on 3/19/23 at [facility].   Pt denied of regular fentanyl use and said he did not plan to use it any more because the last two times were deadly: overdose in  Fall 2022 and received Narcan.  Pt reports no other health issues and states his kidney was  back to normal functioning.  Patient listed his primary care as [facility] but has not seen primary doctor for few years. Pt stated his goal was to return to his doctor.    Treatment plan goals and progress towards those goals:  Nothing changed during his outpatient treatment.  Pt left treatment after 1 group attendance.  Pt is recommended to re-establish his primary care.          3.  Emotional/Behavioral/Cognitive Conditions and Complications - Intake Risk level -  2, Discharge Risk level - 2   Narrative supporting risk description:  At program admission, patient reported mental health history of depression and anxiety. Pt denied suicide ideation in the last 30 days, however he admits he had a plan (suffocation in van) when he was 26 or 27.  Pt states he did not carry out the plan because he thought about his family.  Pt did not have any MH services established and he said he was open to start seeing the program psychotherapist.    Pt reported he had tried different approaches to his mental health management un the past including psych medications, but nothing worked and decided to self-medicate by street drugs and alcohol.  Pt states stake boarding was most helpful with improving his mood.  Pt currently has case  management through [employer], and they  found him his current public housing.    Treatment plan goals and progress towards those goals:  There was no change to this dimension.  Pt attended 1 group and abruptly decided to end the treatment.  Pt cited the reason as the challenge of commuting from [place] to [place].       4.  Readiness for Change - Intake Risk level - 0, Discharge Risk level - 2  Narrative supporting risk description:  At program admission, patient reported he came to treatment voluntarily and was not mandated.    Pt admitted that he has a minor theft charge and working with conversion program to get rid of his charge.   Treatment plan goals and progress towards those goals:  Patient attended 1 group and decided to leave treatment because of the challenge of commuting.       5.  Relapse/Continued Use/Continued Problem Potential - Intake Risk level -  3, Discharge Risk level - 3  Narrative supporting risk description:  At program admission, patient reported he tried 5-6 treatments in the past, no detox. Last use of alcohol and methamphetamine, a day before service initiation (4/10/2023). Pt stated his trigger was anxiety, either thinking too much about the future or the past.  Pt expressed his openness to start seeing program therapist to manage his anxiety symptoms.   Pt reports rarely uses Fentanyl but he experienced two deadly outcomes in the last 6 months: Overdose in Fall of 2022 and Narcan was used; kidney dysfunction after using Fentanyl from a friend.    Treatment plan goals and progress towards those goals:  Patient attended one group on 4/20/23 and reported he was sober for 6 days, making his last date of use as 4/15/23.  Even thought he appeared highly motivated to start treatment, he struggled to attend groups.  He attended his first group on 4/20 and decided to leave because of long distance and commuting between [place] and [place].       6.  Recovery Environment - Intake Risk level - 2, Discharge Risk level - 2,    Narrative supporting risk description:  Pt reported he lived independently at a public housing in [place] and his girlfriend visits him frequently.  Pt stated his girlfriend was currently struggling with fentanyl use and trying to get into an inpatient program.  Pt reports he has no income at this time and receiving some public assistance.  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   Pt reports his sister, mom, dad, and few sober friends are supportive.  Pt has prior experience with AA.  Pt states he likes to skate board.  Pt admits he has a minor theft charge and involved in a conversion program to get rid of the charge, but this treatment is not part of the program.    Treatment plan goals and progress towards those goals:  Nothing changed in this dimension.  Pt was only in treatment for 3 hours.       Strengths: family support, secure housing,   Needs: sobriety, mental health services,   Services Provided: Intake, assessment, treatment planning, education, group discussion, lecture.       Program Involvement: poor  Attendance: poor  Ability to relate in group/   Other program activities: fair  Assignment Completion: poor  Overall Behavior: poor  Reported Family/Significant   Other Involvement: NA    Prognosis: Guarded    Focus of Treatment / Discharge Recommendations    Personal Safety/ Management of Symptoms    * Follow your safety plan.  Report increased symptoms to your care team and /or go to the nearest Emergency Department.    * Call crisis lines as needed    [facility] [phone]                [facility]  [phone]  [facility] [phone]     [facility] [phone]                [facility] [phone]             [facility] [phone]  [facility] [phone]           National Suicide Prevention  [address]  [facility] [phone]                  [facility] [phone]  Suicide Prevention [phone]  Throughout  [place]: call **CRISIS  (**522433)  Crisis Text Line: is available 24/7 by texting [place] to 676142      Abstinence/Relapse Prevention  * Take all medicines as directed.  Carry a current list of medicines with you.  * Use coping skills: Remain contact with [facility]  * Do not use illicit (street) drugs, controlled substances (narcotics) or alcohol.    Develop/Improve Independent Living/Socialization Skills:     Community Resources/Supports and Discharge Planning:   Follow up with psychiatrist / main caregiver: Pt to establish.     Follow up with your therapist: Pt to establish.      Go to group therapy and / or support groups at: NA    See your medical doctor about: follow up after kidney problem    Other:  NA    Counselor Name and Title:  ANNE MARIE Curtis       Date:  4/26/2023  Time:  4:15 PM

## 2023-04-26 NOTE — PROGRESS NOTES
Addiction Outpatient Weekly Clinical Staffing     Morgan Bosch was staffed on 4/26/2023 . Morgan Bosch was staffed on recovery strengths, barriers and treatment progress.     Staff present: ANNE MARIE Bess, ANNE MARIE Curtis, ANNE MARIE Quintana, Rahel Grant [facility], [facility], [facility] , Kylah Shay [facility] and Donald Welander, [facility]    Discussed patient's resignation letter he wrote for outpatient treatment.  Pt will be discharged from outpatient treatment.      Date: 4/26/2023 Time: 4:09 PM    Staff Signature: ANNE MARIE Curtis

## 2023-08-09 ENCOUNTER — MYC REFILL (OUTPATIENT)
Dept: FAMILY MEDICINE | Facility: CLINIC | Age: 38
End: 2023-08-09
Payer: COMMERCIAL

## 2023-08-09 DIAGNOSIS — F15.10 METHAMPHETAMINE ABUSE (H): ICD-10-CM

## 2023-08-09 RX ORDER — GABAPENTIN 300 MG/1
300 CAPSULE ORAL 3 TIMES DAILY
Qty: 90 CAPSULE | Refills: 0 | Status: SHIPPED | OUTPATIENT
Start: 2023-08-09

## 2023-08-09 NOTE — TELEPHONE ENCOUNTER
Requested Prescriptions   Pending Prescriptions Disp Refills    gabapentin (NEURONTIN) 300 MG capsule 90 capsule 0     Sig: Take 1 capsule (300 mg) by mouth 3 times daily       There is no refill protocol information for this order      Routing refill request to provider for review/approval because:  Drug not on the G refill protocol       Dick House RN  [facility]

## 2023-12-20 NOTE — PLAN OF CARE
"[facility], [place]   Psychiatric Progress Note  Hospital Day: 10        Interim History:   The patient's care was discussed with the treatment team during the daily team meeting and/or staff's chart notes were reviewed.  Staff report patient continues to keep to self, disorganized, denying SI, appears suspicious or paranoid at times, denying pain or discomfort or ongoing constipation, declined to speak with mother on phone, slept 7 hours.     Upon interview, pt reports mood is \"doing okay thank you\", denies having any SI or HI, he asked writer how they were doing today. He denies having any current AH \"not right now\" did have some ongoing negative voices yesterday, denies VH. Tolerating medications, reviewed team is waiting to hear back from [facility] on if he needs to stay in the hospital past his 72HH which expires this evening, he states he would like to leave the hospital but also expressed uncertainity on if this would be a safe plan for him. He is open to being in the hospital and going to IRTS, including possibly staying voluntarily if petition for MH commitment not supported (though reported this in the past and then signed intent to discharge). Reports feeling well physically, no further constipation, believes he could have a BM today but has not yet used the bathroom. Writer inquired about notes indicating his mother call the unit, he states he does not wish to speak to anyone right now, including his family, outside of his CM. No additional concerns.          Medications:      busPIRone  5 mg Oral BID    lurasidone  40 mg Oral Daily with lunch    polyethylene glycol  17 g Oral Daily    QUEtiapine  300 mg Oral At Bedtime    senna-docusate  2 tablet Oral BID    cholecalciferol  50 mcg Oral Daily          Allergies:   No Known Allergies       Labs:     No results found for this or any previous visit (from the past 48 hour(s)).         Psychiatric Examination:     /78 (BP " "Problem: Depressive Symptoms  Goal: Depressive Symptoms  Signs and symptoms of listed problems will be absent or manageable.   Outcome: Improving     Met with pt for 1:1.  Pt presents with a full range affect.  Pt states he feels \"ok\".  PT was calm in the milieu and pleasant and calm with staff.  Pt has had increased time in the milieu and increased interactions with peers.     Pt denied all AH, VH, HI, and paranoia.  Pt denied all SI and SIB thoughts, urges and plans.  Pt did not participate in group.  During supper, pt approached writer asking for zyprexa, stating that the other pts were agitating him.  Pt looked distressed.       Pt refused HS medications, because he was already in bed.  Pt is looking forward to discharge and would like to d/c because he is bored.  Pt had no further concerns for staff.  Pt took a shower during the evening shift.  Will continue to monitor and assess.      " "Location: Left arm)   Pulse 90   Temp 98.2  F (36.8  C) (Oral)   Resp 18   Wt 133.7 kg (294 lb 11.2 oz)   SpO2 98%   BMI 39.97 kg/m    Weight is 294 lbs 11.2 oz  Body mass index is 39.97 kg/m .    Orthostatic Vitals       None          Appearance: awake, alert and fair grooming, wearing multiple layers of shirts  Attitude:   continues to be a bit guarded, somewhat cooperative   Eye Contact:  fair  Mood:   \"doing okay thank you\"  Affect:  mood congruent  Speech:  clear, coherent and normal prosody  Language: fluent and intact in English  Psychomotor, Gait, Musculoskeletal:  no evidence of tardive dyskinesia, dystonia, or tics  Thought Process:  disorganized, slight improvement today   Associations:  no loose associations  Thought Content:   denies current SI or HI, reports intermittent AH, appears internally preoccupied at times; paranoid; denies VH  Insight:  limited  Judgement:  limited  Oriented to:   person, place, month, year  Attention Span and Concentration:  fair  Recent and Remote Memory:  limited  Fund of Knowledge:  appropriate           Precautions:     Behavioral Orders   Procedures    Code 1 - Restrict to Unit    Routine Programming     As clinically indicated    Status 15     Every 15 minutes.    Suicide precautions     Patients on Suicide Precautions should have a Combination Diet ordered that includes a Diet selection(s) AND a Behavioral Tray selection for Safe Tray - with utensils, or Safe Tray - NO utensils            Diagnoses:      Schizoaffective disorder, bipolar type (H)  ARETHA  ASD  ADHD per chart  Cannabis use per chart  Alcohol use per chart  Constipation, unspecified constipation type  Hx of Vit D deficiency   Hypokalemia, improved  Leukocytosis, improved          Assessment & Plan:   Assessment and hospital summary:  32 y M with history of schizoaffective disorder, anxiety, autism and substance use who presented to the ED via EMS for SI and psychosis in context of medication " non-adherence. Medically cleared in ED, was initially agreeable to remain in ED then requested to leave, placed on 72HH, started on lurasidone to target mood and psychosis and pt requested to try a new medication. Admitted to 10N under 72HH. UDS negative, other admission labs ordered. PTA quetiapine continued to furhter target psychosis. Pt also on buspirone for anxiety and some Miralax that was started in ED for constipation, he also received enema in ED per his request. Has been on senna/colace PTA, this was resumed and miralax moved to PRN. Pt also requested to be on vitamin D, has not had recent level, this was ordered, standard daily dosing ordered at this time and will determine if patient again requires high dose replacement as he has in the past. On 12/11 patient agreeable to signing in as voluntary patient. Also reports needing a new place to discharge to as not feeling safe in previous residence, CTC to explore. Continues to have some disorganization along with paranoia and reports of AH, will continue to adjust medications for stabilization as indicated/tolerated. Pt signed 12 hour intent to discharge on 12/16, unable to identify any safe plan, ongoing disorganization, placed on 72HH. Petition for MH commitment and [facility] submitted 12/18.     Psychiatric treatment/interventions:  Medications:   -continue PTA quetiapine 300mg at bedtime for psychosis and mood  -continue lurasidone at 40mg daily, moved to lunch time due to need to take with food; medication started in ED for mood and psychosis, had planned to optimize though pts intake appears inconsistent, may need to consider alternative if not frequently taking with enough food.   -continue Vit D3 50mcg daily, started 12/11 per pts request, Vit D level 33  -continue buspirone 5mg BID for anxiety     -PRN hydroxyzine 25mg every 4 hours anxiety  -PRN lorazapam 1mg every 4 hours PO or IM for agitation   -PRN olanzapine 5-10mg PO or 10mg IM TID for  agitation, first line  -PRN trazodone 50mg at bedtime for sleep       -pt agreed to sign in voluntary 12/11, discontinued 72HH, pt signed intent to discharge on 12/16, placed on 72HH, petition for MH commitment with [facility] filed 12/18 with Julio Stinson, requested [facility] meds: Haldol, Olanzapine, Quetiapine, Lurasidone, Paliperidone       The risks, benefits, alternatives and side effects have been discussed and are understood by the patient.     Laboratory/Imaging: no new labs ordered     Patient will be treated in therapeutic milieu with appropriate individual and group therapies as described.     Medical treatment/interventions:  Medical concerns: Pt requests daily Vit D, has hx of Vit D deficiency requiring high dose replacement, ordered 50mcg daily and Vit D level as above. Pt also has hx of constipation, was on scheduled Senna/Colace PTA, will resume and move miralax to PRN and continue to monitor. Pt also with hypokalemia in ED and leukoctyosis, will repeat labs in AM and address as indicated. Repeat labs WNL on 12/12, Vit D level WNL will continue daily supplementation as above. Pt in consistent historian re: bowel movements, continuing to monitor closely. Reported having last BM 12/19.      Disposition Plan   Reason for ongoing admission: poses an imminent risk to self and is unable to care for self due to severe psychosis or robert  Discharge location:  TBD, pt reports not feeling safe to return to previous residence, may consider IRTS  Discharge Medications: not ordered  Follow-up Appointments: not scheduled  Legal Status:  Admitted on 72HH,signed in voluntary on 12/11, placed on 72HH on 12/16 after requesting to discharge, petition for MH commitment and [facility] filed 12/18 with Julio Stinson    This document is created with the help of Dragon dictation system.  All grammatical/typing errors or context distortion are unintentional and inherent to software.      Patient has been seen and evaluated by Chante alexander  DO Lillian.

## 2024-03-01 NOTE — DISCHARGE SUMMARY
Adult Dual Disorder Program   Discharge Summary/Instructions     Patient: Morgan Bosch MRN: [id number]  : 1985 Age:  34 year old Sex:  male    Admission Date: 6/3/2019  Discharge Date: 2019  Diagnosis: 295.70 (F33.2)Schizoaffective Disorder, Bipolar type  300.01 (F41.0) Panic Disorder  303.90 (F10.20) Alcohol use disorder, severe  304.40 (F15.250) Stimulant use disorder, Amphetamine-type substance, severe    Focus of Treatment / Discharge Recommendations: Please attend [place] alternatives to continue your sobriety journey, please continue biking to improve your mood.    Personal Safety/ Management of Symptoms:    * Follow your safety plan.  Report increased symptoms to your care team and/or use the crisis resources listed below.    Crisis Resources:    Suicide Prevention Lifeline: 2-706-466-TALK (3091)    Crisis Text Line Service (available 24 hours a day, 7 days a week): Text [place] to 295215    Call  **[facility] (485689) from a cell phone to talk to a team of professionals who can help you.  Crisis Services By [facility]: Phone Number:   [place]     [phone]   [place]    [phone]   [place]    [phone]   [place]    [phone]   [place]    [phone]   [place] [phone]   [place]     [phone]       Call 911 or go to my nearest emergency department.     Managing Symptoms / Abstinence / Preventing Relapse:    Take all medicines as directed.  Carry a current list of medicines with you.    Use coping skills: weighing the pros and cons, radical acceptance    Do not use illicit (street) drugs, controlled substances (narcotics) or alcohol.    Go to all appointments.    Report symptoms to your care team including: thoughts of suicide, loss of sleep, increased confusion, mood getting worse, feeling more aggressive.    Develop/Improve Independent Living/Socialization Skills: biking    Community Resources/Supports and Discharge Planning:    Follow up with psychiatrist / main caregiver: tbd    Next  I reviewed with the resident the medical history and the resident's findings on the physical examination.  I discussed with the resident the patient's diagnosis and concur with the plan.     visit: tbd    Follow up with your therapist: tbd   Next visit: tbd    Go to group therapy and / or support groups at: [facility]    See your medical doctor about:  Obtaining your psychiatry medications    Other:  na    Copy of summary sent to: pt      Client Signature:_________________________________   Date / Time:___________    Staff Signature:__________________________________   Date / Time:___________

## 2024-06-29 ENCOUNTER — HEALTH MAINTENANCE LETTER (OUTPATIENT)
Age: 39
End: 2024-06-29

## 2025-07-13 ENCOUNTER — HEALTH MAINTENANCE LETTER (OUTPATIENT)
Age: 40
End: 2025-07-13

## (undated) DEVICE — PREP CHLORAPREP 26ML TINTED ORANGE  260815

## (undated) DEVICE — ESU PENCIL SMOKE EVAC W/ROCKER SWITCH 0703-047-000

## (undated) DEVICE — Device

## (undated) DEVICE — SUCTION MANIFOLD NEPTUNE 2 SYS 1 PORT 702-025-000

## (undated) DEVICE — SPONGE LAP 18X18" X8435

## (undated) DEVICE — SYR BULB IRRIG 50ML LATEX FREE 0035280

## (undated) DEVICE — DRAPE C-ARM OEC MINI VIEW 6800   00-901917-01

## (undated) DEVICE — PACK SHOULDER CUSTOM ASC SOP15ASUMA

## (undated) DEVICE — BLADE KNIFE SURG 10 371110

## (undated) DEVICE — ESU ELEC NDL 1" E1552

## (undated) DEVICE — NDL 25GA 1.5" 305127

## (undated) DEVICE — DRAPE SHOULDER PACK SPLITS 29365

## (undated) DEVICE — SOL NACL 0.9% IRRIG 500ML BOTTLE 2F7123

## (undated) DEVICE — DRAPE STERI U 1015

## (undated) DEVICE — STRAP STIRRUP W/SLIP 30187-030

## (undated) DEVICE — COVER CAMERA IN-LIGHT DISP LT-C02

## (undated) DEVICE — ESU CLEANER TIP 31142717

## (undated) DEVICE — IMM KIT SHOULDER TMAX MASK FACE 7210559

## (undated) DEVICE — DRSG STERI STRIP 1/2X4" R1547

## (undated) DEVICE — IMM KIT SHOULDER STABILIZATION 7210573

## (undated) DEVICE — BLADE KNIFE SURG 15 371115

## (undated) DEVICE — LINEN ORTHO PACK 5446

## (undated) DEVICE — SU MONOCRYL 3-0 PS-2 18" UND Y497G

## (undated) DEVICE — GLOVE PROTEXIS POWDER FREE SMT 7.0  2D72PT70X

## (undated) DEVICE — SU MONOCRYL 4-0 PS-2 18" UND Y496G

## (undated) DEVICE — ESU GROUND PAD ADULT W/CORD E7507

## (undated) DEVICE — SU VICRYL 0 CT-1 27" UND J260H

## (undated) DEVICE — DRAPE U-POUCH 34X29" 1067

## (undated) DEVICE — GLOVE BIOGEL PI SZ 7.5 40875

## (undated) DEVICE — BRUSH SURGICAL SCRUB W/4% CHG SOL 25ML 371073

## (undated) DEVICE — SU MONOCRYL 2-0 SH 27" UND Y417H

## (undated) DEVICE — DRAPE IOBAN INCISE 23X17" 6650EZ

## (undated) RX ORDER — FENTANYL CITRATE 50 UG/ML
INJECTION, SOLUTION INTRAMUSCULAR; INTRAVENOUS
Status: DISPENSED
Start: 2020-10-07

## (undated) RX ORDER — IBUPROFEN 200 MG
TABLET ORAL
Status: DISPENSED
Start: 2020-10-07

## (undated) RX ORDER — GABAPENTIN 300 MG/1
CAPSULE ORAL
Status: DISPENSED
Start: 2020-10-07

## (undated) RX ORDER — PROPOFOL 10 MG/ML
INJECTION, EMULSION INTRAVENOUS
Status: DISPENSED
Start: 2020-10-07

## (undated) RX ORDER — ACETAMINOPHEN 325 MG/1
TABLET ORAL
Status: DISPENSED
Start: 2020-10-07

## (undated) RX ORDER — CEFAZOLIN SODIUM 1 G/3ML
INJECTION, POWDER, FOR SOLUTION INTRAMUSCULAR; INTRAVENOUS
Status: DISPENSED
Start: 2020-10-07

## (undated) RX ORDER — EPINEPHRINE 1 MG/ML
INJECTION, SOLUTION, CONCENTRATE INTRAVENOUS
Status: DISPENSED
Start: 2020-10-07

## (undated) RX ORDER — BUPIVACAINE HYDROCHLORIDE 2.5 MG/ML
INJECTION, SOLUTION EPIDURAL; INFILTRATION; INTRACAUDAL
Status: DISPENSED
Start: 2020-10-07

## (undated) RX ORDER — BUPIVACAINE HYDROCHLORIDE 5 MG/ML
INJECTION, SOLUTION EPIDURAL; INTRACAUDAL
Status: DISPENSED
Start: 2020-10-07